# Patient Record
Sex: MALE | Race: OTHER | Employment: OTHER | URBAN - METROPOLITAN AREA
[De-identification: names, ages, dates, MRNs, and addresses within clinical notes are randomized per-mention and may not be internally consistent; named-entity substitution may affect disease eponyms.]

---

## 2017-02-17 PROBLEM — K59.09 CHRONIC CONSTIPATION: Status: ACTIVE | Noted: 2017-02-17

## 2017-02-17 PROBLEM — I25.9 ISCHEMIC HEART DISEASE DUE TO CORONARY ARTERY OBSTRUCTION (HCC): Status: ACTIVE | Noted: 2017-02-17

## 2017-02-17 PROBLEM — I24.0 ISCHEMIC HEART DISEASE DUE TO CORONARY ARTERY OBSTRUCTION (HCC): Status: ACTIVE | Noted: 2017-02-17

## 2017-07-19 DIAGNOSIS — I24.0 ISCHEMIC HEART DISEASE DUE TO CORONARY ARTERY OBSTRUCTION (HCC): ICD-10-CM

## 2017-07-19 DIAGNOSIS — I25.9 ISCHEMIC HEART DISEASE DUE TO CORONARY ARTERY OBSTRUCTION (HCC): ICD-10-CM

## 2017-07-19 LAB
HCT VFR BLD CALC: 28.2 % (ref 40.5–52.5)
HEMOGLOBIN: 9 G/DL (ref 13.5–17.5)
MCH RBC QN AUTO: 28.5 PG (ref 26–34)
MCHC RBC AUTO-ENTMCNC: 32.1 G/DL (ref 31–36)
MCV RBC AUTO: 88.7 FL (ref 80–100)
PDW BLD-RTO: 15.9 % (ref 12.4–15.4)
PLATELET # BLD: 198 K/UL (ref 135–450)
PMV BLD AUTO: 9.4 FL (ref 5–10.5)
RBC # BLD: 3.18 M/UL (ref 4.2–5.9)
WBC # BLD: 7.7 K/UL (ref 4–11)

## 2017-08-17 DIAGNOSIS — N18.31 CHRONIC KIDNEY DISEASE (CKD) STAGE G3A/A1, MODERATELY DECREASED GLOMERULAR FILTRATION RATE (GFR) BETWEEN 45-59 ML/MIN/1.73 SQUARE METER AND ALBUMINURIA CREATININE RATIO LESS THAN 30 MG/G (HCC): ICD-10-CM

## 2017-08-17 DIAGNOSIS — E87.5 HYPERKALEMIA: ICD-10-CM

## 2017-08-17 LAB
ALBUMIN SERPL-MCNC: 4.3 G/DL (ref 3.4–5)
ANION GAP SERPL CALCULATED.3IONS-SCNC: 14 MMOL/L (ref 3–16)
BUN BLDV-MCNC: 41 MG/DL (ref 7–20)
CALCIUM SERPL-MCNC: 9.1 MG/DL (ref 8.3–10.6)
CHLORIDE BLD-SCNC: 108 MMOL/L (ref 99–110)
CO2: 20 MMOL/L (ref 21–32)
CREAT SERPL-MCNC: 3 MG/DL (ref 0.8–1.3)
GFR AFRICAN AMERICAN: 24
GFR NON-AFRICAN AMERICAN: 20
GLUCOSE BLD-MCNC: 106 MG/DL (ref 70–99)
PHOSPHORUS: 3.7 MG/DL (ref 2.5–4.9)
POTASSIUM SERPL-SCNC: 5.1 MMOL/L (ref 3.5–5.1)
SODIUM BLD-SCNC: 142 MMOL/L (ref 136–145)

## 2017-09-13 PROBLEM — Z87.891 FORMER CIGARETTE SMOKER: Status: ACTIVE | Noted: 2017-09-13

## 2017-12-04 PROBLEM — R10.13 EPIGASTRIC PAIN: Status: ACTIVE | Noted: 2017-12-04

## 2017-12-04 PROBLEM — K43.9 VENTRAL HERNIA: Status: ACTIVE | Noted: 2017-12-04

## 2018-09-07 PROBLEM — D63.8 ANEMIA IN OTHER CHRONIC DISEASES CLASSIFIED ELSEWHERE: Status: ACTIVE | Noted: 2018-09-07

## 2018-09-25 DIAGNOSIS — E78.5 HYPERLIPIDEMIA LDL GOAL <70: ICD-10-CM

## 2018-09-25 LAB
ALT SERPL-CCNC: 16 U/L (ref 10–40)
AST SERPL-CCNC: 18 U/L (ref 15–37)
CHOLESTEROL, FASTING: 125 MG/DL (ref 0–199)
HDLC SERPL-MCNC: 55 MG/DL (ref 40–60)
LDL CHOLESTEROL CALCULATED: 46 MG/DL
TRIGLYCERIDE, FASTING: 119 MG/DL (ref 0–150)
VLDLC SERPL CALC-MCNC: 24 MG/DL

## 2018-12-14 PROBLEM — N18.6 ESRD (END STAGE RENAL DISEASE) (HCC): Status: ACTIVE | Noted: 2018-12-14

## 2019-03-21 PROBLEM — N18.6 ESRD (END STAGE RENAL DISEASE) (HCC): Status: RESOLVED | Noted: 2018-12-14 | Resolved: 2019-03-21

## 2019-09-19 DIAGNOSIS — R53.83 FATIGUE, UNSPECIFIED TYPE: ICD-10-CM

## 2019-09-19 LAB — TSH REFLEX FT4: 1.74 UIU/ML (ref 0.27–4.2)

## 2019-09-25 PROBLEM — R53.83 FATIGUE: Status: ACTIVE | Noted: 2019-09-25

## 2019-09-25 PROBLEM — F32.1 CURRENT MODERATE EPISODE OF MAJOR DEPRESSIVE DISORDER WITHOUT PRIOR EPISODE (HCC): Status: ACTIVE | Noted: 2019-09-25

## 2019-09-25 PROBLEM — G47.9 SLEEP DISORDER: Status: ACTIVE | Noted: 2019-09-25

## 2019-10-31 PROBLEM — R35.0 URINARY FREQUENCY: Status: ACTIVE | Noted: 2019-10-31

## 2019-10-31 PROBLEM — R35.1 NOCTURIA: Status: ACTIVE | Noted: 2019-10-31

## 2020-09-03 PROBLEM — R29.6 FREQUENT FALLS: Status: ACTIVE | Noted: 2020-09-03

## 2020-09-03 PROBLEM — R27.0 ATAXIA: Status: ACTIVE | Noted: 2020-09-03

## 2020-09-03 PROBLEM — N18.4 CHRONIC KIDNEY DISEASE, STAGE IV (SEVERE) (HCC): Status: ACTIVE | Noted: 2020-09-03

## 2020-10-05 DIAGNOSIS — N18.31 CHRONIC KIDNEY DISEASE (CKD) STAGE G3A/A1, MODERATELY DECREASED GLOMERULAR FILTRATION RATE (GFR) BETWEEN 45-59 ML/MIN/1.73 SQUARE METER AND ALBUMINURIA CREATININE RATIO LESS THAN 30 MG/G (HCC): ICD-10-CM

## 2020-10-05 DIAGNOSIS — E55.9 VITAMIN D DEFICIENCY: ICD-10-CM

## 2020-10-05 DIAGNOSIS — E78.2 MIXED HYPERLIPIDEMIA: ICD-10-CM

## 2020-10-05 DIAGNOSIS — I10 ESSENTIAL HYPERTENSION: ICD-10-CM

## 2020-10-05 DIAGNOSIS — Z95.5 STENTED CORONARY ARTERY: ICD-10-CM

## 2020-10-05 LAB
A/G RATIO: 1.6 (ref 1.1–2.2)
ALBUMIN SERPL-MCNC: 4.1 G/DL (ref 3.4–5)
ALP BLD-CCNC: 81 U/L (ref 40–129)
ALT SERPL-CCNC: 18 U/L (ref 10–40)
ANION GAP SERPL CALCULATED.3IONS-SCNC: 14 MMOL/L (ref 3–16)
AST SERPL-CCNC: 17 U/L (ref 15–37)
BILIRUB SERPL-MCNC: 0.3 MG/DL (ref 0–1)
BUN BLDV-MCNC: 39 MG/DL (ref 7–20)
CALCIUM SERPL-MCNC: 9.6 MG/DL (ref 8.3–10.6)
CHLORIDE BLD-SCNC: 111 MMOL/L (ref 99–110)
CHOLESTEROL, TOTAL: 120 MG/DL (ref 0–199)
CO2: 20 MMOL/L (ref 21–32)
CREAT SERPL-MCNC: 2.8 MG/DL (ref 0.8–1.3)
GFR AFRICAN AMERICAN: 26
GFR NON-AFRICAN AMERICAN: 21
GLOBULIN: 2.6 G/DL
GLUCOSE BLD-MCNC: 105 MG/DL (ref 70–99)
HDLC SERPL-MCNC: 55 MG/DL (ref 40–60)
LDL CHOLESTEROL CALCULATED: 39 MG/DL
POTASSIUM SERPL-SCNC: 5.8 MMOL/L (ref 3.5–5.1)
SODIUM BLD-SCNC: 145 MMOL/L (ref 136–145)
TOTAL PROTEIN: 6.7 G/DL (ref 6.4–8.2)
TRIGL SERPL-MCNC: 130 MG/DL (ref 0–150)
VITAMIN D 25-HYDROXY: 44 NG/ML
VLDLC SERPL CALC-MCNC: 26 MG/DL

## 2021-03-10 PROBLEM — G89.4 CHRONIC PAIN SYNDROME: Status: ACTIVE | Noted: 2021-03-10

## 2021-06-10 PROBLEM — R00.1 BRADYCARDIA: Status: ACTIVE | Noted: 2021-06-10

## 2021-08-05 PROBLEM — N18.4 ANEMIA IN STAGE 4 CHRONIC KIDNEY DISEASE (HCC): Status: ACTIVE | Noted: 2021-08-05

## 2021-11-05 DIAGNOSIS — I25.9 ISCHEMIC HEART DISEASE DUE TO CORONARY ARTERY OBSTRUCTION (HCC): ICD-10-CM

## 2021-11-05 DIAGNOSIS — I10 ESSENTIAL HYPERTENSION: ICD-10-CM

## 2021-11-05 DIAGNOSIS — E55.9 VITAMIN D DEFICIENCY: ICD-10-CM

## 2021-11-05 DIAGNOSIS — E78.2 MIXED HYPERLIPIDEMIA: ICD-10-CM

## 2021-11-05 DIAGNOSIS — I24.0 ISCHEMIC HEART DISEASE DUE TO CORONARY ARTERY OBSTRUCTION (HCC): ICD-10-CM

## 2021-11-05 LAB
A/G RATIO: 1.5 (ref 1.1–2.2)
ALBUMIN SERPL-MCNC: 4 G/DL (ref 3.4–5)
ALP BLD-CCNC: 78 U/L (ref 40–129)
ALT SERPL-CCNC: 30 U/L (ref 10–40)
ANION GAP SERPL CALCULATED.3IONS-SCNC: 9 MMOL/L (ref 3–16)
AST SERPL-CCNC: 29 U/L (ref 15–37)
BILIRUB SERPL-MCNC: 0.3 MG/DL (ref 0–1)
BUN BLDV-MCNC: 34 MG/DL (ref 7–20)
CALCIUM SERPL-MCNC: 9.2 MG/DL (ref 8.3–10.6)
CHLORIDE BLD-SCNC: 106 MMOL/L (ref 99–110)
CHOLESTEROL, TOTAL: 117 MG/DL (ref 0–199)
CO2: 22 MMOL/L (ref 21–32)
CREAT SERPL-MCNC: 3 MG/DL (ref 0.8–1.3)
GFR AFRICAN AMERICAN: 24
GFR NON-AFRICAN AMERICAN: 20
GLUCOSE BLD-MCNC: 98 MG/DL (ref 70–99)
HDLC SERPL-MCNC: 58 MG/DL (ref 40–60)
LDL CHOLESTEROL CALCULATED: 44 MG/DL
POTASSIUM SERPL-SCNC: 5.6 MMOL/L (ref 3.5–5.1)
SODIUM BLD-SCNC: 137 MMOL/L (ref 136–145)
TOTAL PROTEIN: 6.7 G/DL (ref 6.4–8.2)
TRIGL SERPL-MCNC: 75 MG/DL (ref 0–150)
VITAMIN D 25-HYDROXY: 45.5 NG/ML
VLDLC SERPL CALC-MCNC: 15 MG/DL

## 2022-01-03 ENCOUNTER — TELEPHONE (OUTPATIENT)
Dept: INTERNAL MEDICINE CLINIC | Age: 87
End: 2022-01-03

## 2022-01-03 PROBLEM — E87.5 HYPERKALEMIA: Status: ACTIVE | Noted: 2022-01-03

## 2022-04-04 PROBLEM — G89.29 CHRONIC MIDLINE LOW BACK PAIN: Status: ACTIVE | Noted: 2022-04-04

## 2022-04-04 PROBLEM — M54.50 CHRONIC MIDLINE LOW BACK PAIN: Status: ACTIVE | Noted: 2022-04-04

## 2023-01-05 ENCOUNTER — APPOINTMENT (OUTPATIENT)
Dept: GENERAL RADIOLOGY | Age: 88
DRG: 871 | End: 2023-01-05
Payer: MEDICARE

## 2023-01-05 ENCOUNTER — HOSPITAL ENCOUNTER (INPATIENT)
Age: 88
LOS: 18 days | Discharge: NURSING, REHAB OR LONG-TERM CARE | DRG: 871 | End: 2023-01-23
Attending: EMERGENCY MEDICINE | Admitting: INTERNAL MEDICINE
Payer: MEDICARE

## 2023-01-05 ENCOUNTER — HOSPITAL ENCOUNTER (EMERGENCY)
Age: 88
Discharge: STILL ADMITTED OR ADMINISTRATIVE | DRG: 871 | End: 2023-01-06
Payer: MEDICARE

## 2023-01-05 VITALS
RESPIRATION RATE: 25 BRPM | DIASTOLIC BLOOD PRESSURE: 64 MMHG | BODY MASS INDEX: 24.84 KG/M2 | HEART RATE: 76 BPM | WEIGHT: 154.54 LBS | OXYGEN SATURATION: 93 % | TEMPERATURE: 98 F | SYSTOLIC BLOOD PRESSURE: 131 MMHG | HEIGHT: 66 IN

## 2023-01-05 DIAGNOSIS — Z78.9 IMPAIRED MOBILITY AND ADLS: ICD-10-CM

## 2023-01-05 DIAGNOSIS — N18.9 ACUTE KIDNEY INJURY SUPERIMPOSED ON CKD (HCC): ICD-10-CM

## 2023-01-05 DIAGNOSIS — J15.9 BACTERIAL PNEUMONIA: ICD-10-CM

## 2023-01-05 DIAGNOSIS — J18.9 PNEUMONIA OF RIGHT LUNG DUE TO INFECTIOUS ORGANISM, UNSPECIFIED PART OF LUNG: Primary | ICD-10-CM

## 2023-01-05 DIAGNOSIS — R53.1 GENERAL WEAKNESS: ICD-10-CM

## 2023-01-05 DIAGNOSIS — Z74.09 IMPAIRED MOBILITY AND ADLS: ICD-10-CM

## 2023-01-05 DIAGNOSIS — N17.9 ACUTE KIDNEY INJURY SUPERIMPOSED ON CKD (HCC): ICD-10-CM

## 2023-01-05 DIAGNOSIS — U07.1 COVID-19: ICD-10-CM

## 2023-01-05 PROBLEM — R06.02 SOB (SHORTNESS OF BREATH): Status: ACTIVE | Noted: 2023-01-05

## 2023-01-05 LAB
A/G RATIO: 0.8 (ref 1.1–2.2)
ALBUMIN SERPL-MCNC: 3.2 G/DL (ref 3.4–5)
ALP BLD-CCNC: 74 U/L (ref 40–129)
ALT SERPL-CCNC: 22 U/L (ref 10–40)
ANION GAP SERPL CALCULATED.3IONS-SCNC: 13 MMOL/L (ref 3–16)
AST SERPL-CCNC: 31 U/L (ref 15–37)
BACTERIA: ABNORMAL /HPF
BASOPHILS ABSOLUTE: 0 K/UL (ref 0–0.2)
BASOPHILS RELATIVE PERCENT: 0.1 %
BILIRUB SERPL-MCNC: 0.5 MG/DL (ref 0–1)
BILIRUBIN URINE: NEGATIVE
BLOOD, URINE: ABNORMAL
BUN BLDV-MCNC: 78 MG/DL (ref 7–20)
CALCIUM SERPL-MCNC: 9.2 MG/DL (ref 8.3–10.6)
CHLORIDE BLD-SCNC: 111 MMOL/L (ref 99–110)
CLARITY: CLEAR
CO2: 17 MMOL/L (ref 21–32)
COLOR: YELLOW
CREAT SERPL-MCNC: 4.3 MG/DL (ref 0.8–1.3)
EOSINOPHILS ABSOLUTE: 0 K/UL (ref 0–0.6)
EOSINOPHILS RELATIVE PERCENT: 0 %
EPITHELIAL CELLS, UA: 2 /HPF (ref 0–5)
GFR SERPL CREATININE-BSD FRML MDRD: 12 ML/MIN/{1.73_M2}
GLUCOSE BLD-MCNC: 116 MG/DL (ref 70–99)
GLUCOSE URINE: NEGATIVE MG/DL
HCT VFR BLD CALC: 34.6 % (ref 40.5–52.5)
HEMOGLOBIN: 10.5 G/DL (ref 13.5–17.5)
HYALINE CASTS: 1 /LPF (ref 0–8)
KETONES, URINE: NEGATIVE MG/DL
LACTIC ACID, SEPSIS: 1.2 MMOL/L (ref 0.4–1.9)
LACTIC ACID, SEPSIS: 2.3 MMOL/L (ref 0.4–1.9)
LEUKOCYTE ESTERASE, URINE: ABNORMAL
LYMPHOCYTES ABSOLUTE: 1.2 K/UL (ref 1–5.1)
LYMPHOCYTES RELATIVE PERCENT: 8.3 %
MCH RBC QN AUTO: 28.5 PG (ref 26–34)
MCHC RBC AUTO-ENTMCNC: 30.4 G/DL (ref 31–36)
MCV RBC AUTO: 93.8 FL (ref 80–100)
MICROSCOPIC EXAMINATION: YES
MONOCYTES ABSOLUTE: 0.8 K/UL (ref 0–1.3)
MONOCYTES RELATIVE PERCENT: 5.9 %
NEUTROPHILS ABSOLUTE: 12.1 K/UL (ref 1.7–7.7)
NEUTROPHILS RELATIVE PERCENT: 85.7 %
NITRITE, URINE: NEGATIVE
PDW BLD-RTO: 15.1 % (ref 12.4–15.4)
PH UA: 5.5 (ref 5–8)
PLATELET # BLD: 150 K/UL (ref 135–450)
PMV BLD AUTO: 10 FL (ref 5–10.5)
POTASSIUM REFLEX MAGNESIUM: 5.3 MMOL/L (ref 3.5–5.1)
PROTEIN UA: 100 MG/DL
RAPID INFLUENZA  B AGN: NEGATIVE
RAPID INFLUENZA A AGN: NEGATIVE
RBC # BLD: 3.68 M/UL (ref 4.2–5.9)
RBC UA: 1106 /HPF (ref 0–4)
REASON FOR REJECTION: NORMAL
REJECTED TEST: NORMAL
SARS-COV-2, NAAT: DETECTED
SODIUM BLD-SCNC: 141 MMOL/L (ref 136–145)
SPECIFIC GRAVITY UA: 1.01 (ref 1–1.03)
TOTAL PROTEIN: 7 G/DL (ref 6.4–8.2)
TROPONIN: 0.02 NG/ML
URINE REFLEX TO CULTURE: ABNORMAL
URINE TYPE: ABNORMAL
UROBILINOGEN, URINE: 0.2 E.U./DL
WBC # BLD: 14.2 K/UL (ref 4–11)
WBC UA: 6 /HPF (ref 0–5)

## 2023-01-05 PROCEDURE — 80053 COMPREHEN METABOLIC PANEL: CPT

## 2023-01-05 PROCEDURE — 36415 COLL VENOUS BLD VENIPUNCTURE: CPT

## 2023-01-05 PROCEDURE — 1200000000 HC SEMI PRIVATE

## 2023-01-05 PROCEDURE — 6370000000 HC RX 637 (ALT 250 FOR IP): Performed by: INTERNAL MEDICINE

## 2023-01-05 PROCEDURE — 51798 US URINE CAPACITY MEASURE: CPT

## 2023-01-05 PROCEDURE — 6360000002 HC RX W HCPCS: Performed by: PHYSICIAN ASSISTANT

## 2023-01-05 PROCEDURE — 6360000002 HC RX W HCPCS

## 2023-01-05 PROCEDURE — 84484 ASSAY OF TROPONIN QUANT: CPT

## 2023-01-05 PROCEDURE — 93005 ELECTROCARDIOGRAM TRACING: CPT | Performed by: PHYSICIAN ASSISTANT

## 2023-01-05 PROCEDURE — 87635 SARS-COV-2 COVID-19 AMP PRB: CPT

## 2023-01-05 PROCEDURE — 2580000003 HC RX 258: Performed by: PHYSICIAN ASSISTANT

## 2023-01-05 PROCEDURE — 87804 INFLUENZA ASSAY W/OPTIC: CPT

## 2023-01-05 PROCEDURE — 99285 EMERGENCY DEPT VISIT HI MDM: CPT

## 2023-01-05 PROCEDURE — 96365 THER/PROPH/DIAG IV INF INIT: CPT

## 2023-01-05 PROCEDURE — 83605 ASSAY OF LACTIC ACID: CPT

## 2023-01-05 PROCEDURE — 71045 X-RAY EXAM CHEST 1 VIEW: CPT

## 2023-01-05 PROCEDURE — 2580000003 HC RX 258: Performed by: INTERNAL MEDICINE

## 2023-01-05 PROCEDURE — 96367 TX/PROPH/DG ADDL SEQ IV INF: CPT

## 2023-01-05 PROCEDURE — 85025 COMPLETE CBC W/AUTO DIFF WBC: CPT

## 2023-01-05 PROCEDURE — 87040 BLOOD CULTURE FOR BACTERIA: CPT

## 2023-01-05 PROCEDURE — 81001 URINALYSIS AUTO W/SCOPE: CPT

## 2023-01-05 RX ORDER — SODIUM CHLORIDE 0.9 % (FLUSH) 0.9 %
10 SYRINGE (ML) INJECTION EVERY 12 HOURS SCHEDULED
Status: DISCONTINUED | OUTPATIENT
Start: 2023-01-05 | End: 2023-01-23 | Stop reason: HOSPADM

## 2023-01-05 RX ORDER — ASPIRIN 81 MG/1
81 TABLET ORAL DAILY
Status: DISCONTINUED | OUTPATIENT
Start: 2023-01-06 | End: 2023-01-15

## 2023-01-05 RX ORDER — POLYETHYLENE GLYCOL 3350 17 G/17G
17 POWDER, FOR SOLUTION ORAL DAILY
Status: DISCONTINUED | OUTPATIENT
Start: 2023-01-06 | End: 2023-01-23 | Stop reason: HOSPADM

## 2023-01-05 RX ORDER — PANTOPRAZOLE SODIUM 40 MG/1
40 TABLET, DELAYED RELEASE ORAL
Status: DISCONTINUED | OUTPATIENT
Start: 2023-01-06 | End: 2023-01-09

## 2023-01-05 RX ORDER — ENOXAPARIN SODIUM 100 MG/ML
40 INJECTION SUBCUTANEOUS DAILY
Status: CANCELLED | OUTPATIENT
Start: 2023-01-05

## 2023-01-05 RX ORDER — TRAMADOL HYDROCHLORIDE 50 MG/1
50 TABLET ORAL EVERY 8 HOURS PRN
Status: DISCONTINUED | OUTPATIENT
Start: 2023-01-05 | End: 2023-01-23 | Stop reason: HOSPADM

## 2023-01-05 RX ORDER — 0.9 % SODIUM CHLORIDE 0.9 %
500 INTRAVENOUS SOLUTION INTRAVENOUS ONCE
Status: COMPLETED | OUTPATIENT
Start: 2023-01-05 | End: 2023-01-05

## 2023-01-05 RX ORDER — TRAMADOL HYDROCHLORIDE 50 MG/1
50 TABLET ORAL EVERY 8 HOURS PRN
Status: ON HOLD | COMMUNITY
End: 2023-01-23 | Stop reason: SDUPTHER

## 2023-01-05 RX ORDER — NITROGLYCERIN 0.4 MG/1
0.4 TABLET SUBLINGUAL EVERY 5 MIN PRN
Status: DISCONTINUED | OUTPATIENT
Start: 2023-01-05 | End: 2023-01-23 | Stop reason: HOSPADM

## 2023-01-05 RX ORDER — SODIUM CHLORIDE 9 MG/ML
INJECTION, SOLUTION INTRAVENOUS CONTINUOUS
Status: DISCONTINUED | OUTPATIENT
Start: 2023-01-05 | End: 2023-01-06

## 2023-01-05 RX ORDER — PROMETHAZINE HYDROCHLORIDE 25 MG/1
12.5 TABLET ORAL EVERY 6 HOURS PRN
Status: DISCONTINUED | OUTPATIENT
Start: 2023-01-05 | End: 2023-01-23 | Stop reason: HOSPADM

## 2023-01-05 RX ORDER — ATORVASTATIN CALCIUM 40 MG/1
40 TABLET, FILM COATED ORAL NIGHTLY
Status: DISCONTINUED | OUTPATIENT
Start: 2023-01-05 | End: 2023-01-23 | Stop reason: HOSPADM

## 2023-01-05 RX ORDER — SODIUM CHLORIDE 9 MG/ML
INJECTION, SOLUTION INTRAVENOUS PRN
Status: DISCONTINUED | OUTPATIENT
Start: 2023-01-05 | End: 2023-01-23 | Stop reason: HOSPADM

## 2023-01-05 RX ORDER — ACETAMINOPHEN 650 MG/1
650 SUPPOSITORY RECTAL EVERY 6 HOURS PRN
Status: DISCONTINUED | OUTPATIENT
Start: 2023-01-05 | End: 2023-01-23 | Stop reason: HOSPADM

## 2023-01-05 RX ORDER — ONDANSETRON 2 MG/ML
4 INJECTION INTRAMUSCULAR; INTRAVENOUS EVERY 6 HOURS PRN
Status: DISCONTINUED | OUTPATIENT
Start: 2023-01-05 | End: 2023-01-23 | Stop reason: HOSPADM

## 2023-01-05 RX ORDER — SENNA PLUS 8.6 MG/1
1 TABLET ORAL 2 TIMES DAILY
Status: DISCONTINUED | OUTPATIENT
Start: 2023-01-05 | End: 2023-01-23 | Stop reason: HOSPADM

## 2023-01-05 RX ORDER — MAGNESIUM SULFATE IN WATER 40 MG/ML
2000 INJECTION, SOLUTION INTRAVENOUS PRN
Status: CANCELLED | OUTPATIENT
Start: 2023-01-05

## 2023-01-05 RX ORDER — POTASSIUM CHLORIDE 20 MEQ/1
40 TABLET, EXTENDED RELEASE ORAL PRN
Status: CANCELLED | OUTPATIENT
Start: 2023-01-05

## 2023-01-05 RX ORDER — ESCITALOPRAM OXALATE 10 MG/1
10 TABLET ORAL NIGHTLY
Status: DISCONTINUED | OUTPATIENT
Start: 2023-01-05 | End: 2023-01-23 | Stop reason: HOSPADM

## 2023-01-05 RX ORDER — POTASSIUM CHLORIDE 7.45 MG/ML
10 INJECTION INTRAVENOUS PRN
Status: CANCELLED | OUTPATIENT
Start: 2023-01-05

## 2023-01-05 RX ORDER — CARVEDILOL 3.12 MG/1
3.12 TABLET ORAL 2 TIMES DAILY WITH MEALS
Status: DISCONTINUED | OUTPATIENT
Start: 2023-01-05 | End: 2023-01-15

## 2023-01-05 RX ORDER — ACETAMINOPHEN 325 MG/1
650 TABLET ORAL EVERY 6 HOURS PRN
Status: DISCONTINUED | OUTPATIENT
Start: 2023-01-05 | End: 2023-01-23 | Stop reason: HOSPADM

## 2023-01-05 RX ORDER — TAMSULOSIN HYDROCHLORIDE 0.4 MG/1
0.4 CAPSULE ORAL NIGHTLY
Status: DISCONTINUED | OUTPATIENT
Start: 2023-01-05 | End: 2023-01-23 | Stop reason: HOSPADM

## 2023-01-05 RX ORDER — SODIUM CHLORIDE 0.9 % (FLUSH) 0.9 %
10 SYRINGE (ML) INJECTION PRN
Status: DISCONTINUED | OUTPATIENT
Start: 2023-01-05 | End: 2023-01-23 | Stop reason: HOSPADM

## 2023-01-05 RX ADMIN — ATORVASTATIN CALCIUM 40 MG: 40 TABLET, FILM COATED ORAL at 23:26

## 2023-01-05 RX ADMIN — AZITHROMYCIN MONOHYDRATE 500 MG: 500 INJECTION, POWDER, LYOPHILIZED, FOR SOLUTION INTRAVENOUS at 17:11

## 2023-01-05 RX ADMIN — SODIUM CHLORIDE: 9 INJECTION, SOLUTION INTRAVENOUS at 23:20

## 2023-01-05 RX ADMIN — TAMSULOSIN HYDROCHLORIDE 0.4 MG: 0.4 CAPSULE ORAL at 23:26

## 2023-01-05 RX ADMIN — CARVEDILOL 3.12 MG: 3.12 TABLET, FILM COATED ORAL at 23:26

## 2023-01-05 RX ADMIN — SENNOSIDES 8.6 MG: 8.6 TABLET, FILM COATED ORAL at 23:26

## 2023-01-05 RX ADMIN — CEFTRIAXONE 1000 MG: 1 INJECTION, POWDER, FOR SOLUTION INTRAMUSCULAR; INTRAVENOUS at 16:32

## 2023-01-05 RX ADMIN — SODIUM CHLORIDE 500 ML: 9 INJECTION, SOLUTION INTRAVENOUS at 16:30

## 2023-01-05 RX ADMIN — ESCITALOPRAM OXALATE 10 MG: 10 TABLET ORAL at 23:26

## 2023-01-05 RX ADMIN — SODIUM CHLORIDE, PRESERVATIVE FREE 10 ML: 5 INJECTION INTRAVENOUS at 23:21

## 2023-01-05 ASSESSMENT — PAIN - FUNCTIONAL ASSESSMENT
PAIN_FUNCTIONAL_ASSESSMENT: NONE - DENIES PAIN
PAIN_FUNCTIONAL_ASSESSMENT: NONE - DENIES PAIN

## 2023-01-05 NOTE — ED NOTES
Will be a slight delay in care d/t pt. Having 2 's listed and has had trouble with this for the past 50yrs. Charts may have to be merged.       Tiffany Chung RN  [phone] [facility] Clearance Poll  [phone]

## 2023-01-05 NOTE — PROGRESS NOTES
Medication Reconciliation    List of medications patient is currently taking is complete. Source of information: 1. Conversation with patient's family at bedside                                      2. EPIC records      Allergies  Patient has no known allergies. Notes regarding home medications:   1.  Patient hasn't had medications since yesterday morning      [facility], [facility]   1/5/2023  4:11 PM

## 2023-01-05 NOTE — ED PROVIDER NOTES
[address] [facility]        Pt Name: Zeinab Holland  MRN: [id number]  [place] 7/31/1931  Date of evaluation: 1/5/2023  Provider: Yesenia Gallegos PA-C  PCP: No primary care provider on file. Note Started: 2:35 PM EST 1/5/23       I have seen and evaluated this patient with my supervising physician Mariaa Miranda MD.      [address] [place] [address]       Chief Complaint   Patient presents with    Fatigue     Pt. normally able to ambulate but not at this time and is extremely weak. HISTORY OF PRESENT ILLNESS: 1 or more Elements     History From: patient and son at bedside  Limitations to history : None    Zeinab Holland is a 80 y.o. male who presents with weakness, with new onset unable to ambulate for the last 2 days, cough and congestion, low energy and concern for possible COVID or influenza or pneumonia when consulting with visiting nursing and patient's physician according to the son. No recent fevers or chills. No known infectious disease exposure. No complaint of abdominal pain or vomiting. Positive for low appetite. No extremity one-sided acute loss of sensation or movement or coordination. No confusion or disorientation. However positive for malaise and fatigue. Nursing Notes were all reviewed and agreed with or any disagreements were addressed in the HPI. REVIEW OF SYSTEMS :      Review of Systems  Positive for cough, sometimes productive with mucus, no fevers or chills, no headache or vision change neck pain or stiffness shortness of breath or chest pain. No dizziness or confusion syncope or near syncope. Positive malaise and fatigue, newly too weak to stand and ambulate, no acute one-sided extremity loss of sensation or movement or coordination. Positives and Pertinent negatives as per HPI. SURGICAL HISTORY   No past surgical history on file.     CURRENTMEDICATIONS       Previous Medications    No medications on file ALLERGIES     Patient has no known allergies. FAMILYHISTORY     No family history on file. SOCIAL HISTORY          SCREENINGS        [place] Coma Scale  Eye Opening: To speech  Best Verbal Response: Oriented (With some confusion)  Best Motor Response: Obeys commands  [place] Coma Scale Score: 14                CIWA Assessment  BP: 126/62  Heart Rate: 67           PHYSICAL EXAM  1 or more Elements     ED Triage Vitals [01/05/23 1138]   BP Temp Temp Source Heart Rate Resp SpO2 Height Weight   126/62 98 °F (36.7 °C) Oral 67 24 92 % 5' 6\" (1.676 m) 154 lb 8.7 oz (70.1 kg)       Physical Exam  Vitals and nursing note reviewed. Constitutional:       Appearance: Normal appearance. He is ill-appearing. He is not toxic-appearing or diaphoretic. HENT:      Head: Normocephalic and atraumatic. Right Ear: External ear normal.      Left Ear: External ear normal.      Nose: Nose normal.      Mouth/Throat:      Mouth: Mucous membranes are moist.      Pharynx: No posterior oropharyngeal erythema. Eyes:      General:         Right eye: No discharge. Left eye: No discharge. Conjunctiva/sclera: Conjunctivae normal.   Cardiovascular:      Rate and Rhythm: Normal rate and regular rhythm. Pulses: Normal pulses. Heart sounds: Normal heart sounds. Pulmonary:      Effort: Tachypnea present. No respiratory distress. Breath sounds: Decreased air movement present. Examination of the left-middle field reveals rales. Examination of the left-lower field reveals rales. Rales present. Chest:      Chest wall: No tenderness. Abdominal:      General: There is no distension. Palpations: Abdomen is soft. Tenderness: There is no abdominal tenderness. There is no right CVA tenderness or left CVA tenderness. Musculoskeletal:         General: No swelling or tenderness. Normal range of motion. Cervical back: Normal range of motion and neck supple. No rigidity.       Right lower leg: No edema. Left lower leg: No edema. Comments: Patient moves all extremities equally though with little strength. Distal pulses 2+ bilaterally in radialis and dorsalis pedis. No edema or discoloration. Skin:     General: Skin is warm and dry. Capillary Refill: Capillary refill takes less than 2 seconds. Findings: No rash. Neurological:      Mental Status: He is alert and oriented to person, place, and time. Mental status is at baseline. Motor: Weakness present. Gait: Gait abnormal.   Psychiatric:         Mood and Affect: Mood normal.         Behavior: Behavior normal.       1 view chest x-ray    DIAGNOSTIC RESULTS   LABS:    Labs Reviewed - No data to display    When ordered only abnormal lab results are displayed. All other labs were within normal range or not returned as of this dictation. EKG: When ordered, EKG's are interpreted by the Emergency Department Physician in the absence of a cardiologist.  Please see their note for interpretation of EKG. RADIOLOGY:   Non-plain film images such as CT, Ultrasound and MRI are read by the radiologist. Plain radiographic images are visualized and preliminarily interpreted by the ED Provider with the below findings:    1 view chest x-ray reflects significant right middle and lower airspace increased markings consistent with likely pneumonia with also some increased left-sided streaks and patches per myself in absence of radiologist.    Interpretation per the Radiologist below, if available at the time of this note:    XR CHEST PORTABLE   Final Result   1. Mild bibasilar airspace disease right greater than left. This could   represent atelectasis or pneumonia in the appropriate clinical setting. 2.  There appear to be multiple bilateral pleural based calcified plaques. However, no prior exams are available for comparison. Calcified pleural   plaques can be seen with prior asbestos exposure. Correlate with patient's   history. XR CHEST PORTABLE    Result Date: 1/5/2023  EXAMINATION: ONE XRAY VIEW OF THE CHEST 1/5/2023 12:30 pm COMPARISON: None. HISTORY: ORDERING SYSTEM PROVIDED HISTORY: weakness TECHNOLOGIST PROVIDED HISTORY: Reason for exam:->weakness Reason for Exam: weakness FINDINGS: There appear to be multiple pleural based plaques bilaterally. Mild bibasilar airspace disease. No definite consolidation. No pneumothorax or pleural effusion. Normal cardiomediastinal silhouette     1. Mild bibasilar airspace disease right greater than left. This could represent atelectasis or pneumonia in the appropriate clinical setting. 2.  There appear to be multiple bilateral pleural based calcified plaques. However, no prior exams are available for comparison. Calcified pleural plaques can be seen with prior asbestos exposure. Correlate with patient's history. No results found. PROCEDURES   Unless otherwise noted below, none     Procedures    CRITICAL CARE TIME (.cctime)   none    PAST MEDICAL HISTORY      has a past medical history of CHF (congestive heart failure) ([address] Chronic kidney disease, and Depression. EMERGENCY DEPARTMENT COURSE and DIFFERENTIAL DIAGNOSIS/MDM:   Vitals:    Vitals:    01/05/23 1138   BP: 126/62   Pulse: 67   Resp: 24   Temp: 98 °F (36.7 °C)   TempSrc: Oral   SpO2: 92%   Weight: 154 lb 8.7 oz (70.1 kg)   Height: 5' 6\" (1.676 m)       Patient was given the following medications:  Medications - No data to display      Chronic Conditions affecting care: none additional other than   has a past medical history of CHF (congestive heart failure) ([address] Chronic kidney disease, and Depression. CONSULTS: (Who and What was discussed)  None          Records Reviewed (Source): none    CC/HPI Summary, DDx, ED Course, and Reassessment: This patient presents as above and evaluation and treatment is begun here. Chest x-ray ordered as well as labs right away.   There is significant delay in nursing obtaining and running the labs. However 1 view chest x-ray reflects significant right middle and lower airspace increased markings consistent with likely pneumonia with also some increased left-sided streaks and patches per myself in absence of radiologist.  Patient oxygen saturation at room air at rest is 92%. Unable to ambulate at this time. While the lactic acid levels are ordered as well as blood cultures, IV antibiotic orders now placed for azithromycin and Rocephin secondary to new right-sided pneumonia with weakness and productive cough at home. Disposition Considerations (tests considered but not done, Admit vs D/C, Shared Decision Making, Pt Expectation of Test or Tx.):   3:21 PM At this time patient has had lab orders pending and not yet obtained by nursing for about 2-1/2 hours and they indicate they are still working on it. Dr. Philip Ocasio is also seeing this patient and agrees to manage further ED care and disposition with disposition expected to most likely include inpatient admission. Patient in fair condition at this time. I am the Primary Clinician of Record. FINAL IMPRESSION    Pneumonia of right lung from infectious organism unspecified part of lung  General weakness  Impaired mobility and ADLs    DISPOSITION/PLAN     DISPOSITION        PATIENT REFERRED TO:  No follow-up provider specified.     DISCHARGE MEDICATIONS:  New Prescriptions    No medications on file       DISCONTINUED MEDICATIONS:  Discontinued Medications    No medications on file              (Please note that portions of this note were completed with a voice recognition program.  Efforts were made to edit the dictations but occasionally words are mis-transcribed.)    Marck Mayorga PA-C (electronically signed)        Marck Mayorga PA-C  01/05/23 1044

## 2023-01-05 NOTE — ED PROVIDER NOTES
ED Attending Attestation Note    This patient was seen by the advanced practice provider. I personally saw the patient and performed a substantive portion of the visit including all aspects of the medical decision making. Briefly, 80 y.o. 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 presents with complaints of general fatigue and difficulty ambulating secondary to general weakness. Patient also having shortness of breath as well as a cough. Patient's general fatigue is new onset over the past 2 days. Patient is able to normally ambulate. No documented fevers at home. No nausea or vomiting. No abdominal pain. Has had decreased appetite. No focal numbness or weakness. Focused exam:   Gen: 80 y.o. male, ill-appearing,  HEENT: NCAT. PERRL. EOMI. CV: RRR w/o MRG  Lungs: Decreased breath sounds in the bases bilaterally, Rales in the bases bilaterally, no wheezing, mild tachypnea  Abdomen: Soft, nontender, nondistended. No rebound/guarding. Neuro: GCS 15, cranial nerves II through XII intact, 5/5 strength throughout, light touch sensation grossly intact, no dysarthria, no aphasia, no facial droop, negative test of skew. The Ekg interpreted by me shows  Normal sinus rhythm with a rate of 81, left axis deviation, , QRS 96 and QTc 429, no ST elevations, nonspecific ST changes, Q waves in the inferior leads, no ST depressions, or R wave progression no longer present when compared EKG from 6/14/2022 otherwise no significant changes    MDM:   Patient seen and evaluated. History physical as above. Patient arrives for general fatigue.   No focal deficits on exam.  Patient is alert and does answer some questions. Patient tolerating room air. Found to have COVID-19. Patient also has elevated lactic acid at 2.3 and elevated white count of 14 consistent with sepsis. Patient's initial potassium was hemolyzed and at 7. Repeat potassium still hemolyzed at 5.3. Patient also has RONI with creatinine of 4.3 on top of CKD. Baseline creatinine is around 3. Chest x-ray does show signs of pneumonia worse on the left than the right. Discussed starting patient on antibiotics as well as admission to the hospital.  Patient's son agreeable. Is this patient to be included in the SEP-1 Core Measure due to severe sepsis or septic shock? Yes   SEP-1 CORE MEASURE DATA      Sepsis Criteria   Severe Sepsis Criteria   Septic Shock Criteria     Must be confirmed or suspected to move forward with diagnosis of sepsis. Must meet 2:    [] Temperature > 100.9 F (38.3 C)        or < 96.8 F (36 C)  [] HR > 90  [x] RR > 20  [x] WBC > 12 or < 4 or 10% bands      AND:      [x] Infection Confirmed or        Suspected. Must meet 1:    [x] Lactate > 2       or   [] Signs of Organ Dysfunction:    - SBP < 90 or MAP < 65  - Altered mental status  - Creatinine > 2 or increased from      baseline  - Urine Output < 0.5 ml/kg/hr  - Bilirubin > 2  - INR > 1.5 (not anticoagulated)  - Platelets < 017,390  - Acute Respiratory Failure as     evidenced by new need for NIPPV     or mechanical ventilation      [] No criteria met for Severe Sepsis. Must meet 1:    [] Lactate > 4        or   [] SBP < 90 or MAP < 65 for at        least two readings in the first        hour after fluid bolus        administration      [] Vasopressors initiated (if hypotension persists after fluid resuscitation)        [x] No criteria met for Septic Shock.    Patient Vitals for the past 6 hrs:   BP Temp Pulse Resp SpO2   01/05/23 1634 (!) 143/66 -- 69 -- --   01/05/23 1730 132/68 98 °F (36.7 °C) 81 22 --   01/05/23 1815 131/64 97.6 °F (36.4 °C) 76 25 93 %      Recent Labs     01/05/23  1443 01/05/23  1624   WBC 14.2*  --    CREATININE  --  4.3*   BILITOT  --  0.5     --          Time Severe Sepsis Identified: 4:26 PM EST      Fluid Resuscitation Rational: less than 30mL/kg because of concern for fluid overload and patient/patient advocate made an informed decision to decline more aggressive fluid resuscitation    Repeat lactate level: ordered and pending at this time    Reassessment Exam:   Not applicable. Patient does not have septic shock. CRITICAL CARE  I personally saw the patient and independently provided 32 minutes of non-concurrent critical care out of the total shared critical care time provided. This excludes seperately billable procedures. Critical care time was provided for COVID 19 and severe sepsis that required close evaluation and/or intervention with concern for potential patient decompensation. Final Impression    1. Pneumonia of right lung due to infectious organism, unspecified part of lung    2. General weakness    3. Impaired mobility and ADLs    4. COVID-19    5. Acute kidney injury superimposed on CKD (HCC)        Blood pressure 131/64, pulse 76, temperature 97.6 °F (36.4 °C), temperature source Oral, resp. rate 25, SpO2 93 %. DISPOSITION Decision To Admit 01/05/2023 06:40:22 PM      For further details of the patient's emergency department visit, please see the advanced practice provider's documentation. Fracisco Rome MD     This report has been produced using speech recognition software and may contain errors related to that system including errors in grammar, punctuation, and spelling, as well as words and phrases that may be inappropriate. If there are any questions or concerns please feel free to contact the dictating provider for clarification.         Fracisco Rome MD  01/05/23 9389

## 2023-01-06 LAB
ANION GAP SERPL CALCULATED.3IONS-SCNC: 12 MMOL/L (ref 3–16)
BASOPHILS ABSOLUTE: 0 K/UL (ref 0–0.2)
BASOPHILS RELATIVE PERCENT: 0.1 %
BUN BLDV-MCNC: 71 MG/DL (ref 7–20)
CALCIUM SERPL-MCNC: 8.6 MG/DL (ref 8.3–10.6)
CHLORIDE BLD-SCNC: 113 MMOL/L (ref 99–110)
CO2: 16 MMOL/L (ref 21–32)
CREAT SERPL-MCNC: 3.8 MG/DL (ref 0.8–1.3)
EKG ATRIAL RATE: 81 BPM
EKG DIAGNOSIS: NORMAL
EKG P-R INTERVAL: 168 MS
EKG Q-T INTERVAL: 370 MS
EKG QRS DURATION: 96 MS
EKG QTC CALCULATION (BAZETT): 429 MS
EKG R AXIS: -62 DEGREES
EKG T AXIS: 27 DEGREES
EKG VENTRICULAR RATE: 81 BPM
EOSINOPHILS ABSOLUTE: 0 K/UL (ref 0–0.6)
EOSINOPHILS RELATIVE PERCENT: 0 %
GFR SERPL CREATININE-BSD FRML MDRD: 14 ML/MIN/{1.73_M2}
GLUCOSE BLD-MCNC: 124 MG/DL (ref 70–99)
HCT VFR BLD CALC: 28.9 % (ref 40.5–52.5)
HEMOGLOBIN: 8.8 G/DL (ref 13.5–17.5)
LYMPHOCYTES ABSOLUTE: 0.7 K/UL (ref 1–5.1)
LYMPHOCYTES RELATIVE PERCENT: 5.9 %
MCH RBC QN AUTO: 28.5 PG (ref 26–34)
MCHC RBC AUTO-ENTMCNC: 30.6 G/DL (ref 31–36)
MCV RBC AUTO: 93 FL (ref 80–100)
MONOCYTES ABSOLUTE: 0.6 K/UL (ref 0–1.3)
MONOCYTES RELATIVE PERCENT: 5 %
NEUTROPHILS ABSOLUTE: 10.6 K/UL (ref 1.7–7.7)
NEUTROPHILS RELATIVE PERCENT: 89 %
PDW BLD-RTO: 14.9 % (ref 12.4–15.4)
PLATELET # BLD: 145 K/UL (ref 135–450)
PMV BLD AUTO: 10.1 FL (ref 5–10.5)
POTASSIUM REFLEX MAGNESIUM: 4.8 MMOL/L (ref 3.5–5.1)
RBC # BLD: 3.1 M/UL (ref 4.2–5.9)
SODIUM BLD-SCNC: 141 MMOL/L (ref 136–145)
WBC # BLD: 11.9 K/UL (ref 4–11)

## 2023-01-06 PROCEDURE — 85025 COMPLETE CBC W/AUTO DIFF WBC: CPT

## 2023-01-06 PROCEDURE — 97162 PT EVAL MOD COMPLEX 30 MIN: CPT

## 2023-01-06 PROCEDURE — 97530 THERAPEUTIC ACTIVITIES: CPT

## 2023-01-06 PROCEDURE — 97166 OT EVAL MOD COMPLEX 45 MIN: CPT

## 2023-01-06 PROCEDURE — 97535 SELF CARE MNGMENT TRAINING: CPT

## 2023-01-06 PROCEDURE — 92610 EVALUATE SWALLOWING FUNCTION: CPT

## 2023-01-06 PROCEDURE — 36415 COLL VENOUS BLD VENIPUNCTURE: CPT

## 2023-01-06 PROCEDURE — 2500000003 HC RX 250 WO HCPCS: Performed by: HOSPITALIST

## 2023-01-06 PROCEDURE — 6360000002 HC RX W HCPCS: Performed by: NURSE PRACTITIONER

## 2023-01-06 PROCEDURE — 2580000003 HC RX 258: Performed by: HOSPITALIST

## 2023-01-06 PROCEDURE — 94760 N-INVAS EAR/PLS OXIMETRY 1: CPT

## 2023-01-06 PROCEDURE — 6370000000 HC RX 637 (ALT 250 FOR IP): Performed by: INTERNAL MEDICINE

## 2023-01-06 PROCEDURE — 93010 ELECTROCARDIOGRAM REPORT: CPT | Performed by: INTERNAL MEDICINE

## 2023-01-06 PROCEDURE — 1200000000 HC SEMI PRIVATE

## 2023-01-06 PROCEDURE — 6360000002 HC RX W HCPCS: Performed by: INTERNAL MEDICINE

## 2023-01-06 PROCEDURE — 80048 BASIC METABOLIC PNL TOTAL CA: CPT

## 2023-01-06 PROCEDURE — 2580000003 HC RX 258: Performed by: INTERNAL MEDICINE

## 2023-01-06 RX ORDER — HEPARIN SODIUM 5000 [USP'U]/ML
5000 INJECTION, SOLUTION INTRAVENOUS; SUBCUTANEOUS EVERY 8 HOURS SCHEDULED
Status: DISCONTINUED | OUTPATIENT
Start: 2023-01-06 | End: 2023-01-09

## 2023-01-06 RX ADMIN — ESCITALOPRAM OXALATE 10 MG: 10 TABLET ORAL at 22:15

## 2023-01-06 RX ADMIN — SODIUM CHLORIDE, PRESERVATIVE FREE 10 ML: 5 INJECTION INTRAVENOUS at 22:16

## 2023-01-06 RX ADMIN — SENNOSIDES 8.6 MG: 8.6 TABLET, FILM COATED ORAL at 22:15

## 2023-01-06 RX ADMIN — TAMSULOSIN HYDROCHLORIDE 0.4 MG: 0.4 CAPSULE ORAL at 22:15

## 2023-01-06 RX ADMIN — ATORVASTATIN CALCIUM 40 MG: 40 TABLET, FILM COATED ORAL at 22:15

## 2023-01-06 RX ADMIN — HEPARIN SODIUM 5000 UNITS: 5000 INJECTION INTRAVENOUS; SUBCUTANEOUS at 15:33

## 2023-01-06 RX ADMIN — CEFTRIAXONE 1000 MG: 1 INJECTION, POWDER, FOR SOLUTION INTRAMUSCULAR; INTRAVENOUS at 15:31

## 2023-01-06 RX ADMIN — HEPARIN SODIUM 5000 UNITS: 5000 INJECTION INTRAVENOUS; SUBCUTANEOUS at 22:15

## 2023-01-06 RX ADMIN — Medication: at 12:29

## 2023-01-06 RX ADMIN — CARVEDILOL 3.12 MG: 3.12 TABLET, FILM COATED ORAL at 15:33

## 2023-01-06 RX ADMIN — AZITHROMYCIN MONOHYDRATE 500 MG: 500 INJECTION, POWDER, LYOPHILIZED, FOR SOLUTION INTRAVENOUS at 17:08

## 2023-01-06 ASSESSMENT — PAIN DESCRIPTION - DESCRIPTORS: DESCRIPTORS: ACHING;DISCOMFORT

## 2023-01-06 ASSESSMENT — PAIN DESCRIPTION - ONSET: ONSET: ON-GOING

## 2023-01-06 ASSESSMENT — PAIN DESCRIPTION - FREQUENCY: FREQUENCY: CONTINUOUS

## 2023-01-06 ASSESSMENT — PAIN DESCRIPTION - PAIN TYPE: TYPE: ACUTE PAIN

## 2023-01-06 ASSESSMENT — PAIN DESCRIPTION - LOCATION: LOCATION: ABDOMEN

## 2023-01-06 ASSESSMENT — PAIN - FUNCTIONAL ASSESSMENT: PAIN_FUNCTIONAL_ASSESSMENT: PREVENTS OR INTERFERES SOME ACTIVE ACTIVITIES AND ADLS

## 2023-01-06 ASSESSMENT — PAIN SCALES - GENERAL: PAINLEVEL_OUTOF10: 3

## 2023-01-06 NOTE — ED TRIAGE NOTES
Pt. presents via EMT d/t newly developed weakness and trouble ambulating. Pt.'s son was stating he wasn't acting right.

## 2023-01-06 NOTE — PROGRESS NOTES
Physical Therapy  Facility/Department: [facility]  Physical Therapy Initial Assessment    Name: Larisa Hernández  : 1931  MRN: [id number]  Date of Service: 2023    Discharge Recommendations:  Therapy recommended at discharge, Continue to assess pending progress    Larisa Hernández scored a 15/24 on the AM-PAC short mobility form. Current research shows that an AM-PAC score of 17 or less is typically not associated with a discharge to the patient's home setting. Based on the patient's AM-PAC score and their current functional mobility deficits, it is recommended that the patient have 3-5 sessions per week of Physical Therapy at d/c to increase the patient's independence. Please see assessment section for further patient specific details. If patient discharges prior to next session this note will serve as a discharge summary. Please see below for the latest assessment towards goals. Patient Diagnosis(es): The primary encounter diagnosis was Pneumonia of right lung due to infectious organism, unspecified part of lung. Diagnoses of General weakness, Impaired mobility and ADLs, COVID-19, and Acute kidney injury superimposed on CKD [facility]) were also pertinent to this visit. 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 Past Surgical History:  has a past surgical history that includes Coronary angioplasty with stent (10/18/15); Endoscopy, colon, diagnostic; and Upper gastrointestinal endoscopy (10-). Assessment   Body Structures, Functions, Activity Limitations Requiring Skilled Therapeutic Intervention: Decreased functional mobility ; Decreased ADL status; Decreased strength;Decreased balance;Decreased endurance  Assessment: Patient is a 63-year-old male with past medical history of CAD, CKD stage IV, hyperlipidemia who presents to the hospital on 1/5/23 due to patient having generalized weakness. Pt found to be COVID+. Prior to admission, pt living in apt setting with son, independent with ADLs and ambulation with SPC vs RW. Pt currently functioning below baseline. Recommend continued skilled therapy 3-5x/week. Treatment Diagnosis: impaired mobility  Therapy Prognosis: Fair  Decision Making: Medium Complexity  History: see above  Exam: see below  Clinical Presentation: evolving  Requires PT Follow-Up: Yes  Activity Tolerance  Activity Tolerance: Patient limited by fatigue;Patient limited by endurance     Plan   Physcial Therapy Plan  General Plan: 3-5 times per week  Current Treatment Recommendations: Strengthening, Balance training, Functional mobility training, Transfer training, Gait training, Endurance training, Neuromuscular re-education, Safety education & training, Equipment evaluation, education, & procurement, Patient/Caregiver education & training, Therapeutic activities  Safety Devices  Type of Devices: All fall risk precautions in place, Call light within reach, Gait belt, Patient at risk for falls, Left in chair, Chair alarm in place, Nurse notified     Restrictions  Restrictions/Precautions  Restrictions/Precautions: Fall Risk, Isolation  Position Activity Restriction  Other position/activity restrictions: Covid-19     Subjective   General  Chart Reviewed: Yes  Patient assessed for rehabilitation services?: Yes  Additional Pertinent Hx: Patient is a 63-year-old male with past medical history of CAD, CKD stage IV, hyperlipidemia who presents to the hospital on 1/5/23 due to patient having generalized weakness. Pt found to be COVID+.   Response To Previous Treatment: Not applicable  Family / Caregiver Present: No  Referring Practitioner: Opal Gonzales MD  Referral Date : 01/05/23  Diagnosis: COVID  Follows Commands: Within Functional Limits  Subjective  Subjective: Pt is agreeable to PT - slightly difficult to understand at times due to accent. Social/Functional History  Social/Functional History  Lives With: Son  Type of Home: Apartment  Home Layout: One level  Home Access: Stairs to enter with rails  Entrance Stairs - Number of Steps: 13  Bathroom Shower/Tub: Tub/Shower unit  Bathroom Toilet: Standard  Bathroom Equipment: Grab bars in shower  Home Equipment: Lugenia Constance, Walker, rolling  Has the patient had two or more falls in the past year or any fall with injury in the past year?: Yes  ADL Assistance: Independent  Ambulation Assistance: Independent (RW in apt, SPC in community)  Transfer Assistance: Independent  Active : No  Occupation: Retired  Type of Occupation:     Vision/Hearing  Vision  Vision: Impaired (Reporting poor vision but does not wear glasses)  Hearing  Hearing: Exceptions to [facility]  Hearing Exceptions: Hard of hearing/hearing concerns      Cognition   Orientation  Overall Orientation Status: Impaired  Orientation Level: Oriented to person;Oriented to place  Cognition  Overall Cognitive Status: Exceptions  Arousal/Alertness: Appropriate responses to stimuli  Following Commands:  Follows one step commands with repetition  Attention Span: Difficulty dividing attention  Memory: Decreased recall of recent events;Decreased short term memory  Safety Judgement: Decreased awareness of need for safety;Decreased awareness of need for assistance  Problem Solving: Decreased awareness of errors  Insights: Decreased awareness of deficits  Initiation: Requires cues for some  Sequencing: Requires cues for some     Objective   Bed mobility  Supine to Sit: Contact guard assistance  Sit to Supine: Unable to assess (in recliner at end of session)  Transfers  Sit to Stand: Contact guard assistance;Minimal Assistance  Stand to Sit: Contact guard assistance  Comment: several trials from EOB and toilet. Ambulation  Surface: Level tile  Device: Rolling Walker  Assistance: Contact guard assistance  Gait Deviations: Slow Donna;Decreased step length;Decreased step height  Distance: 10' + 25'  Comments: Pt ambulated to bathroom for BM. Pt also noted to what appeared to be aspirating on liquids - explained to nsg - she reports pt is NPO util speech, however NPO order not in system but speech order is.      Balance  Posture: Fair  Sitting - Static: Good  Sitting - Dynamic: Good  Standing - Static: Fair  Standing - Dynamic: Fair           AM-PAC Score  AM-PAC Inpatient Mobility Raw Score : 15 (01/06/23 0927)  AM-PAC Inpatient T-Scale Score : 39.45 (01/06/23 0927)  Mobility Inpatient CMS 0-100% Score: 57.7 (01/06/23 0927)  Mobility Inpatient CMS G-Code Modifier : CK (01/06/23 0927)          Goals  Short Term Goals  Time Frame for Short Term Goals: by acute discharge  Short Term Goal 1: bed mobility SBA  Short Term Goal 2: sit<>stand with SBA  Short Term Goal 3: ambulate > 30' with RW and SBA  Patient Goals   Patient Goals : none stated       Education  Patient Education  Education Given To: Patient  Education Provided: Role of Therapy;Plan of Care  Education Method: Verbal  Barriers to Learning: None  Education Outcome: Verbalized understanding;Continued education needed      Therapy Time   Individual Concurrent Group Co-treatment   Time In 0830         Time Out 0910         Minutes 40         Timed Code Treatment Minutes: 25 Minutes       Theodora Pardo, PT

## 2023-01-06 NOTE — CONSULTS
Nephrology ([facility]) Consult Note    Jairo Mueller is a 80 y.o. male whom we were asked to see for RONI on CKD. I see him in the office, last 08/22 with eGFR 18 ml/min. He presents with weakness and dyspnea. He was diagnosed with Covid-19. Patient is confused and unable to provide a history. This is clearly different from baseline. Nurse informs me that he has fallen recent and lost some teeth. Neither he nor his son called the office to inform me of his clinical deterioration. Past Medical History:  CKD4   - sees me in office  hyperkalemia  CAD  HLP  BPH  failure to thrive      Review of System:  Otherwise unremarkable    Allergies:  Patient has no known allergies. Medications:  Current medications reviewed. Social History:  former tobacco  Family Medical History:  Negative for kidney disase    Physical Exam:  Blood pressure 119/60, pulse (!) 120, temperature 97.5 °F (36.4 °C), temperature source Oral, resp. rate 19, height 5' 6\" (1.676 m), weight 143 lb 1.3 oz (64.9 kg), SpO2 94 %.     General:  NAD, confused, ill-appearing, normal body habitus  HEENT:  PERRL, EOMI  Neck:  Supple, normal range of movement,   Chest:  CTAB, good respiratory effort, good air movement  CV:  RRR, no murmurs or rubs, no carotid bruit, no abdominal bruits  Abdomen:  NTND, soft, +BS, no hepatosplenomegaly  Extremities:  1+ edema  Neurological:  N/A  Lymphatics:  No palpable lymph nodes  Skin:  No rash, no jaundice  Psychiatric:  N/A    Laboratory Studies:  Lab Results   Component Value Date/Time     01/06/2023 07:16 AM    K 4.8 01/06/2023 07:16 AM     (H) 01/06/2023 07:16 AM    CO2 16 (L) 01/06/2023 07:16 AM    BUN 71 (H) 01/06/2023 07:16 AM    CREATININE 3.8 (H) 01/06/2023 07:16 AM    CALCIUM 8.6 01/06/2023 07:16 AM    PHOS 3.4 04/30/2021 02:32 PM    MG 2.20 06/18/2019 10:11 PM     Lab Results   Component Value Date/Time    WBC 11.9 (H) 01/06/2023 07:16 AM    HGB 8.8 (L) 01/06/2023 07:16 AM    HCT 28.9 (L) 01/06/2023 07:16 AM     01/06/2023 07:16 AM       Assessment/Plan:  Reviewed old records and labs.      1) 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 CKD progression vs. pre-renal  - check urine studies  - I would be concerned that the weakness and failure to thrive may indicate lack of sufficient renal function, and although we have previously discussed dialysis as an option if his CKD progressed, I have reservations about the appropriateness of dialysis is his current situation with clinical decline    2) Covid-19  - not requiring O2    3) PNA  - on empiric ceftriaxone and azithromycin    4) FEN  - metabolic acidosis   - on NaHCO3 gtt  - failure to thrive   - encouraged PO intake    5) anemia  - check iron studies    6) AMS  - clinical decline and will need to see if there is improvement over the weekend  - may need palliative care consult  - he has previously discussed that he has \"lived a good life\" and lived [facility] enough\"

## 2023-01-06 NOTE — PROGRESS NOTES
Hospital Medicine Progress Note      Admit Date: 1/5/2023       CC: F/U for generalized weakness    HPI: Patient is a 68-year-old male with past medical history of CAD, CKD stage IV, hyperlipidemia who presents to the hospital due to patient having generalized weakness. Patient also has poor appetite, fatigue as well as generalized weakness. Patient does not have any recent sick contacts, no reported fevers chills. No reported nausea vomiting diarrhea. Interval History/Subjective: pt sleeping on my exam. Dark tea-colored urine in bag. Getting IVF  Lungs sound clear. Review of Systems:       The patient denied headaches, visual changes, LOC, SOB, CP, ABD pain, N/V/D, skin changes, new or worsening weakness or neuromuscular deficits. Comprehensive ROS negative except as mentioned above. Past Medical History:        Diagnosis Date    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 chronic kidney disease ([facility] [address] 8/5/2021    Back pain 12/17/2013    BPH (benign prostatic hyperplasia) 12/17/2013    CAD (coronary artery disease)     CAD (coronary artery disease) 12/17/2013    Constipated 12/17/2013    Hyperlipidemia     MI (myocardial infarction) ([facility] [address] 10/18/15    Spinal stenosis 12/17/2013    Stented coronary artery 12/17/2013    Urinary hesitancy     Vitamin D deficiency 12/23/2013       Past Surgical History:        Procedure Laterality Date    CORONARY ANGIOPLASTY WITH STENT PLACEMENT  10/18/15    ENDOSCOPY, COLON, DIAGNOSTIC      UPPER GASTROINTESTINAL ENDOSCOPY  10-       Allergies:  Patient has no known allergies. Past medical and surgical history reviewed. Any changes have been noted.      PHYSICAL EXAM:  /63   Pulse 77   Temp 99.5 °F (37.5 °C) (Oral)   Resp 22   Ht 5' 6\" (1.676 m)   Wt 143 lb 1.3 oz (64.9 kg)   SpO2 90%   BMI 23.09 kg/m²       Intake/Output Summary (Last 24 hours) at 1/6/2023 1032  Last data filed at 1/6/2023 0634  Gross per 24 hour   Intake 700 ml   Output 950 ml   Net -250 ml General appearance:   No apparent distress, appears stated age. Cooperative. HEENT:  Normocephalic, atraumatic. PERRLA. EOMi. Conjunctivae/corneas clear, no icterus, non-injected. Neck: Supple, with full range of motion. No jugular venous distention. Trachea midline. Respiratory:  Normal respiratory effort. Clear to auscultation, bilaterally without Rales/Wheezes/Rhonchi. Cardiovascular:  Regular rate and rhythm without murmurs, rubs or gallops. Abdomen: Soft, non-tender, non-distended, without rebound or guarding. Normal bowel sounds. : aldrich in place with tea-colored urine   Musculoskeletal:  No clubbing, cyanosis or edema bilaterally. Full range of motion without deformity. Skin: Skin color, texture, turgor normal.  No rashes or lesions. Neurologic:  Neurovascularly intact without any focal sensory/motor deficits. Cranial nerves: II-XII intact, grossly intact. No facial asymmetry, tongue midline. Psychiatric:  Alert and oriented, thought content appropriate  Capillary Refill: Brisk,< 3 seconds   Peripheral Pulses: +2 palpable, equal bilaterally       LABS:    Lab Results   Component Value Date    WBC 11.9 (H) 01/06/2023    HGB 8.8 (L) 01/06/2023    HCT 28.9 (L) 01/06/2023    MCV 93.0 01/06/2023     01/06/2023    LYMPHOPCT 5.9 01/06/2023    RBC 3.10 (L) 01/06/2023    MCH 28.5 01/06/2023    MCHC 30.6 (L) 01/06/2023    RDW 14.9 01/06/2023       Lab Results   Component Value Date    CREATININE 3.8 (H) 01/06/2023    BUN 71 (H) 01/06/2023     01/06/2023    K 4.8 01/06/2023     (H) 01/06/2023    CO2 16 (L) 01/06/2023       Lab Results   Component Value Date/Time    MG 2.20 06/18/2019 10:11 PM       Lab Results   Component Value Date    ALT 22 01/05/2023    AST 31 01/05/2023    ALKPHOS 74 01/05/2023    BILITOT 0.5 01/05/2023        No flowsheet data found. No results found for: LABA1C    Imaging:  XR CHEST 1 VIEW   Final Result   Ill-defined opacities bilaterally. Scheduled and prn Medications:    Scheduled Meds:   sodium chloride flush  10 mL IntraVENous 2 times per day    azithromycin  500 mg IntraVENous Q24H    cefTRIAXone (ROCEPHIN) IV  1,000 mg IntraVENous Q24H    aspirin EC  81 mg Oral Daily    atorvastatin  40 mg Oral Nightly    carvedilol  3.125 mg Oral BID WC    escitalopram  10 mg Oral Nightly    pantoprazole  40 mg Oral QAM AC    polyethylene glycol  17 g Oral Daily    senna  1 tablet Oral BID    tamsulosin  0.4 mg Oral Nightly     Continuous Infusions:   sodium chloride      sodium chloride 75 mL/hr at 01/05/23 2320     PRN Meds:.sodium chloride flush, sodium chloride, promethazine **OR** ondansetron, acetaminophen **OR** acetaminophen, nitroGLYCERIN, traMADol    Assessment & Plan:        Right lung pneumonia suspected gram-positive pneumonia  - CXR: ill-defined opacities bilaterally   - Gentle IV fluid therapy  - Check blood cultures  - Started on azithromycin, Rocephin  - Check procalcitonin, urine Legionella antigen, urine strep antigen test    Generalized weakness, likely due to pneumonia and UTI   - cont to monitor and treat  - PT/OT evals  - placement if needed   - Difficulty ambulation  - 80 yr old with full code  - palliative care consult for code status      Hyperkalemia  - replace per protocol  - daily BMP     Lactic acidosis- normalized  - IVF  - trend - can stop    Sepsis present on admission  - likely due to pneumonia and UTI  - follow blood cultures  - treat PNA and UTI with antibx  - VSS, Tmax 99.5,+ leukocytosis       COVID-19 infection  - not requiring O2       CKD stage IV  - monitor daily BMP  - baseline Cr 3  - IVF NS @ 75/hr  - urine with + U/A with trace leuks and only 6 WBC on micro   - consult Nephrology - Dr. Dalila Justin already consulted    CAD  - reordered home meds   - trop elev in setting of CKD   - denies chest pain   - EKG NSR, rate 81, , QRS 96, QTc 429, no ST elevation/ectopy    Anemia, chronic  - monitor H/H   - stable Bacteruria  - urine with + U/A with trace leuks and only 6 WBC on micro   - rocephin  - follow cx   Continue current regimen/therapies. Monitor. Adjust medical regimen as appropriate. Body mass index is 23.09 kg/m². The patient and / or the family were informed of the results of any tests, a time was given to answer questions, a plan was proposed and they agreed with plan. DVT ppx: heparin      Diet: ADULT DIET;  Regular    Consults:  IP CONSULT TO NEPHROLOGY    DISPO/placement plan: pending - SNF on d/c when ready    Code Status: Full Code      LISSET Meier - CNP  01/06/23

## 2023-01-06 NOTE — H&P
Hospital Medicine History & Physical      PCP: Marylin Galeano MD    Date of Admission: 1/5/2023    Chief Complaint:  No chief complaint on file. History Of Present Illness  Patient is a 19-year-old male with past medical history of CAD, CKD stage IV, hyperlipidemia who presents to the hospital due to patient having generalized weakness. Patient also has poor appetite, fatigue as well as generalized weakness. Patient does not have any recent sick contacts, no reported fevers chills. No reported nausea vomiting diarrhea. Past Medical History:          Diagnosis Date    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 chronic kidney disease ([facility] [address] 8/5/2021    Back pain 12/17/2013    BPH (benign prostatic hyperplasia) 12/17/2013    CAD (coronary artery disease)     CAD (coronary artery disease) 12/17/2013    Constipated 12/17/2013    Hyperlipidemia     MI (myocardial infarction) ([facility] [address] 10/18/15    Spinal stenosis 12/17/2013    Stented coronary artery 12/17/2013    Urinary hesitancy     Vitamin D deficiency 12/23/2013       Past Surgical History:          Procedure Laterality Date    CORONARY ANGIOPLASTY WITH STENT PLACEMENT  10/18/15    ENDOSCOPY, COLON, DIAGNOSTIC      UPPER GASTROINTESTINAL ENDOSCOPY  10-       Medications Prior to Admission:      Prior to Admission medications    Medication Sig Start Date End Date Taking? Authorizing Provider   traMADol (ULTRAM) 50 MG tablet Take 50 mg by mouth every 8 hours as needed for Pain.    Yes Historical Provider, MD   solifenacin (VESICARE) 5 MG tablet TAKE 1 TABLET BY MOUTH DAILY 12/5/22   Christopher Ledezma MD   atorvastatin (LIPITOR) 40 MG tablet TAKE 1 TABLET BY MOUTH DAILY 11/17/22   Christopher Ledezma MD   tamsulosin (FLOMAX) 0.4 MG capsule TAKE 1 CAPSULE BY MOUTH EVERY DAY  Patient taking differently: Take 0.4 mg by mouth at bedtime 8/19/22   Christopher Ledezma MD   carvedilol (COREG) 3.125 MG tablet TAKE 1 TABLET BY MOUTH TWICE DAILY 8/19/22   Christopher Ledezma MD omeprazole (PRILOSEC) 40 MG delayed release capsule TAKE 1 CAPSULE BY MOUTH DAILY 8/19/22   Becky Logan MD   escitalopram (LEXAPRO) 10 MG tablet TAKE 1 TABLET BY MOUTH DAILY  Patient taking differently: Take 10 mg by mouth at bedtime 5/23/22   Becky Logan MD   senna (SENOKOT) 8.6 MG tablet Take 1 tablet by mouth 2 times daily 4/4/22 4/4/23  Becky Logan MD   dorzolamide-timolol (COSOPT) 22.3-6.8 MG/ML ophthalmic solution INSTILL 1 DROP IN BOTH EYES TWICE DAILY 12/20/21   Historical Provider, MD   polyethylene glycol (GLYCOLAX) 17 GM/SCOOP powder Take 17 g by mouth 2 times daily MIX 17 GRAMS IN WATER AND DRINK DAILY  Patient taking differently: Take 17 g by mouth daily MIX 17 GRAMS IN WATER AND DRINK DAILY 10/5/21   Becky Logan MD   nitroGLYCERIN (NITROSTAT) 0.4 MG SL tablet Place 1 tablet under the tongue every 5 minutes as needed for Chest pain 10/23/20   Helen Rubio MD   aspirin EC 81 MG EC tablet Take 1 tablet by mouth daily 9/4/20   Becky Logan MD   Multiple Vitamins-Minerals (ICAPS AREDS 2 PO) Take 1 tablet by mouth daily    Historical Provider, MD   patiromer sorbitex calcium (VELTASSA) 8.4 g PACK packet Take 8.4 g by mouth daily     Historical Provider, MD       Allergies:  Patient has no known allergies. Social History:      TOBACCO:   reports that he quit smoking about 9 years ago. His smoking use included cigarettes. He smoked an average of .5 packs per day. He has never used smokeless tobacco.  ETOH:   reports no history of alcohol use. Family History:       Reviewed in detail and non contributory          Problem Relation Age of Onset    Heart Disease Mother        REVIEW OF SYSTEMS:   Pertinent positives as noted in the HPI. All other systems reviewed and negative. PHYSICAL EXAM PERFORMED:    /62   Pulse 70   Temp 97.6 °F (36.4 °C) (Oral)   Resp 23   SpO2 95%     General appearance:  No apparent distress, cooperative.   HEENT:  Normal cephalic, atraumatic without obvious deformity. Conjunctivae/corneas clear. Neck: Supple, with full range of motion. No cervical lymphadenopathy  Respiratory:  Normal respiratory effort. Clear to auscultation, bilaterally without Rales/Wheezes/Rhonchi. Cardiovascular:  Regular rate and rhythm with normal S1/S2 without murmurs, rubs or gallops. Abdomen: Soft, non-tender, non-distended, normal bowel sounds. Musculoskeletal:  No edema noted bilaterally. No tenderness on palpation   Skin: no rash visible  Neurologic:  moving all his extremities spontaneously  Psychiatric:  Alert and oriented x0, he is somnolent, normal mood  Peripheral Pulses: +2 palpable, equal bilaterally       Labs:     Recent Labs     01/05/23  1443   WBC 14.2*   HGB 10.5*   HCT 34.6*        Recent Labs     01/05/23  1624      K 5.3*   *   CO2 17*   BUN 78*   CREATININE 4.3*   CALCIUM 9.2     Recent Labs     01/05/23  1624   AST 31   ALT 22   BILITOT 0.5   ALKPHOS 74     No results for input(s): INR in the last 72 hours. Recent Labs     01/05/23  1624   TROPONINI 0.02*       Urinalysis:      Lab Results   Component Value Date/Time    NITRU Negative 01/05/2023 06:49 PM    WBCUA 6 01/05/2023 06:49 PM    BACTERIA None Seen 01/05/2023 06:49 PM    RBCUA 1106 01/05/2023 06:49 PM    BLOODU LARGE 01/05/2023 06:49 PM    SPECGRAV 1.015 01/05/2023 06:49 PM    GLUCOSEU Negative 01/05/2023 06:49 PM       Radiology:       No orders to display           Active Hospital Problems    Diagnosis Date Noted    SOB (shortness of breath) [R06.02] 01/05/2023     Priority: Medium       Patient is a 59-year-old female with past medical history of CAD, CKD stage IV, hyperlipidemia who presents to the hospital due to patient having generalized weakness. Patient also has poor appetite, fatigue as well as generalized weakness. Patient does not have any recent sick contacts, no reported fevers chills.   No reported nausea vomiting diarrhea. Assessment  Right lung pneumonia suspected gram-positive pneumonia  Generalized weakness  Difficulty ambulation  Hyperkalemia  Lactic acidosis  Sepsis present on admission  COVID-19 infection  CKD stage IV  CAD    Plan  Started on azithromycin, Rocephin  Gentle IV fluid therapy  Check blood cultures  Check urine culture  Check procalcitonin, urine Legionella antigen, urine strep antigen test  Patient is not requiring oxygen at this time, holding off of his steroids  Monitor lactic acid  Consult palliative care  Monitor and replace electrolytes  DVT prophylaxis-Heparin  Diet: No diet orders on file  Code Status: Prior    PT/OT Eval Status: ordered    Dispo - pending clinical improvement       Abbey Aldana MD    The note was completed using EMR and Dragon dictation system. Every effort was made to ensure accuracy; however, inadvertent computerized transcription errors may be present. Thank you Emy Mccoy MD for the opportunity to be involved in this patient's care. If you have any questions or concerns please feel free to contact me at [phone].     Abbey Aldana MD

## 2023-01-06 NOTE — CARE COORDINATION
INITIAL CASE MANAGEMENT ASSESSMENT    Met with patient to assess possible discharge needs. Explained Case Management role/services. Living Situation: Lives with his son in an apartment with [address]. ADLs: Independent     DME: Cane, rolling walker, grab bars in shower. PT/OT Recs: scored a 15/24 on the AM-PAC short mobility form. Current research shows that an AM-PAC score of 17 or less is typically not associated with a discharge to the patient's home setting. Based on the patient's AM-PAC score and their current functional mobility deficits, it is recommended that the patient have 3-5 sessions per week of Physical Therapy at d/c to increase the patient's independence. OT scored a 16/24 on the AM-PAC ADL Inpatient form. Current research shows that an AM-PAC score of 17 or less is typically not associated with a discharge to the patient's home setting. Based on the patient's AM-PAC score and their current ADL deficits, it is recommended that the patient have 3-5 sessions per week of Occupational Therapy at d/c to increase the patient's independence. Active Services: N/A     Transportation: Not an active . Family provides transportation     Medications: [facility]    PCP: August Cisneros MD      HD/PD: N/A    PLAN/COMMENTS: Discharge plan to be determined. Patient is covid +. Recommendations at this time by therapy is to a skilled nursing facility. Insurance is [employer]. Will require precert. Provided contact information for patient or family to call with any questions. Will follow and assist as needed.     Pamela LANE RN  Case Management  [phone]    Electronically signed by Pamela Harp RN on 1/6/2023 at 6:31 PM

## 2023-01-06 NOTE — PROGRESS NOTES
Occupational Therapy  Facility/Department: [facility]  Occupational Therapy Initial Assessment    Name: Vonnie Wagner  : 1931  MRN: [id number]  Date of Service: 2023    Discharge Recommendations:  3-5 sessions per week, Patient would benefit from continued therapy after discharge     Vonnie Wagner scored a 16/24 on the AM-PAC ADL Inpatient form. Current research shows that an AM-PAC score of 17 or less is typically not associated with a discharge to the patient's home setting. Based on the patient's AM-PAC score and their current ADL deficits, it is recommended that the patient have 3-5 sessions per week of Occupational Therapy at d/c to increase the patient's independence. Please see assessment section for further patient specific details. If patient discharges prior to next session this note will serve as a discharge summary. Please see below for the latest assessment towards goals. Patient Diagnosis(es): The primary encounter diagnosis was Pneumonia of right lung due to infectious organism, unspecified part of lung. Diagnoses of General weakness, Impaired mobility and ADLs, COVID-19, and Acute kidney injury superimposed on CKD [facility]) were also pertinent to this visit. 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 Past Surgical History:  has a past surgical history that includes Coronary angioplasty with stent (10/18/15); Endoscopy, colon, diagnostic; and Upper gastrointestinal endoscopy (10-). Treatment Diagnosis: Decreased: ADLs, functional transfers/mobility      Assessment   Performance deficits / Impairments: Decreased functional mobility ; Decreased ADL status; Decreased endurance;Decreased balance;Decreased cognition;Decreased safe awareness;Decreased strength  Assessment: Pt is a 81 yo M who was admitted with weakness, SOB, found to have covid-19. PTA, pt lives at home w/ his son where he is typically independent in self-care and functional mobility using RW vs SPC. He is below baseline today 2/2 the above deficits, and required CGA for functional transfers and mobility using RW and mod/max A for toileting. Will continue to see on acute in order to address the above deficits and maximize pt's functional performance. Based on performance today, would recommend ongoing skilled OT 3-5x/week at d/c to increase his safety, independence, and promote return to PLOF. Will continue to assess progress for possible d/c home. Treatment Diagnosis: Decreased: ADLs, functional transfers/mobility  Prognosis: Fair  Decision Making: Medium Complexity  REQUIRES OT FOLLOW-UP: Yes  Activity Tolerance  Activity Tolerance: Patient Tolerated treatment well        Plan   Occupational Therapy Plan  Times Per Week: 3-5  Times Per Day: Once a day  Current Treatment Recommendations: Strengthening, ROM, Balance training, Functional mobility training, Endurance training, Self-Care / ADL, Safety education & training     Restrictions  Restrictions/Precautions  Restrictions/Precautions: Fall Risk, Isolation  Position Activity Restriction  Other position/activity restrictions: Covid-19    Subjective   General  Chart Reviewed: Yes  Patient assessed for rehabilitation services?: Yes  Additional Pertinent Hx: per H&P: \"Patient is a 77-year-old male with past medical history of CAD, CKD stage IV, hyperlipidemia who presents to the hospital due to patient having generalized weakness. Patient also has poor appetite, fatigue as well as generalized weakness.   Patient does not have any recent sick contacts, no reported fevers chills\"  Family / Caregiver Present: No  Referring Practitioner: Shay  Diagnosis: SOB  Subjective  Subjective: Pt met b/s for OT eval/tx w/ PT. Pt in bed, agreeable to therapy. Denied pain     Social/Functional History  Social/Functional History  Lives With: Son  Type of Home: Apartment  Home Layout: One level  Home Access: Stairs to enter with rails  Entrance Stairs - Number of Steps: 13  Bathroom Shower/Tub: Tub/Shower unit  Bathroom Toilet: Standard  Bathroom Equipment: Grab bars in shower  Home Equipment: Angelina Aniyah, rolling  Has the patient had two or more falls in the past year or any fall with injury in the past year?: Yes  ADL Assistance: Independent  Ambulation Assistance: Independent (RW in apt, SPC in community)  Transfer Assistance: Independent  Active : No  Occupation: Retired  Type of Occupation:        Objective   Safety Devices  Type of Devices: All fall risk precautions in place;Call light within reach;Gait belt;Patient at risk for falls; Left in chair;Chair alarm in place;Nurse notified     Toilet Transfers  Toilet - Technique: Ambulating (RW)  Equipment Used: Grab bars  Toilet Transfer: Minimal assistance  Toilet Transfers Comments: min A to guide hips to toilet  AROM: Generally decreased, functional  Strength: Generally decreased, functional  ADL  Feeding Skilled Clinical Factors: Breakfast tray outside room - brought in and set up for pt. Pt began drinking water/juice, reports he has been having difficulty swalling x1 week. Pt noted to be coughing on liquids - could not follow commands to perform chin tuck. Discussed w/ RN who reports pt is supposed to be NPO pending speech consult - this is not in the chart anywhere but did remove pt's breakfast tray from him  Toileting: Moderate assistance;Maximum assistance  Toileting Skilled Clinical Factors: Tien.  Pt had loose BM from the commode, he completed hygiene w/ min A to ensure good quality and required max A for briefs up/down  Additional Comments: Anticipate pt would require setup for grooming, min A UB bathing/dressing, max A LB bathing/dressing based on ROM, strength, endurance, cognition this session     Activity Tolerance  Activity Tolerance: Patient limited by fatigue;Patient limited by endurance  Bed mobility  Supine to Sit: Stand by assistance  Sit to Supine: Unable to assess (in recliner at end of session)  Transfers  Sit to stand: Contact guard assistance  Stand to sit: Contact guard assistance  Transfer Comments: RW. Pt completed functional mobility from bed > BR > recliner CGA using RW, unsteady at times and required min/mod cues for RW safety  Vision  Vision: Impaired (Reporting poor vision but does not wear glasses)  Hearing  Hearing: Exceptions to [facility]  Hearing Exceptions: Hard of hearing/hearing concerns  Cognition  Overall Cognitive Status: Exceptions  Arousal/Alertness: Appropriate responses to stimuli  Following Commands: Follows one step commands with repetition  Attention Span: Difficulty dividing attention  Memory: Decreased recall of recent events;Decreased short term memory  Safety Judgement: Decreased awareness of need for safety;Decreased awareness of need for assistance  Problem Solving: Decreased awareness of errors  Insights: Decreased awareness of deficits  Initiation: Requires cues for some  Sequencing: Requires cues for some  Orientation  Overall Orientation Status: Impaired  Orientation Level: Oriented to person;Oriented to place                  Education Given To: Patient  Education Provided: Role of Therapy; ADL Adaptive Strategies;Transfer Training;Equipment  Education Method: Verbal;Demonstration  Barriers to Learning: Cognition  Education Outcome: Continued education needed        AM-PAC Score  AM-[facility] Inpatient Daily Activity Raw Score: 16 (01/06/23 0921)  AM-PAC Inpatient ADL T-Scale Score : 35.96 (01/06/23 0921)  ADL Inpatient CMS 0-100% Score: 53.32 (01/06/23 0921)  ADL Inpatient CMS G-Code Modifier : CK (01/06/23 0753)    Goals  Short Term Goals  Time Frame for Short Term Goals: Prior to d/c  Short Term Goal 1: Pt will complete ADL transfers w/ supervision  Short Term Goal 2: Pt will toilet w/ supervision  Short Term Goal 3: Pt will bathe w/ min A  Short Term Goal 4: Pt will groom in stance at sink w/ supervision  Short Term Goal 5: Pt will complete 10 reps UE therex in all planes to increase strength/endurance for ADLs  Long Term Goals  Time Frame for Long Term Goals : LTG=STG  Patient Goals   Patient goals : to go home       Therapy Time   Individual Concurrent Group Co-treatment   Time In 0830         Time Out 0910         Minutes 40         Timed Code Treatment Minutes: [address], OTR/L [address]

## 2023-01-06 NOTE — PROGRESS NOTES
Facility/Department: [address] MED SURG  Initial Assessment  DYSPHAGIA BEDSIDE SWALLOW EVALUATION     Patient: Zulma Cruz   : 1931   MRN: [id number]      Evaluation Date: 2023   Admitting Diagnosis:   SOB (shortness of breath) [R06.02]  General weakness [R53.1]  Impaired mobility and ADLs [Z74.09, Z78.9]  Pneumonia of right lung due to infectious organism, unspecified part of lung [J18.9]  Acute kidney injury superimposed on CKD ([facility] [address] [N17.9, N18.9]  COVID-19 [U07.1]    Pain: Denies                                                       CHART REVIEW:  2023 admitted with c/o generalized weakness, fatigue, poor appetite  MD ADMISSION H&P HPI:  Patient is a 57-year-old male with past medical history of CAD, CKD stage IV, hyperlipidemia who presents to the hospital due to patient having generalized weakness. Patient also has poor appetite, fatigue as well as generalized weakness. Patient does not have any recent sick contacts, no reported fevers chills. No reported nausea vomiting diarrhea. 2023 COVID +    IMAGING:  CXR: 2023  Impression   Ill-defined opacities bilaterally. Current Diet Level (prior to evaluation): Regular texture diet  Thin liquids    Respiratory Status:   [x]Room Air   []O2 via nasal cannula   []Other:    Reason for referral: SLP evaluation orders received due to pt/family reports of trouble swallowing     History/Prior Level of Function:   Living Status: admitted from home  Baseline diet: patient reports soft foods after losing teeth after reported fall? Prior Dysphagia History: no prior notes; patient denies prior dysphagia    Dysphagia Impressions/Diagnosis: Oropharyngeal Dysphagia   Accepted and tolerated evaluation at bedside. Patient alert, cooperative, pleasant; concern for potential for unreliable historian as responses tend to be vague at times; follows simple dx; verbally responsive.   Moderate orals stage dysphagia characterized by reduced mastication 2/2 edentulism and weakness and decreased lingual manipulation 2/2 weakness. Bolus formation and movement with soft solids is prolonged and labored. Bolus prep ap[pears adequate with minced solids, puree, and liquids. Suspect premature bolus loss to the pharynx with all. Moderate pharyngeal stage dysphagia characterized by delayed swallow and decreased laryngeal elevation. Cough ~25% of trials with thin liquids noted. Recommend downgrade to minced/moist with mildly/nectar thick liquids. ST to follow for diet tolerance, upgrade readiness, and tx appropriateness monitor. MEDICAL OR COGNITIVE/BEHAVIORAL FACTORS WHICH CAN EXACERBATE: current comorbidities place patient at risk for potential for variable dysphagia    Recommended Diet and Intervention 1/6/2023:  Diet Solids Recommendation:  Dysphagia II Minced and Moist  Liquid Consistency Recommendation:  Mildly (nectar) thick liquids  Recommended form of Meds: Meds crushed as able in puree      Compensatory Swallowing Strategies:  Upright as possible with all PO intake , Small bites/sips , Eat/feed slowly, Remain upright 30-45 min     SHORT TERM DYSPHAGIA GOALS/PLAN OF CARE: Speech therapy for dysphagia tx 3-5 times per week during acute care stay.     Goals  Pt will functionally tolerate recommended diet with no overt clinical s/s of aspiration   Pt will demonstrate understanding of aspiration risk and precautions via education/demonstration with occasional prompting  Pt will advance to least restrictive diet as indicated        Dysphagia Therapeutic Intervention:  Bolus Control Exercise, Laryngeal Exercises , Pharyngeal Exercise, Diet Tolerance Monitoring , Oral Motor Exercises , Patient/Family Education , Therapeutic Trials with SLP     Dysphagia Prognosis: [] good []fair  [x]guarded []poor  Barriers to progress: Confusion, Medical comorbidties    Discharge Recommendations: Recommend ongoing SLP for dysphagia therapy upon discharge from hospital       TEST DATA  Vision: [x]WFL []Impaired:  Hearing: []WFL [x]Impaired: [place]    Cognitive/behavioral/communication:   Oriented to [x] self [x] place [] date [] situation  [x]alert []lethargic  [x]cooperative []self-limiting   [x]confused at times  []distractible []agitated []impulsive  [x]verbally responsive []nonverbal []limited verbal responses  [x]follows one step commands []does not follow dx []follows complex commands  []aphasic []dysarthric  [] other:     Dentition: []Adequate []Dentures [x]Missing Many Teeth []Edentulous []Other:  Vocal Quality: [x]Normal []Dysphonic  []Aphonic  []Hoarse []Wet []Weak []Other:  Volitional Cough: [x]Strong []Weak []Wet []Absent []Congested []Other:  Volitional Swallow:   []Absent  [x]Delayed []Adequate []Required use of drink     Patient Positioning: Upright in chair    Consistencies Presented: Thin liquid;   Mildly / nectar thick liquid ;    Moderately / honey thick liquid  Puree food  Minced and moist food   Soft/bite size food    Oral Mechanism Exam:  []WFL [x]Mild   [x] Moderate  []Severe  []To be assessed  Impaired:   []Left side      []Right side    [x]Labial ROM/Coordination    []Labial Symmetry   [x]Lingual ROM/Coordination   []Lingual Symmetry  []Gag  []Other:     Oral Phase: []WFL []Mild   [x] Moderate  []Severe  []To be assessed   [x]Impaired/Prolonged Mastication: soft   []Spillage Left:   []Spillage Right:  []Pocketing Left:   []Pocketing Right:   [x]Decreased Anterior to Posterior Transit:   [x]Suspected Premature Bolus Loss:   []Lingual/Palatal Residue:   []Other:     Pharyngeal Phase: []WFL []Mild   [x] Moderate  []Severe  []To be assessed   [x]Delayed Swallow:   [x]Suspected Pharyngeal Pooling:   [x]Decreased Laryngeal Elevation:   []Absent Swallow:  []Wet Vocal Quality:   []Throat Clearing-Immediate:   []Throat Clearing-Delayed:   [x]Cough-Immediate: ~25% thin trials   []Cough-Delayed:  []Change in Vital Signs:  []Suspected Delayed Pharyngeal Clearing:  []Other: Eating Assistance:  []Independent  [x]Setup or clean-up assistance   [] Supervision or touching assistance   [] Partial or moderate assistance   [] Substantial or maximal assistance  [] Dependent       EDUCATION:   Provided education regarding role of SLP, results of assessment, recommendations and general speech pathology plan of care. [] Pt verbalized understanding and agreement   [x] Pt requires ongoing learning   [] No evidence of comprehension     If patient discharges prior to next visit, this note will serve as discharge. Timed Code Minutes: 0  Total Treatment Minutes: 40    Electronically signed by:  Juan Bell, #[id number]  Speech-Language Pathologist  Portable phone: [phone]  01/06/23 11:05 AM

## 2023-01-06 NOTE — ED NOTES
Pt. to be admitted to [place] [address] room 4277. Report called to Ginny Armendariz RN. Transport to be initiated when Triage from 1st chart is re-entered into second chart by this writer.       Dorothea Mota RN  01/05/23 2030

## 2023-01-06 NOTE — CONSULTS
Nephrology Consult Note                                                                                                                                                                                                                                                                                                                                                               Office : [phone]     Fax :[phone]              Patient's Name: Flash Bradford  1:54 PM  1/6/2023    Reason for Consult:  RONI  Requesting Physician:  KENDRA URBINA MD      Chief Complaint:  SOB    History of Present Ilness:    Flash Bradford is a 89 y.o. 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 chronic kidney disease (HCC) 8/5/2021    Back pain 12/17/2013    BPH (benign prostatic hyperplasia) 12/17/2013    CAD (coronary artery disease)     CAD (coronary artery disease) 12/17/2013    Constipated 12/17/2013    Hyperlipidemia     MI (myocardial infarction) ([facility]) 10/18/15    Spinal stenosis 12/17/2013    Stented coronary artery 12/17/2013    Urinary hesitancy     Vitamin D deficiency 12/23/2013       Past Surgical History:   Procedure Laterality Date    CORONARY ANGIOPLASTY WITH STENT PLACEMENT  10/18/15    ENDOSCOPY, COLON, DIAGNOSTIC      UPPER GASTROINTESTINAL ENDOSCOPY  10-       Family History   Problem Relation Age of Onset    Heart Disease Mother         reports that he quit smoking about 9 years ago. His smoking use included cigarettes. He smoked an average of .5 packs per day. He has never used smokeless tobacco. He reports that he does not drink alcohol and does not use drugs.    Allergies:  Patient has no known allergies.    Current Medications:    sodium bicarbonate 150 mEq in dextrose 5 % 1,150 mL infusion,  Continuous  sodium chloride flush 0.9 % injection 10 mL, 2 times per day  sodium chloride flush 0.9 % injection 10 mL, PRN  0.9 % sodium chloride infusion, PRN  promethazine (PHENERGAN) tablet 12.5 mg, Q6H PRN   Or  ondansetron (ZOFRAN) injection 4 mg, Q6H PRN  acetaminophen (TYLENOL) tablet 650 mg, Q6H PRN   Or  acetaminophen (TYLENOL) suppository 650 mg, Q6H PRN  azithromycin (ZITHROMAX) 500 mg in D5W 250ml addavial, Q24H  cefTRIAXone (ROCEPHIN) 1,000 mg in dextrose 5 % 50 mL IVPB mini-bag, Q24H  aspirin EC tablet 81 mg, Daily  atorvastatin (LIPITOR) tablet 40 mg, Nightly  carvedilol (COREG) tablet 3.125 mg, BID WC  escitalopram (LEXAPRO) tablet 10 mg, Nightly  nitroGLYCERIN (NITROSTAT) SL tablet 0.4 mg, Q5 Min PRN  pantoprazole (PROTONIX) tablet 40 mg, QAM AC  polyethylene glycol (GLYCOLAX) packet 17 g, Daily  senna (SENOKOT) tablet 8.6 mg, BID  tamsulosin (FLOMAX) capsule 0.4 mg, Nightly  traMADol (ULTRAM) tablet 50 mg, Q8H PRN        Review of Systems:   14 point ROS obtained but were negative except mentioned in HPI      Physical exam:     Vitals:  /65   Pulse 70   Temp 97.5 °F (36.4 °C) (Oral)   Resp 19   Ht 5' 6\" (1.676 m)   Wt 143 lb 1.3 oz (64.9 kg)   SpO2 94%   BMI 23.09 kg/m²   Constitutional:  OAA X3 NAD  Skin: no rash, turgor wnl  Heent:  eomi, mmm  Neck: no bruits or jvd noted  Cardiovascular:  S1, S2 without m/r/g  Respiratory: CTA B without w/r/r  Abdomen:  +bs, soft, nt, nd  Ext:  no lower extremity edema  Psychiatric: mood and affect appropriate  Musculoskeletal:  Rom, muscular strength intact    Data:   Labs:  CBC:   Recent Labs     01/05/23  1443 01/06/23  0716   WBC 14.2* 11.9*   HGB 10.5* 8.8*    145     BMP:    Recent Labs     01/05/23  1624 01/06/23  0716    141   K 5.3* 4.8   * 113*   CO2 17* 16*   BUN 78* 71*   CREATININE 4.3* 3.8*   GLUCOSE 116* 124*     Ca/Mg/Phos:   Recent Labs     01/05/23  1624 01/06/23  0716   CALCIUM 9.2 8.6     Hepatic:   Recent Labs 01/05/23  1624   AST 31   ALT 22   BILITOT 0.5   ALKPHOS 74     Troponin:   Recent Labs     01/05/23  1624   TROPONINI 0.02*     BNP: No results for input(s): BNP in the last 72 hours. Lipids: No results for input(s): CHOL, TRIG, HDL, LDLCALC, LABVLDL in the last 72 hours. ABGs: No results for input(s): PHART, PO2ART, REF0NSR in the last 72 hours. INR: No results for input(s): INR in the last 72 hours. UA:  Recent Labs     01/05/23  1849   COLORU Yellow   CLARITYU Clear   GLUCOSEU Negative   BILIRUBINUR Negative   KETUA Negative   SPECGRAV 1.015   BLOODU LARGE*   PHUR 5.5   PROTEINU 100*   UROBILINOGEN 0.2   NITRU Negative   LEUKOCYTESUR TRACE*   LABMICR YES   URINETYPE NotGiven      Urine Microscopic:   Recent Labs     01/05/23  1849   BACTERIA None Seen   HYALCAST 1   WBCUA 6*   RBCUA 1106*   EPIU 2     Urine Culture: No results for input(s): LABURIN in the last 72 hours. Urine Chemistry: No results for input(s): Valene Lara, PROTEINUR, NAUR in the last 72 hours. IMAGING:  XR CHEST 1 VIEW   Final Result   Ill-defined opacities bilaterally. Assessment/Plan       Acute renal failure on CKD 4    Baseline serum cr ~ 3    RONI etiology : combination of pre renal   ATN from sepsis       Continue IVF  Check Urine Na, Cr  Avoid ACEI or ARB  Avoid NSAIDs  Avoid contrast  Accurate documentation of UOP      AGMA in setting of RONI    Diarrhea ?     Give IVF with sodium bicarb    Hyperkalemia    Better  Low K diet  Continue bicarb    Sepsis , UTI, PNA    Antibiotics per primary team  Avoid Nephrotoxic meds      CAD    DLP    Covid 19 infection : management per primary team                Thank you for allowing us to participate in care of Oliver Redman MD  Feel free to contact me   [facility] [address]  Office : [phone]  Fax :[phone]

## 2023-01-06 NOTE — ACP (ADVANCE CARE PLANNING)
Advance Care Planning     Advance Care Planning Activator (Inpatient)  Conversation Note      Date of ACP Conversation: 1/6/2023     Conversation Conducted with: Patient with Decision Making Capacity    ACP Activator: Kacey Palm [place] 45 Decision Maker:     Current Designated Health Care Decision Maker:     Primary Decision Maker: Hugh Bowman - [phone]    Secondary Decision Maker: Bobbi Bradford - [phone]    Care Preferences    Ventilation: \"If you were in your present state of health and suddenly became very ill and were unable to breathe on your own, what would your preference be about the use of a ventilator (breathing machine) if it were available to you? \"      Would the patient desire the use of ventilator (breathing machine)?: yes    \"If your health worsens and it becomes clear that your chance of recovery is unlikely, what would your preference be about the use of a ventilator (breathing machine) if it were available to you? \"     Would the patient desire the use of ventilator (breathing machine)?: Unsure      Resuscitation  \"CPR works best to restart the heart when there is a sudden event, like a heart attack, in someone who is otherwise healthy. Unfortunately, CPR does not typically restart the heart for people who have serious health conditions or who are very sick. \"    \"In the event your heart stopped as a result of an underlying serious health condition, would you want attempts to be made to restart your heart (answer \"yes\" for attempt to resuscitate) or would you prefer a natural death (answer \"no\" for do not attempt to resuscitate)? \" yes       [] Yes   [x] No   Educated Patient / Mary Bey regarding differences between Advance Directives and portable DNR orders.     Length of ACP Conversation in minutes:  5    Conversation Outcomes:  [] ACP discussion completed  [] Existing advance directive reviewed with patient; no changes to patient's previously recorded wishes  [] New Advance Directive completed  [] Portable Do Not Rescitate prepared for Provider review and signature  [] POLST/POST/MOLST/MOST prepared for Provider review and signature      Follow-up plan:    [] Schedule follow-up conversation to continue planning  [] Referred individual to Provider for additional questions/concerns   [] Advised patient/agent/surrogate to review completed ACP document and update if needed with changes in condition, patient preferences or care setting    [] This note routed to one or more involved healthcare providers    Ganesh LANE RN  Case Management  [phone]    Electronically signed by Ganesh Barton RN on 1/6/2023 at 6:36 PM

## 2023-01-07 ENCOUNTER — APPOINTMENT (OUTPATIENT)
Dept: CT IMAGING | Age: 88
DRG: 871 | End: 2023-01-07
Payer: MEDICARE

## 2023-01-07 LAB
ANION GAP SERPL CALCULATED.3IONS-SCNC: 11 MMOL/L (ref 3–16)
BASOPHILS ABSOLUTE: 0 K/UL (ref 0–0.2)
BASOPHILS RELATIVE PERCENT: 0.1 %
BUN BLDV-MCNC: 66 MG/DL (ref 7–20)
CALCIUM SERPL-MCNC: 8.4 MG/DL (ref 8.3–10.6)
CHLORIDE BLD-SCNC: 109 MMOL/L (ref 99–110)
CO2: 21 MMOL/L (ref 21–32)
CREAT SERPL-MCNC: 3.2 MG/DL (ref 0.8–1.3)
EOSINOPHILS ABSOLUTE: 0.1 K/UL (ref 0–0.6)
EOSINOPHILS RELATIVE PERCENT: 0.7 %
FERRITIN: 736.6 NG/ML (ref 30–400)
GFR SERPL CREATININE-BSD FRML MDRD: 18 ML/MIN/{1.73_M2}
GLUCOSE BLD-MCNC: 126 MG/DL (ref 70–99)
HCT VFR BLD CALC: 27 % (ref 40.5–52.5)
HEMOGLOBIN: 8.5 G/DL (ref 13.5–17.5)
IRON SATURATION: 20 % (ref 20–50)
IRON: 30 UG/DL (ref 59–158)
LYMPHOCYTES ABSOLUTE: 0.8 K/UL (ref 1–5.1)
LYMPHOCYTES RELATIVE PERCENT: 7 %
MCH RBC QN AUTO: 28.5 PG (ref 26–34)
MCHC RBC AUTO-ENTMCNC: 31.4 G/DL (ref 31–36)
MCV RBC AUTO: 90.6 FL (ref 80–100)
MONOCYTES ABSOLUTE: 0.5 K/UL (ref 0–1.3)
MONOCYTES RELATIVE PERCENT: 4.9 %
NEUTROPHILS ABSOLUTE: 9.4 K/UL (ref 1.7–7.7)
NEUTROPHILS RELATIVE PERCENT: 87.3 %
PDW BLD-RTO: 14.6 % (ref 12.4–15.4)
PLATELET # BLD: 161 K/UL (ref 135–450)
PMV BLD AUTO: 10 FL (ref 5–10.5)
POTASSIUM REFLEX MAGNESIUM: 4.2 MMOL/L (ref 3.5–5.1)
PROCALCITONIN: 2.05 NG/ML (ref 0–0.15)
RBC # BLD: 2.98 M/UL (ref 4.2–5.9)
SODIUM BLD-SCNC: 141 MMOL/L (ref 136–145)
TOTAL IRON BINDING CAPACITY: 153 UG/DL (ref 260–445)
WBC # BLD: 10.7 K/UL (ref 4–11)

## 2023-01-07 PROCEDURE — 80048 BASIC METABOLIC PNL TOTAL CA: CPT

## 2023-01-07 PROCEDURE — 70490 CT SOFT TISSUE NECK W/O DYE: CPT

## 2023-01-07 PROCEDURE — 85025 COMPLETE CBC W/AUTO DIFF WBC: CPT

## 2023-01-07 PROCEDURE — 6360000002 HC RX W HCPCS: Performed by: INTERNAL MEDICINE

## 2023-01-07 PROCEDURE — 6360000002 HC RX W HCPCS: Performed by: NURSE PRACTITIONER

## 2023-01-07 PROCEDURE — 6370000000 HC RX 637 (ALT 250 FOR IP): Performed by: INTERNAL MEDICINE

## 2023-01-07 PROCEDURE — 2500000003 HC RX 250 WO HCPCS: Performed by: HOSPITALIST

## 2023-01-07 PROCEDURE — 36415 COLL VENOUS BLD VENIPUNCTURE: CPT

## 2023-01-07 PROCEDURE — 1200000000 HC SEMI PRIVATE

## 2023-01-07 PROCEDURE — 83550 IRON BINDING TEST: CPT

## 2023-01-07 PROCEDURE — 84145 PROCALCITONIN (PCT): CPT

## 2023-01-07 PROCEDURE — 94760 N-INVAS EAR/PLS OXIMETRY 1: CPT

## 2023-01-07 PROCEDURE — 2580000003 HC RX 258: Performed by: HOSPITALIST

## 2023-01-07 PROCEDURE — 2580000003 HC RX 258: Performed by: INTERNAL MEDICINE

## 2023-01-07 PROCEDURE — 82728 ASSAY OF FERRITIN: CPT

## 2023-01-07 PROCEDURE — 83540 ASSAY OF IRON: CPT

## 2023-01-07 RX ORDER — DOCUSATE SODIUM 100 MG/1
100 CAPSULE, LIQUID FILLED ORAL 2 TIMES DAILY
Status: DISCONTINUED | OUTPATIENT
Start: 2023-01-07 | End: 2023-01-23 | Stop reason: HOSPADM

## 2023-01-07 RX ORDER — BISACODYL 10 MG
10 SUPPOSITORY, RECTAL RECTAL DAILY PRN
Status: DISCONTINUED | OUTPATIENT
Start: 2023-01-07 | End: 2023-01-23 | Stop reason: HOSPADM

## 2023-01-07 RX ORDER — BISACODYL 10 MG
10 SUPPOSITORY, RECTAL RECTAL ONCE
Status: DISCONTINUED | OUTPATIENT
Start: 2023-01-07 | End: 2023-01-14

## 2023-01-07 RX ADMIN — HEPARIN SODIUM 5000 UNITS: 5000 INJECTION INTRAVENOUS; SUBCUTANEOUS at 22:03

## 2023-01-07 RX ADMIN — ASPIRIN 81 MG: 81 TABLET, COATED ORAL at 08:33

## 2023-01-07 RX ADMIN — SODIUM CHLORIDE, PRESERVATIVE FREE 10 ML: 5 INJECTION INTRAVENOUS at 08:34

## 2023-01-07 RX ADMIN — SENNOSIDES 8.6 MG: 8.6 TABLET, FILM COATED ORAL at 08:33

## 2023-01-07 RX ADMIN — ATORVASTATIN CALCIUM 40 MG: 40 TABLET, FILM COATED ORAL at 22:02

## 2023-01-07 RX ADMIN — POLYETHYLENE GLYCOL 3350 17 G: 17 POWDER, FOR SOLUTION ORAL at 08:33

## 2023-01-07 RX ADMIN — CARVEDILOL 3.12 MG: 3.12 TABLET, FILM COATED ORAL at 08:33

## 2023-01-07 RX ADMIN — DOCUSATE SODIUM 100 MG: 100 CAPSULE, LIQUID FILLED ORAL at 22:03

## 2023-01-07 RX ADMIN — CARVEDILOL 3.12 MG: 3.12 TABLET, FILM COATED ORAL at 16:25

## 2023-01-07 RX ADMIN — TRAMADOL HYDROCHLORIDE 50 MG: 50 TABLET ORAL at 04:36

## 2023-01-07 RX ADMIN — PANTOPRAZOLE SODIUM 40 MG: 40 TABLET, DELAYED RELEASE ORAL at 05:02

## 2023-01-07 RX ADMIN — TAMSULOSIN HYDROCHLORIDE 0.4 MG: 0.4 CAPSULE ORAL at 22:03

## 2023-01-07 RX ADMIN — Medication: at 11:02

## 2023-01-07 RX ADMIN — CEFTRIAXONE 1000 MG: 1 INJECTION, POWDER, FOR SOLUTION INTRAMUSCULAR; INTRAVENOUS at 16:24

## 2023-01-07 RX ADMIN — TRAMADOL HYDROCHLORIDE 50 MG: 50 TABLET ORAL at 22:02

## 2023-01-07 RX ADMIN — AZITHROMYCIN MONOHYDRATE 500 MG: 500 INJECTION, POWDER, LYOPHILIZED, FOR SOLUTION INTRAVENOUS at 17:03

## 2023-01-07 RX ADMIN — SENNOSIDES 8.6 MG: 8.6 TABLET, FILM COATED ORAL at 22:03

## 2023-01-07 RX ADMIN — HEPARIN SODIUM 5000 UNITS: 5000 INJECTION INTRAVENOUS; SUBCUTANEOUS at 16:25

## 2023-01-07 RX ADMIN — ESCITALOPRAM OXALATE 10 MG: 10 TABLET ORAL at 22:02

## 2023-01-07 RX ADMIN — HEPARIN SODIUM 5000 UNITS: 5000 INJECTION INTRAVENOUS; SUBCUTANEOUS at 05:02

## 2023-01-07 ASSESSMENT — PAIN DESCRIPTION - LOCATION
LOCATION: ABDOMEN
LOCATION: ABDOMEN;NECK

## 2023-01-07 ASSESSMENT — PAIN DESCRIPTION - DESCRIPTORS
DESCRIPTORS: ACHING;TENDER;DISCOMFORT
DESCRIPTORS: ACHING;DISCOMFORT
DESCRIPTORS: ACHING;DISCOMFORT;TENDER

## 2023-01-07 ASSESSMENT — PAIN SCALES - GENERAL
PAINLEVEL_OUTOF10: 7
PAINLEVEL_OUTOF10: 0
PAINLEVEL_OUTOF10: 0
PAINLEVEL_OUTOF10: 10
PAINLEVEL_OUTOF10: 8
PAINLEVEL_OUTOF10: 10
PAINLEVEL_OUTOF10: 10
PAINLEVEL_OUTOF10: 0

## 2023-01-07 ASSESSMENT — PAIN DESCRIPTION - ORIENTATION: ORIENTATION: ANTERIOR

## 2023-01-07 ASSESSMENT — PAIN - FUNCTIONAL ASSESSMENT
PAIN_FUNCTIONAL_ASSESSMENT: PREVENTS OR INTERFERES SOME ACTIVE ACTIVITIES AND ADLS
PAIN_FUNCTIONAL_ASSESSMENT: PREVENTS OR INTERFERES SOME ACTIVE ACTIVITIES AND ADLS

## 2023-01-07 ASSESSMENT — PAIN DESCRIPTION - FREQUENCY
FREQUENCY: CONTINUOUS
FREQUENCY: CONTINUOUS

## 2023-01-07 ASSESSMENT — PAIN DESCRIPTION - PAIN TYPE
TYPE: ACUTE PAIN
TYPE: ACUTE PAIN

## 2023-01-07 ASSESSMENT — PAIN DESCRIPTION - ONSET
ONSET: ON-GOING
ONSET: ON-GOING

## 2023-01-07 NOTE — PROGRESS NOTES
Department of Internal Medicine  Nephrology Progress Note    HPI.    80 y.o. male whom we were asked to see for RONI on CKD. I see him in the office, last 08/22 with eGFR 18 ml/min. He presents with weakness and dyspnea. He was diagnosed with Covid-19. Renal cosn called for RONI . Events noted , chart reviewed   REVIEW OF SYSTEMS:  Confused / disoriented  . Physical Exam:    VITALS:  /69   Pulse 69   Temp 98 °F (36.7 °C) (Oral)   Resp 16   Ht 5' 6\" (1.676 m)   Wt 148 lb 2.4 oz (67.2 kg)   SpO2 95%   BMI 23.91 kg/m²   24HR INTAKE/OUTPUT:    Intake/Output Summary (Last 24 hours) at 1/7/2023 1208  Last data filed at 1/7/2023 0853  Gross per 24 hour   Intake 1470.27 ml   Output 1350 ml   Net 120.27 ml       Constitutional:  Looks comfortable   Respiratory:  scattered rhonchi   Gastrointestinal No tenderness.   Normal Bowel Sounds  Cardiovascular:  S1, S2 RRR   Edema:   + edema    DATA:    CBC:  Lab Results   Component Value Date/Time    WBC 10.7 01/07/2023 05:17 AM    RBC 2.98 01/07/2023 05:17 AM    HGB 8.5 01/07/2023 05:17 AM    HCT 27.0 01/07/2023 05:17 AM    MCV 90.6 01/07/2023 05:17 AM    MCH 28.5 01/07/2023 05:17 AM    MCHC 31.4 01/07/2023 05:17 AM    RDW 14.6 01/07/2023 05:17 AM     01/07/2023 05:17 AM    MPV 10.0 01/07/2023 05:17 AM     CMP:  Lab Results   Component Value Date/Time     01/07/2023 05:17 AM    K 4.2 01/07/2023 05:17 AM     01/07/2023 05:17 AM    CO2 21 01/07/2023 05:17 AM    BUN 66 01/07/2023 05:17 AM    CREATININE 3.2 01/07/2023 05:17 AM    GFRAA 24 11/05/2021 01:23 PM    AGRATIO 0.8 01/05/2023 04:24 PM    LABGLOM 18 01/07/2023 05:17 AM    GLUCOSE 126 01/07/2023 05:17 AM    PROT 7.0 01/05/2023 04:24 PM    CALCIUM 8.4 01/07/2023 05:17 AM    BILITOT 0.5 01/05/2023 04:24 PM    ALKPHOS 74 01/05/2023 04:24 PM    AST 31 01/05/2023 04:24 PM    ALT 22 01/05/2023 04:24 PM      Hepatic Function Panel:   Lab Results   Component Value Date/Time    ALKPHOS 74 01/05/2023 04:24 PM    ALT 22 01/05/2023 04:24 PM    AST 31 01/05/2023 04:24 PM    PROT 7.0 01/05/2023 04:24 PM    BILITOT 0.5 01/05/2023 04:24 PM      Phosphorus:   Lab Results   Component Value Date/Time    PHOS 3.4 04/30/2021 02:32 PM       ASSESSMENT:  Principal Problem:    SOB (shortness of breath)  Resolved Problems:    * No resolved hospital problems. *      PLAN:  1) 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 CKD progression vs. pre-renal  - renal function better with IVF . Rate adjusted      2) Covid-19  - plan per admitting team.     3) PNA  - on empiric ceftriaxone and azithromycin     4) FEN  - metabolic acidosis              - on NaHCO3 gtt  - failure to thrive              - encouraged PO intake     5) anemia  - check iron studies     6) AMS   No marked change . Over  all prognosis guarded .     Israel Hernandez MD, FACP

## 2023-01-07 NOTE — PLAN OF CARE
Problem: Discharge Planning  Goal: Discharge to home or other facility with appropriate resources  Outcome: Progressing     Problem: Skin/Tissue Integrity  Goal: Absence of new skin breakdown  Description: 1. Monitor for areas of redness and/or skin breakdown  2. Assess vascular access sites hourly  3. Every 4-6 hours minimum:  Change oxygen saturation probe site  4. Every 4-6 hours:  If on nasal continuous positive airway pressure, respiratory therapy assess nares and determine need for appliance change or resting period.   Outcome: Progressing     Problem: Neurosensory - Adult  Goal: Achieves stable or improved neurological status  Outcome: Progressing  Goal: Achieves maximal functionality and self care  Outcome: Progressing     Problem: Respiratory - Adult  Goal: Achieves optimal ventilation and oxygenation  Outcome: Progressing     Problem: Cardiovascular - Adult  Goal: Maintains optimal cardiac output and hemodynamic stability  Outcome: Progressing  Goal: Absence of cardiac dysrhythmias or at baseline  Outcome: Progressing     Problem: Skin/Tissue Integrity - Adult  Goal: Skin integrity remains intact  Outcome: Progressing  Goal: Incisions, wounds, or drain sites healing without S/S of infection  Outcome: Progressing  Goal: Oral mucous membranes remain intact  Outcome: Progressing     Problem: Musculoskeletal - Adult  Goal: Return mobility to safest level of function  Outcome: Progressing  Goal: Maintain proper alignment of affected body part  Outcome: Progressing  Goal: Return ADL status to a safe level of function  Outcome: Progressing     Problem: Gastrointestinal - Adult  Goal: Minimal or absence of nausea and vomiting  Outcome: Progressing  Goal: Maintains or returns to baseline bowel function  Outcome: Progressing     Problem: Genitourinary - Adult  Goal: Urinary catheter remains patent  Outcome: Progressing     Problem: Infection - Adult  Goal: Absence of infection at discharge  Outcome: Progressing  Goal: Absence of infection during hospitalization  Outcome: Progressing  Goal: Absence of fever/infection during anticipated neutropenic period  Outcome: Progressing     Problem: Metabolic/Fluid and Electrolytes - Adult  Goal: Electrolytes maintained within normal limits  Outcome: Progressing  Goal: Hemodynamic stability and optimal renal function maintained  Outcome: Progressing  Goal: Glucose maintained within prescribed range  Outcome: Progressing     Problem: Hematologic - Adult  Goal: Maintains hematologic stability  Outcome: Progressing     Problem: ABCDS Injury Assessment  Goal: Absence of physical injury  Outcome: Progressing     Problem: Safety - Adult  Goal: Free from fall injury  Outcome: Progressing     Problem: Pain  Goal: Verbalizes/displays adequate comfort level or baseline comfort level  Outcome: Progressing

## 2023-01-07 NOTE — PROGRESS NOTES
[facility] Medicine Progress Note     Date:  1/7/2023    PCP: Marylin Galeano MD (Tel: [phone])    Date of Admission: 1/5/2023    Chief complaint:   Chief Complaint   Patient presents with    Fatigue     Pt. normally able to ambulate but not at this time and is extremely weak. Brief admission history: 70-year-old male with history of CKD stage IV, anemia of chronic disease, BPH, CAD, hyperlipidemia, who was admitted with generalized weakness, decreased oral intake, diagnosed with COVID-19 and bacterial pneumonia. Assessment/plan:  Bacterial pneumonia, likely superimposed in the setting of COVID-19 pneumonia. Procalcitonin level is elevated. Continue Zithromax, Rocephin. Continue breathing treatments. Inframandibular pain, per patient's report. Exam was benign. Will obtain CT-soft tissue neck for further evaluation. Acute kidney injury on CKD stage IV. Continue intravenous fluid. Monitor renal function closely. Generalized weakness. Likely secondary to acute medical conditions. Treat underlying conditions as noted above. Therapy evaluation in place. Going to SNF at discharge. Asymptomatic bacteriuria. Patient already on antibiotics for the indication. There is no indication for treatment of asymptomatic bacteriuria. Abdominal pain. Likely secondary to constipation. Abdominal exam is benign. Adjusted bowel regimen. Other comorbidities: history of CKD stage IV, anemia of chronic disease, BPH, CAD, hyperlipidemia. Diet: ADULT DIET; Dysphagia - Minced and Moist; Mildly Thick (Nectar)    Code status: Full Code   ----------  Subjective  Reports abdominal pain. He also reports mandibular pain. Objective  Physical exam:  Vitals: /69   Pulse 69   Temp 98 °F (36.7 °C) (Oral)   Resp 16   Ht 5' 6\" (1.676 m)   Wt 148 lb 2.4 oz (67.2 kg)   SpO2 95%   BMI 23.91 kg/m²   Gen/overall appearance: Not in acute distress. Alert. Oriented x3.   Head: Normocephalic, atraumatic  Eyes: EOMI, good acuity  ENT: Oral mucosa moist  Neck: No JVD, thyromegaly  CVS: Nml S1S2, no MRG, RRR  Pulm: Clear bilaterally. No crackles/wheezes  Gastrointestinal: Soft, NT/ND, +BS  Musculoskeletal: No edema. Warm  Neuro: No focal deficit. Moves extremity spontaneously. Psychiatry: Appropriate affect. Not agitated. Skin: Warm, dry with normal turgor. No rash  Capillary refill: Brisk,< 3 seconds   Peripheral Pulses: +2 palpable, equal bilaterally     24HR INTAKE/OUTPUT:    Intake/Output Summary (Last 24 hours) at 1/7/2023 1340  Last data filed at 1/7/2023 0853  Gross per 24 hour   Intake 1470.27 ml   Output 1350 ml   Net 120.27 ml     I/O last 3 completed shifts: In: 1970.3 [P.O.:700; I.V.:1045.8;  IV Piggyback:224.5]  Out: 2100 [Urine:2100]  I/O this shift:  In: 200 [P.O.:200]  Out: 200 [Urine:200]  Meds:    heparin (porcine)  5,000 Units SubCUTAneous 3 times per day    sodium chloride flush  10 mL IntraVENous 2 times per day    azithromycin  500 mg IntraVENous Q24H    cefTRIAXone (ROCEPHIN) IV  1,000 mg IntraVENous Q24H    aspirin EC  81 mg Oral Daily    atorvastatin  40 mg Oral Nightly    carvedilol  3.125 mg Oral BID WC    escitalopram  10 mg Oral Nightly    pantoprazole  40 mg Oral QAM AC    polyethylene glycol  17 g Oral Daily    senna  1 tablet Oral BID    tamsulosin  0.4 mg Oral Nightly     Infusions:    sodium bicarbonate infusion 50 mL/hr at 01/07/23 1224    sodium chloride       PRN Meds: sodium chloride flush, sodium chloride, promethazine **OR** ondansetron, acetaminophen **OR** acetaminophen, nitroGLYCERIN, traMADol    Labs/imaging:  CBC:   Recent Labs     01/05/23  1443 01/06/23  0716 01/07/23  0517   WBC 14.2* 11.9* 10.7   HGB 10.5* 8.8* 8.5*    145 161     BMP:    Recent Labs     01/05/23  1624 01/06/23  0716 01/07/23  0517    141 141   K 5.3* 4.8 4.2   * 113* 109   CO2 17* 16* 21   BUN 78* 71* 66*   CREATININE 4.3* 3.8* 3.2*   GLUCOSE 116* 124* 126*     Hepatic: Recent Labs     01/05/23  1624   AST 31   ALT 22   BILITOT 0.5   ALKPHOS 74       Juani Benavides MD  -------------------------------  [facility] Center[facility]

## 2023-01-07 NOTE — PLAN OF CARE
Problem: Discharge Planning  Goal: Discharge to home or other facility with appropriate resources  Outcome: Progressing     Problem: Skin/Tissue Integrity  Goal: Absence of new skin breakdown  Description: 1. Monitor for areas of redness and/or skin breakdown  2. Assess vascular access sites hourly  3. Every 4-6 hours minimum:  Change oxygen saturation probe site  4. Every 4-6 hours:  If on nasal continuous positive airway pressure, respiratory therapy assess nares and determine need for appliance change or resting period.   Outcome: Progressing     Problem: Neurosensory - Adult  Goal: Achieves stable or improved neurological status  Outcome: Progressing  Goal: Achieves maximal functionality and self care  Outcome: Progressing     Problem: Respiratory - Adult  Goal: Achieves optimal ventilation and oxygenation  Outcome: Progressing     Problem: Cardiovascular - Adult  Goal: Maintains optimal cardiac output and hemodynamic stability  Outcome: Progressing  Goal: Absence of cardiac dysrhythmias or at baseline  Outcome: Progressing     Problem: Skin/Tissue Integrity - Adult  Goal: Skin integrity remains intact  Outcome: Progressing  Goal: Incisions, wounds, or drain sites healing without S/S of infection  Outcome: Progressing  Goal: Oral mucous membranes remain intact  Outcome: Progressing     Problem: Musculoskeletal - Adult  Goal: Return mobility to safest level of function  Outcome: Progressing  Goal: Maintain proper alignment of affected body part  Outcome: Progressing  Goal: Return ADL status to a safe level of function  Outcome: Progressing     Problem: Gastrointestinal - Adult  Goal: Minimal or absence of nausea and vomiting  Outcome: Progressing  Goal: Maintains or returns to baseline bowel function  Outcome: Progressing     Problem: Genitourinary - Adult  Goal: Urinary catheter remains patent  Outcome: Progressing     Problem: Infection - Adult  Goal: Absence of infection at discharge  Outcome: Progressing  Goal: Absence of infection during hospitalization  Outcome: Progressing  Goal: Absence of fever/infection during anticipated neutropenic period  Outcome: Progressing     Problem: Metabolic/Fluid and Electrolytes - Adult  Goal: Electrolytes maintained within normal limits  Outcome: Progressing  Goal: Hemodynamic stability and optimal renal function maintained  Outcome: Progressing  Goal: Glucose maintained within prescribed range  Outcome: Progressing     Problem: Hematologic - Adult  Goal: Maintains hematologic stability  Outcome: Progressing     Problem: ABCDS Injury Assessment  Goal: Absence of physical injury  Outcome: Progressing     Problem: Safety - Adult  Goal: Free from fall injury  Outcome: Progressing

## 2023-01-07 NOTE — PROGRESS NOTES
Pt has been complaining of abdominal pain off and on through the shift. Medicated with Tramadol for pain Also has a persistent cough. Needs cough syrup. Remains in Covid isolation.  VSS

## 2023-01-08 LAB
ABO/RH: NORMAL
ALBUMIN SERPL-MCNC: 2.5 G/DL (ref 3.4–5)
ANION GAP SERPL CALCULATED.3IONS-SCNC: 10 MMOL/L (ref 3–16)
ANTIBODY SCREEN: NORMAL
BLOOD BANK DISPENSE STATUS: NORMAL
BLOOD BANK PRODUCT CODE: NORMAL
BPU ID: NORMAL
BUN BLDV-MCNC: 56 MG/DL (ref 7–20)
CALCIUM SERPL-MCNC: 7.9 MG/DL (ref 8.3–10.6)
CHLORIDE BLD-SCNC: 105 MMOL/L (ref 99–110)
CO2: 24 MMOL/L (ref 21–32)
CREAT SERPL-MCNC: 2.6 MG/DL (ref 0.8–1.3)
DESCRIPTION BLOOD BANK: NORMAL
GFR SERPL CREATININE-BSD FRML MDRD: 22 ML/MIN/{1.73_M2}
GLUCOSE BLD-MCNC: 108 MG/DL (ref 70–99)
HCT VFR BLD CALC: 22 % (ref 40.5–52.5)
HCT VFR BLD CALC: 23.3 % (ref 40.5–52.5)
HEMOGLOBIN: 6.9 G/DL (ref 13.5–17.5)
HEMOGLOBIN: 7.5 G/DL (ref 13.5–17.5)
MCH RBC QN AUTO: 29.1 PG (ref 26–34)
MCHC RBC AUTO-ENTMCNC: 32.4 G/DL (ref 31–36)
MCV RBC AUTO: 89.7 FL (ref 80–100)
OCCULT BLOOD SCREENING: NORMAL
PDW BLD-RTO: 14.4 % (ref 12.4–15.4)
PHOSPHORUS: 2.6 MG/DL (ref 2.5–4.9)
PLATELET # BLD: 166 K/UL (ref 135–450)
PMV BLD AUTO: 10 FL (ref 5–10.5)
POTASSIUM SERPL-SCNC: 4.2 MMOL/L (ref 3.5–5.1)
PRO-BNP: 4078 PG/ML (ref 0–449)
RBC # BLD: 2.59 M/UL (ref 4.2–5.9)
SODIUM BLD-SCNC: 139 MMOL/L (ref 136–145)
WBC # BLD: 7.1 K/UL (ref 4–11)

## 2023-01-08 PROCEDURE — 36415 COLL VENOUS BLD VENIPUNCTURE: CPT

## 2023-01-08 PROCEDURE — 2580000003 HC RX 258: Performed by: INTERNAL MEDICINE

## 2023-01-08 PROCEDURE — 86900 BLOOD TYPING SEROLOGIC ABO: CPT

## 2023-01-08 PROCEDURE — 86901 BLOOD TYPING SEROLOGIC RH(D): CPT

## 2023-01-08 PROCEDURE — 85014 HEMATOCRIT: CPT

## 2023-01-08 PROCEDURE — 85027 COMPLETE CBC AUTOMATED: CPT

## 2023-01-08 PROCEDURE — 85018 HEMOGLOBIN: CPT

## 2023-01-08 PROCEDURE — 6360000002 HC RX W HCPCS: Performed by: INTERNAL MEDICINE

## 2023-01-08 PROCEDURE — 6370000000 HC RX 637 (ALT 250 FOR IP): Performed by: INTERNAL MEDICINE

## 2023-01-08 PROCEDURE — 6360000002 HC RX W HCPCS: Performed by: NURSE PRACTITIONER

## 2023-01-08 PROCEDURE — 1200000000 HC SEMI PRIVATE

## 2023-01-08 PROCEDURE — 36430 TRANSFUSION BLD/BLD COMPNT: CPT

## 2023-01-08 PROCEDURE — 2500000003 HC RX 250 WO HCPCS: Performed by: INTERNAL MEDICINE

## 2023-01-08 PROCEDURE — P9016 RBC LEUKOCYTES REDUCED: HCPCS

## 2023-01-08 PROCEDURE — 86850 RBC ANTIBODY SCREEN: CPT

## 2023-01-08 PROCEDURE — 83880 ASSAY OF NATRIURETIC PEPTIDE: CPT

## 2023-01-08 PROCEDURE — 80069 RENAL FUNCTION PANEL: CPT

## 2023-01-08 PROCEDURE — 86923 COMPATIBILITY TEST ELECTRIC: CPT

## 2023-01-08 PROCEDURE — 82270 OCCULT BLOOD FECES: CPT

## 2023-01-08 RX ORDER — FERROUS SULFATE TAB EC 324 MG (65 MG FE EQUIVALENT) 324 (65 FE) MG
324 TABLET DELAYED RESPONSE ORAL
Status: DISCONTINUED | OUTPATIENT
Start: 2023-01-08 | End: 2023-01-14

## 2023-01-08 RX ORDER — SODIUM CHLORIDE 9 MG/ML
INJECTION, SOLUTION INTRAVENOUS PRN
Status: DISCONTINUED | OUTPATIENT
Start: 2023-01-08 | End: 2023-01-11

## 2023-01-08 RX ADMIN — AZITHROMYCIN MONOHYDRATE 500 MG: 500 INJECTION, POWDER, LYOPHILIZED, FOR SOLUTION INTRAVENOUS at 15:39

## 2023-01-08 RX ADMIN — Medication: at 18:36

## 2023-01-08 RX ADMIN — HEPARIN SODIUM 5000 UNITS: 5000 INJECTION INTRAVENOUS; SUBCUTANEOUS at 21:26

## 2023-01-08 RX ADMIN — SENNOSIDES 8.6 MG: 8.6 TABLET, FILM COATED ORAL at 21:26

## 2023-01-08 RX ADMIN — SENNOSIDES 8.6 MG: 8.6 TABLET, FILM COATED ORAL at 09:41

## 2023-01-08 RX ADMIN — TAMSULOSIN HYDROCHLORIDE 0.4 MG: 0.4 CAPSULE ORAL at 21:26

## 2023-01-08 RX ADMIN — HEPARIN SODIUM 5000 UNITS: 5000 INJECTION INTRAVENOUS; SUBCUTANEOUS at 05:41

## 2023-01-08 RX ADMIN — PANTOPRAZOLE SODIUM 40 MG: 40 TABLET, DELAYED RELEASE ORAL at 05:41

## 2023-01-08 RX ADMIN — FERROUS SULFATE TAB EC 324 MG (65 MG FE EQUIVALENT) 324 MG: 324 (65 FE) TABLET DELAYED RESPONSE at 13:39

## 2023-01-08 RX ADMIN — ASPIRIN 81 MG: 81 TABLET, COATED ORAL at 09:41

## 2023-01-08 RX ADMIN — ATORVASTATIN CALCIUM 40 MG: 40 TABLET, FILM COATED ORAL at 21:25

## 2023-01-08 RX ADMIN — CEFTRIAXONE 1000 MG: 1 INJECTION, POWDER, FOR SOLUTION INTRAMUSCULAR; INTRAVENOUS at 15:02

## 2023-01-08 RX ADMIN — HEPARIN SODIUM 5000 UNITS: 5000 INJECTION INTRAVENOUS; SUBCUTANEOUS at 13:39

## 2023-01-08 RX ADMIN — ESCITALOPRAM OXALATE 10 MG: 10 TABLET ORAL at 21:25

## 2023-01-08 RX ADMIN — CARVEDILOL 3.12 MG: 3.12 TABLET, FILM COATED ORAL at 09:41

## 2023-01-08 RX ADMIN — POLYETHYLENE GLYCOL 3350 17 G: 17 POWDER, FOR SOLUTION ORAL at 09:40

## 2023-01-08 ASSESSMENT — PAIN SCALES - GENERAL: PAINLEVEL_OUTOF10: 3

## 2023-01-08 NOTE — PROGRESS NOTES
Department of Internal Medicine  Nephrology Progress Note    HPI.    80 y.o. male whom we were asked to see for RONI on CKD. I see him in the office, last 08/22 with eGFR 18 ml/min. He presents with weakness and dyspnea. He was diagnosed with Covid-19. Renal cosn called for RONI . Events noted , chart reviewed   MS better   REVIEW OF SYSTEMS:  Improved Confusion , no CP/SOB . Eating better     No family at bed  side   Physical Exam:    VITALS:  BP (!) 90/52   Pulse 69   Temp 97.3 °F (36.3 °C) (Oral)   Resp 16   Ht 5' 6\" (1.676 m)   Wt 143 lb 15.4 oz (65.3 kg)   SpO2 92%   BMI 23.24 kg/m²   24HR INTAKE/OUTPUT:    Intake/Output Summary (Last 24 hours) at 1/8/2023 1457  Last data filed at 1/8/2023 1244  Gross per 24 hour   Intake 400 ml   Output 600 ml   Net -200 ml         Constitutional:  Looks comfortable   Respiratory:  scattered rhonchi   Gastrointestinal No tenderness.   Normal Bowel Sounds  Cardiovascular:  S1, S2 RRR   Edema:   + edema    DATA:    CBC:  Lab Results   Component Value Date/Time    WBC 7.1 01/08/2023 06:10 AM    RBC 2.59 01/08/2023 06:10 AM    HGB 7.5 01/08/2023 06:10 AM    HCT 23.3 01/08/2023 06:10 AM    MCV 89.7 01/08/2023 06:10 AM    MCH 29.1 01/08/2023 06:10 AM    MCHC 32.4 01/08/2023 06:10 AM    RDW 14.4 01/08/2023 06:10 AM     01/08/2023 06:10 AM    MPV 10.0 01/08/2023 06:10 AM     CMP:  Lab Results   Component Value Date/Time     01/08/2023 06:10 AM    K 4.2 01/08/2023 06:10 AM    K 4.2 01/07/2023 05:17 AM     01/08/2023 06:10 AM    CO2 24 01/08/2023 06:10 AM    BUN 56 01/08/2023 06:10 AM    CREATININE 2.6 01/08/2023 06:10 AM    GFRAA 24 11/05/2021 01:23 PM    AGRATIO 0.8 01/05/2023 04:24 PM    LABGLOM 23 01/08/2023 06:10 AM    GLUCOSE 108 01/08/2023 06:10 AM    PROT 7.0 01/05/2023 04:24 PM    CALCIUM 7.9 01/08/2023 06:10 AM    BILITOT 0.5 01/05/2023 04:24 PM    ALKPHOS 74 01/05/2023 04:24 PM    AST 31 01/05/2023 04:24 PM    ALT 22 01/05/2023 04:24 PM Hepatic Function Panel:   Lab Results   Component Value Date/Time    ALKPHOS 74 01/05/2023 04:24 PM    ALT 22 01/05/2023 04:24 PM    AST 31 01/05/2023 04:24 PM    PROT 7.0 01/05/2023 04:24 PM    BILITOT 0.5 01/05/2023 04:24 PM      Phosphorus:   Lab Results   Component Value Date/Time    PHOS 2.6 01/08/2023 06:10 AM       ASSESSMENT:  Principal Problem:    SOB (shortness of breath)  Resolved Problems:    * No resolved hospital problems. *      PLAN:  1) 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 CKD progression vs. pre-renal  - renal function better with IVF . Rate adjusted      2) Covid-19  - plan per admitting team.     3) PNA  - on empiric ceftriaxone and azithromycin     4) FEN  - metabolic acidosis              - on NaHCO3 gtt  - failure to thrive              - encouraged PO intake     5) anemia  - labs noted . 6) AMS   No marked change . Over  all prognosis guarded .     Nakia Anaya MD, FACP

## 2023-01-08 NOTE — PROGRESS NOTES
Critical hemoglobin to 6.9 per lab at this time, perfect serve sent to MD to notify, awaiting response. Occult stool ordered earlier this shift, pt hasn't had BM yet this shift to obtain sample. UPDATE 1817: Give 1 UNIT PRBC, type and screen order placed as well per Hailey Zuniga MD.       Guillermina Newell 1667: RN attempted to get consent from Daughter at bedside, unsure about pt receiving blood,  daughter wants to speak to her  before signing consent. UPDATE 1856: Daughter gave consent for blood at this time, placed in chart. Stool sample sent as well.

## 2023-01-08 NOTE — PROGRESS NOTES
RN attempted to get pt out of bed, pt declined despite education. Pt currently denies any pain, pt provided new glass of water and encouraged to drink, pt resting in bed with call light within reach. Bed alarm on, bed in lowest position.

## 2023-01-08 NOTE — PROGRESS NOTES
RN spoke to daughter Karen Sanderson regarding visiting policy for covid positive pts, RN offered for family to come up and see pts through window outside of room, daughter upset with the offer and hung up on RN. Charge RN notified, pts daughter requesting a call from the supervisor prior to hanging up on RN, Charge RN also already spoke to family regarding the policy.

## 2023-01-08 NOTE — PROGRESS NOTES
Charge nurse notified that the patients daughter requested a call from a nursing supervisor. Call placed to patients daughter [phone] as requested. She expressed multiple concerns and very emotional    She requested to speak with the doctor, notified dr Franklin Perez via perfect serve    She is requesting  call, order placed for social work consult     She is requesting to visit with her father due to she is here from out of town.

## 2023-01-08 NOTE — PROGRESS NOTES
[facility] Medicine Progress Note     Date:  1/8/2023    PCP: Baltazar Oglesby MD (Tel: [phone])    Date of Admission: 1/5/2023    Chief complaint:   Chief Complaint   Patient presents with    Fatigue     Pt. normally able to ambulate but not at this time and is extremely weak. Brief admission history: 51-year-old male with history of CKD stage IV, anemia of chronic disease, BPH, CAD, hyperlipidemia, who was admitted with generalized weakness, decreased oral intake, diagnosed with COVID-19 and bacterial pneumonia. Assessment/plan:  Bacterial pneumonia, likely superimposed in the setting of COVID-19 pneumonia. Procalcitonin level is elevated. Continue Zithromax, Rocephin. Continue breathing treatments. Inframandibular pain, per patient's report. Exam was benign. CT-soft tissue neck with no acute finding. Suspect patient has sore throat from underlying COVID infection - started on cepacol. Acute kidney injury on CKD stage IV, resolved with intravenous fluid. Non-anion gap metabolic acidosis, resolved with sodium bicarbonate fluid. Generalized weakness. Likely secondary to acute medical conditions. Treat underlying conditions as noted above. Therapy evaluation in place. Going to SNF at discharge. Asymptomatic bacteriuria. Patient already on antibiotics for other indication as noted above. There is no indication for treatment of asymptomatic bacteriuria. Abdominal pain. Likely secondary to constipation. Abdominal exam is benign. Adjusted bowel regimen on 1/7/2023, had bowel movement on 1/7/2023. Acute on chronic anemia with superimposed anemia of chronic disease. He does have some degree of iron deficiency - on ferrous sulfate. Currently low Hb is partly due to hemodilutional effect. Will check occult stool, repeat H&H today. If occult stool is positive, will transfuse. Other comorbidities: history of CKD stage IV, anemia of chronic disease, BPH, CAD, hyperlipidemia.   Discussed with daughter over the phone, phone #[phone]. Diet: ADULT DIET; Dysphagia - Minced and Moist; Mildly Thick (Nectar)    Code status: Full Code   ----------  Subjective  No complaints today. Objective  Physical exam:  Vitals: BP (!) 128/58   Pulse 65   Temp 98.2 °F (36.8 °C) (Oral)   Resp 16   Ht 5' 6\" (1.676 m)   Wt 143 lb 15.4 oz (65.3 kg)   SpO2 94%   BMI 23.24 kg/m²   Gen/overall appearance: Not in acute distress. Alert. Oriented x3. Head: Normocephalic, atraumatic  Eyes: EOMI, good acuity  ENT: Oral mucosa moist  Neck: No JVD, thyromegaly  CVS: Nml S1S2, no MRG, RRR  Pulm: Clear bilaterally. No crackles/wheezes  Gastrointestinal: Soft, NT/ND, +BS  Musculoskeletal: No edema. Warm  Neuro: No focal deficit. Moves extremity spontaneously. Psychiatry: Appropriate affect. Not agitated. Skin: Warm, dry with normal turgor. No rash  Capillary refill: Brisk,< 3 seconds   Peripheral Pulses: +2 palpable, equal bilaterally     24HR INTAKE/OUTPUT:    Intake/Output Summary (Last 24 hours) at 1/8/2023 1000  Last data filed at 1/8/2023 0408  Gross per 24 hour   Intake 300 ml   Output 925 ml   Net -625 ml       I/O last 3 completed shifts: In: 1770.3 [P.O.:500; I.V.:1045.8; IV Piggyback:224.5]  Out: 1875 [Urine:1875]  No intake/output data recorded.   Meds:    docusate sodium  100 mg Oral BID    bisacodyl  10 mg Rectal Once    heparin (porcine)  5,000 Units SubCUTAneous 3 times per day    sodium chloride flush  10 mL IntraVENous 2 times per day    azithromycin  500 mg IntraVENous Q24H    cefTRIAXone (ROCEPHIN) IV  1,000 mg IntraVENous Q24H    aspirin EC  81 mg Oral Daily    atorvastatin  40 mg Oral Nightly    carvedilol  3.125 mg Oral BID WC    escitalopram  10 mg Oral Nightly    pantoprazole  40 mg Oral QAM AC    polyethylene glycol  17 g Oral Daily    senna  1 tablet Oral BID    tamsulosin  0.4 mg Oral Nightly     Infusions:    sodium bicarbonate infusion 50 mL/hr at 01/07/23 1224    sodium chloride PRN Meds: bisacodyl, sodium chloride flush, sodium chloride, promethazine **OR** ondansetron, acetaminophen **OR** acetaminophen, nitroGLYCERIN, traMADol    Labs/imaging:  CBC:   Recent Labs     01/06/23  0716 01/07/23  0517 01/08/23  0610   WBC 11.9* 10.7 7.1   HGB 8.8* 8.5* 7.5*    161 166       BMP:    Recent Labs     01/06/23  0716 01/07/23  0517 01/08/23  0610    141 139   K 4.8 4.2 4.2   * 109 105   CO2 16* 21 24   BUN 71* 66* 56*   CREATININE 3.8* 3.2* 2.6*   GLUCOSE 124* 126* 108*       Hepatic:   Recent Labs     01/05/23  1624   AST 31   ALT 22   BILITOT 0.5   ALKPHOS 74         Trena Victor MD  -------------------------------  Rounding hospitalist

## 2023-01-09 ENCOUNTER — ANESTHESIA EVENT (OUTPATIENT)
Dept: ENDOSCOPY | Age: 88
End: 2023-01-09
Payer: MEDICARE

## 2023-01-09 ENCOUNTER — APPOINTMENT (OUTPATIENT)
Dept: GENERAL RADIOLOGY | Age: 88
DRG: 871 | End: 2023-01-09
Payer: MEDICARE

## 2023-01-09 ENCOUNTER — ANESTHESIA (OUTPATIENT)
Dept: ENDOSCOPY | Age: 88
End: 2023-01-09
Payer: MEDICARE

## 2023-01-09 LAB
A/G RATIO: 1 (ref 1.1–2.2)
ALBUMIN SERPL-MCNC: 2.5 G/DL (ref 3.4–5)
ALP BLD-CCNC: 61 U/L (ref 40–129)
ALT SERPL-CCNC: 45 U/L (ref 10–40)
ANION GAP SERPL CALCULATED.3IONS-SCNC: 13 MMOL/L (ref 3–16)
AST SERPL-CCNC: 49 U/L (ref 15–37)
BASOPHILS ABSOLUTE: 0 K/UL (ref 0–0.2)
BASOPHILS RELATIVE PERCENT: 0.3 %
BILIRUB SERPL-MCNC: 0.3 MG/DL (ref 0–1)
BLOOD BANK DISPENSE STATUS: NORMAL
BLOOD BANK PRODUCT CODE: NORMAL
BLOOD CULTURE, ROUTINE: NORMAL
BPU ID: NORMAL
BUN BLDV-MCNC: 83 MG/DL (ref 7–20)
CALCIUM SERPL-MCNC: 8.2 MG/DL (ref 8.3–10.6)
CHLORIDE BLD-SCNC: 101 MMOL/L (ref 99–110)
CO2: 24 MMOL/L (ref 21–32)
CREAT SERPL-MCNC: 4 MG/DL (ref 0.8–1.3)
CREATININE URINE: 104.5 MG/DL (ref 39–259)
CULTURE, BLOOD 2: NORMAL
DESCRIPTION BLOOD BANK: NORMAL
EOSINOPHILS ABSOLUTE: 0.1 K/UL (ref 0–0.6)
EOSINOPHILS RELATIVE PERCENT: 1.8 %
GFR SERPL CREATININE-BSD FRML MDRD: 13 ML/MIN/{1.73_M2}
GLUCOSE BLD-MCNC: 118 MG/DL (ref 70–99)
HCT VFR BLD CALC: 20.6 % (ref 40.5–52.5)
HCT VFR BLD CALC: 20.9 % (ref 40.5–52.5)
HCT VFR BLD CALC: 21.3 % (ref 40.5–52.5)
HCT VFR BLD CALC: 23.1 % (ref 40.5–52.5)
HCT VFR BLD CALC: 24.3 % (ref 40.5–52.5)
HEMOGLOBIN: 6.8 G/DL (ref 13.5–17.5)
HEMOGLOBIN: 6.9 G/DL (ref 13.5–17.5)
HEMOGLOBIN: 7.1 G/DL (ref 13.5–17.5)
HEMOGLOBIN: 7.6 G/DL (ref 13.5–17.5)
HEMOGLOBIN: 8 G/DL (ref 13.5–17.5)
INR BLD: 1.23 (ref 0.87–1.14)
LYMPHOCYTES ABSOLUTE: 0.8 K/UL (ref 1–5.1)
LYMPHOCYTES RELATIVE PERCENT: 10.7 %
MAGNESIUM: 1.9 MG/DL (ref 1.8–2.4)
MONOCYTES ABSOLUTE: 0.4 K/UL (ref 0–1.3)
MONOCYTES RELATIVE PERCENT: 5.4 %
NEUTROPHILS ABSOLUTE: 5.9 K/UL (ref 1.7–7.7)
NEUTROPHILS RELATIVE PERCENT: 81.8 %
OCCULT BLOOD SCREENING: ABNORMAL
PHOSPHORUS: 3.6 MG/DL (ref 2.5–4.9)
POTASSIUM SERPL-SCNC: 4 MMOL/L (ref 3.5–5.1)
PROTEIN PROTEIN: 80 MG/DL
PROTEIN/CREAT RATIO: 0.8 MG/DL
PROTHROMBIN TIME: 15.4 SEC (ref 11.7–14.5)
SODIUM BLD-SCNC: 138 MMOL/L (ref 136–145)
TOTAL PROTEIN: 5.1 G/DL (ref 6.4–8.2)

## 2023-01-09 PROCEDURE — 3700000001 HC ADD 15 MINUTES (ANESTHESIA): Performed by: INTERNAL MEDICINE

## 2023-01-09 PROCEDURE — 3E0G8GC INTRODUCTION OF OTHER THERAPEUTIC SUBSTANCE INTO UPPER GI, VIA NATURAL OR ARTIFICIAL OPENING ENDOSCOPIC: ICD-10-PCS | Performed by: INTERNAL MEDICINE

## 2023-01-09 PROCEDURE — 3700000000 HC ANESTHESIA ATTENDED CARE: Performed by: INTERNAL MEDICINE

## 2023-01-09 PROCEDURE — 2060000000 HC ICU INTERMEDIATE R&B

## 2023-01-09 PROCEDURE — 6370000000 HC RX 637 (ALT 250 FOR IP): Performed by: INTERNAL MEDICINE

## 2023-01-09 PROCEDURE — 3609013800 HC EGD SUBMUCOSAL/BOTOX INJECTION: Performed by: INTERNAL MEDICINE

## 2023-01-09 PROCEDURE — 85014 HEMATOCRIT: CPT

## 2023-01-09 PROCEDURE — 2580000003 HC RX 258: Performed by: INTERNAL MEDICINE

## 2023-01-09 PROCEDURE — 6360000002 HC RX W HCPCS: Performed by: INTERNAL MEDICINE

## 2023-01-09 PROCEDURE — 6360000002 HC RX W HCPCS

## 2023-01-09 PROCEDURE — 94760 N-INVAS EAR/PLS OXIMETRY 1: CPT

## 2023-01-09 PROCEDURE — 85018 HEMOGLOBIN: CPT

## 2023-01-09 PROCEDURE — 84100 ASSAY OF PHOSPHORUS: CPT

## 2023-01-09 PROCEDURE — 71045 X-RAY EXAM CHEST 1 VIEW: CPT

## 2023-01-09 PROCEDURE — 84156 ASSAY OF PROTEIN URINE: CPT

## 2023-01-09 PROCEDURE — 36430 TRANSFUSION BLD/BLD COMPNT: CPT

## 2023-01-09 PROCEDURE — 2500000003 HC RX 250 WO HCPCS

## 2023-01-09 PROCEDURE — 36415 COLL VENOUS BLD VENIPUNCTURE: CPT

## 2023-01-09 PROCEDURE — 85610 PROTHROMBIN TIME: CPT

## 2023-01-09 PROCEDURE — 6360000002 HC RX W HCPCS: Performed by: NURSE PRACTITIONER

## 2023-01-09 PROCEDURE — 2500000003 HC RX 250 WO HCPCS: Performed by: INTERNAL MEDICINE

## 2023-01-09 PROCEDURE — C9113 INJ PANTOPRAZOLE SODIUM, VIA: HCPCS | Performed by: INTERNAL MEDICINE

## 2023-01-09 PROCEDURE — 97530 THERAPEUTIC ACTIVITIES: CPT

## 2023-01-09 PROCEDURE — 83735 ASSAY OF MAGNESIUM: CPT

## 2023-01-09 PROCEDURE — 80053 COMPREHEN METABOLIC PANEL: CPT

## 2023-01-09 PROCEDURE — 82270 OCCULT BLOOD FECES: CPT

## 2023-01-09 PROCEDURE — 0W3P8ZZ CONTROL BLEEDING IN GASTROINTESTINAL TRACT, VIA NATURAL OR ARTIFICIAL OPENING ENDOSCOPIC: ICD-10-PCS | Performed by: INTERNAL MEDICINE

## 2023-01-09 PROCEDURE — 2720000010 HC SURG SUPPLY STERILE: Performed by: INTERNAL MEDICINE

## 2023-01-09 PROCEDURE — 2709999900 HC NON-CHARGEABLE SUPPLY: Performed by: INTERNAL MEDICINE

## 2023-01-09 PROCEDURE — 82570 ASSAY OF URINE CREATININE: CPT

## 2023-01-09 PROCEDURE — 3609013200 HC EGD W/ ABLATION: Performed by: INTERNAL MEDICINE

## 2023-01-09 RX ORDER — KETAMINE HCL IN NACL, ISO-OSM 100MG/10ML
SYRINGE (ML) INJECTION PRN
Status: DISCONTINUED | OUTPATIENT
Start: 2023-01-09 | End: 2023-01-09 | Stop reason: SDUPTHER

## 2023-01-09 RX ORDER — SODIUM CHLORIDE 0.9 % (FLUSH) 0.9 %
5-40 SYRINGE (ML) INJECTION EVERY 12 HOURS SCHEDULED
Status: DISCONTINUED | OUTPATIENT
Start: 2023-01-09 | End: 2023-01-15

## 2023-01-09 RX ORDER — LIDOCAINE HYDROCHLORIDE 20 MG/ML
INJECTION, SOLUTION EPIDURAL; INFILTRATION; INTRACAUDAL; PERINEURAL PRN
Status: DISCONTINUED | OUTPATIENT
Start: 2023-01-09 | End: 2023-01-09 | Stop reason: SDUPTHER

## 2023-01-09 RX ORDER — SODIUM CHLORIDE 9 MG/ML
INJECTION, SOLUTION INTRAVENOUS CONTINUOUS
Status: ACTIVE | OUTPATIENT
Start: 2023-01-09 | End: 2023-01-10

## 2023-01-09 RX ORDER — SODIUM CHLORIDE 9 MG/ML
INJECTION, SOLUTION INTRAVENOUS PRN
Status: DISCONTINUED | OUTPATIENT
Start: 2023-01-09 | End: 2023-01-23 | Stop reason: HOSPADM

## 2023-01-09 RX ORDER — SODIUM CHLORIDE 9 MG/ML
INJECTION, SOLUTION INTRAVENOUS PRN
Status: DISCONTINUED | OUTPATIENT
Start: 2023-01-09 | End: 2023-01-16

## 2023-01-09 RX ORDER — EPINEPHRINE 1 MG/ML(1)
AMPUL (ML) INJECTION PRN
Status: DISCONTINUED | OUTPATIENT
Start: 2023-01-09 | End: 2023-01-09 | Stop reason: ALTCHOICE

## 2023-01-09 RX ORDER — 0.9 % SODIUM CHLORIDE 0.9 %
500 INTRAVENOUS SOLUTION INTRAVENOUS ONCE
Status: COMPLETED | OUTPATIENT
Start: 2023-01-09 | End: 2023-01-09

## 2023-01-09 RX ORDER — SODIUM CHLORIDE 0.9 % (FLUSH) 0.9 %
5-40 SYRINGE (ML) INJECTION PRN
Status: DISCONTINUED | OUTPATIENT
Start: 2023-01-09 | End: 2023-01-16

## 2023-01-09 RX ORDER — PROPOFOL 10 MG/ML
INJECTION, EMULSION INTRAVENOUS PRN
Status: DISCONTINUED | OUTPATIENT
Start: 2023-01-09 | End: 2023-01-09 | Stop reason: SDUPTHER

## 2023-01-09 RX ORDER — PROPOFOL 10 MG/ML
INJECTION, EMULSION INTRAVENOUS CONTINUOUS PRN
Status: DISCONTINUED | OUTPATIENT
Start: 2023-01-09 | End: 2023-01-09 | Stop reason: SDUPTHER

## 2023-01-09 RX ADMIN — DOCUSATE SODIUM 100 MG: 100 CAPSULE, LIQUID FILLED ORAL at 22:27

## 2023-01-09 RX ADMIN — TAMSULOSIN HYDROCHLORIDE 0.4 MG: 0.4 CAPSULE ORAL at 22:27

## 2023-01-09 RX ADMIN — ASPIRIN 81 MG: 81 TABLET, COATED ORAL at 08:21

## 2023-01-09 RX ADMIN — FERROUS SULFATE TAB EC 324 MG (65 MG FE EQUIVALENT) 324 MG: 324 (65 FE) TABLET DELAYED RESPONSE at 08:22

## 2023-01-09 RX ADMIN — ESCITALOPRAM OXALATE 10 MG: 10 TABLET ORAL at 22:27

## 2023-01-09 RX ADMIN — PROPOFOL 50 MG: 10 INJECTION, EMULSION INTRAVENOUS at 14:25

## 2023-01-09 RX ADMIN — LIDOCAINE HYDROCHLORIDE 100 MG: 20 INJECTION, SOLUTION EPIDURAL; INFILTRATION; INTRACAUDAL; PERINEURAL at 14:25

## 2023-01-09 RX ADMIN — SODIUM CHLORIDE, PRESERVATIVE FREE 10 ML: 5 INJECTION INTRAVENOUS at 08:22

## 2023-01-09 RX ADMIN — SODIUM CHLORIDE 80 MG: 9 INJECTION, SOLUTION INTRAVENOUS at 10:42

## 2023-01-09 RX ADMIN — SENNOSIDES 8.6 MG: 8.6 TABLET, FILM COATED ORAL at 22:27

## 2023-01-09 RX ADMIN — Medication 20 MG: at 14:25

## 2023-01-09 RX ADMIN — CEFTRIAXONE 1000 MG: 1 INJECTION, POWDER, FOR SOLUTION INTRAMUSCULAR; INTRAVENOUS at 17:02

## 2023-01-09 RX ADMIN — AZITHROMYCIN MONOHYDRATE 500 MG: 500 INJECTION, POWDER, LYOPHILIZED, FOR SOLUTION INTRAVENOUS at 18:07

## 2023-01-09 RX ADMIN — CARVEDILOL 3.12 MG: 3.12 TABLET, FILM COATED ORAL at 08:22

## 2023-01-09 RX ADMIN — Medication 1 LOZENGE: at 08:21

## 2023-01-09 RX ADMIN — SODIUM CHLORIDE: 9 INJECTION, SOLUTION INTRAVENOUS at 13:40

## 2023-01-09 RX ADMIN — Medication 10 MG: at 14:28

## 2023-01-09 RX ADMIN — SODIUM CHLORIDE 500 ML: 9 INJECTION, SOLUTION INTRAVENOUS at 11:36

## 2023-01-09 RX ADMIN — Medication: at 00:27

## 2023-01-09 RX ADMIN — PROPOFOL 50 MCG/KG/MIN: 10 INJECTION, EMULSION INTRAVENOUS at 14:25

## 2023-01-09 RX ADMIN — SODIUM CHLORIDE 8 MG/HR: 9 INJECTION, SOLUTION INTRAVENOUS at 11:38

## 2023-01-09 RX ADMIN — SODIUM CHLORIDE: 9 INJECTION, SOLUTION INTRAVENOUS at 22:40

## 2023-01-09 RX ADMIN — HEPARIN SODIUM 5000 UNITS: 5000 INJECTION INTRAVENOUS; SUBCUTANEOUS at 05:03

## 2023-01-09 RX ADMIN — ATORVASTATIN CALCIUM 40 MG: 40 TABLET, FILM COATED ORAL at 22:27

## 2023-01-09 RX ADMIN — PANTOPRAZOLE SODIUM 40 MG: 40 TABLET, DELAYED RELEASE ORAL at 05:03

## 2023-01-09 RX ADMIN — SODIUM CHLORIDE, PRESERVATIVE FREE 10 ML: 5 INJECTION INTRAVENOUS at 22:28

## 2023-01-09 ASSESSMENT — PAIN SCALES - GENERAL
PAINLEVEL_OUTOF10: 0
PAINLEVEL_OUTOF10: 7
PAINLEVEL_OUTOF10: 0
PAINLEVEL_OUTOF10: 0

## 2023-01-09 ASSESSMENT — PAIN DESCRIPTION - ORIENTATION: ORIENTATION: MID

## 2023-01-09 ASSESSMENT — PAIN DESCRIPTION - LOCATION: LOCATION: ABDOMEN

## 2023-01-09 ASSESSMENT — PAIN DESCRIPTION - PAIN TYPE: TYPE: ACUTE PAIN

## 2023-01-09 ASSESSMENT — PAIN DESCRIPTION - DESCRIPTORS: DESCRIPTORS: SQUEEZING

## 2023-01-09 ASSESSMENT — ENCOUNTER SYMPTOMS: SHORTNESS OF BREATH: 1

## 2023-01-09 ASSESSMENT — LIFESTYLE VARIABLES: SMOKING_STATUS: 0

## 2023-01-09 NOTE — CONSULTS
GASTROENTEROLOGY INPATIENT CONSULTATION        IDENTIFYING DATA/REASON FOR CONSULTATION   PATIENT:  Kirsty Bronson  MRN:  [id number]  ADMIT DATE: 1/5/2023  TIME OF EVALUATION: 1/9/2023 1:08 PM  HOSPITAL STAY:   LOS: 4 days     REASON FOR CONSULTATION:  GI bleed    HISTORY OF PRESENT ILLNESS   Kirsty Bronson is a 80 y.o. male with a PMH of CAD with coronary stents, HLD, CKD stage IV, chronic anemia BPH who presented on 1/5/2023 with generalized weakness, decreased oral intake diagnosed with COVID-19, bacterial pneumonia, acute on CKD. We have been consulted regarding GI bleed. Pt's hgb has been trending down since admission from 10.5 (on 1/5/23) to 6.8 yesterday. He was transfused 2u PRBCs. This morning pt had a large bloody dark stool. RN described it as magenta in color with blood clots. Pt states he has diffuse abdominal pain occurring for a long time. He suffers from frequent constipation. He denies NSAID use. He is on daily aspirin, no other anticoaguluation or antiplatelet therapy      Prior Endoscopic Evaluations:  EGD 10/5/15 with Dr. Miki Osgood  1. Hiatal hernia   2. Multiple duodenal ulcers in the duodenal bulb   3. Duodenal Nodule in second portion of the duodenum    EGD/EUS 10/28/15 with Dr. Roscoe Tabor  1. Probable gastric metaplasia of the 2nd portion of the duodenum. Did not take further biopsies today as he is on plavix. By EUS there was no deep invasion. Surface does not look adenomatous. 2. Normal papilla, pancreas, and bile duct. 3.  Resolution of the duodenal ulcers.     Patient reports last colonoscopy ~45 years ago    PAST MEDICAL, SURGICAL, FAMILY, and SOCIAL HISTORY     Past Medical History:   Diagnosis Date    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 chronic kidney disease ([facility] [address] 8/5/2021    Back pain 12/17/2013    BPH (benign prostatic hyperplasia) 12/17/2013    CAD (coronary artery disease)     CAD (coronary artery disease) 12/17/2013    Constipated 12/17/2013    Hyperlipidemia     MI (myocardial infarction) ([facility] [address] 10/18/15    Spinal stenosis 2013    Stented coronary artery 2013    Urinary hesitancy     Vitamin D deficiency 2013     Past Surgical History:   Procedure Laterality Date    CORONARY ANGIOPLASTY WITH STENT PLACEMENT  10/18/15    ENDOSCOPY, COLON, DIAGNOSTIC      UPPER GASTROINTESTINAL ENDOSCOPY  10-     Family History   Problem Relation Age of Onset    Heart Disease Mother      Social History     Socioeconomic History    Marital status: Single   Tobacco Use    Smoking status: Former     Packs/day: 0.50     Types: Cigarettes     Quit date: 2013     Years since quittin.0    Smokeless tobacco: Never   Substance and Sexual Activity    Alcohol use: No    Drug use: No     Social Determinants of Health     Physical Activity: Inactive    Days of Exercise per Week: 0 days    Minutes of Exercise per Session: 0 min       MEDICATIONS   SCHEDULED:  sodium chloride, 500 mL, Once  [Held by provider] ferrous sulfate, 324 mg, Daily with breakfast  docusate sodium, 100 mg, BID  bisacodyl, 10 mg, Once  sodium chloride flush, 10 mL, 2 times per day  azithromycin, 500 mg, Q24H  cefTRIAXone (ROCEPHIN) IV, 1,000 mg, Q24H  [Held by provider] aspirin EC, 81 mg, Daily  atorvastatin, 40 mg, Nightly  [Held by provider] carvedilol, 3.125 mg, BID WC  escitalopram, 10 mg, Nightly  polyethylene glycol, 17 g, Daily  senna, 1 tablet, BID  tamsulosin, 0.4 mg, Nightly      FLUIDS/DRIPS:     sodium chloride      pantoprazole 8 mg/hr (23 1138)    sodium chloride      sodium chloride      sodium chloride       PRNs: sodium chloride, , PRN  Benzocaine-Menthol, 1 lozenge, Q2H PRN  sodium chloride, , PRN  bisacodyl, 10 mg, Daily PRN  sodium chloride flush, 10 mL, PRN  sodium chloride, , PRN  promethazine, 12.5 mg, Q6H PRN   Or  ondansetron, 4 mg, Q6H PRN  acetaminophen, 650 mg, Q6H PRN   Or  acetaminophen, 650 mg, Q6H PRN  nitroGLYCERIN, 0.4 mg, Q5 Min PRN  traMADol, 50 mg, Q8H PRN      ALLERGIES:  He No Known Allergies    REVIEW OF SYSTEMS   Pertinent ROS noted in HPI    PHYSICAL EXAM     Vitals:    01/09/23 0500 01/09/23 0515 01/09/23 0734 01/09/23 1118   BP:  (!) 110/57 (!) 105/44 (!) 94/35   Pulse:  60 61 54   Resp:  17 18 15   Temp:  97.8 °F (36.6 °C) 98.7 °F (37.1 °C) 98.1 °F (36.7 °C)   TempSrc:  Axillary Oral Oral   SpO2:  91% 92% 93%   Weight: 145 lb 15.1 oz (66.2 kg)      Height: 5' 6\" (1.676 m)          I/O last 3 completed shifts: In: 697 [P.O.:400; Blood:297]  Out: 1000 [Urine:1000]      Physical Exam:  General appearance: alert, cooperative, no distress, appears stated age  Eyes: Anicteric  Head: Normocephalic, without obvious abnormality  Lungs: clear to auscultation bilaterally, Normal Effort  Heart: regular rate and rhythm, normal S1 and S2, no murmurs or rubs  Abdomen: soft, non-distended, non-tender. Bowel sounds normal. No masses,  no organomegaly. Extremities: atraumatic, no cyanosis or edema  Skin: warm and dry, no jaundice  Neuro: Grossly intact, A&OX3      LABS AND IMAGING   Laboratory   Recent Labs     01/07/23  0517 01/08/23  0610 01/08/23  1740 01/09/23  0214 01/09/23  0249 01/09/23  0943   WBC 10.7 7.1  --   --   --   --    HGB 8.5* 7.5*   < > 6.8* 6.9* 8.0*   HCT 27.0* 23.3*   < > 20.6* 20.9* 24.3*   MCV 90.6 89.7  --   --   --   --     166  --   --   --   --     < > = values in this interval not displayed. Recent Labs     01/07/23  0517 01/08/23  0610 01/09/23  0943    139 138   K 4.2 4.2 4.0    105 101   CO2 21 24 24   PHOS  --  2.6 3.6   BUN 66* 56* 83*   CREATININE 3.2* 2.6* 4.0*     Recent Labs     01/09/23  0943   AST 49*   ALT 45*   BILITOT 0.3   ALKPHOS 61     No results for input(s): LIPASE, AMYLASE in the last 72 hours. Recent Labs     01/09/23  0943   PROTIME 15.4*   INR 1.23*       Imaging  XR CHEST PORTABLE   Final Result   1. Patchy right basilar airspace disease could represent atelectasis versus   pneumonia.       2.  Multiple bilateral calcified pleural plaques         CT SOFT TISSUE NECK WO CONTRAST   Final Result   No acute findings, soft tissue mass or lymphadenopathy evident. XR CHEST 1 VIEW   Final Result   Ill-defined opacities bilaterally. ASSESSMENT AND RECOMMENDATIONS   80 y.o. male with a CAD with coronary stents, HLD, CKD stage IV, chronic anemia BPH who presented on 1/5/2023 with generalized weakness, decreased oral intake diagnosed with COVID-19, bacterial pneumonia, acute on CKD. We have been consulted regarding bloody stool, acute on  chronic anemia    IMPRESSION:  Acute on chronic anemia with bloody stools. Unclear if lower GI bleed (hemorrhoids, diverticular bleed, AVM, neoplasm) vs brisk upper GI bleed. BUN 56->83 raising concern for upper source. Pt with hx of duodenal ulcers and questionable gastric metaplasia of the 2nd portion of the duodenum (biopsies not taken due to plavix). No NSAIDs. No on anticoagulation or antiplatelet therapy. Covid-19 infection  Acute on CKD  Hx of CAD with coronary stents. On baby ASA          RECOMMENDATIONS:    EGD and flex sig today  2 tap water enemas  Keep NPO. If you have any questions or need any further information, please feel free to contact our consult team.  Thank you for allowing us to participate in the care of Georges Mcfarland. The note was completed using Dragon voice recognition transcription. Every effort was made to ensure accuracy; however, inadvertent transcription errors may be present despite my best efforts to edit errors.       Steven Portillo PA-C

## 2023-01-09 NOTE — PROGRESS NOTES
Advised by nursing staff that patient had diarrhea with blood clots in it. Starting PPI, GI consult. Canceled previously ordered heme consult.

## 2023-01-09 NOTE — PLAN OF CARE
Law from [facility] called to schedule a TCM for pt who was admitted on 10/1/21 and is discharging today. Pt is currently positive for covid, please call Law.    Problem: Discharge Planning  Goal: Discharge to home or other facility with appropriate resources  1/9/2023 0810 by Heather Chapa RN  Outcome: Progressing  1/9/2023 0127 by Mckenna Ramirez RN  Outcome: Progressing     Problem: Skin/Tissue Integrity  Goal: Absence of new skin breakdown  Description: 1. Monitor for areas of redness and/or skin breakdown  2. Assess vascular access sites hourly  3. Every 4-6 hours minimum:  Change oxygen saturation probe site  4. Every 4-6 hours:  If on nasal continuous positive airway pressure, respiratory therapy assess nares and determine need for appliance change or resting period.   1/9/2023 0810 by Heather Chapa RN  Outcome: Progressing  1/9/2023 0127 by Mckenna Ramirez RN  Outcome: Progressing     Problem: Neurosensory - Adult  Goal: Achieves stable or improved neurological status  1/9/2023 0810 by Heather Chapa RN  Outcome: Progressing  1/9/2023 0127 by Mckenna Ramirez RN  Outcome: Progressing  Flowsheets (Taken 1/8/2023 2140)  Achieves stable or improved neurological status: Assess for and report changes in neurological status  Goal: Achieves maximal functionality and self care  1/9/2023 0810 by Heather Chapa RN  Outcome: Progressing  1/9/2023 0127 by Mckenna Ramirez RN  Outcome: Progressing  Flowsheets (Taken 1/8/2023 2140)  Achieves maximal functionality and self care: Monitor swallowing and airway patency with patient fatigue and changes in neurological status     Problem: Respiratory - Adult  Goal: Achieves optimal ventilation and oxygenation  1/9/2023 0810 by Heather Chapa RN  Outcome: Progressing  1/9/2023 0127 by Mckenna Ramirez RN  Outcome: Progressing  Flowsheets (Taken 1/8/2023 2140)  Achieves optimal ventilation and oxygenation: Assess for changes in respiratory status     Problem: Skin/Tissue Integrity - Adult  Goal: Skin integrity remains intact  1/9/2023 0810 by Heather Chapa RN  Outcome: Progressing  1/9/2023 0127 by Mckenna Ramirez RN  Outcome: Progressing  Flowsheets (Taken 1/8/2023 2140)  Skin Integrity Remains Intact: Monitor for areas of redness and/or skin breakdown  Goal: Incisions, wounds, or drain sites healing without S/S of infection  1/9/2023 0810 by Brooke Degroot RN  Outcome: Progressing  1/9/2023 0127 by Berna Becerra RN  Outcome: Progressing  Flowsheets (Taken 1/8/2023 2140)  Incisions, Wounds, or Drain Sites Healing Without Sign and Symptoms of Infection: TWICE DAILY: Assess and document skin integrity  Goal: Oral mucous membranes remain intact  1/9/2023 0810 by Brooke Degroot RN  Outcome: Progressing  1/9/2023 0127 by Berna Becerra RN  Outcome: Progressing     Problem: Musculoskeletal - Adult  Goal: Return mobility to safest level of function  1/9/2023 0810 by Brooke Degroot RN  Outcome: Progressing  1/9/2023 0127 by Berna Becerra RN  Outcome: Progressing  Flowsheets (Taken 1/8/2023 2140)  Return Mobility to Safest Level of Function: Assess patient stability and activity tolerance for standing, transferring and ambulating with or without assistive devices  Goal: Maintain proper alignment of affected body part  1/9/2023 0810 by Brooke Degroot RN  Outcome: Progressing  1/9/2023 0127 by Berna Becerra RN  Outcome: Progressing  Goal: Return ADL status to a safe level of function  1/9/2023 0810 by Brooke Degroot RN  Outcome: Progressing  1/9/2023 0127 by Berna Becerra RN  Outcome: Progressing     Problem: Genitourinary - Adult  Goal: Urinary catheter remains patent  1/9/2023 0810 by Brooke Degroot RN  Outcome: Progressing  1/9/2023 0127 by Berna Becerra RN  Outcome: Progressing  Flowsheets (Taken 1/8/2023 2140)  Urinary catheter remains patent: Assess patency of urinary catheter     Problem: Infection - Adult  Goal: Absence of infection at discharge  1/9/2023 0810 by Brooke Degroot RN  Outcome: Progressing  1/9/2023 0127 by Berna Becerra RN  Outcome: Progressing  Flowsheets (Taken 1/8/2023 2140)  Absence of infection at discharge: Assess and monitor for signs and symptoms of infection  Goal: Absence of infection during hospitalization  1/9/2023 0810 by Candace Moctezuma RN  Outcome: Progressing  1/9/2023 0127 by Phyllis Thomason RN  Outcome: Progressing  Goal: Absence of fever/infection during anticipated neutropenic period  1/9/2023 0810 by Candace Moctezuma RN  Outcome: Progressing  1/9/2023 0127 by Phyllis Thomason RN  Outcome: Progressing     Problem: Metabolic/Fluid and Electrolytes - Adult  Goal: Electrolytes maintained within normal limits  1/9/2023 0810 by Candace Moctezuma RN  Outcome: Progressing  1/9/2023 0127 by Phyllis Thomason RN  Outcome: Progressing  Goal: Hemodynamic stability and optimal renal function maintained  1/9/2023 0810 by Candace Moctezuma RN  Outcome: Progressing  1/9/2023 0127 by Phyllis Thomason RN  Outcome: Progressing  Goal: Glucose maintained within prescribed range  1/9/2023 0810 by Candace Moctezuma RN  Outcome: Progressing  1/9/2023 0127 by Phyllis Thomason RN  Outcome: Progressing     Problem: Safety - Adult  Goal: Free from fall injury  1/9/2023 0810 by Candace Moctezuma RN  Outcome: Progressing  1/9/2023 0127 by Phyllis Thomason RN  Outcome: Progressing  Flowsheets (Taken 1/9/2023 0126)  Free From Fall Injury: Instruct family/caregiver on patient safety     Problem: Pain  Goal: Verbalizes/displays adequate comfort level or baseline comfort level  1/9/2023 0810 by Candace Moctezuma RN  Outcome: Progressing  1/9/2023 0127 by Phyllis Thomason RN  Outcome: Progressing

## 2023-01-09 NOTE — PROGRESS NOTES
Report given to [facility]. Notified daughter Teodora Rea that pt will be going to room [phone] after procedure.

## 2023-01-09 NOTE — PROGRESS NOTES
Daughter Lee Friend signed consent for EGD with flexible sigmoidoscopy with possible colonoscopy after GI NP Evelin Norwood spoke with her.

## 2023-01-09 NOTE — PROGRESS NOTES
Speech Language Pathology    Chart reviewed. Patient now NPO d/t med team concern for GI bleed. ST to hold dysphagia tx this date with plan to re-attempt later date pending med status unless otherwise notified. Thank you. Juan Culver, #[id number]  Speech-Language Pathologist  Portable phone: [phone]

## 2023-01-09 NOTE — PROGRESS NOTES
Physical Therapy  Facility/Department: [address] MED SURG  Physical Therapy Treatment Note    Name: Trevor Newman  : 1931  MRN: [id number]  Date of Service: 2023    Discharge Recommendations:  Therapy recommended at discharge, Continue to assess pending progress        Trevor Newman scored a 13/ on the AM-PAC short mobility form. Current research shows that an AM-PAC score of 17 or less is typically not associated with a discharge to the patient's home setting. Based on the patient's AM-PAC score and their current functional mobility deficits, it is recommended that the patient have 3-5 sessions per week of Physical Therapy at d/c to increase the patient's independence. Please see assessment section for further patient specific details. If patient discharges prior to next session this note will serve as a discharge summary. Please see below for the latest assessment towards goals. Patient Diagnosis(es): The primary encounter diagnosis was Pneumonia of right lung due to infectious organism, unspecified part of lung. Diagnoses of General weakness, Impaired mobility and ADLs, COVID-19, and Acute kidney injury superimposed on CKD [facility]) were also pertinent to this visit. Past Medical History:  has a past medical history of 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 chronic kidney disease ([facility] [address] Back pain, BPH (benign prostatic hyperplasia), CAD (coronary artery disease), CAD (coronary artery disease), Constipated, Hyperlipidemia, MI (myocardial infarction) ([facility] [address] Spinal stenosis, Stented coronary artery, Urinary hesitancy, and Vitamin D deficiency. Past Surgical History:  has a past surgical history that includes Coronary angioplasty with stent (10/18/15); Endoscopy, colon, diagnostic; and Upper gastrointestinal endoscopy (10-). Assessment   Body Structures, Functions, Activity Limitations Requiring Skilled Therapeutic Intervention: Decreased functional mobility ; Decreased ADL status; Decreased strength;Decreased balance;Decreased endurance  Assessment: Patient is a 80-year-old male with past medical history of CAD, CKD stage IV, hyperlipidemia who presents to the hospital on 1/5/23 due to patient having generalized weakness. Pt found to be COVID+. Prior to admission, pt living in apt setting with son, independent with ADLs and ambulation with SPC vs RW. Pt continues to function well below baseline. Recommend continued skilled therapy 3-5x/week. Treatment Diagnosis: impaired mobility  Therapy Prognosis: Fair  Decision Making: Medium Complexity  History: see above  Exam: see below  Clinical Presentation: evolving  Activity Tolerance  Activity Tolerance: Patient limited by fatigue;Patient limited by endurance     Plan   Physcial Therapy Plan  General Plan: 3-5 times per week  Current Treatment Recommendations: Strengthening, Balance training, Functional mobility training, Transfer training, Gait training, Endurance training, Neuromuscular re-education, Safety education & training, Equipment evaluation, education, & procurement, Patient/Caregiver education & training, Therapeutic activities  Safety Devices  Type of Devices: All fall risk precautions in place, Call light within reach, Gait belt, Patient at risk for falls, Left in chair, Chair alarm in place, Nurse notified     Restrictions  Restrictions/Precautions  Restrictions/Precautions: Fall Risk, Isolation  Position Activity Restriction  Other position/activity restrictions: Covid-19     Subjective   General  Chart Reviewed: Yes  Patient assessed for rehabilitation services?: Yes  Additional Pertinent Hx: Patient is a 80-year-old male with past medical history of CAD, CKD stage IV, hyperlipidemia who presents to the hospital on 1/5/23 due to patient having generalized weakness. Pt found to be COVID+.   Response To Previous Treatment: Not applicable  Family / Caregiver Present: No  Referring Practitioner: Lu Kehr, MD  Referral Date : 01/05/23  Diagnosis: COVID  Follows Commands: Within Functional Limits  General Comment  Comments: Noted to receive one unit PRBC this morning - hgb 8.0. Subjective  Subjective: Pt is agreeable to PT - states he did not sit in his recliner over the weekend. Social/Functional History  Social/Functional History  Lives With: Son  Type of Home: Apartment  Home Layout: One level  Home Access: Stairs to enter with rails  Entrance Stairs - Number of Steps: 13  Bathroom Shower/Tub: Tub/Shower unit  Bathroom Toilet: Standard  Bathroom Equipment: Grab bars in shower  Home Equipment: Steph Lennox, Walker, rolling  Has the patient had two or more falls in the past year or any fall with injury in the past year?: Yes  ADL Assistance: Independent  Ambulation Assistance: Independent (RW in apt, SPC in community)  Transfer Assistance: Independent  Active : No  Occupation: Retired  Type of Occupation:     Vision/Hearing  Vision  Vision: Impaired (Reporting poor vision but does not wear glasses)  Hearing  Hearing: Exceptions to [facility]  Hearing Exceptions: Hard of hearing/hearing concerns      Cognition   Orientation  Overall Orientation Status: Impaired  Orientation Level: Oriented to person;Oriented to place  Cognition  Overall Cognitive Status: Exceptions  Arousal/Alertness: Appropriate responses to stimuli  Following Commands: Follows one step commands with repetition  Attention Span: Difficulty dividing attention  Memory: Decreased recall of recent events;Decreased short term memory  Safety Judgement: Decreased awareness of need for safety;Decreased awareness of need for assistance  Problem Solving: Decreased awareness of errors  Insights: Decreased awareness of deficits  Initiation: Requires cues for some  Sequencing: Requires cues for some     Objective   Bed mobility  Supine to Sit: Contact guard assistance  Sit to Supine: Unable to assess (in recliner at end of session)  Transfers  Sit to Stand:  Moderate Assistance  Stand to Sit: Minimal Assistance  Comment: heavy posterior lean this date - cues to transition weight to midfoot. Ambulation  Surface: Level tile  Device: Rolling Walker  Assistance: Minimal assistance  Gait Deviations: Slow Donna;Decreased step length;Decreased step height  Distance: 5'  Comments: Limited due to dizziness. Attempted to call in lunch order but listed as NPO for possible endoscopic procedure. Balance  Posture: Fair  Sitting - Static: Good  Sitting - Dynamic: Good  Standing - Static: Poor;+  Standing - Dynamic: Poor;+         AM-PAC Score  AM-PAC Inpatient Mobility Raw Score : 13 (01/09/23 1049)  AM-PAC Inpatient T-Scale Score : 36.74 (01/09/23 1049)  Mobility Inpatient CMS 0-100% Score: 64.91 (01/09/23 1049)  Mobility Inpatient CMS G-Code Modifier : CL (01/09/23 1049)    Goals  Short Term Goals  Time Frame for Short Term Goals: by acute discharge - all goals ongoing as of 1/9/23.   Short Term Goal 1: bed mobility SBA  Short Term Goal 2: sit<>stand with SBA  Short Term Goal 3: ambulate > 30' with RW and SBA  Patient Goals   Patient Goals : none stated       Education  Patient Education  Education Given To: Patient  Education Provided: Role of Therapy;Plan of Care  Education Method: Verbal  Barriers to Learning: None  Education Outcome: Verbalized understanding;Continued education needed      Therapy Time   Individual Concurrent Group Co-treatment   Time In 1005         Time Out 1030         Minutes 25         Timed Code Treatment Minutes: 25 Minutes       Roscoe Bertrand, PT

## 2023-01-09 NOTE — PROGRESS NOTES
Patient completed one unit of blood transfusion. Vital signs recorded. Patient calmly resting in bed.  Electronically signed by Kishore Zafar RN on 1/9/2023 at 12:23 AM

## 2023-01-09 NOTE — PROGRESS NOTES
Gastroenterology Progress Note    Maryana Mayers is a 80 y.o. male patient. Principal Problem:    SOB (shortness of breath)  Resolved Problems:    * No resolved hospital problems. *      SUBJECTIVE:  Tolerated endoscopy.      Current Facility-Administered Medications: 0.9 % sodium chloride infusion, , IntraVENous, PRN  pantoprazole (PROTONIX) 80 mg in sodium chloride 0.9 % 100 mL infusion, 8 mg/hr, IntraVENous, Continuous  0.9 % sodium chloride infusion, , IntraVENous, Continuous  0.9 % sodium chloride bolus, 500 mL, IntraVENous, Once  Benzocaine-Menthol (CEPACOL SORE THROAT) lozenge 1 lozenge, 1 lozenge, Oral, Q2H PRN  [Held by provider] ferrous sulfate EC tablet 324 mg, 324 mg, Oral, Daily with breakfast  0.9 % sodium chloride infusion, , IntraVENous, PRN  docusate sodium (COLACE) capsule 100 mg, 100 mg, Oral, BID  bisacodyl (DULCOLAX) suppository 10 mg, 10 mg, Rectal, Daily PRN  bisacodyl (DULCOLAX) suppository 10 mg, 10 mg, Rectal, Once  sodium chloride flush 0.9 % injection 10 mL, 10 mL, IntraVENous, 2 times per day  sodium chloride flush 0.9 % injection 10 mL, 10 mL, IntraVENous, PRN  0.9 % sodium chloride infusion, , IntraVENous, PRN  promethazine (PHENERGAN) tablet 12.5 mg, 12.5 mg, Oral, Q6H PRN **OR** ondansetron (ZOFRAN) injection 4 mg, 4 mg, IntraVENous, Q6H PRN  acetaminophen (TYLENOL) tablet 650 mg, 650 mg, Oral, Q6H PRN **OR** acetaminophen (TYLENOL) suppository 650 mg, 650 mg, Rectal, Q6H PRN  azithromycin (ZITHROMAX) 500 mg in D5W 250ml addavial, 500 mg, IntraVENous, Q24H  cefTRIAXone (ROCEPHIN) 1,000 mg in dextrose 5 % 50 mL IVPB mini-bag, 1,000 mg, IntraVENous, Q24H  [Held by provider] aspirin EC tablet 81 mg, 81 mg, Oral, Daily  atorvastatin (LIPITOR) tablet 40 mg, 40 mg, Oral, Nightly  [Held by provider] carvedilol (COREG) tablet 3.125 mg, 3.125 mg, Oral, BID WC  escitalopram (LEXAPRO) tablet 10 mg, 10 mg, Oral, Nightly  nitroGLYCERIN (NITROSTAT) SL tablet 0.4 mg, 0.4 mg, SubLINGual, Q5 Min PRN  polyethylene glycol (GLYCOLAX) packet 17 g, 17 g, Oral, Daily  senna (SENOKOT) tablet 8.6 mg, 1 tablet, Oral, BID  tamsulosin (FLOMAX) capsule 0.4 mg, 0.4 mg, Oral, Nightly  traMADol (ULTRAM) tablet 50 mg, 50 mg, Oral, Q8H PRN    Physical    VITALS:  BP (!) 94/35   Pulse 54   Temp 98.1 °F (36.7 °C) (Oral)   Resp 15   Ht 5' 6\" (1.676 m)   Wt 145 lb 15.1 oz (66.2 kg)   SpO2 93%   BMI 23.56 kg/m²   TEMPERATURE:  Current - Temp: 98.1 °F (36.7 °C); Max - Temp  Av.3 °F (36.8 °C)  Min: 97.8 °F (36.6 °C)  Max: 98.7 °F (37.1 °C)    NAD  Eyes: No icterus  RRR  Lungs CTA Bilaterally, normal effort  Abdomen soft, ND, NT, Bowel sounds normal.  Ext: no edema  Neuro: No tremor  Psych: Alert but confused  Data    Data Review:    Recent Labs     23  0523  0610 23  1740 23  0214 23  0249 23  0943   WBC 10.7 7.1  --   --   --   --    HGB 8.5* 7.5*   < > 6.8* 6.9* 8.0*   HCT 27.0* 23.3*   < > 20.6* 20.9* 24.3*   MCV 90.6 89.7  --   --   --   --     166  --   --   --   --     < > = values in this interval not displayed. Recent Labs     23  0523  0610 23  0943    139 138   K 4.2 4.2 4.0    105 101   CO2 21 24 24   PHOS  --  2.6 3.6   BUN 66* 56* 83*   CREATININE 3.2* 2.6* 4.0*     Recent Labs     23  0943   AST 49*   ALT 45*   BILITOT 0.3   ALKPHOS 61     No results for input(s): LIPASE, AMYLASE in the last 72 hours. Recent Labs     23  0943   PROTIME 15.4*   INR 1.23*     No results for input(s): PTT in the last 72 hours. ASSESSMENT:  80 y.o. male with a PMH of CAD with coronary stents, HLD, CKD stage IV, BPH who presented on 2023 with generalized weakness, decreased oral intake diagnosed with COVID-19 and bacterial pneumonia. Has developed worsening anemia during hospital course and came down to 6.9 23 and received 1 unit of blood. Repeat hgb 23 the same so recieved a 2nd unit.   Then morning on 23 had explosive diarrhea with clots. Labs this am showed BUN 83 and Cr 4.0 (56/2.6 yesterday). LFT's AST 49, ALT 49, bili 0.3. Hgb 6.9 after 1 unit of blood and then 8.0 after a 2nd unit of blood (10.5 on admission). EGD 1/9/23 showed a normal esophagus without Mcdonald's or reflux changes, 3cm sliding hiatal hernia, normal stomach, 6mm clean-based duodenal ulcer, 15mm clean-based duodenal ulcer in the bulb. In the sweep, there was a 2cm ulcer with a visible vessel in the distal aspect of the ulcer. I was not able to see the very proximal aspect of the ulcer. Epinephrine 1:10,000 was injected in each of 4 quadrants around the ulcer with excellent blanching of tissue. A total of 3.5mL was injected. Next, the vessel was obliterated using gold probe cautery with excellent hemostasis. No bleeding at the end of the procedure. 2nd portion of the duodenum was normal.     Duodenal ulcer with hemorrhage and acute blood loss anemia  Acute renal failure  COVID pneumonia    PLAN :  PPI drip  H. Pylori stool antigen  Avoid NSAIDs  Follow hemoglobin and transfuse to keep >7 (higher if needed given his CAD and CKD, will defer to medicine). Thank you for allowing me to participate in the care of your patient. Please feel free to contact me with any concerns.   [address], MD

## 2023-01-09 NOTE — PROGRESS NOTES
X2 water enema given to pt per MD order. pt tolerated well. Blood clots noted coming out of rectum. Tried to get consents signed by daughter Mahogany Coulter for EGD with flexible sigmoidoscopy; with possible colonoscopy. And daughter was not sure if she wants to complete procedure. Called GI NP Asa to come up and speak with pt daughter again.

## 2023-01-09 NOTE — ANESTHESIA POSTPROCEDURE EVALUATION
Department of Anesthesiology  Postprocedure Note    Patient: Kirsty Bronson  MRN: [id number]  YOB: 1931  Date of evaluation: 1/9/2023      Procedure Summary     Date: 01/09/23 Room / Location: [address] [place] [address]    Anesthesia Start: 7171 Anesthesia Stop: 6217    Procedures:       EGD SUBMUCOSAL EPINEPHRINE INJECTION      EGD  ABLATION WITH GOLD PROBE Diagnosis:       Anemia, unspecified type      (Anemia, unspecified type [D64.9])    Surgeons: Yosvany Ramsay MD Responsible Provider: Denilson Choudhury MD    Anesthesia Type: MAC ASA Status: 4 - Emergent          Anesthesia Type: MAC    Pat Phase I: Pat Score: 8    Pat Phase II:        Anesthesia Post Evaluation    Patient location during evaluation: PACU  Patient participation: complete - patient cannot participate (At preoperative baseline, following commands, speech difficult to understand)  Level of consciousness: awake  Airway patency: patent  Nausea & Vomiting: no nausea and no vomiting  Complications: no  Cardiovascular status: hemodynamically stable and blood pressure returned to baseline  Respiratory status: spontaneous ventilation, nonlabored ventilation and room air  Hydration status: stable  Comments: Uneventful MAC anesthetic. Mr. Marisol Jolley was seen resting comfortably following procedure. Recovered in room per institutional COVID policies. Appropriate for return to floor at this time.

## 2023-01-09 NOTE — PROGRESS NOTES
Collected stool for occult stool. Stool noted watery with magenta color. No distress or discomfort noted.

## 2023-01-09 NOTE — OP NOTE
Endoscopy Note    Patient: Zeinab Holland  : 1931  Acct#:     Procedure: Esophagogastroduodenoscopy with epinephrine injection and gold probe cautery for control of hemorrhage    Date:  2023     Surgeon:  Shelli Starks MD,     Indications: This is a 80y.o. year old male who presents today with melena and acute blood loss anemia. Planning EGD and if negative then limited colonoscopy. Anesthesia:  TIVA    Description of Procedure:  Informed consent was obtained from the patient after explanation of indications, benefits and possible risks and complications of the procedure. The patient was then taken to the endoscopy suite, placed in the left lateral decubitus position and the above IV sedation was administrered. The Olympus videoendoscope was placed in the patient's mouth and under direct visualization passed into the esophagus and advanced without difficulty to the 2nd portion of the duodenum. Views were good, patient toleration was good. Retroflexion was performed in the stomach. Findings:  Normal esophagus without Mcdonald's or reflux changes, 3cm sliding hiatal hernia, normal stomach, 6mm clean-based duodenal ulcer, 15mm clean-based duodenal ulcer in the bulb. In the sweep, there was a 2cm ulcer with a visible vessel in the distal aspect of the ulcer. I was not able to see the very proximal aspect of the ulcer. Epinephrine 1:10,000 was injected in each of 4 quadrants around the ulcer with excellent blanching of tissue. A total of 3.5mL was injected. Next, the vessel was obliterated using gold probe cautery with excellent hemostasis. No bleeding at the end of the procedure. 2nd portion of the duodenum was normal.    The scope was then withdrawn back into the stomach, it was decompressed, and the scope was completely withdrawn. The patient tolerated the procedure well and was taken to the post anesthesia care unit in good condition.     Estimated blood loss: minimal  Specimens taken: none    Impression:    Normal esophagus without Mcdonald's or reflux changes, 3cm sliding hiatal hernia, normal stomach, 6mm clean-based duodenal ulcer, 15mm clean-based duodenal ulcer in the bulb. In the sweep, there was a 2cm ulcer with a visible vessel in the distal aspect of the ulcer. I was not able to see the very proximal aspect of the ulcer. Epinephrine 1:10,000 was injected in each of 4 quadrants around the ulcer with excellent blanching of tissue. A total of 3.5mL was injected. Next, the vessel was obliterated using gold probe cautery with excellent hemostasis. No bleeding at the end of the procedure. 2nd portion of the duodenum was normal.      Recommendations:   1.   See my progress note    Deep Batista MD,   Jackson Gregory

## 2023-01-09 NOTE — PROGRESS NOTES
Medications administered and monitored by CRNA, see anesthesia record.     Electronically signed by Aimee Mckeon RN on 1/9/2023 at 2:08 PM

## 2023-01-09 NOTE — PROGRESS NOTES
Department of Internal Medicine  Nephrology Progress Note    HPI.    80 y.o. male whom we were asked to see for RONI on CKD. I see him in the office, last 08/22 with eGFR 18 ml/min. He presents with weakness and dyspnea. He was diagnosed with Covid-19. Renal cosn called for RONI . Events noted , chart reviewed     HPI:  Breathing stable. No CP. Renal function better. Less confused. ROS:  In bed. No fever. [address]:  medications reviewed. Physical Exam:    VITALS:  BP (!) 105/44   Pulse 61   Temp 98.7 °F (37.1 °C) (Oral)   Resp 18   Ht 5' 6\" (1.676 m)   Wt 145 lb 15.1 oz (66.2 kg)   SpO2 92%   BMI 23.56 kg/m²   24HR INTAKE/OUTPUT:    Intake/Output Summary (Last 24 hours) at 1/9/2023 1046  Last data filed at 1/9/2023 0021  Gross per 24 hour   Intake 537 ml   Output 400 ml   Net 137 ml         Constitutional:  Looks comfortable   Respiratory:  scattered rhonchi   Gastrointestinal No tenderness.   Normal Bowel Sounds  Cardiovascular:  S1, S2 RRR   Edema:   + edema  Skin:  no rash    DATA:    CBC:  Lab Results   Component Value Date/Time    WBC 7.1 01/08/2023 06:10 AM    RBC 2.59 01/08/2023 06:10 AM    HGB 8.0 01/09/2023 09:43 AM    HCT 24.3 01/09/2023 09:43 AM    MCV 89.7 01/08/2023 06:10 AM    MCH 29.1 01/08/2023 06:10 AM    MCHC 32.4 01/08/2023 06:10 AM    RDW 14.4 01/08/2023 06:10 AM     01/08/2023 06:10 AM    MPV 10.0 01/08/2023 06:10 AM     CMP:  Lab Results   Component Value Date/Time     01/08/2023 06:10 AM    K 4.2 01/08/2023 06:10 AM    K 4.2 01/07/2023 05:17 AM     01/08/2023 06:10 AM    CO2 24 01/08/2023 06:10 AM    BUN 56 01/08/2023 06:10 AM    CREATININE 2.6 01/08/2023 06:10 AM    GFRAA 24 11/05/2021 01:23 PM    AGRATIO 0.8 01/05/2023 04:24 PM    LABGLOM 23 01/08/2023 06:10 AM    GLUCOSE 108 01/08/2023 06:10 AM    PROT 7.0 01/05/2023 04:24 PM    CALCIUM 7.9 01/08/2023 06:10 AM    BILITOT 0.5 01/05/2023 04:24 PM    ALKPHOS 74 01/05/2023 04:24 PM    AST 31 01/05/2023 04:24 PM    ALT 22 01/05/2023 04:24 PM      Hepatic Function Panel:   Lab Results   Component Value Date/Time    ALKPHOS 74 01/05/2023 04:24 PM    ALT 22 01/05/2023 04:24 PM    AST 31 01/05/2023 04:24 PM    PROT 7.0 01/05/2023 04:24 PM    BILITOT 0.5 01/05/2023 04:24 PM      Phosphorus:   Lab Results   Component Value Date/Time    PHOS 2.6 01/08/2023 06:10 AM       ASSESSMENT/PLAN:    1) 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 CKD progression vs. pre-renal  - renal function better   - off IVF     2) Covid-19  - plan per admitting team.     3) PNA  - on empiric ceftriaxone and azithromycin     4) FEN  - metabolic acidosis              - off NaHCO3 gtt  - failure to thrive              - encouraged PO intake     5) anemia  - labs noted . 6) UGIB  - GI consulted  - on PPI gtt     7) AMS   No marked change . Will consult palliative care as I suspect his overall prognosis is quite poor.

## 2023-01-09 NOTE — PROGRESS NOTES
Hospital Medicine Progress Note     Date:  1/9/2023    PCP: Marcos Purvis MD (Tel: [phone])    Date of Admission: 1/5/2023    Chief complaint:   Chief Complaint   Patient presents with    Fatigue     Pt. normally able to ambulate but not at this time and is extremely weak. Brief admission history: 80-year-old male with history of CKD stage IV, anemia of chronic disease, BPH, CAD, hyperlipidemia, who was admitted with generalized weakness, decreased oral intake, diagnosed with COVID-19 and bacterial pneumonia. Assessment/plan:  Bacterial pneumonia, likely superimposed in the setting of COVID-19 pneumonia. Procalcitonin level is elevated. Continue Zithromax (last dose 1/9/2023), Rocephin. Continue breathing treatments. Not on steroid for COVID as patient is not hypoxic. Follow up CXR ordered 1/9/2023, especially with report of SOB. Inframandibular pain, per patient's report. Exam was benign. CT-soft tissue neck with no acute finding. Suspect patient has sore throat from underlying COVID infection - started on cepacol. Acute kidney injury on CKD stage IV, resolved with intravenous fluid. Non-anion gap metabolic acidosis, resolved with sodium bicarbonate fluid. Discontinued intravenous fluid on 1/9/2023 as patient is currently receiving 2nd unit of PRBC in less than 12 hours, with increased risk of volume overload. Renal function has normalized to baseline. Generalized weakness. Likely secondary to acute medical conditions. Treat underlying conditions as noted above. Therapy evaluation in place. Going to SNF at discharge. Asymptomatic bacteriuria. Patient already on antibiotics for other indication as noted above. There is no indication for treatment of asymptomatic bacteriuria. Abdominal pain. Likely secondary to constipation. Abdominal exam is benign. Adjusted bowel regimen on 1/7/2023, had bowel movement on 1/7/2023.   Acute on chronic anemia with superimposed anemia of chronic disease. He does have some degree of iron deficiency, complicated by acute GI bleed during this hospitalization. .   Occult stool negative on 1/8/2023. However, patient had gross melanotic stool on 1/9/2023. Status post transfusion of 1 unit of PRBC on 1/8/2023 with no significant improvement. Getting second unit of PRBC as of morning of 1/9/2023. Holding aspirin. Discontinued SC heparin (SCDs ordered) and started patient on PPI infusion. GI has been consulted to assist with evaluation. Other comorbidities: history of CKD stage IV, anemia of chronic disease, BPH, CAD, hyperlipidemia. Discussed with daughter Mrs Chely Cazares at 9 AM this morning, phone #[phone]. Updated on findings of GI bleed, current hemoglobin level, GI consult and possible endoscopic evaluation. She would like a call from GI regarding procedure (if any), and to discuss expectations, risks of complications, from procedure. Disposition. Palliative care consulted on 1/9/2023 to determine goals of care. Prognosis is guarded, given advanced age, underlying pneumonic process. Diet: ADULT DIET; Dysphagia - Minced and Moist; Mildly Thick (Nectar)    Code status: Full Code   ----------  Subjective  Patient reports feeling fine, no abdominal pain. He reports sore throat this morning. Per discussion with nursing staff, he has complained of shortness of breath this morning. Objective  Physical exam:  Vitals: BP (!) 105/44   Pulse 61   Temp 98.7 °F (37.1 °C) (Oral)   Resp 18   Ht 5' 6\" (1.676 m)   Wt 145 lb 15.1 oz (66.2 kg)   SpO2 92%   BMI 23.56 kg/m²   Gen/overall appearance: Not in acute distress. Alert. Oriented x3. Head: Normocephalic, atraumatic  Eyes: EOMI, good acuity  ENT: Oral mucosa moist  Neck: No JVD, thyromegaly  CVS: Nml S1S2, no MRG, RRR  Pulm: Clear bilaterally. No crackles/wheezes  Gastrointestinal: Soft, NT/ND, +BS  Musculoskeletal: No edema. Warm  Neuro: No focal deficit.  Moves extremity spontaneously. Psychiatry: Appropriate affect. Not agitated. Skin: Warm, dry with normal turgor. No rash  Capillary refill: Brisk,< 3 seconds   Peripheral Pulses: +2 palpable, equal bilaterally     24HR INTAKE/OUTPUT:    Intake/Output Summary (Last 24 hours) at 1/9/2023 0648  Last data filed at 1/9/2023 0021  Gross per 24 hour   Intake 697 ml   Output 400 ml   Net 297 ml       I/O last 3 completed shifts: In: 900 [P.O.:900]  Out: 1125 [Urine:1125]  I/O this shift:  In: 297 [Blood:297]  Out: 400 [Urine:400]  Meds:    pantoprazole (PROTONIX) bolus  80 mg IntraVENous Once    [Held by provider] ferrous sulfate  324 mg Oral Daily with breakfast    docusate sodium  100 mg Oral BID    bisacodyl  10 mg Rectal Once    sodium chloride flush  10 mL IntraVENous 2 times per day    azithromycin  500 mg IntraVENous Q24H    cefTRIAXone (ROCEPHIN) IV  1,000 mg IntraVENous Q24H    [Held by provider] aspirin EC  81 mg Oral Daily    atorvastatin  40 mg Oral Nightly    carvedilol  3.125 mg Oral BID WC    escitalopram  10 mg Oral Nightly    polyethylene glycol  17 g Oral Daily    senna  1 tablet Oral BID    tamsulosin  0.4 mg Oral Nightly     Infusions:    sodium chloride      pantoprazole      sodium chloride      sodium chloride       PRN Meds: sodium chloride, Benzocaine-Menthol, sodium chloride, bisacodyl, sodium chloride flush, sodium chloride, promethazine **OR** ondansetron, acetaminophen **OR** acetaminophen, nitroGLYCERIN, traMADol    Labs/imaging:  CBC:   Recent Labs     01/07/23  0517 01/08/23  0610 01/08/23  1740 01/09/23  0214 01/09/23  0249   WBC 10.7 7.1  --   --   --    HGB 8.5* 7.5* 6.9* 6.8* 6.9*    166  --   --   --      BMP:    Recent Labs     01/07/23  0517 01/08/23  0610    139   K 4.2 4.2    105   CO2 21 24   BUN 66* 56*   CREATININE 3.2* 2.6*   GLUCOSE 126* 108*     Hepatic:   No results for input(s): AST, ALT, ALB, BILITOT, ALKPHOS in the last 72 hours.       Luzmaria Magallanes, MD  -------------------------------  Rounding hospitalist

## 2023-01-09 NOTE — PROGRESS NOTES
Secure message sent to [facility] about patients hemoglobin. New orders placed. See MAR.  Electronically signed by Cameron Sun RN on 1/9/2023 at 4:29 AM

## 2023-01-09 NOTE — PLAN OF CARE
Problem: Discharge Planning  Goal: Discharge to home or other facility with appropriate resources  Outcome: Progressing     Problem: Skin/Tissue Integrity  Goal: Absence of new skin breakdown  Description: 1. Monitor for areas of redness and/or skin breakdown  2. Assess vascular access sites hourly  3. Every 4-6 hours minimum:  Change oxygen saturation probe site  4. Every 4-6 hours:  If on nasal continuous positive airway pressure, respiratory therapy assess nares and determine need for appliance change or resting period.   Outcome: Progressing     Problem: Neurosensory - Adult  Goal: Achieves stable or improved neurological status  Outcome: Progressing  Flowsheets (Taken 1/8/2023 2140)  Achieves stable or improved neurological status: Assess for and report changes in neurological status  Goal: Achieves maximal functionality and self care  Outcome: Progressing  Flowsheets (Taken 1/8/2023 2140)  Achieves maximal functionality and self care: Monitor swallowing and airway patency with patient fatigue and changes in neurological status     Problem: Respiratory - Adult  Goal: Achieves optimal ventilation and oxygenation  Outcome: Progressing  Flowsheets (Taken 1/8/2023 2140)  Achieves optimal ventilation and oxygenation: Assess for changes in respiratory status     Problem: Cardiovascular - Adult  Goal: Maintains optimal cardiac output and hemodynamic stability  Outcome: Progressing  Flowsheets (Taken 1/8/2023 2140)  Maintains optimal cardiac output and hemodynamic stability: Monitor blood pressure and heart rate  Goal: Absence of cardiac dysrhythmias or at baseline  Outcome: Progressing     Problem: Skin/Tissue Integrity - Adult  Goal: Skin integrity remains intact  Outcome: Progressing  Flowsheets (Taken 1/8/2023 2140)  Skin Integrity Remains Intact: Monitor for areas of redness and/or skin breakdown  Goal: Incisions, wounds, or drain sites healing without S/S of infection  Outcome: Progressing  Flowsheets (Taken 1/8/2023 2140)  Incisions, Wounds, or Drain Sites Healing Without Sign and Symptoms of Infection: TWICE DAILY: Assess and document skin integrity  Goal: Oral mucous membranes remain intact  Outcome: Progressing     Problem: Musculoskeletal - Adult  Goal: Return mobility to safest level of function  Outcome: Progressing  Flowsheets (Taken 1/8/2023 2140)  Return Mobility to Safest Level of Function: Assess patient stability and activity tolerance for standing, transferring and ambulating with or without assistive devices  Goal: Maintain proper alignment of affected body part  Outcome: Progressing  Goal: Return ADL status to a safe level of function  Outcome: Progressing     Problem: Gastrointestinal - Adult  Goal: Minimal or absence of nausea and vomiting  Outcome: Progressing  Flowsheets (Taken 1/8/2023 2140)  Minimal or absence of nausea and vomiting: Administer IV fluids as ordered to ensure adequate hydration  Goal: Maintains or returns to baseline bowel function  Outcome: Progressing  Flowsheets (Taken 1/8/2023 2140)  Maintains or returns to baseline bowel function: Assess bowel function     Problem: Genitourinary - Adult  Goal: Urinary catheter remains patent  Outcome: Progressing  Flowsheets (Taken 1/8/2023 2140)  Urinary catheter remains patent: Assess patency of urinary catheter     Problem: Infection - Adult  Goal: Absence of infection at discharge  Outcome: Progressing  Flowsheets (Taken 1/8/2023 2140)  Absence of infection at discharge: Assess and monitor for signs and symptoms of infection  Goal: Absence of infection during hospitalization  Outcome: Progressing  Goal: Absence of fever/infection during anticipated neutropenic period  Outcome: Progressing     Problem: Metabolic/Fluid and Electrolytes - Adult  Goal: Electrolytes maintained within normal limits  Outcome: Progressing  Goal: Hemodynamic stability and optimal renal function maintained  Outcome: Progressing  Goal: Glucose maintained within prescribed range  Outcome: Progressing     Problem: Hematologic - Adult  Goal: Maintains hematologic stability  Outcome: Progressing     Problem: ABCDS Injury Assessment  Goal: Absence of physical injury  Outcome: Progressing  Flowsheets (Taken 1/9/2023 0126)  Absence of Physical Injury: Implement safety measures based on patient assessment     Problem: Safety - Adult  Goal: Free from fall injury  Outcome: Progressing  Flowsheets (Taken 1/9/2023 0126)  Free From Fall Injury: Instruct family/caregiver on patient safety     Problem: Pain  Goal: Verbalizes/displays adequate comfort level or baseline comfort level  Outcome: Progressing

## 2023-01-09 NOTE — ANESTHESIA PRE PROCEDURE
Department of Anesthesiology  Preprocedure Note       Name:  Juliana Najera   Age:  80 y.o.  :  1931                                          MRN:  [id number]         Date:  2023      Surgeon: Fern Tabor):  Naresh Correia MD    Procedure: Procedure(s):  EGD ESOPHAGOGASTRODUODENOSCOPY  SIGMOIDOSCOPY DIAGNOSTIC FLEXIBLE    Medications prior to admission:   Prior to Admission medications    Medication Sig Start Date End Date Taking? Authorizing Provider   traMADol (ULTRAM) 50 MG tablet Take 50 mg by mouth every 8 hours as needed for Pain.    Yes Historical Provider, MD   solifenacin (VESICARE) 5 MG tablet TAKE 1 TABLET BY MOUTH DAILY 22   Luis Fabian MD   atorvastatin (LIPITOR) 40 MG tablet TAKE 1 TABLET BY MOUTH DAILY 22   Luis Fabian MD   tamsulosin (FLOMAX) 0.4 MG capsule TAKE 1 CAPSULE BY MOUTH EVERY DAY  Patient taking differently: Take 0.4 mg by mouth at bedtime 22   Luis Fabian MD   carvedilol (COREG) 3.125 MG tablet TAKE 1 TABLET BY MOUTH TWICE DAILY 22   Luis Fabian MD   omeprazole (PRILOSEC) 40 MG delayed release capsule TAKE 1 CAPSULE BY MOUTH DAILY 22   Luis Fabian MD   escitalopram (LEXAPRO) 10 MG tablet TAKE 1 TABLET BY MOUTH DAILY  Patient taking differently: Take 10 mg by mouth at bedtime 22   Luis Fabian MD   senna (SENOKOT) 8.6 MG tablet Take 1 tablet by mouth 2 times daily 22  Luis Fabian MD   dorzolamide-timolol (COSOPT) 22.3-6.8 MG/ML ophthalmic solution INSTILL 1 DROP IN BOTH EYES TWICE DAILY 21   Historical Provider, MD   polyethylene glycol (GLYCOLAX) 17 GM/SCOOP powder Take 17 g by mouth 2 times daily MIX 17 GRAMS IN WATER AND DRINK DAILY  Patient taking differently: Take 17 g by mouth daily MIX 17 GRAMS IN WATER AND DRINK DAILY 10/5/21   Luis Fabian MD   nitroGLYCERIN (NITROSTAT) 0.4 MG SL tablet Place 1 tablet under the tongue every 5 minutes as needed for Chest pain 10/23/20   Melanie Villasenor Estrada Torres MD   aspirin EC 81 MG EC tablet Take 1 tablet by mouth daily 9/4/20   Aida Madsen MD   Multiple Vitamins-Minerals (ICAPS AREDS 2 PO) Take 1 tablet by mouth daily    Historical Provider, MD   patiromer sorbitex calcium (VELTASSA) 8.4 g PACK packet Take 8.4 g by mouth daily     Historical Provider, MD       Current medications:    Current Facility-Administered Medications   Medication Dose Route Frequency Provider Last Rate Last Admin    0.9 % sodium chloride infusion   IntraVENous PRN Brenna Lopez MD        pantoprazole (PROTONIX) 80 mg in sodium chloride 0.9 % 100 mL infusion  8 mg/hr IntraVENous Continuous Taryn Gordon MD 10 mL/hr at 01/09/23 1138 8 mg/hr at 01/09/23 1138    0.9 % sodium chloride infusion   IntraVENous Continuous Taryn Gordon MD 75 mL/hr at 01/09/23 1340 New Bag at 01/09/23 1340    Benzocaine-Menthol (CEPACOL SORE THROAT) lozenge 1 lozenge  1 lozenge Oral Q2H PRN Taryn Gordon MD   1 lozenge at 01/09/23 6830    [Held by provider] ferrous sulfate EC tablet 324 mg  324 mg Oral Daily with breakfast Taryn Gordon MD   324 mg at 01/09/23 9954    0.9 % sodium chloride infusion   IntraVENous PRN Taryn Gordon MD        docusate sodium (COLACE) capsule 100 mg  100 mg Oral BID Taryn Gordon MD   100 mg at 01/07/23 2203    bisacodyl (DULCOLAX) suppository 10 mg  10 mg Rectal Daily PRN Taryn Gordon MD        bisacodyl (DULCOLAX) suppository 10 mg  10 mg Rectal Once Taryn Gordon MD        sodium chloride flush 0.9 % injection 10 mL  10 mL IntraVENous 2 times per day Juan Carlos Carlin MD   10 mL at 01/09/23 0822    sodium chloride flush 0.9 % injection 10 mL  10 mL IntraVENous PRN Juan Carlos Carlin MD        0.9 % sodium chloride infusion   IntraVENous PRN Juan Carlos Carlin MD        promethazine (PHENERGAN) tablet 12.5 mg  12.5 mg Oral Q6H PRN Juan Carlos Carlin MD        Or    ondansetron (ZOFRAN) injection 4 mg  4 mg IntraVENous Q6H PRN Irfan MD Shay        acetaminophen (TYLENOL) tablet 650 mg  650 mg Oral Q6H PRN Shoaib Tipton MD        Or    acetaminophen (TYLENOL) suppository 650 mg  650 mg Rectal Q6H PRN Shoaib Tipton MD        azithromycin (ZITHROMAX) 500 mg in D5W 250ml addavial  500 mg IntraVENous Q24H Shoaib Tipton MD   Stopped at 01/08/23 1657    cefTRIAXone (ROCEPHIN) 1,000 mg in dextrose 5 % 50 mL IVPB mini-bag  1,000 mg IntraVENous Q24H Laquita Oconnor MD   Stopped at 01/08/23 1538    [Held by provider] aspirin EC tablet 81 mg  81 mg Oral Daily Shoaib Tipotn MD   81 mg at 01/09/23 0821    atorvastatin (LIPITOR) tablet 40 mg  40 mg Oral Nightly Shoaib Tipton MD   40 mg at 01/08/23 2125    [Held by provider] carvedilol (COREG) tablet 3.125 mg  3.125 mg Oral BID WC Shoaib Tipton MD   3.125 mg at 01/09/23 7890    escitalopram (LEXAPRO) tablet 10 mg  10 mg Oral Nightly Shoaib Tipton MD   10 mg at 01/08/23 2125    nitroGLYCERIN (NITROSTAT) SL tablet 0.4 mg  0.4 mg SubLINGual Q5 Min PRN Shoaib Tipton MD        polyethylene glycol (GLYCOLAX) packet 17 g  17 g Oral Daily Laquita Oconnor MD   17 g at 01/08/23 0940    senna (SENOKOT) tablet 8.6 mg  1 tablet Oral BID Shoaib Tipton MD   8.6 mg at 01/08/23 2126    tamsulosin (FLOMAX) capsule 0.4 mg  0.4 mg Oral Nightly Shoaib Tipton MD   0.4 mg at 01/08/23 2126    traMADol (ULTRAM) tablet 50 mg  50 mg Oral Q8H PRN Shoaib Tipton MD   50 mg at 01/07/23 2202       Allergies:  No Known Allergies    Problem List:    Patient Active Problem List   Diagnosis Code    Back pain M54.9    Benign nodular prostatic hyperplasia with lower urinary tract symptoms N40.1    Spinal stenosis M48.00    Coronary artery disease involving native coronary artery of native heart without angina pectoris I25.10    Stented coronary artery Z95.5    Vitamin D deficiency E55.9    Essential hypertension I10    Hyperlipidemia E78.5    Slow transit constipation K59.01    Psoriasis L40.9    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, I25.9    Chronic constipation K59.09    Former cigarette smoker Z87.891    Epigastric pain R10.13    Ventral hernia K43.9    Anemia in other chronic diseases classified elsewhere D63.8    Current moderate episode of major depressive disorder without prior episode ([facility]) F32.1    Fatigue R53.83    Sleep disorder G47.9    Non-English speaking patient Z78.9    Nocturia R35.1    Urinary frequency R35.0    Chronic kidney disease, stage IV (severe) ([facility]) N18.4    Ataxia R27.0    Frequent falls R29.6    Frailty syndrome in geriatric patient R48    Chronic pain syndrome G89.4    Bradycardia R00.1    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 chronic kidney disease (HCC) N18.4, D63.1    Hyperkalemia E87.5    Chronic midline low back pain M54.50, G89.29    SOB (shortness of breath) R06.02       Past Medical History:        Diagnosis Date    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 chronic kidney disease ([facility] 2021    Back pain 2013    BPH (benign prostatic hyperplasia) 2013    CAD (coronary artery disease)     CAD (coronary artery disease) 2013    Constipated 2013    Hyperlipidemia     MI (myocardial infarction) ([facility] 10/18/15    Spinal stenosis 2013    Stented coronary artery 2013    Urinary hesitancy     Vitamin D deficiency 2013       Past Surgical History:        Procedure Laterality Date    CORONARY ANGIOPLASTY WITH STENT PLACEMENT  10/18/15    ENDOSCOPY, COLON, DIAGNOSTIC      UPPER GASTROINTESTINAL ENDOSCOPY  10-       Social History:    Social History     Tobacco Use    Smoking status: Former     Packs/day: 0.50     Types: Cigarettes     Quit date: 2013     Years since quittin.0    Smokeless tobacco: Never   Substance Use Topics    Alcohol use:  No                                Counseling given: Not Answered      Vital Signs (Current):   Vitals:    23 0500 23 0515 23 0734 23 1118   BP: (!) 110/57 (!) 105/44 (!) 94/35   Pulse:  60 61 54   Resp:  17 18 15   Temp:  97.8 °F (36.6 °C) 98.7 °F (37.1 °C) 98.1 °F (36.7 °C)   TempSrc:  Axillary Oral Oral   SpO2:  91% 92% 93%   Weight: 145 lb 15.1 oz (66.2 kg)      Height: 5' 6\" (1.676 m)                                                 BP Readings from Last 3 Encounters:   01/09/23 (!) 94/35   10/06/22 128/60   07/05/22 (!) 155/70       NPO Status: Time of last liquid consumption: 0800                        Time of last solid consumption: 0800                        Date of last liquid consumption: 01/09/23                        Date of last solid food consumption: 01/09/23    BMI:   Wt Readings from Last 3 Encounters:   01/09/23 145 lb 15.1 oz (66.2 kg)   10/06/22 150 lb (68 kg)   07/05/22 150 lb (68 kg)     Body mass index is 23.56 kg/m². CBC:   Lab Results   Component Value Date/Time    WBC 7.1 01/08/2023 06:10 AM    RBC 2.59 01/08/2023 06:10 AM    HGB 8.0 01/09/2023 09:43 AM    HCT 24.3 01/09/2023 09:43 AM    MCV 89.7 01/08/2023 06:10 AM    RDW 14.4 01/08/2023 06:10 AM     01/08/2023 06:10 AM       CMP:   Lab Results   Component Value Date/Time     01/09/2023 09:43 AM    K 4.0 01/09/2023 09:43 AM    K 4.2 01/07/2023 05:17 AM     01/09/2023 09:43 AM    CO2 24 01/09/2023 09:43 AM    BUN 83 01/09/2023 09:43 AM    CREATININE 4.0 01/09/2023 09:43 AM    GFRAA 24 11/05/2021 01:23 PM    AGRATIO 1.0 01/09/2023 09:43 AM    LABGLOM 13 01/09/2023 09:43 AM    GLUCOSE 118 01/09/2023 09:43 AM    PROT 5.1 01/09/2023 09:43 AM    CALCIUM 8.2 01/09/2023 09:43 AM    BILITOT 0.3 01/09/2023 09:43 AM    ALKPHOS 61 01/09/2023 09:43 AM    AST 49 01/09/2023 09:43 AM    ALT 45 01/09/2023 09:43 AM       POC Tests: No results for input(s): POCGLU, POCNA, POCK, POCCL, POCBUN, POCHEMO, POCHCT in the last 72 hours.     Coags:   Lab Results   Component Value Date/Time    PROTIME 15.4 01/09/2023 09:43 AM    INR 1.23 01/09/2023 09:43 AM    APTT 29.1 10/18/2015 09:39 AM       HCG (If Applicable): No results found for: PREGTESTUR, PREGSERUM, HCG, HCGQUANT     ABGs: No results found for: PHART, PO2ART, HHP6BMS, GQR5HZN, BEART, F2WIYMKB     Type & Screen (If Applicable):  No results found for: LABABO, LABRH    Drug/Infectious Status (If Applicable):  No results found for: HIV, HEPCAB    COVID-19 Screening (If Applicable):   Lab Results   Component Value Date/Time    COVID19 DETECTED 01/05/2023 02:43 PM           Anesthesia Evaluation   no history of anesthetic complications:   Airway: Mallampati: II       Mouth opening: > = 3 FB   Dental:      Comment: Majority of teeth missing. Dark residue noted on tongue, suspect recent coffee ground emesis. Pulmonary:   (+) pneumonia:  shortness of breath:  recent URI (Covid +):  rhonchi decreased breath sounds     (-) not a current smoker                           Cardiovascular:  Exercise tolerance: good (>4 METS),   (+) hypertension:, past MI:, CAD:, CABG/stent (s/p PCI):, COLVIN:,     (-) weak pulses and peripheral edema (trace)        Rate: normal           Beta Blocker:  Dose within 24 Hrs (carvedilol)      ROS comment: Echocardiogram (2015):  Summary  LVH with normal systolic function. EF 60%. Grade I Diastolic dysfunction. No wall motion abnormalities seen. Trivial mitral, aortic, tricuspid and pulmonic regurgitation present. RVSP estimated at 30 mmHg. Neuro/Psych:   (+) neuromuscular disease (ataxia):, depression/anxiety    (-) seizures, TIA and CVA           GI/Hepatic/Renal:   (+) renal disease (CKD-4): CRI,      (-) liver disease and no morbid obesity       Endo/Other:    (+) blood dyscrasia: anemia:., electrolyte abnormalities (K+: 4.0), .    (-) diabetes mellitus               Abdominal:         (-) obese Abdomen: soft. Vascular: Other Findings: Appears frail. Anesthesia Plan      MAC     ASA 4 - emergent     (Presenting for upper and lower endoscopy following acute drop in hemoglobin.  Meds with applesauce 6 hours ago per floor. Consent obtained by telephone from patient's daughter. Discussed Mr. Braxton Barker is a high risk patient given his age, past medical history and active COVID/pneumonia. Unable to optimize prior to procedure. Ms. Braxton Barker verbalized understanding and wishes to proceed with proposed procedure. DNR suspended for perioperative period. )      MIPS: Postoperative opioids intended and Prophylactic antiemetics administered. Anesthetic plan and risks discussed with child/children. Use of blood products discussed with child/children whom consented to blood products. Plan discussed with CRNA. This pre-anesthesia assessment may be used as a history and physical.    DOS STAFF ADDENDUM:    Pt seen and examined, chart reviewed (including anesthesia, drug and allergy history). No interval changes to history and physical examination. Anesthetic plan, risks, benefits, alternatives, and personnel involved discussed with patient. Patient verbalized an understanding and agrees to proceed.       Nicolette Mason MD  January 9, 2023  2:27 PM

## 2023-01-09 NOTE — PROGRESS NOTES
Blood started at 2124, verified with YONY Khalil. VS stable (see flowsheets). Pt tolerating well. 2144 update, VS obtained (see flowsheets).   Rate increased with YNOY Khalil

## 2023-01-09 NOTE — PROGRESS NOTES
Blood transfusion started. Primary RN at bedside. Confirmed orders and blood with another RN on floor.  Electronically signed by Aguila Becerra RN on 1/9/2023 at 5:02 AM

## 2023-01-09 NOTE — PROGRESS NOTES
Called daughter again around 11:35 AM to update about ongoing GI bleed, soft BP. Unfortunately, she is unable to hear me very well - from my cell phone. And each time I tried to reach her from hospital phone, it went straight to voicemail, 3 separate times. She states she will be coming to hospital now, should be here in another 30 mins, to discuss ongoing issues.     SIGNED: Jagdish Hoffman MD, MPH. 1/9/2023

## 2023-01-09 NOTE — PROGRESS NOTES
Pt noted with explosive diarrhea with blood clots. BM noted watery and color noted magenta. Held all laxatives and notified MD Nolan. See new orders placed. 2nd unit of blood stopped. Pt given bed bath and full linen change and tolerated well. No distress or discomfort noted. Call light in reach. All safety measures in place.

## 2023-01-09 NOTE — PROGRESS NOTES
Met with and discussed with daughter in the room. She also connected me to discuss with her  over the phone. Updated both of them regarding GI bleed, worsening renal failure, plan for endoscopy. Answered all their questions. Daughter wants everything done for now. Her  was recommending \"conservative management. \"  However, daughter, Mrs Viann Gitelman, advised that it is her decision and that she will make decisions one at a time. For now, patient to remain full code.     SIGNED: Sheldon Dutta MD, MPH. 1/9/2023

## 2023-01-10 LAB
ALBUMIN SERPL-MCNC: 2.2 G/DL (ref 3.4–5)
ALP BLD-CCNC: 60 U/L (ref 40–129)
ALT SERPL-CCNC: 40 U/L (ref 10–40)
ANION GAP SERPL CALCULATED.3IONS-SCNC: 10 MMOL/L (ref 3–16)
ANION GAP SERPL CALCULATED.3IONS-SCNC: 10 MMOL/L (ref 3–16)
AST SERPL-CCNC: 44 U/L (ref 15–37)
BASOPHILS ABSOLUTE: 0 K/UL (ref 0–0.2)
BASOPHILS ABSOLUTE: 0 K/UL (ref 0–0.2)
BASOPHILS RELATIVE PERCENT: 0.2 %
BASOPHILS RELATIVE PERCENT: 0.3 %
BILIRUB SERPL-MCNC: <0.2 MG/DL (ref 0–1)
BILIRUBIN DIRECT: <0.2 MG/DL (ref 0–0.3)
BILIRUBIN, INDIRECT: ABNORMAL MG/DL (ref 0–1)
BLOOD BANK DISPENSE STATUS: NORMAL
BLOOD BANK PRODUCT CODE: NORMAL
BPU ID: NORMAL
BUN BLDV-MCNC: 76 MG/DL (ref 7–20)
BUN BLDV-MCNC: 81 MG/DL (ref 7–20)
CALCIUM SERPL-MCNC: 7.6 MG/DL (ref 8.3–10.6)
CALCIUM SERPL-MCNC: 7.6 MG/DL (ref 8.3–10.6)
CHLORIDE BLD-SCNC: 107 MMOL/L (ref 99–110)
CHLORIDE BLD-SCNC: 109 MMOL/L (ref 99–110)
CO2: 21 MMOL/L (ref 21–32)
CO2: 23 MMOL/L (ref 21–32)
CREAT SERPL-MCNC: 3.5 MG/DL (ref 0.8–1.3)
CREAT SERPL-MCNC: 3.6 MG/DL (ref 0.8–1.3)
DESCRIPTION BLOOD BANK: NORMAL
EOSINOPHILS ABSOLUTE: 0.2 K/UL (ref 0–0.6)
EOSINOPHILS ABSOLUTE: 0.2 K/UL (ref 0–0.6)
EOSINOPHILS RELATIVE PERCENT: 3.2 %
EOSINOPHILS RELATIVE PERCENT: 3.2 %
GFR SERPL CREATININE-BSD FRML MDRD: 15 ML/MIN/{1.73_M2}
GFR SERPL CREATININE-BSD FRML MDRD: 16 ML/MIN/{1.73_M2}
GLUCOSE BLD-MCNC: 100 MG/DL (ref 70–99)
GLUCOSE BLD-MCNC: 114 MG/DL (ref 70–99)
HCT VFR BLD CALC: 20.8 % (ref 40.5–52.5)
HCT VFR BLD CALC: 21.1 % (ref 40.5–52.5)
HCT VFR BLD CALC: 24.6 % (ref 40.5–52.5)
HCT VFR BLD CALC: 26 % (ref 40.5–52.5)
HEMOGLOBIN: 6.9 G/DL (ref 13.5–17.5)
HEMOGLOBIN: 7 G/DL (ref 13.5–17.5)
HEMOGLOBIN: 8.2 G/DL (ref 13.5–17.5)
HEMOGLOBIN: 8.5 G/DL (ref 13.5–17.5)
LYMPHOCYTES ABSOLUTE: 0.7 K/UL (ref 1–5.1)
LYMPHOCYTES ABSOLUTE: 0.7 K/UL (ref 1–5.1)
LYMPHOCYTES RELATIVE PERCENT: 10.6 %
LYMPHOCYTES RELATIVE PERCENT: 9.7 %
MCH RBC QN AUTO: 29.6 PG (ref 26–34)
MCH RBC QN AUTO: 29.9 PG (ref 26–34)
MCHC RBC AUTO-ENTMCNC: 33.1 G/DL (ref 31–36)
MCHC RBC AUTO-ENTMCNC: 33.2 G/DL (ref 31–36)
MCV RBC AUTO: 89 FL (ref 80–100)
MCV RBC AUTO: 90.4 FL (ref 80–100)
MONOCYTES ABSOLUTE: 0.6 K/UL (ref 0–1.3)
MONOCYTES ABSOLUTE: 0.6 K/UL (ref 0–1.3)
MONOCYTES RELATIVE PERCENT: 8.1 %
MONOCYTES RELATIVE PERCENT: 8.3 %
NEUTROPHILS ABSOLUTE: 5.2 K/UL (ref 1.7–7.7)
NEUTROPHILS ABSOLUTE: 5.6 K/UL (ref 1.7–7.7)
NEUTROPHILS RELATIVE PERCENT: 77.6 %
NEUTROPHILS RELATIVE PERCENT: 78.8 %
PDW BLD-RTO: 13.8 % (ref 12.4–15.4)
PDW BLD-RTO: 13.9 % (ref 12.4–15.4)
PLATELET # BLD: 157 K/UL (ref 135–450)
PLATELET # BLD: 158 K/UL (ref 135–450)
PMV BLD AUTO: 9.4 FL (ref 5–10.5)
PMV BLD AUTO: 9.7 FL (ref 5–10.5)
POTASSIUM REFLEX MAGNESIUM: 4 MMOL/L (ref 3.5–5.1)
POTASSIUM REFLEX MAGNESIUM: 4.1 MMOL/L (ref 3.5–5.1)
RBC # BLD: 2.3 M/UL (ref 4.2–5.9)
RBC # BLD: 2.37 M/UL (ref 4.2–5.9)
SODIUM BLD-SCNC: 140 MMOL/L (ref 136–145)
SODIUM BLD-SCNC: 140 MMOL/L (ref 136–145)
TOTAL PROTEIN: 5 G/DL (ref 6.4–8.2)
WBC # BLD: 6.7 K/UL (ref 4–11)
WBC # BLD: 7.1 K/UL (ref 4–11)

## 2023-01-10 PROCEDURE — 80048 BASIC METABOLIC PNL TOTAL CA: CPT

## 2023-01-10 PROCEDURE — 36415 COLL VENOUS BLD VENIPUNCTURE: CPT

## 2023-01-10 PROCEDURE — 85014 HEMATOCRIT: CPT

## 2023-01-10 PROCEDURE — 6360000002 HC RX W HCPCS: Performed by: INTERNAL MEDICINE

## 2023-01-10 PROCEDURE — 80076 HEPATIC FUNCTION PANEL: CPT

## 2023-01-10 PROCEDURE — 6370000000 HC RX 637 (ALT 250 FOR IP): Performed by: INTERNAL MEDICINE

## 2023-01-10 PROCEDURE — 92526 ORAL FUNCTION THERAPY: CPT

## 2023-01-10 PROCEDURE — 2060000000 HC ICU INTERMEDIATE R&B

## 2023-01-10 PROCEDURE — 94760 N-INVAS EAR/PLS OXIMETRY 1: CPT

## 2023-01-10 PROCEDURE — 85025 COMPLETE CBC W/AUTO DIFF WBC: CPT

## 2023-01-10 PROCEDURE — C9113 INJ PANTOPRAZOLE SODIUM, VIA: HCPCS | Performed by: INTERNAL MEDICINE

## 2023-01-10 PROCEDURE — 2580000003 HC RX 258: Performed by: INTERNAL MEDICINE

## 2023-01-10 PROCEDURE — 2580000003 HC RX 258: Performed by: STUDENT IN AN ORGANIZED HEALTH CARE EDUCATION/TRAINING PROGRAM

## 2023-01-10 PROCEDURE — 85018 HEMOGLOBIN: CPT

## 2023-01-10 PROCEDURE — 36430 TRANSFUSION BLD/BLD COMPNT: CPT

## 2023-01-10 RX ORDER — DORZOLAMIDE HYDROCHLORIDE AND TIMOLOL MALEATE 20; 5 MG/ML; MG/ML
1 SOLUTION/ DROPS OPHTHALMIC 2 TIMES DAILY
Status: DISCONTINUED | OUTPATIENT
Start: 2023-01-10 | End: 2023-01-23 | Stop reason: HOSPADM

## 2023-01-10 RX ORDER — SODIUM CHLORIDE 9 MG/ML
INJECTION, SOLUTION INTRAVENOUS PRN
Status: DISCONTINUED | OUTPATIENT
Start: 2023-01-10 | End: 2023-01-11

## 2023-01-10 RX ADMIN — SENNOSIDES 8.6 MG: 8.6 TABLET, FILM COATED ORAL at 22:00

## 2023-01-10 RX ADMIN — CEFTRIAXONE 1000 MG: 1 INJECTION, POWDER, FOR SOLUTION INTRAMUSCULAR; INTRAVENOUS at 17:57

## 2023-01-10 RX ADMIN — SODIUM CHLORIDE 8 MG/HR: 9 INJECTION, SOLUTION INTRAVENOUS at 02:23

## 2023-01-10 RX ADMIN — ATORVASTATIN CALCIUM 40 MG: 40 TABLET, FILM COATED ORAL at 21:59

## 2023-01-10 RX ADMIN — TAMSULOSIN HYDROCHLORIDE 0.4 MG: 0.4 CAPSULE ORAL at 21:59

## 2023-01-10 RX ADMIN — POLYETHYLENE GLYCOL 3350 17 G: 17 POWDER, FOR SOLUTION ORAL at 10:55

## 2023-01-10 RX ADMIN — ESCITALOPRAM OXALATE 10 MG: 10 TABLET ORAL at 22:01

## 2023-01-10 RX ADMIN — Medication 10 ML: at 22:01

## 2023-01-10 RX ADMIN — DORZOLAMIDE HYDROCHLORIDE AND TIMOLOL MALEATE 1 DROP: 20; 5 SOLUTION/ DROPS OPHTHALMIC at 20:04

## 2023-01-10 RX ADMIN — SODIUM CHLORIDE, PRESERVATIVE FREE 10 ML: 5 INJECTION INTRAVENOUS at 21:59

## 2023-01-10 RX ADMIN — DOCUSATE SODIUM 100 MG: 100 CAPSULE, LIQUID FILLED ORAL at 11:00

## 2023-01-10 RX ADMIN — SENNOSIDES 8.6 MG: 8.6 TABLET, FILM COATED ORAL at 11:00

## 2023-01-10 ASSESSMENT — ENCOUNTER SYMPTOMS
SHORTNESS OF BREATH: 0
CHEST TIGHTNESS: 0

## 2023-01-10 ASSESSMENT — PAIN SCALES - GENERAL: PAINLEVEL_OUTOF10: 8

## 2023-01-10 ASSESSMENT — PAIN DESCRIPTION - PAIN TYPE: TYPE: ACUTE PAIN

## 2023-01-10 ASSESSMENT — PAIN DESCRIPTION - FREQUENCY: FREQUENCY: CONTINUOUS

## 2023-01-10 ASSESSMENT — PAIN DESCRIPTION - DESCRIPTORS: DESCRIPTORS: SQUEEZING

## 2023-01-10 ASSESSMENT — PAIN DESCRIPTION - ORIENTATION: ORIENTATION: MID

## 2023-01-10 ASSESSMENT — PAIN DESCRIPTION - LOCATION: LOCATION: ABDOMEN

## 2023-01-10 ASSESSMENT — PAIN DESCRIPTION - ONSET: ONSET: ON-GOING

## 2023-01-10 NOTE — PROGRESS NOTES
Gastroenterology Progress Note    Shira Pemberton is a 80 y.o. male patient. Principal Problem:    SOB (shortness of breath)  Resolved Problems:    * No resolved hospital problems. *      SUBJECTIVE:  Stool is now black with a hint of maroon compared to pretty reddish yesterday. Has had some abdominal pain. Tolerating small clears. Not eating much. No fevers.      Current Facility-Administered Medications: 0.9 % sodium chloride infusion, , IntraVENous, PRN  0.9 % sodium chloride infusion, , IntraVENous, PRN  pantoprazole (PROTONIX) 80 mg in sodium chloride 0.9 % 100 mL infusion, 8 mg/hr, IntraVENous, Continuous  0.9 % sodium chloride infusion, , IntraVENous, Continuous  sodium chloride flush 0.9 % injection 5-40 mL, 5-40 mL, IntraVENous, 2 times per day  sodium chloride flush 0.9 % injection 5-40 mL, 5-40 mL, IntraVENous, PRN  0.9 % sodium chloride infusion, , IntraVENous, PRN  Benzocaine-Menthol (CEPACOL SORE THROAT) lozenge 1 lozenge, 1 lozenge, Oral, Q2H PRN  [Held by provider] ferrous sulfate EC tablet 324 mg, 324 mg, Oral, Daily with breakfast  0.9 % sodium chloride infusion, , IntraVENous, PRN  docusate sodium (COLACE) capsule 100 mg, 100 mg, Oral, BID  bisacodyl (DULCOLAX) suppository 10 mg, 10 mg, Rectal, Daily PRN  bisacodyl (DULCOLAX) suppository 10 mg, 10 mg, Rectal, Once  sodium chloride flush 0.9 % injection 10 mL, 10 mL, IntraVENous, 2 times per day  sodium chloride flush 0.9 % injection 10 mL, 10 mL, IntraVENous, PRN  0.9 % sodium chloride infusion, , IntraVENous, PRN  promethazine (PHENERGAN) tablet 12.5 mg, 12.5 mg, Oral, Q6H PRN **OR** ondansetron (ZOFRAN) injection 4 mg, 4 mg, IntraVENous, Q6H PRN  acetaminophen (TYLENOL) tablet 650 mg, 650 mg, Oral, Q6H PRN **OR** acetaminophen (TYLENOL) suppository 650 mg, 650 mg, Rectal, Q6H PRN  cefTRIAXone (ROCEPHIN) 1,000 mg in dextrose 5 % 50 mL IVPB mini-bag, 1,000 mg, IntraVENous, Q24H  [Held by provider] aspirin EC tablet 81 mg, 81 mg, Oral, Daily  atorvastatin (LIPITOR) tablet 40 mg, 40 mg, Oral, Nightly  [Held by provider] carvedilol (COREG) tablet 3.125 mg, 3.125 mg, Oral, BID WC  escitalopram (LEXAPRO) tablet 10 mg, 10 mg, Oral, Nightly  nitroGLYCERIN (NITROSTAT) SL tablet 0.4 mg, 0.4 mg, SubLINGual, Q5 Min PRN  polyethylene glycol (GLYCOLAX) packet 17 g, 17 g, Oral, Daily  senna (SENOKOT) tablet 8.6 mg, 1 tablet, Oral, BID  tamsulosin (FLOMAX) capsule 0.4 mg, 0.4 mg, Oral, Nightly  traMADol (ULTRAM) tablet 50 mg, 50 mg, Oral, Q8H PRN    Physical    VITALS:  BP (!) 136/59   Pulse 62   Temp 98.2 °F (36.8 °C) (Oral)   Resp 18   Ht 5' 6\" (1.676 m)   Wt 149 lb 11.1 oz (67.9 kg)   SpO2 96%   BMI 24.16 kg/m²   TEMPERATURE:  Current - Temp: 98.2 °F (36.8 °C); Max - Temp  Av.9 °F (36.6 °C)  Min: 97.5 °F (36.4 °C)  Max: 98.3 °F (36.8 °C)    NAD  Eyes: No icterus  RRR  Lungs CTA Bilaterally, normal effort  Abdomen soft, ND, NT, Bowel sounds normal.  Ext: no edema  Neuro: No tremor  Psych: Alert but confused  Data    Data Review:    Recent Labs     23  0610 23  1740 23  1750 01/10/23  0418 01/10/23  0559   WBC 7.1  --   --  7.1 6.7   HGB 7.5*   < > 7.1* 7.0* 6.9*   HCT 23.3*   < > 21.3* 21.1* 20.8*   MCV 89.7  --   --  89.0 90.4     --   --  158 157    < > = values in this interval not displayed. Recent Labs     23  0610 23  0943 01/10/23  0418 01/10/23  0600    138 140 140   K 4.2 4.0 4.1 4.0    101 107 109   CO2 24 24 23 21   PHOS 2.6 3.6  --   --    BUN 56* 83* 81* 76*   CREATININE 2.6* 4.0* 3.5* 3.6*     Recent Labs     23  0943 01/10/23  0418   AST 49* 44*   ALT 45* 40   BILIDIR  --  <0.2   BILITOT 0.3 <0.2   ALKPHOS 61 60     No results for input(s): LIPASE, AMYLASE in the last 72 hours. Recent Labs     23  09   PROTIME 15.4*   INR 1.23*     No results for input(s): PTT in the last 72 hours.     ASSESSMENT:  80 y.o. male with a PMH of CAD with coronary stents, HLD, CKD stage IV, BPH who presented on 1/5/2023 with generalized weakness, decreased oral intake diagnosed with COVID-19 and bacterial pneumonia. Has developed worsening anemia during hospital course and came down to 6.9 1/8/23 and received 1 unit of blood. Repeat hgb 1/9/23 the same so recieved a 2nd unit. Then morning on 1/9/23 had explosive diarrhea with clots. Labs this am showed BUN 83 and Cr 4.0 (56/2.6 yesterday). LFT's AST 49, ALT 49, bili 0.3. Hgb 6.9 after 1 unit of blood and then 8.0 after a 2nd unit of blood (10.5 on admission). EGD 1/9/23 showed a normal esophagus without [employer] or reflux changes, 3cm sliding hiatal hernia, normal stomach, 6mm clean-based duodenal ulcer, 15mm clean-based duodenal ulcer in the bulb. In the sweep, there was a 2cm ulcer with a visible vessel in the distal aspect of the ulcer. I was not able to see the very proximal aspect of the ulcer. Epinephrine 1:10,000 was injected in each of 4 quadrants around the ulcer with excellent blanching of tissue. A total of 3.5mL was injected. Next, the vessel was obliterated using gold probe cautery with excellent hemostasis. No bleeding at the end of the procedure. 2nd portion of the duodenum was normal.     Duodenal ulcer with hemorrhage and acute blood loss anemia  Acute renal failure  COVID pneumonia    PLAN :  PPI drip  H. Pylori stool antigen  Avoid NSAIDs  Agree with transfusion. Would watch for now. BUN improving. Hgb down 0.5 points. No other treatable lesion on endoscopy yesterday. Will transfuse and monitor. Continue PPI drip. I suspect this is old blood passing through rather than active bleeding but will need to watch. Thank you for allowing me to participate in the care of your patient. Please feel free to contact me with any concerns.   [address], [place]

## 2023-01-10 NOTE — PROGRESS NOTES
[facility]   Speech Therapy  Daily Dysphagia Treatment Note    Aleksandar Mulligan  AGE: 80 y.o. 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)    Chronic constipation    Former cigarette smoker    Epigastric pain    Ventral hernia    Anemia in other chronic diseases classified elsewhere    Current moderate episode of major depressive disorder without prior episode (HCC)    Fatigue    Sleep disorder    Non-English speaking patient    Nocturia    Urinary frequency    Chronic kidney disease, stage IV (severe) (HCC)    Ataxia    Frequent falls    Frailty syndrome in geriatric patient    Chronic pain syndrome    Bradycardia    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 chronic kidney disease (HCC)    Hyperkalemia    Chronic midline low back pain    SOB (shortness of breath)     No Known Allergies    Treatment Diagnosis: Dysphagia     Chart review: 2023 admitted with c/o generalized weakness, fatigue, poor appetite  MD ADMISSION H&P HPI:  Patient is a 59-year-old male with past medical history of CAD, CKD stage IV, hyperlipidemia who presents to the hospital due to patient having generalized weakness. Patient also has poor appetite, fatigue as well as generalized weakness. Patient does not have any recent sick contacts, no reported fevers chills. No reported nausea vomiting diarrhea. 2023 COVID +     IMAGING:  CXR: 2023  Impression   Ill-defined opacities bilaterally.       GI note 1/10/23  80 y.o. male with a PMH of CAD with coronary stents, HLD, CKD stage IV, BPH who presented on 1/5/2023 with generalized weakness, decreased oral intake diagnosed with COVID-19 and bacterial pneumonia. Has developed worsening anemia during hospital course and came down to 6.9 1/8/23 and received 1 unit of blood. Repeat hgb 1/9/23 the same so recieved a 2nd unit. Then morning on 1/9/23 had explosive diarrhea with clots. Labs this am showed BUN 83 and Cr 4.0 (56/2.6 yesterday). LFT's AST 49, ALT 49, bili 0.3. Hgb 6.9 after 1 unit of blood and then 8.0 after a 2nd unit of blood (10.5 on admission). EGD 1/9/23 showed a normal esophagus without [employer] or reflux changes, 3cm sliding hiatal hernia, normal stomach, 6mm clean-based duodenal ulcer, 15mm clean-based duodenal ulcer in the bulb. In the sweep, there was a 2cm ulcer with a visible vessel in the distal aspect of the ulcer. I was not able to see the very proximal aspect of the ulcer. Epinephrine 1:10,000 was injected in each of 4 quadrants around the ulcer with excellent blanching of tissue. A total of 3.5mL was injected. Next, the vessel was obliterated using gold probe cautery with excellent hemostasis. No bleeding at the end of the procedure. 2nd portion of the duodenum was normal.      Duodenal ulcer with hemorrhage and acute blood loss anemia  Acute renal failure  COVID pneumonia     PLAN :  PPI drip  H. Pylori stool antigen  Avoid NSAIDs  Agree with transfusion. Would watch for now. BUN improving. Hgb down 0.5 points. No other treatable lesion on endoscopy yesterday. Will transfuse and monitor. Continue PPI drip. I suspect this is old blood passing through rather than active bleeding but will need to watch. Subjective:     Current Diet Level: Clear liquids ;  Thin liquids  (despite recommendation for mildly thick liquids prior to being made NPO)    Comments regarding tolerating Current Diet: RN reports staff has been giving him mildly thick liquids and thickening his clear liquids as this was the recommended diet prior to being made NPO and advanced to clear liquids by GI. Reports no difficulty with mildly thick liquids    Objective:     Pain: Did not state               Vision: [x]WFL []Impaired:  Hearing: []WFL [x]Impaired: [place]    Cognitive/behavioral/communication:   Oriented to [x] self [x] place [x] Date (month and year) [x] situation  [x]alert []lethargic  [x]cooperative []self-limiting   [x]confused at times  []distractible []agitated []impulsive  [x]verbally responsive []nonverbal []limited verbal responses  [x]follows one step commands []does not follow dx []follows complex commands  []aphasic []dysarthric  [] other:     Respiratory Status: [x]Room Air []O2 via nasal cannula []Other:  Dentition: []Adequate []Dentures []Missing Many Teeth []Edentulous []Other:  Vocal Quality: [x]Normal []Dysphonic  []Aphonic  []Hoarse []Wet []Weak []Other:  Volitional Cough: [x]Strong []Weak []Wet []Absent []Congested []Other:  Volitional Swallow:   []Absent  [x]Delayed []Adequate []Required use of drink     Oral Mechanism Exam:  []WFL [x]Mild   [x] Moderate  []Severe  []To be assessed  Impaired:   []Left side      []Right side    [x]Labial ROM/Coordination    []Labial Symmetry   [x]Lingual ROM/Coordination   []Lingual Symmetry  []Gag  []Other:     Patient Positioning: Upright in bed     PO Trials: liquids only per recommendation by GI  Thin Liquids: suspect at risk for premature loss of bolus, delayed swallow initiation, decreased laryngeal elevation, immediate severe coughing episode via tsp  Nectar thick liquids via straw and cup's edge: suspect at risk for premature loss of bolus, delayed swallow initiation, decreased laryngeal elevation  Honey Thick liquids suspect at risk for premature loss of bolus, delayed swallow initiation, decreased laryngeal elevation      Dysphagia Tx:   Direct Dysphagia tx: PO tolerance as indicated above. Immediate coughing episodes with thin liquids.  No overt s/s of aspiration or penetration with mildly thick liquids. Dysphagia ex: N/A, main focus on PO tolerance  Training in compensatory strategies: small single sips, reviewed purpose of diet recommendations, sit upright  Pt response to ex/training: needs ongoing reinforcement    Goals:    Pt will functionally tolerate recommended diet with no overt clinical s/s of aspiration ongoing  Pt will demonstrate understanding of aspiration risk and precautions via education/demonstration with occasional prompting ongoing  Pt will advance to least restrictive diet as indicated  ongoing     Assessment:   Impressions:   Accepted and tolerated treatment at bedside-patient seen with lunch tray. Patient alert, cooperative, pleasant; concern for potential for unreliable historian as responses tend to be vague at times; follows simple dx; verbally responsive. Limited oral phase assessment as trials limited to liquids only. Patient previously seen 1/6/22 with recommendation for minced and moist recommendation due to reduced mastication secondary to edentulism and weakness. Suspect premature bolus loss to the pharynx with all. Moderate pharyngeal stage dysphagia characterized by delayed swallow and decreased laryngeal elevation. Immediate cough of thin liquids via tsp. No overt s/s of aspiration or penetration   Recommend downgrade to mildly thick liquids cont clear liquids per GI. Please refer to ST note 1/6/22 before advancing diet to solids. ST recommended minced and moist due to missing teeth and weakness. ST to follow for diet tolerance, upgrade readiness, and tx appropriateness monitor.     Recommended Diet and Intervention 1/10/2023:  Diet Solids Recommendation:  Clear liquids  Liquid Consistency Recommendation:  Mildly (nectar) thick liquids  Recommended form of Meds: Meds crushed as able in puree      Compensatory Swallowing Strategies:  Upright as possible with all PO intake , Small bites/sips , Remain upright 30-45 min     EDUCATION: Provided education regarding role of SLP, results of assessment, recommendations and general speech pathology plan of care. [] Pt verbalized understanding and agreement   [x] Pt requires ongoing learning   [] No evidence of comprehension     Dysphagia Prognosis: [] good [x]fair []poor []guarded     Current co-morbidites    Plan:   Continue Dysphagia Therapy: YES    Interventions: Diet Tolerance Monitoring , Patient/Family Education   Duration/Frequency of therapy while on unit: Speech therapy for dysphagia tx 3-5 times per week during acute care stay. Discharge Instructions:   Recommend ongoing SLP for dysphagia therapy upon discharge from hospital     This note serves as a D/C Summary in the event that this patient is discharged prior to the next therapy session.     Coded treatment time: 0  Total treatment time: [address] [place] [address], [facility]  Speech-Language Pathologist  On 01/10/23 at 12:33 PM

## 2023-01-10 NOTE — CARE COORDINATION
01/10/23 1147   IMM Letter   IMM Letter given to Patient/Family/Significant other/Guardian/POA/by: Provided to patient at bedside by Nick Wilkerson RN. Education provided to patient, patient reported no questions and verbalized understanding. Patient aware of 4 hours allotted time to determine if they choose to pursue Medicare appeal process. Copy left at bedside.    IMM Letter date given: 01/10/23   IMM Letter time given: 1115     #[phone]  Electronically signed by Nick Wilkerson RN on 1/10/2023 at 11:47 AM

## 2023-01-10 NOTE — PROGRESS NOTES
Department of Internal Medicine  Nephrology Progress Note    HPI.    80 y.o. male whom we were asked to see for RONI on CKD. I see him in the office, last 08/22 with eGFR 18 ml/min. He presents with weakness and dyspnea. He was diagnosed with Covid-19. Renal cosn called for RONI . Events noted , chart reviewed     HPI:  Breathing stable. No CP. Renal function worse. Hgb dropped  ROS:  In bed. No fever. [address]:  medications reviewed. Physical Exam:    VITALS:  BP (!) 136/59   Pulse 62   Temp 98.2 °F (36.8 °C) (Oral)   Resp 18   Ht 5' 6\" (1.676 m)   Wt 149 lb 11.1 oz (67.9 kg)   SpO2 96%   BMI 24.16 kg/m²   24HR INTAKE/OUTPUT:    Intake/Output Summary (Last 24 hours) at 1/10/2023 1253  Last data filed at 1/10/2023 0306  Gross per 24 hour   Intake 400 ml   Output 900 ml   Net -500 ml         Constitutional:  Looks comfortable   Respiratory:  scattered rhonchi   Gastrointestinal No tenderness.   Normal Bowel Sounds  Cardiovascular:  S1, S2 RRR   Edema:   + edema  Skin:  no rash    DATA:    CBC:  Lab Results   Component Value Date/Time    WBC 6.7 01/10/2023 05:59 AM    RBC 2.30 01/10/2023 05:59 AM    HGB 6.9 01/10/2023 05:59 AM    HCT 20.8 01/10/2023 05:59 AM    MCV 90.4 01/10/2023 05:59 AM    MCH 29.9 01/10/2023 05:59 AM    MCHC 33.1 01/10/2023 05:59 AM    RDW 13.9 01/10/2023 05:59 AM     01/10/2023 05:59 AM    MPV 9.7 01/10/2023 05:59 AM     CMP:  Lab Results   Component Value Date/Time     01/10/2023 06:00 AM    K 4.0 01/10/2023 06:00 AM     01/10/2023 06:00 AM    CO2 21 01/10/2023 06:00 AM    BUN 76 01/10/2023 06:00 AM    CREATININE 3.6 01/10/2023 06:00 AM    GFRAA 24 11/05/2021 01:23 PM    AGRATIO 1.0 01/09/2023 09:43 AM    LABGLOM 15 01/10/2023 06:00 AM    GLUCOSE 100 01/10/2023 06:00 AM    PROT 5.0 01/10/2023 04:18 AM    CALCIUM 7.6 01/10/2023 06:00 AM    BILITOT <0.2 01/10/2023 04:18 AM    ALKPHOS 60 01/10/2023 04:18 AM    AST 44 01/10/2023 04:18 AM    ALT 40 01/10/2023 04:18 AM      Hepatic Function Panel:   Lab Results   Component Value Date/Time    ALKPHOS 60 01/10/2023 04:18 AM    ALT 40 01/10/2023 04:18 AM    AST 44 01/10/2023 04:18 AM    PROT 5.0 01/10/2023 04:18 AM    BILITOT <0.2 01/10/2023 04:18 AM    BILIDIR <0.2 01/10/2023 04:18 AM    IBILI see below 01/10/2023 04:18 AM      Phosphorus:   Lab Results   Component Value Date/Time    PHOS 3.6 01/09/2023 09:43 AM       ASSESSMENT/PLAN:    1) 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 CKD progression vs. pre-renal  - renal function worse     2) Covid-19  - plan per admitting team.     3) PNA  - on empiric ceftriaxone and azithromycin     4) FEN  - metabolic acidosis              - off NaHCO3 gtt  - failure to thrive              - encouraged PO intake     5) anemia  - labs noted     6) UGIB  - GI consulted  - on PPI gtt     7) AMS   No marked change . Will consult palliative care as I suspect his overall prognosis is quite poor.

## 2023-01-10 NOTE — CARE COORDINATION
Discharge Planning:   Lyssa Hamilton with Palliative Care requested I call daughter Carrillo Fernandez #[phone]. Called to daughter Carrillo Fernandez. Discussed discharge planning. Daughter expressed concerns regarding patient returning home with son and refusing services in the home. Per chart review, PT/OT recommending SNF placement at discharge. Daughter requested information regarding resources in the community if patient would return home. Private duty, home health care, and skilled nursing facility sent to daughter via e-mail. E-mail to [email or web address]  Referral to [facility] placed online.      NEED: SNF choice    YAO Valentin, LACHO, Social Work/Case Management   [phone]  Electronically signed by YAO Valentin, LACHO on 1/10/2023 at 4:23 PM

## 2023-01-10 NOTE — PROGRESS NOTES
Hospitalist Progress Note    Patient:  Moise Cardoza  Unit/Bed:M1L-2619/5127-01   YOB: 1931       MRN: [id number] Acct: [de-identified]  PCP: Sarah Melo MD    Date of Admission: 1/5/2023  --------------------------    Chief Complaint:          Hospital Course:     Moise Cardoza is a 80 y.o. male hospitalized on 1/5/2023   with multiple medical condition including anemia, CKD stage IV, dyslipidemia, coronary artery disease, BPH, presented with generalized weakness. Decreased oral intake. Diagnosed with COVID-19 infection with probable superimposed bacterial infection    Assessment/plan:     Bacterial pneumonia superimposing, COVID 19 infection  -Date of diagnosis 12/5/2022  -No hypoxia.  -No COVID-specific medication.  -Completing antibiotics today.  -Respiratory care protocol bronchodilator as needed. Acute upper GI bleed    -Status post EGD 1/9 which revealed multiple duodenal ulcer, treated with ( 6mm clean-based duodenal ulcer, 15mm clean-based duodenal ulcer in the bulb. In the sweep, there was a 2cm ulcer with a visible vessel in the distal aspect of the ulcer. I was not able to see the very proximal aspect of the ulcer). Treated with epinephrine  -Patient hemodynamically stable. Ongoing melena could indicate persistent bleeding versus residual melena from previous bleed. Continue monitoring stool output  -Continue infusion of Protonix per GI recommended  -Holding aspirin         Acute blood loss anemia  -Repeated hemoglobin today 6.9. Will give additional 1 unit of packed RBCs continue monitoring. Acute kidney injury superimposing CKD versus progression of CKD. -Plan per nephrology. Acute metabolic encephalopathy,   -Alert oriented to self carry carry conversation. ,  Neuro exam nonfocal.  Continue monitoring. Incidental pleural plaques    Code Status: Full Code         DVT prophylaxis: SCD secondary to GI bleed.      Disposition: Continue inpatient care, awaiting stability of the renal function hemoglobin. Diet advancement. Discussed with RN      Attempt to call the patient daughter Jeff [place] approximately 4:27 PM.  Message left.    ----------------      Subjective:     Patient seen and examined  Overnight events noted  RN and ancillary staff note reviewed    Alert, report that he is thirsty, no new complaint. Hemoglobin low today. Discussed with nursing staff, still have black stool. No hematemesis. Diet: ADULT DIET; Clear Liquid; Mildly Thick (Nectar)    OBJECTIVE     Exam:  BP (!) 141/57   Pulse 62   Temp 98 °F (36.7 °C) (Oral)   Resp 18   Ht 5' 6\" (1.676 m)   Wt 149 lb 11.1 oz (67.9 kg)   SpO2 96%   BMI 24.16 kg/m²            Gen: Somnolent, arousable.,    Head: Normocephalic. Atraumatic. Eyes: Conjunctivae/corneas clear. ENT: Oral mucosa dry mucous membrane, no throat lesion  Neck: No JVD. No obvious thyromegaly. CVS: Nml S1S2, no murmur  , RRR  Pulmomary: Diminished air entry in both lungs. .  Gastrointestinal: Soft, minimal tenderness with deep palpation, no rebound tenderness or sign of acute abdomen. Tender, non distend, . Musculoskeletal: Trace bilateral edema. Warm  Neuro: Somnolent but easily arousable no focal deficit. Moves extremity spontaneously.   Psychiatry: Appears depressed        Medications:  Reviewed    Infusion Medications    sodium chloride      sodium chloride      pantoprazole 8 mg/hr (01/10/23 0223)    sodium chloride      sodium chloride      sodium chloride       Scheduled Medications    sodium chloride flush  5-40 mL IntraVENous 2 times per day    [Held by provider] ferrous sulfate  324 mg Oral Daily with breakfast    docusate sodium  100 mg Oral BID    bisacodyl  10 mg Rectal Once    sodium chloride flush  10 mL IntraVENous 2 times per day    cefTRIAXone (ROCEPHIN) IV  1,000 mg IntraVENous Q24H    [Held by provider] aspirin EC  81 mg Oral Daily    atorvastatin  40 mg Oral Nightly    [Held by provider] carvedilol  3.125 mg Oral BID     escitalopram  10 mg Oral Nightly    polyethylene glycol  17 g Oral Daily    senna  1 tablet Oral BID    tamsulosin  0.4 mg Oral Nightly     PRN Meds: sodium chloride, phenol, sodium chloride, sodium chloride flush, sodium chloride, Benzocaine-Menthol, sodium chloride, bisacodyl, sodium chloride flush, sodium chloride, promethazine **OR** ondansetron, acetaminophen **OR** acetaminophen, nitroGLYCERIN, traMADol      Intake/Output Summary (Last 24 hours) at 1/10/2023 1547  Last data filed at 1/10/2023 1446  Gross per 24 hour   Intake 515 ml   Output 1400 ml   Net -885 ml             Labs:   Recent Labs     01/08/23  0610 01/08/23  1740 01/10/23  0418 01/10/23  0559 01/10/23  1433   WBC 7.1  --  7.1 6.7  --    HGB 7.5*   < > 7.0* 6.9* 8.5*   HCT 23.3*   < > 21.1* 20.8* 26.0*     --  158 157  --     < > = values in this interval not displayed. Recent Labs     01/08/23  0610 01/09/23  0943 01/10/23  0418 01/10/23  0600    138 140 140   K 4.2 4.0 4.1 4.0    101 107 109   CO2 24 24 23 21   BUN 56* 83* 81* 76*   CREATININE 2.6* 4.0* 3.5* 3.6*   CALCIUM 7.9* 8.2* 7.6* 7.6*   PHOS 2.6 3.6  --   --      Recent Labs     01/09/23  0943 01/10/23  0418   AST 49* 44*   ALT 45* 40   BILIDIR  --  <0.2   BILITOT 0.3 <0.2   ALKPHOS 61 60     Recent Labs     01/09/23 0943   INR 1.23*     No results for input(s): [facility] in the last 72 hours. Urinalysis:      Lab Results   Component Value Date/Time    NITRU Negative 01/05/2023 06:49 PM    WBCUA 6 01/05/2023 06:49 PM    BACTERIA None Seen 01/05/2023 06:49 PM    RBCUA 1106 01/05/2023 06:49 PM    BLOODU LARGE 01/05/2023 06:49 PM    SPECGRAV 1.015 01/05/2023 06:49 PM    GLUCOSEU Negative 01/05/2023 06:49 PM       Radiology:  XR CHEST PORTABLE   Final Result   1. Patchy right basilar airspace disease could represent atelectasis versus   pneumonia.       2.  Multiple bilateral calcified pleural plaques         CT SOFT TISSUE NECK WO CONTRAST   Final Result   No acute findings, soft tissue mass or lymphadenopathy evident. XR CHEST 1 VIEW   Final Result   Ill-defined opacities bilaterally.                      Electronically signed by Christina Horne MD on 1/10/2023 at 3:47 PM

## 2023-01-10 NOTE — CONSULTS
PALLIATIVE MEDICINE CONSULTATION     Patient name:Flash Goodman   GUN:[id number]    TJO:[birth year]  Room/Bed:Y1U-5379/5127-01   LOS: 5 days         Date of consult:1/10/2023    Consult Information  Palliative Medicine Consult performed by: LISSET Rodriguez CNP     Inpatient consult to Palliative Care  Consult performed by: LISSET Rodriguez CNP  Consult ordered by: Sheldon Dutta MD  Reason for consult: Goals of care and code status discussion. ASSESSMENT/RECOMMENDATIONS     80 y.o. male with debility, dysphagia, anemia, malnutrition, and CKD. Symptom Management:  Weakness - Working with PT/OT, will likely need SNF at discharge. Lives with son, however he also has significant medical concerns. Daughter would like to speak with case management to discuss possibilities of the patient going to a SNF in [facility]. Dysphagia - ST following. Anemia - Transfusion per attending, GI following. Malnutrition - Long standing concern after reviewing medical records. Discussed the role this plays in healing and overall prognosis. CKD - Nephrology following, not a candidate for dialysis. Goals of Care- See below. Patient/Family Goals of Care :    I explained the highly personal nature of deciding how to approach serious illness, and outlined two extremes--continuing disease-focused, morbidity-inducing treatment with the possibility of prolonging life vs. focusing on comfort/quality of life while allowing disease progression and natural death. Emphasis was placed on the absence of a \"right\" answer, as opposed to making good decisions based on personal preferences and circumstances, with ongoing reevaluation and adjustment in treatment goals as the clinical course unfolds. Patient was alert and oriented throughout the conversation in his room.   We reviewed his living well at length, he did confirm his decisions regarding CPR and intubation in the aspect of cardiac or respiratory arrest.  He does state that he would like to live and continue medical management up until that point. Reached out to patient's [facility]. POA, daughter Cathryn Wallace. [place] through the 38-minute conversation, Cathryn Rodrigo did call who is a critical care physician in [facility]. He and Cathryn Rodrigo are not 100% on the same page regarding the patient's current medical care. We discussed goals of care, patient's main goal is to return home where he lives with his son who also has several medical conditions. The 2 of them essentially financially support and help maintain each other's care. Given the patient's overall prognosis, at this point, he will likely not return immediately to his apartment, however will end up in a skilled nursing facility. Cathryn Wallace is having a difficult time with the patient's \"waffling\" regarding his desire to live. Per his chart review, the patient has had conversations with his providers about \"why am I still living\". Per his daughters report he is also had those conversations with her and is questioning why he is the last of his siblings to be alive. He has had concerns with malnutrition and failure to thrive for quite some time per chart review, she confirmed. Her frustration now lies that he is essentially changing his mind stating that he has a lot to live for. Per her report, her  was very in depth when having CODE STATUS conversations when the patient was completing his living well and estate planning. At that time he chose not to undergo resuscitative measures in the event of cardiac or respiratory arrest.  He feels as though now he wishes to do so. We discussed that the patient's in goal essentially will not be met by aggressive management of his disease processes. She is under the mindset that if the patient were to undergo cardiac or respiratory arrest, temporary measures to revive the patient would be appropriate so that her siblings would be able to say goodbye.   Her  does not agree with this decision, but states that she has the right to make that decision. Larry Perez states that she needs to [place] conversations with her siblings\" so that they understand the magnitude of what is going on with their father and can come to terms with his prognosis therefore taking resuscitative measures off the table. She also states that she is \"trying to prevent my siblings from hating me for the rest of my life\" if I do not allow resuscitative measures. We discussed the risk vs benefit of aggressive measures understanding his desires and co-morbidities. Crista Alegria and her  feel as though the patient would improve if he moved to [facility], however he has a co dependent relationship with his son and therefore will likely not move. Crista Alegria is very overwhelmed, asked that the conversation was ended in order eat and take a walk. She would like to have a discussion again tomorrow allowing some time to continue discussions with her siblings and . Disposition/Discharge Plan:   Pending  Follows with outpatient [place]. Advance Directives:  Surrogate Decision Maker: Matheus Odom. Code status:  Full Code    Thank you for allowing us to participate in the care of this patient. HISTORY     CC: Weakness  HPI: The patient is a 80 y.o. male with a past medical history significant for CAD, CKD stage IV, hyperlipidemia, depression, chronic low back pain, and failure to thrive. Patient arrived to the ED with complaints of poor appetite, fatigue, and generalized weakness. Further evaluation showed right lung pneumonia, positive for COVID-19. Developed worsening anemia requiring multiple units of PRBC's. EGD and flex sigmoid yesterday showing duodenal ulcer. Appetite has been poor for quite some time, ST following for aspiration. Palliative Medicine SymptomScreening/ROS:    Review of Systems   Constitutional:  Positive for appetite change and fatigue. Negative for activity change. Respiratory:  Negative for chest tightness and shortness of breath. Cardiovascular: Negative. Neurological:  Positive for weakness. Psychiatric/Behavioral: Negative. A complete 10 count ROS was obtained. Pertinent positives mentioned above in HPI/ROS. All others if not mentioned are negative. Palliative Performance Scale:     [] 60%  Amb reduced; Sig dz. Can't do hobbies/housework; Intake normal or reduced, Occasional assist; LOC full/confusion   [] 50%  Mainly sit/lie; Extensive disease. Mainly assist, Intake normal or reduced; Occasional assist; LOC full/confusion   [] 40%  Mainly in bed; Extensive disease; Mainly assist; Intake normal or reduced; Occasional assist; LOC full/confusion   [x] 30%  Bed bound, Extensive disease; Total care; Intake reduced; LOC full/confusion   [] 20%  Bed bound; Extensive disease; Total care; Intake minimal; Drowsy/coma   [] 10%  Bed bound; Extensive disease; Total care; Mouth care only; Drowsy/coma   []  0%   Death       Home med list and hospital medications reviewed in chart as of 1/10/2023     EXAM     Vitals:    01/10/23 1048   BP: (!) 136/59   Pulse:    Resp:    Temp: 98.2 °F (36.8 °C)   SpO2:        Physical Exam  Cardiovascular:      Rate and Rhythm: Normal rate. Pulses: Normal pulses. Pulmonary:      Effort: Pulmonary effort is normal.   Abdominal:      Palpations: Abdomen is soft. Musculoskeletal:      Right lower leg: No edema. Left lower leg: No edema. Neurological:      Mental Status: He is alert and oriented to person, place, and time. Motor: Weakness present. Psychiatric:         Mood and Affect: Mood normal.         Thought Content:  Thought content normal.         Judgment: Judgment normal.              Current labs in the epic chart reviewed as of 1/10/2023   Review of previous notes, admits, labs, radiology and testing relevant to this consult done in this chart today 1/10/2023      Total time: 110 minutes  >50% of time spent counseling patient at bedside or POA/family member if applicable , reviewing information and discussing care, coordinating with care team  Signed By: Electronically signed by LISSET Aguero CNP on 1/10/2023 at [address] [place] [address]   [phone]    January 10, 2023

## 2023-01-11 LAB
ANION GAP SERPL CALCULATED.3IONS-SCNC: 11 MMOL/L (ref 3–16)
BASOPHILS ABSOLUTE: 0 K/UL (ref 0–0.2)
BASOPHILS RELATIVE PERCENT: 0.3 %
BUN BLDV-MCNC: 65 MG/DL (ref 7–20)
CALCIUM SERPL-MCNC: 8 MG/DL (ref 8.3–10.6)
CHLORIDE BLD-SCNC: 110 MMOL/L (ref 99–110)
CO2: 20 MMOL/L (ref 21–32)
CREAT SERPL-MCNC: 3.3 MG/DL (ref 0.8–1.3)
EOSINOPHILS ABSOLUTE: 0.2 K/UL (ref 0–0.6)
EOSINOPHILS RELATIVE PERCENT: 2.8 %
GFR SERPL CREATININE-BSD FRML MDRD: 17 ML/MIN/{1.73_M2}
GLUCOSE BLD-MCNC: 107 MG/DL (ref 70–99)
HCT VFR BLD CALC: 25.7 % (ref 40.5–52.5)
HCT VFR BLD CALC: 25.8 % (ref 40.5–52.5)
HCT VFR BLD CALC: 25.9 % (ref 40.5–52.5)
HCT VFR BLD CALC: 26.3 % (ref 40.5–52.5)
HEMOGLOBIN: 8.4 G/DL (ref 13.5–17.5)
HEMOGLOBIN: 8.5 G/DL (ref 13.5–17.5)
HEMOGLOBIN: 8.5 G/DL (ref 13.5–17.5)
HEMOGLOBIN: 8.6 G/DL (ref 13.5–17.5)
LYMPHOCYTES ABSOLUTE: 0.7 K/UL (ref 1–5.1)
LYMPHOCYTES RELATIVE PERCENT: 8.6 %
MCH RBC QN AUTO: 29.6 PG (ref 26–34)
MCHC RBC AUTO-ENTMCNC: 33.1 G/DL (ref 31–36)
MCV RBC AUTO: 89.6 FL (ref 80–100)
MONOCYTES ABSOLUTE: 0.5 K/UL (ref 0–1.3)
MONOCYTES RELATIVE PERCENT: 6.9 %
NEUTROPHILS ABSOLUTE: 6.2 K/UL (ref 1.7–7.7)
NEUTROPHILS RELATIVE PERCENT: 81.4 %
PDW BLD-RTO: 14.3 % (ref 12.4–15.4)
PLATELET # BLD: 207 K/UL (ref 135–450)
PMV BLD AUTO: 9 FL (ref 5–10.5)
POTASSIUM REFLEX MAGNESIUM: 4 MMOL/L (ref 3.5–5.1)
RBC # BLD: 2.89 M/UL (ref 4.2–5.9)
SODIUM BLD-SCNC: 141 MMOL/L (ref 136–145)
WBC # BLD: 7.7 K/UL (ref 4–11)

## 2023-01-11 PROCEDURE — C9113 INJ PANTOPRAZOLE SODIUM, VIA: HCPCS | Performed by: INTERNAL MEDICINE

## 2023-01-11 PROCEDURE — 2060000000 HC ICU INTERMEDIATE R&B

## 2023-01-11 PROCEDURE — 97530 THERAPEUTIC ACTIVITIES: CPT

## 2023-01-11 PROCEDURE — 2580000003 HC RX 258: Performed by: INTERNAL MEDICINE

## 2023-01-11 PROCEDURE — 6370000000 HC RX 637 (ALT 250 FOR IP): Performed by: INTERNAL MEDICINE

## 2023-01-11 PROCEDURE — 2580000003 HC RX 258: Performed by: STUDENT IN AN ORGANIZED HEALTH CARE EDUCATION/TRAINING PROGRAM

## 2023-01-11 PROCEDURE — 94760 N-INVAS EAR/PLS OXIMETRY 1: CPT

## 2023-01-11 PROCEDURE — 92526 ORAL FUNCTION THERAPY: CPT

## 2023-01-11 PROCEDURE — 85025 COMPLETE CBC W/AUTO DIFF WBC: CPT

## 2023-01-11 PROCEDURE — 36415 COLL VENOUS BLD VENIPUNCTURE: CPT

## 2023-01-11 PROCEDURE — 85014 HEMATOCRIT: CPT

## 2023-01-11 PROCEDURE — 6360000002 HC RX W HCPCS: Performed by: INTERNAL MEDICINE

## 2023-01-11 PROCEDURE — 85018 HEMOGLOBIN: CPT

## 2023-01-11 PROCEDURE — 80048 BASIC METABOLIC PNL TOTAL CA: CPT

## 2023-01-11 RX ADMIN — SENNOSIDES 8.6 MG: 8.6 TABLET, FILM COATED ORAL at 08:18

## 2023-01-11 RX ADMIN — DORZOLAMIDE HYDROCHLORIDE AND TIMOLOL MALEATE 1 DROP: 20; 5 SOLUTION/ DROPS OPHTHALMIC at 11:43

## 2023-01-11 RX ADMIN — SODIUM CHLORIDE 8 MG/HR: 9 INJECTION, SOLUTION INTRAVENOUS at 00:55

## 2023-01-11 RX ADMIN — ATORVASTATIN CALCIUM 40 MG: 40 TABLET, FILM COATED ORAL at 21:06

## 2023-01-11 RX ADMIN — POLYETHYLENE GLYCOL 3350 17 G: 17 POWDER, FOR SOLUTION ORAL at 08:17

## 2023-01-11 RX ADMIN — SODIUM CHLORIDE, PRESERVATIVE FREE 10 ML: 5 INJECTION INTRAVENOUS at 21:06

## 2023-01-11 RX ADMIN — DOCUSATE SODIUM 100 MG: 100 CAPSULE, LIQUID FILLED ORAL at 08:17

## 2023-01-11 RX ADMIN — Medication 10 ML: at 21:07

## 2023-01-11 RX ADMIN — TAMSULOSIN HYDROCHLORIDE 0.4 MG: 0.4 CAPSULE ORAL at 21:06

## 2023-01-11 RX ADMIN — ESCITALOPRAM OXALATE 10 MG: 10 TABLET ORAL at 21:06

## 2023-01-11 RX ADMIN — DOCUSATE SODIUM 100 MG: 100 CAPSULE, LIQUID FILLED ORAL at 21:06

## 2023-01-11 RX ADMIN — DORZOLAMIDE HYDROCHLORIDE AND TIMOLOL MALEATE 1 DROP: 20; 5 SOLUTION/ DROPS OPHTHALMIC at 21:10

## 2023-01-11 RX ADMIN — ACETAMINOPHEN 650 MG: 325 TABLET ORAL at 21:06

## 2023-01-11 RX ADMIN — SENNOSIDES 8.6 MG: 8.6 TABLET, FILM COATED ORAL at 21:06

## 2023-01-11 ASSESSMENT — PAIN SCALES - GENERAL
PAINLEVEL_OUTOF10: 4
PAINLEVEL_OUTOF10: 0
PAINLEVEL_OUTOF10: 7
PAINLEVEL_OUTOF10: 4

## 2023-01-11 ASSESSMENT — PAIN DESCRIPTION - LOCATION
LOCATION: ABDOMEN
LOCATION: ABDOMEN

## 2023-01-11 ASSESSMENT — PAIN DESCRIPTION - ORIENTATION: ORIENTATION: MID

## 2023-01-11 NOTE — PROGRESS NOTES
Gastroenterology Progress Note    Aleksandar Mulligan is a 80 y.o. male patient. Principal Problem:    SOB (shortness of breath)  Resolved Problems:    * No resolved hospital problems. *      SUBJECTIVE:  Small melena only overnight per nursing. Has had some abdominal pain. Tolerating small clears. Not eating much. No fevers.      Current Facility-Administered Medications: phenol 1.4 % mouth spray 1 spray, 1 spray, Mouth/Throat, Q2H PRN  dorzolamide-timolol (COSOPT) 22.3-6.8 MG/ML ophthalmic solution 1 drop (Patient Supplied), 1 drop, Both Eyes, BID  0.9 % sodium chloride infusion, , IntraVENous, PRN  pantoprazole (PROTONIX) 80 mg in sodium chloride 0.9 % 100 mL infusion, 8 mg/hr, IntraVENous, Continuous  sodium chloride flush 0.9 % injection 5-40 mL, 5-40 mL, IntraVENous, 2 times per day  sodium chloride flush 0.9 % injection 5-40 mL, 5-40 mL, IntraVENous, PRN  0.9 % sodium chloride infusion, , IntraVENous, PRN  Benzocaine-Menthol (CEPACOL SORE THROAT) lozenge 1 lozenge, 1 lozenge, Oral, Q2H PRN  [Held by provider] ferrous sulfate EC tablet 324 mg, 324 mg, Oral, Daily with breakfast  docusate sodium (COLACE) capsule 100 mg, 100 mg, Oral, BID  bisacodyl (DULCOLAX) suppository 10 mg, 10 mg, Rectal, Daily PRN  bisacodyl (DULCOLAX) suppository 10 mg, 10 mg, Rectal, Once  sodium chloride flush 0.9 % injection 10 mL, 10 mL, IntraVENous, 2 times per day  sodium chloride flush 0.9 % injection 10 mL, 10 mL, IntraVENous, PRN  0.9 % sodium chloride infusion, , IntraVENous, PRN  promethazine (PHENERGAN) tablet 12.5 mg, 12.5 mg, Oral, Q6H PRN **OR** ondansetron (ZOFRAN) injection 4 mg, 4 mg, IntraVENous, Q6H PRN  acetaminophen (TYLENOL) tablet 650 mg, 650 mg, Oral, Q6H PRN **OR** acetaminophen (TYLENOL) suppository 650 mg, 650 mg, Rectal, Q6H PRN  cefTRIAXone (ROCEPHIN) 1,000 mg in dextrose 5 % 50 mL IVPB mini-bag, 1,000 mg, IntraVENous, Q24H  [Held by provider] aspirin EC tablet 81 mg, 81 mg, Oral, Daily  atorvastatin (LIPITOR) tablet 40 mg, 40 mg, Oral, Nightly  [Held by provider] carvedilol (COREG) tablet 3.125 mg, 3.125 mg, Oral, BID WC  escitalopram (LEXAPRO) tablet 10 mg, 10 mg, Oral, Nightly  nitroGLYCERIN (NITROSTAT) SL tablet 0.4 mg, 0.4 mg, SubLINGual, Q5 Min PRN  polyethylene glycol (GLYCOLAX) packet 17 g, 17 g, Oral, Daily  senna (SENOKOT) tablet 8.6 mg, 1 tablet, Oral, BID  tamsulosin (FLOMAX) capsule 0.4 mg, 0.4 mg, Oral, Nightly  traMADol (ULTRAM) tablet 50 mg, 50 mg, Oral, Q8H PRN    Physical    VITALS:  /72   Pulse 58   Temp 97.8 °F (36.6 °C) (Oral)   Resp 18   Ht 5' 6\" (1.676 m)   Wt 154 lb 8.7 oz (70.1 kg)   SpO2 92%   BMI 24.94 kg/m²   TEMPERATURE:  Current - Temp: 97.8 °F (36.6 °C); Max - Temp  Av.1 °F (36.7 °C)  Min: 97.7 °F (36.5 °C)  Max: 98.9 °F (37.2 °C)    NAD  Eyes: No icterus  RRR  Lungs CTA Bilaterally, normal effort  Abdomen soft, ND, NT, Bowel sounds normal.  Ext: no edema  Neuro: No tremor  Psych: Alert but confused  Data    Data Review:    Recent Labs     01/10/23  0418 01/10/23  0559 01/10/23  1433 01/10/23  1733 23  0155 23  0612   WBC 7.1 6.7  --   --   --  7.7   HGB 7.0* 6.9*   < > 8.2* 8.4* 8.6*   HCT 21.1* 20.8*   < > 24.6* 25.7* 25.9*   MCV 89.0 90.4  --   --   --  89.6    157  --   --   --  207    < > = values in this interval not displayed. Recent Labs     23  0943 01/10/23  0418 01/10/23  0600 23  0612    140 140 141   K 4.0 4.1 4.0 4.0    107 109 110   CO2 24 23 21 20*   PHOS 3.6  --   --   --    BUN 83* 81* 76* 65*   CREATININE 4.0* 3.5* 3.6* 3.3*     Recent Labs     23  0943 01/10/23  0418   AST 49* 44*   ALT 45* 40   BILIDIR  --  <0.2   BILITOT 0.3 <0.2   ALKPHOS 61 60     No results for input(s): LIPASE, AMYLASE in the last 72 hours. Recent Labs     23  0943   PROTIME 15.4*   INR 1.23*     No results for input(s): PTT in the last 72 hours.     ASSESSMENT:  80 y.o. male with a PMH of CAD with coronary stents, HLD, CKD stage IV, BPH who presented on 1/5/2023 with generalized weakness, decreased oral intake diagnosed with COVID-19 and bacterial pneumonia. Has developed worsening anemia during hospital course and came down to 6.9 1/8/23 and received 1 unit of blood. Repeat hgb 1/9/23 the same so recieved a 2nd unit. Then morning on 1/9/23 had explosive diarrhea with clots. Labs this am showed BUN 83 and Cr 4.0 (56/2.6 yesterday). LFT's AST 49, ALT 49, bili 0.3. Hgb 6.9 after 1 unit of blood and then 8.0 after a 2nd unit of blood (10.5 on admission). EGD 1/9/23 showed a normal esophagus without Mcdonald's or reflux changes, 3cm sliding hiatal hernia, normal stomach, 6mm clean-based duodenal ulcer, 15mm clean-based duodenal ulcer in the bulb. In the sweep, there was a 2cm ulcer with a visible vessel in the distal aspect of the ulcer. I was not able to see the very proximal aspect of the ulcer. Epinephrine 1:10,000 was injected in each of 4 quadrants around the ulcer with excellent blanching of tissue. A total of 3.5mL was injected. Next, the vessel was obliterated using gold probe cautery with excellent hemostasis. No bleeding at the end of the procedure. 2nd portion of the duodenum was normal.     Duodenal ulcer with hemorrhage and acute blood loss anemia  Acute renal failure  COVID pneumonia    PLAN :  PPI drip  H. Pylori stool antigen  Avoid NSAIDs  Agree with transfusion. Would continue to monitor. BUN improving. Hgb stable. OK for regular dysphagia diet. Thank you for allowing me to participate in the care of your patient. Please feel free to contact me with any concerns.   [address], [place]

## 2023-01-11 NOTE — PROGRESS NOTES
Attempted to see patient today for continuation of goals of care conversations with both he and his daughter. No family was in the room, patient was somewhat lethargic and sleeping. Will attempt to see later today if time permits.

## 2023-01-11 NOTE — PROGRESS NOTES
Hospitalist Progress Note    Patient:  Tal Lacey  Unit/Bed:U3V-6253/5127-01   YOB: 1931       MRN: [id number] Acct: [de-identified]  PCP: Wesly Shannon MD    Date of Admission: 1/5/2023  --------------------------    Chief Complaint:      Weakness     Hospital Course/interval history:     Tal Lacey is a 80 y.o. male hospitalized on 1/5/2023   with multiple medical condition including anemia, CKD stage IV, dyslipidemia, coronary artery disease, BPH, presented with generalized weakness. Decreased oral intake. Diagnosed with COVID-19 infection with probable superimposed bacterial infection    Broad-spectrum antibiotic initiated, respiratory care protocol. He did not require COVID-specific medications in the light of the lack of hypoxia. He is noted to have acute GI bleeding, underwent EGD which revealed multiple peptic ulcers. Assessment/plan:     Bacterial pneumonia superimposing, COVID 19 infection  -Date of diagnosis 12/5/2022  -No hypoxia.  -No COVID-specific medication.  -Completing 6-day course of ceftriaxone, 5 days of azithromycin.  -Respiratory care protocol, monitor respiratory status. Acute upper GI bleed    -Status post EGD 1/9 which revealed multiple duodenal ulcer, treated with ( 6mm clean-based duodenal ulcer, 15mm clean-based duodenal ulcer in the bulb. In the sweep, there was a 2cm ulcer with a visible vessel in the distal aspect of the ulcer. I was not able to see the very proximal aspect of the ulcer). Treated with epinephrine  -Continue holding aspirin  -Continue Protonix infusion  -GI team advance diet to regular (modified for dysphagia)  -Hemoglobin appears stable, dark stool likely secondary related to old blood. Continue close monitoring of stool output. Acute blood loss anemia  -Hemoglobin stable at 8.5. Continue monitoring. Acute kidney injury superimposing CKD versus progression of CKD. -Creatinine 3.3 today. Unclear baseline.   Peaked at 4.    Acute metabolic encephalopathy,/delirium  -Alert oriented to self carry carry conversation. ,  Neuro exam nonfocal.  Continue monitoring. Incidental pleural plaques    Code Status: Full Code         DVT prophylaxis: SCD secondary to GI bleed. Disposition: Continue monitor for signs of rebleeding. Awaiting improvement of mental status. Continue PT and OT. Likely need ECF on discharge. Discussed with RN      Message left for his daughter. ----------------      Subjective:     Patient seen and examined    Somnolent but easily arousable, oriented. Has \"little \" abdominal pain\" tolerating clear. Still have dark stool. Diet: ADULT DIET; Dysphagia - Minced and Moist; Mildly Thick (Nectar)    OBJECTIVE     Exam:  /72   Pulse 58   Temp 97.8 °F (36.6 °C) (Oral)   Resp 18   Ht 5' 6\" (1.676 m)   Wt 154 lb 8.7 oz (70.1 kg)   SpO2 92%   BMI 24.94 kg/m²          Overall unchanged physical exam finding as documented below  Gen: Mental status waxing and waning. Head: Normocephalic. Atraumatic. Eyes: Conjunctivae/corneas clear. ENT: Oral mucosa dry mucous membrane, no throat lesion  Neck: No JVD. No obvious thyromegaly. CVS: Nml S1S2, no murmur  , RRR  Pulmomary: Diminished air entry in both lungs. .  Gastrointestinal: Soft, minimal tenderness with deep palpation, no rebound tenderness or sign of acute abdomen. Tender, non distend, . Musculoskeletal: Trace bilateral edema. Warm  Neuro: Somnolent but easily arousable no focal deficit. Moves extremity spontaneously.   Psychiatry: Appears depressed        Medications:  Reviewed    Infusion Medications    sodium chloride      pantoprazole 8 mg/hr (01/11/23 0055)    sodium chloride      sodium chloride       Scheduled Medications    dorzolamide-timolol  1 drop Both Eyes BID    sodium chloride flush  5-40 mL IntraVENous 2 times per day    [Held by provider] ferrous sulfate  324 mg Oral Daily with breakfast    docusate sodium  100 mg Oral BID    bisacodyl  10 mg Rectal Once    sodium chloride flush  10 mL IntraVENous 2 times per day    cefTRIAXone (ROCEPHIN) IV  1,000 mg IntraVENous Q24H    [Held by provider] aspirin EC  81 mg Oral Daily    atorvastatin  40 mg Oral Nightly    [Held by provider] carvedilol  3.125 mg Oral BID WC    escitalopram  10 mg Oral Nightly    polyethylene glycol  17 g Oral Daily    senna  1 tablet Oral BID    tamsulosin  0.4 mg Oral Nightly     PRN Meds: phenol, sodium chloride, sodium chloride flush, sodium chloride, Benzocaine-Menthol, bisacodyl, sodium chloride flush, sodium chloride, promethazine **OR** ondansetron, acetaminophen **OR** acetaminophen, nitroGLYCERIN, traMADol      Intake/Output Summary (Last 24 hours) at 1/11/2023 1427  Last data filed at 1/11/2023 1416  Gross per 24 hour   Intake 1402.56 ml   Output 2050 ml   Net -647.44 ml             Labs:   Recent Labs     01/10/23  0418 01/10/23  0559 01/10/23  1433 01/11/23  0155 01/11/23  0612 01/11/23  1243   WBC 7.1 6.7  --   --  7.7  --    HGB 7.0* 6.9*   < > 8.4* 8.6* 8.5*   HCT 21.1* 20.8*   < > 25.7* 25.9* 25.8*    157  --   --  207  --     < > = values in this interval not displayed. Recent Labs     01/09/23  0943 01/10/23  0418 01/10/23  0600 01/11/23  0612    140 140 141   K 4.0 4.1 4.0 4.0    107 109 110   CO2 24 23 21 20*   BUN 83* 81* 76* 65*   CREATININE 4.0* 3.5* 3.6* 3.3*   CALCIUM 8.2* 7.6* 7.6* 8.0*   PHOS 3.6  --   --   --      Recent Labs     01/09/23  0943 01/10/23  0418   AST 49* 44*   ALT 45* 40   BILIDIR  --  <0.2   BILITOT 0.3 <0.2   ALKPHOS 61 60     Recent Labs     01/09/23  0943   INR 1.23*     No results for input(s): Haseeb Valverde in the last 72 hours.     Urinalysis:      Lab Results   Component Value Date/Time    NITRU Negative 01/05/2023 06:49 PM    WBCUA 6 01/05/2023 06:49 PM    BACTERIA None Seen 01/05/2023 06:49 PM    RBCUA 1106 01/05/2023 06:49 PM    BLOODU LARGE 01/05/2023 06:49 PM    SPECGRAV 1.015 01/05/2023 06:49 PM    GLUCOSEU Negative 01/05/2023 06:49 PM       Radiology:  XR CHEST PORTABLE   Final Result   1. Patchy right basilar airspace disease could represent atelectasis versus   pneumonia. 2.  Multiple bilateral calcified pleural plaques         CT SOFT TISSUE NECK WO CONTRAST   Final Result   No acute findings, soft tissue mass or lymphadenopathy evident. XR CHEST 1 VIEW   Final Result   Ill-defined opacities bilaterally.                      Electronically signed by Kodak Brower MD on 1/11/2023 at 2:27 PM

## 2023-01-11 NOTE — PROGRESS NOTES
Physical Therapy  Facility/Department: [facility] PROGRESSIVE CARE  Physical Therapy Daily Treatment Note     Name: Joseluis Barry  : 1931  MRN: [id number]  Date of Service: 2023    Discharge Recommendations:  Therapy recommended at discharge, Continue to assess pending progress (skilled 3-5 x wk therapy)      Patient Diagnosis(es): The primary encounter diagnosis was Pneumonia of right lung due to infectious organism, unspecified part of lung. Diagnoses of General weakness, Impaired mobility and ADLs, COVID-19, and Acute kidney injury superimposed on CKD [facility]) were also pertinent to this visit. Past Medical History:  has a past medical history of 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 chronic kidney disease ([facility] [address] Back pain, BPH (benign prostatic hyperplasia), CAD (coronary artery disease), CAD (coronary artery disease), Constipated, Hyperlipidemia, MI (myocardial infarction) ([facility] [address] Spinal stenosis, Stented coronary artery, Urinary hesitancy, and Vitamin D deficiency. Past Surgical History:  has a past surgical history that includes Coronary angioplasty with stent (10/18/15); Endoscopy, colon, diagnostic; Upper gastrointestinal endoscopy (10-); Upper gastrointestinal endoscopy (N/A, 2023); and Upper gastrointestinal endoscopy (2023). Assessment   Body Structures, Functions, Activity Limitations Requiring Skilled Therapeutic Intervention: Decreased functional mobility ; Decreased ADL status; Decreased strength;Decreased balance;Decreased endurance  Assessment: Patient is a 80-year-old male with past medical history of CAD, CKD stage IV, hyperlipidemia who presents to the hospital on 23 due to patient having generalized weakness. Pt found to be COVID+. Prior to admission, pt living in apt setting with son, independent with ADLs and ambulation with SPC vs RW. Pt continues to function well below baseline--not able to keep awake and engage in activity or sitting up or out of bed at this time.   Juan C in need for cotreatment with OT for further sessions. At present time, pt will likley need ongoing skilled PT at 3-5x wk intensity for d/c needs. Will follow and make recs based on pt's progress. Mykel Pacheco scored a 10/24 on the AM-PAC short mobility form. Current research shows that an AM-PAC score of 17 or less is typically not associated with a discharge to the patient's home setting. Based on the patient's AM-PAC score and their current functional mobility deficits, it is recommended that the patient have 3-5 sessions per week of Physical Therapy at d/c to increase the patient's independence. Please see assessment section for further patient specific details. If patient discharges prior to next session this note will serve as a discharge summary. Please see below for the latest assessment towards goals. Treatment Diagnosis: impaired mobility  Specific Instructions for Next Treatment: cotx with OT  Therapy Prognosis: Fair;Guarded  Barriers to Learning: [place], ?cog  Requires PT Follow-Up: Yes  Activity Tolerance  Activity Tolerance: Patient limited by fatigue;Patient limited by endurance  Activity Tolerance Comments: eyes closed, little to no tolerance for activity at this time.      Plan   Physcial Therapy Plan  General Plan: 3-5 times per week  Specific Instructions for Next Treatment: cotx with OT  Current Treatment Recommendations: Strengthening, Balance training, Functional mobility training, Transfer training, Gait training, Endurance training, Neuromuscular re-education, Safety education & training, Equipment evaluation, education, & procurement, Patient/Caregiver education & training, Therapeutic activities  Safety Devices  Type of Devices: Patient at risk for falls, Bed alarm in place, Left in bed, Nurse notified, Call light within reach, Heels elevated for pressure relief     Restrictions  Restrictions/Precautions  Restrictions/Precautions: Fall Risk, Isolation  Position Activity Restriction  Other position/activity restrictions: Covid-19; IV; aldrich     Subjective   General  Chart Reviewed: Yes  Patient assessed for rehabilitation services?: Yes  Additional Pertinent Hx: Patient is a 60-year-old male with past medical history of CAD, CKD stage IV, hyperlipidemia who presents to the hospital on 1/5/23 due to patient having generalized weakness. Pt found to be COVID+. Family / Caregiver Present: No  Referring Practitioner: Dusty Bonner MD  Referral Date : 01/05/23  Diagnosis: COVID+  Subjective  Subjective: Pt was in supine, tended to keep eyes closed with little interaction with PT--difficult to keep awake. Pt nodded yes/no at times, garbled speech noted with wet heavy cough at times. Lunch tray present, but declining to eat--did agree to a drink, 2 bites of mousse.      Social/Functional History  Social/Functional History  Lives With: Son  Type of Home: Apartment  Home Layout: One level  Home Access: Stairs to enter with rails  Entrance Stairs - Number of Steps: 13  Bathroom Shower/Tub: Tub/Shower unit  Bathroom Toilet: Standard  Bathroom Equipment: Grab bars in shower  Home Equipment: franny Felipe  Has the patient had two or more falls in the past year or any fall with injury in the past year?: Yes  ADL Assistance: Independent  Ambulation Assistance: Independent (RW in apt, SPC in community)  Transfer Assistance: Independent  Active : No  Occupation: Retired  Type of Occupation:   Vision/Hearing  Vision  Vision: Impaired (Reporting poor vision but does not wear glasses)  Vision Exceptions:  (kept eyes closed most of PT sessioin this date.)  Hearing  Hearing: Exceptions to [facility]  Hearing Exceptions: Hard of hearing/hearing concerns    Cognition   Orientation  Overall Orientation Status: Impaired  Orientation Level: Oriented to person;Oriented to place (O x person, knew 'hospital')     Objective   Bed mobility  Supine to Sit: Unable to assess  Sit to Supine: Unable to assess  Bed Mobility Comments: Declining any/all attempts at sitting up eob or any mobility at this time. Only allowed minimal rom of LEs for repositioning in bed, as well as pillow placement under knees to allow heels up/off of bed. PT gave pt drink, 2 bites of mousse, then left in supine with call light and needs in reach. Nursing notified. Transfers  Sit to Stand: Unable to assess  Stand to Sit: Unable to assess  Bed to Chair: Unable to assess  Comment: KATRIN--pt declined any mobility attempts thie date, falls back asleep easily, with eyes closed most of session. Balance  Comments: KATRIN this date, declined offers, kept eyes closed most all of visit. AM-PAC Score  AM-PAC Inpatient Mobility Raw Score : 10 (01/11/23 1332)  AM-PAC Inpatient T-Scale Score : 32.29 (01/11/23 1332)  Mobility Inpatient CMS 0-100% Score: 76.75 (01/11/23 1332)  Mobility Inpatient CMS G-Code Modifier : CL (01/11/23 1332)     Goals  Short Term Goals  Time Frame for Short Term Goals: by acute discharge - all goals ongoing 1/11/23 with refugio/no progress this date. Short Term Goal 1: bed mobility SBA  Short Term Goal 2: sit<>stand with SBA  Short Term Goal 3: ambulate > 30' with RW and SBA  Patient Goals   Patient Goals : none stated     Education  Patient Education  Education Given To: Patient; Family  Education Provided: Role of Therapy;Plan of Care;Orientation  Education Method: Verbal  Barriers to Learning: Hearing  Education Outcome: Continued education needed  Therapy Time   Individual Concurrent Group Co-treatment   Time In 1300         Time Out 1325         Minutes 25          2 act charges   Maribel Rangel PT  Electronically signed by Maribel Rangel PT on 1/11/2023 at 1:34 PM

## 2023-01-11 NOTE — PROGRESS NOTES
Pt is alert and oriented to self and place, pt is confused on the time and situation at times. Pt has had 3 small loose dark red/brown BM throughout the night. Pt denies further needs at this time. Call light within reach.     Electronically signed by Purnima Roberts RN on 1/11/2023 at 6:54 AM

## 2023-01-11 NOTE — PROGRESS NOTES
[facility]   Speech Therapy  Daily Dysphagia Treatment Note    Jairo Mueller  AGE: 80 y.o. 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)    Chronic constipation    Former cigarette smoker    Epigastric pain    Ventral hernia    Anemia in other chronic diseases classified elsewhere    Current moderate episode of major depressive disorder without prior episode (HCC)    Fatigue    Sleep disorder    Non-English speaking patient    Nocturia    Urinary frequency    Chronic kidney disease, stage IV (severe) (HCC)    Ataxia    Frequent falls    Frailty syndrome in geriatric patient    Chronic pain syndrome    Bradycardia    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 Patient also has poor appetite, fatigue as well as generalized weakness. Patient does not have any recent sick contacts, no reported fevers chills. No reported nausea vomiting diarrhea. 2023 COVID +     IMAGING:  CXR: 2023  Impression   Ill-defined opacities bilaterally.       GI note 1/10/23  80 y.o. male with a PMH of CAD with coronary stents, HLD, CKD stage IV, BPH who presented on 1/5/2023 with generalized weakness, decreased oral intake diagnosed with COVID-19 and bacterial pneumonia. Has developed worsening anemia during hospital course and came down to 6.9 1/8/23 and received 1 unit of blood. Repeat hgb 1/9/23 the same so recieved a 2nd unit. Then morning on 1/9/23 had explosive diarrhea with clots. Labs this am showed BUN 83 and Cr 4.0 (56/2.6 yesterday). LFT's AST 49, ALT 49, bili 0.3. Hgb 6.9 after 1 unit of blood and then 8.0 after a 2nd unit of blood (10.5 on admission). EGD 1/9/23 showed a normal esophagus without Mcdonald's or reflux changes, 3cm sliding hiatal hernia, normal stomach, 6mm clean-based duodenal ulcer, 15mm clean-based duodenal ulcer in the bulb. In the sweep, there was a 2cm ulcer with a visible vessel in the distal aspect of the ulcer. I was not able to see the very proximal aspect of the ulcer. Epinephrine 1:10,000 was injected in each of 4 quadrants around the ulcer with excellent blanching of tissue. A total of 3.5mL was injected. Next, the vessel was obliterated using gold probe cautery with excellent hemostasis. No bleeding at the end of the procedure. 2nd portion of the duodenum was normal.      Duodenal ulcer with hemorrhage and acute blood loss anemia  Acute renal failure  COVID pneumonia     PLAN :  PPI drip  H. Pylori stool antigen  Avoid NSAIDs  Agree with transfusion. Would watch for now. BUN improving. Hgb down 0.5 points. No other treatable lesion on endoscopy yesterday. Will transfuse and monitor. Continue PPI drip. I suspect this is old blood passing through rather than active bleeding but will need to watch. Subjective:     Current Diet Level: Regular with nectar thick liquids    1/10/2023 Clear liquids ;  Thin liquids  (despite recommendation for mildly thick liquids prior to being made NPO)    Comments regarding tolerating Current Diet:   NSg reports pt tolerating nectar thick (mildly thick) liquids (clear liquid diet per GI)  GI wrote for regular diet this date; nsg concern that pt cannot tolerate solids  Pt with CSE on 1/6/2023 with recommendation for Dysphagia II diet (minced and moist) and nectar thick (mildly thick) liquids      Objective:     Pain: Did not state               Vision: [x]WFL []Impaired:  Hearing: []WFL [x]Impaired: Teller    Cognitive/behavioral/communication:   Oriented to [x] self [x] place [] Date (month and year) [] situation  []alert [x]lethargic  [x]cooperative []self-limiting   [x]confused at times  []distractible []agitated []impulsive  []verbally responsive []nonverbal [x]limited verbal responses  [x]follows one step commands []does not follow dx []follows complex commands  []aphasic []dysarthric  [] other:     Respiratory Status: [x]Room Air []O2 via nasal cannula []Other:  Dentition: []Adequate []Dentures [x]Missing Many Teeth []Edentulous []Other:  Vocal Quality: [x]Normal []Dysphonic  []Aphonic  []Hoarse []Wet []Weak []Other:  Volitional Cough: []Strong []Weak []Wet []Absent []Congested [x]Other: NT this date  Volitional Swallow:   []Absent  [x]Delayed []Adequate []Required use of drink   Reflexive cough:   [x]Strong []Weak []Wet []Absent []Congested []Other:    Oral Mechanism Exam:  []WFL []Mild   [x] Moderate  []Severe  []To be assessed  Impaired:   []Left side      []Right side    [x]Labial ROM/Coordination    []Labial Symmetry   [x]Lingual ROM/Coordination   []Lingual Symmetry  []Gag  []Other:     Patient Positioning: Upright in bed ; bed does not got to 90 degrees    PO Trials: pt reports he is not hungry; agreeable to trials. Thin Liquids: NT  Nectar thick liquids via straw: suspect at risk for premature loss of bolus, delayed swallow initiation, decreased laryngeal elevation. Pt initially with delayed cough with laryngeal congestion. After pause in trials and clearing of congestion, re attempted with sips by straw with no cough noted.     Honey Thick liquids : NT  Puree:  x 1:  slow oral phase, no noted cough, choke or vocal change. Dysphagia II diet (minced and moist): slow oral phase, trace oral residue clears with puree and effort. No cough, choke or vocal change noted. Soft solid: trial with small bolus lynne cracker:  lengthy oral phase, assist to clear with puree and liquid. Pt closing eyes while chewing; responds to verbal cues to continue. Dysphagia Tx:   Direct Dysphagia tx: PO tolerance as indicated above. O2 sats remain consistent at 96% t/o trials. Dysphagia ex: N/A, main focus on PO tolerance  Training in compensatory strategies: small single sips, reviewed purpose of diet recommendations, sit upright  Pt response to ex/training: no response to education    Goals:    Pt will functionally tolerate recommended diet with no overt clinical s/s of aspiration ongoing  Pt will demonstrate understanding of aspiration risk and precautions via education/demonstration with occasional prompting ongoing  Pt will advance to least restrictive diet as indicated  ongoing     Assessment:   Impressions:   Pt fatigued but agreeable to tx  Pt upgraded to regular solids this date per GI with nsg concern for intake due to pt's poor dentition  Pt appears to tolerate nectar thick liquids per current diet  Trials with solids indicate most optimal diet to be Minced and moist solids due to pt's poor dentition, slow oral phase, and fatigue. ST to continue to follow for diet tolerance and training in strategies.      Recommended Diet and Intervention 1/11/2023:  Diet Solids Recommendation:  Dysphagia II diet (minced and moist)   Liquid Consistency Recommendation:  Mildly (nectar) thick liquids  Recommended form of Meds: Meds crushed as able in puree      Compensatory Swallowing Strategies:  Upright as possible with all PO intake , Small bites/sips , Remain upright 30-45 min     EDUCATION:   Provided education regarding role of SLP, results of assessment, recommendations and general speech pathology plan of care. [] Pt verbalized understanding and agreement   [x] Pt requires ongoing learning   [] No evidence of comprehension     Dysphagia Prognosis: [] good [x]fair []poor []guarded     Current co-morbidites    Plan:   Continue Dysphagia Therapy: YES    Interventions: Diet Tolerance Monitoring , Patient/Family Education   Duration/Frequency of therapy while on unit: Speech therapy for dysphagia tx 3-5 times per week during acute care stay. Discharge Instructions:   Recommend ongoing SLP for dysphagia therapy upon discharge from hospital     This note serves as a D/C Summary in the event that this patient is discharged prior to the next therapy session.     Coded treatment time: 0  Total treatment time: 1111 [facility], [place] CCC/SLP 5823  Speech Language Pathologist  01/11/23  2:36 PM

## 2023-01-11 NOTE — PROGRESS NOTES
Occupational Therapy  Facility/Department: [facility] PROGRESSIVE CARE  Occupational Therapy Daily Note  Name: Riaz Hay  : 1931  MRN: [id number]  Date of Service: 2023    Discharge Recommendations:  3-5 sessions per week, Patient would benefit from continued therapy after discharge  Riaz Hay scored a 15/24 on the AM-PAC ADL Inpatient form. Current research shows that an AM-PAC score of 17 or less is typically not associated with a discharge to the patient's home setting. Based on the patient's AM-PAC score and their current ADL deficits, it is recommended that the patient have 3-5 sessions per week of Occupational Therapy at d/c to increase the patient's independence. Please see assessment section for further patient specific details. If patient discharges prior to next session this note will serve as a discharge summary. Please see below for the latest assessment towards goals. Patient Diagnosis(es): The primary encounter diagnosis was Pneumonia of right lung due to infectious organism, unspecified part of lung. Diagnoses of General weakness, Impaired mobility and ADLs, COVID-19, and Acute kidney injury superimposed on CKD [facility]) were also pertinent to this visit. 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 Past Surgical History:  has a past surgical history that includes Coronary angioplasty with stent (10/18/15); Endoscopy, colon, diagnostic; Upper gastrointestinal endoscopy (10-); Upper gastrointestinal endoscopy (N/A, 2023); and Upper gastrointestinal endoscopy (2023).     Treatment Diagnosis: Decreased: ADLs, functional transfers/mobility      Assessment Performance deficits / Impairments: Decreased functional mobility ; Decreased ADL status; Decreased endurance;Decreased balance;Decreased cognition;Decreased safe awareness;Decreased strength  Assessment: Discussed with OTR: Pt presenting weaker, limited by the above deficits, and required increased assistance for bed mob (mod A x1), Sit><stands (Mod A x1 with light CGA of another), with use of ivelisse stedy lift. Pt sat at EOB for 5 min with up to Max A x1 d/t posterior lean and c/o dizziness. Pt on room air and 02 sats WNL(92%), BP WNL. Unsafe to attempt further standing, OOB to chair d/t medical status. Anticipate pt would require overall Max A for ADL's. Will continue to see on acute in order to address the above deficits and maximize pt's functional performance. Based on performance today, would recommend ongoing skilled OT 3-5x/week at d/c to increase his safety, independence, and promote return to PLOF. Treatment Diagnosis: Decreased: ADLs, functional transfers/mobility  Prognosis: Fair  History: Pt is a 81 yo M who was admitted with weakness, SOB, found to have covid-19. PTA, pt lives at home w/ his son where he is typically independent in self-care and functional mobility using RW vs SPC. REQUIRES OT FOLLOW-UP: Yes  Activity Tolerance  Activity Tolerance: Treatment limited secondary to medical complications (free text); Patient limited by fatigue  Activity Tolerance Comments: C/o dizziness while seated at EOB. 02 sats WNL (92%) on room air, and BP WNL. Plan   Occupational Therapy Plan  Times Per Week: 3-5  Times Per Day:  Once a day  Current Treatment Recommendations: Strengthening, ROM, Balance training, Functional mobility training, Endurance training, Self-Care / ADL, Safety education & training     Restrictions  Restrictions/Precautions  Restrictions/Precautions: Fall Risk, Isolation  Position Activity Restriction  Other position/activity restrictions: Covid-19; IV; aldrich    Subjective General  Chart Reviewed: Yes, Orders, Progress Notes, Labs  Patient assessed for rehabilitation services?: Yes  Additional Pertinent Hx: per H&P: \"Patient is a 80-year-old male with past medical history of CAD, CKD stage IV, hyperlipidemia who presents to the hospital due to patient having generalized weakness. Patient also has poor appetite, fatigue as well as generalized weakness. Patient does not have any recent sick contacts, no reported fevers chills\"  Family / Caregiver Present: No  Referring Practitioner: Shay  Diagnosis: SOB  Subjective  Subjective: Pt met BS, in bed. Pt agreeable to OT. Pt complaining of dizziness when seated at EOB. Social/Functional History  Social/Functional History  Lives With: Son  Type of Home: Apartment  Home Layout: One level  Home Access: Stairs to enter with rails  Entrance Stairs - Number of Steps: 13  Bathroom Shower/Tub: Tub/Shower unit  Bathroom Toilet: Standard  Bathroom Equipment: Grab bars in shower  Home Equipment: [email or web address] N[employer], rolling  Has the patient had two or more falls in the past year or any fall with injury in the past year?: Yes  ADL Assistance: Independent  Ambulation Assistance: Independent (RW in apt, SPC in community)  Transfer Assistance: Independent  Active : No  Occupation: Retired  Type of Occupation:        Objective         Safety Devices  Type of Devices: All fall risk precautions in place;Call light within reach;Gait belt;Patient at risk for falls;Nurse notified; Left in bed;Bed alarm in place           ADL  Feeding Skilled Clinical Factors: Assisted to take drink from cup. Toileting Skilled Clinical Factors: [place]. Additional Comments: Anticipate pt would require setup for grooming, min A UB bathing/dressing, max A LB bathing/dressing based on ROM, strength, endurance, cognition this session        Bed mobility  Supine to Sit: Moderate assistance (x1; cues, assist for hand placement, use of rail.  Increased time to complete)  Sit to Supine: Moderate assistance (x1; bed flat)  Bed Mobility Comments: Max A for sitting at EOB. Pt leaning/pushing posteriorly and c/o dizziness. 02 sats and BP WNL. Transfers  Sit to stand: Moderate assistance  Stand to sit: Moderate assistance  Transfer Comments: x1 to/from bed using ivelisse stedy lift (RN present and provided light CGA). Pt stood briefly on stedy, with posterior lean/push, needing Max A for balance. Cognition  Overall Cognitive Status: Exceptions  Arousal/Alertness: Delayed responses to stimuli  Following Commands: Follows one step commands with repetition  Attention Span: Difficulty dividing attention  Memory: Decreased recall of recent events;Decreased short term memory  Safety Judgement: Decreased awareness of need for safety  Problem Solving: Decreased awareness of errors  Insights: Fully aware of deficits  Initiation: Requires cues for some  Sequencing: Requires cues for some  Orientation  Overall Orientation Status: Impaired  Orientation Level: Oriented to person;Oriented to place         Education Given To: Patient  Education Provided: Role of Therapy;Transfer Training  Education Method: Verbal;Demonstration  Barriers to Learning: Cognition  Education Outcome: Continued education needed    AM-PAC Score        AM-[facility] Inpatient Daily Activity Raw Score: 15 (01/11/23 0842)  AM-PAC Inpatient ADL T-Scale Score : 34.69 (01/11/23 0842)  ADL Inpatient CMS 0-100% Score: 56.46 (01/11/23 0842)  ADL Inpatient CMS G-Code Modifier : CK (01/11/23 9090)       Goals  Short Term Goals  Time Frame for Short Term Goals: Prior to d/c.  Status: goals ongoing  Short Term Goal 1: Pt will complete ADL transfers w/ supervision  Short Term Goal 2: Pt will toilet w/ supervision  Short Term Goal 3: Pt will bathe w/ min A  Short Term Goal 4: Pt will groom in stance at sink w/ supervision  Short Term Goal 5: Pt will complete 10 reps UE therex in all planes to increase strength/endurance for ADLs  Long Term Goals  Time Frame for Long Term Goals : LTG=STG  Patient Goals   Patient goals : to go home       Therapy Time   Individual Concurrent Group Co-treatment   Time In 0800         Time Out 0845         Minutes [address], [address] [place]/L,[facility]

## 2023-01-11 NOTE — PROGRESS NOTES
Department of Internal Medicine  Nephrology Progress Note    HPI.    80 y.o. male whom we were asked to see for RONI on CKD. I see him in the office, last 08/22 with eGFR 18 ml/min. He presents with weakness and dyspnea. He was diagnosed with Covid-19. Renal cosn called for RONI . Events noted , chart reviewed     HPI:  Breathing stable. No CP. Renal function better after PRBC yesterday. ROS:  In bed. No fever. [address]:  medications reviewed. Physical Exam:    VITALS:  /72   Pulse 58   Temp 97.8 °F (36.6 °C) (Oral)   Resp 18   Ht 5' 6\" (1.676 m)   Wt 154 lb 8.7 oz (70.1 kg)   SpO2 92%   BMI 24.94 kg/m²   24HR INTAKE/OUTPUT:    Intake/Output Summary (Last 24 hours) at 1/11/2023 1431  Last data filed at 1/11/2023 1416  Gross per 24 hour   Intake 1402.56 ml   Output 2050 ml   Net -647.44 ml         Constitutional:  Looks comfortable   Respiratory:  scattered rhonchi   Gastrointestinal No tenderness.   Normal Bowel Sounds  Cardiovascular:  S1, S2 RRR   Edema:   + edema  Skin:  no rash    DATA:    CBC:  Lab Results   Component Value Date/Time    WBC 7.7 01/11/2023 06:12 AM    RBC 2.89 01/11/2023 06:12 AM    HGB 8.5 01/11/2023 12:43 PM    HCT 25.8 01/11/2023 12:43 PM    MCV 89.6 01/11/2023 06:12 AM    MCH 29.6 01/11/2023 06:12 AM    MCHC 33.1 01/11/2023 06:12 AM    RDW 14.3 01/11/2023 06:12 AM     01/11/2023 06:12 AM    MPV 9.0 01/11/2023 06:12 AM     CMP:  Lab Results   Component Value Date/Time     01/11/2023 06:12 AM    K 4.0 01/11/2023 06:12 AM     01/11/2023 06:12 AM    CO2 20 01/11/2023 06:12 AM    BUN 65 01/11/2023 06:12 AM    CREATININE 3.3 01/11/2023 06:12 AM    GFRAA 24 11/05/2021 01:23 PM    AGRATIO 1.0 01/09/2023 09:43 AM    LABGLOM 17 01/11/2023 06:12 AM    GLUCOSE 107 01/11/2023 06:12 AM    PROT 5.0 01/10/2023 04:18 AM    CALCIUM 8.0 01/11/2023 06:12 AM    BILITOT <0.2 01/10/2023 04:18 AM    ALKPHOS 60 01/10/2023 04:18 AM    AST 44 01/10/2023 04:18 AM    ALT 40 01/10/2023 04:18 AM      Hepatic Function Panel:   Lab Results   Component Value Date/Time    ALKPHOS 60 01/10/2023 04:18 AM    ALT 40 01/10/2023 04:18 AM    AST 44 01/10/2023 04:18 AM    PROT 5.0 01/10/2023 04:18 AM    BILITOT <0.2 01/10/2023 04:18 AM    BILIDIR <0.2 01/10/2023 04:18 AM    IBILI see below 01/10/2023 04:18 AM      Phosphorus:   Lab Results   Component Value Date/Time    PHOS 3.6 01/09/2023 09:43 AM       ASSESSMENT/PLAN:    1) 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 CKD progression vs. pre-renal  - renal function better     2) Covid-19  - plan per admitting team.     3) PNA  - on empiric ceftriaxone and azithromycin     4) FEN  - metabolic acidosis              - off NaHCO3 gtt  - failure to thrive              - encouraged PO intake     5) anemia  - labs noted   - s/p PRBC 01/10/23    6) UGIB  - GI consulted  - on PPI gtt     7) AMS   No marked change .

## 2023-01-12 LAB
ANION GAP SERPL CALCULATED.3IONS-SCNC: 12 MMOL/L (ref 3–16)
BASOPHILS ABSOLUTE: 0 K/UL (ref 0–0.2)
BASOPHILS RELATIVE PERCENT: 0.3 %
BUN BLDV-MCNC: 60 MG/DL (ref 7–20)
CALCIUM SERPL-MCNC: 7.8 MG/DL (ref 8.3–10.6)
CHLORIDE BLD-SCNC: 110 MMOL/L (ref 99–110)
CO2: 20 MMOL/L (ref 21–32)
CREAT SERPL-MCNC: 3.1 MG/DL (ref 0.8–1.3)
EOSINOPHILS ABSOLUTE: 0.2 K/UL (ref 0–0.6)
EOSINOPHILS RELATIVE PERCENT: 2.9 %
GFR SERPL CREATININE-BSD FRML MDRD: 18 ML/MIN/{1.73_M2}
GLUCOSE BLD-MCNC: 116 MG/DL (ref 70–99)
HCT VFR BLD CALC: 24.7 % (ref 40.5–52.5)
HCT VFR BLD CALC: 25.3 % (ref 40.5–52.5)
HEMOGLOBIN: 8.2 G/DL (ref 13.5–17.5)
HEMOGLOBIN: 8.3 G/DL (ref 13.5–17.5)
LYMPHOCYTES ABSOLUTE: 0.8 K/UL (ref 1–5.1)
LYMPHOCYTES RELATIVE PERCENT: 10.2 %
MCH RBC QN AUTO: 29.9 PG (ref 26–34)
MCHC RBC AUTO-ENTMCNC: 33.2 G/DL (ref 31–36)
MCV RBC AUTO: 90 FL (ref 80–100)
MONOCYTES ABSOLUTE: 0.6 K/UL (ref 0–1.3)
MONOCYTES RELATIVE PERCENT: 7.6 %
NEUTROPHILS ABSOLUTE: 6 K/UL (ref 1.7–7.7)
NEUTROPHILS RELATIVE PERCENT: 79 %
PDW BLD-RTO: 14.6 % (ref 12.4–15.4)
PLATELET # BLD: 237 K/UL (ref 135–450)
PMV BLD AUTO: 9.1 FL (ref 5–10.5)
POTASSIUM REFLEX MAGNESIUM: 4.2 MMOL/L (ref 3.5–5.1)
RBC # BLD: 2.75 M/UL (ref 4.2–5.9)
SODIUM BLD-SCNC: 142 MMOL/L (ref 136–145)
WBC # BLD: 7.6 K/UL (ref 4–11)

## 2023-01-12 PROCEDURE — 6370000000 HC RX 637 (ALT 250 FOR IP): Performed by: INTERNAL MEDICINE

## 2023-01-12 PROCEDURE — 6360000002 HC RX W HCPCS: Performed by: INTERNAL MEDICINE

## 2023-01-12 PROCEDURE — 85025 COMPLETE CBC W/AUTO DIFF WBC: CPT

## 2023-01-12 PROCEDURE — 85014 HEMATOCRIT: CPT

## 2023-01-12 PROCEDURE — 92526 ORAL FUNCTION THERAPY: CPT

## 2023-01-12 PROCEDURE — 2580000003 HC RX 258: Performed by: INTERNAL MEDICINE

## 2023-01-12 PROCEDURE — 36415 COLL VENOUS BLD VENIPUNCTURE: CPT

## 2023-01-12 PROCEDURE — 97530 THERAPEUTIC ACTIVITIES: CPT | Performed by: PHYSICAL THERAPIST

## 2023-01-12 PROCEDURE — 2060000000 HC ICU INTERMEDIATE R&B

## 2023-01-12 PROCEDURE — 80048 BASIC METABOLIC PNL TOTAL CA: CPT

## 2023-01-12 PROCEDURE — 85018 HEMOGLOBIN: CPT

## 2023-01-12 PROCEDURE — 97535 SELF CARE MNGMENT TRAINING: CPT

## 2023-01-12 PROCEDURE — C9113 INJ PANTOPRAZOLE SODIUM, VIA: HCPCS | Performed by: INTERNAL MEDICINE

## 2023-01-12 PROCEDURE — 2580000003 HC RX 258: Performed by: STUDENT IN AN ORGANIZED HEALTH CARE EDUCATION/TRAINING PROGRAM

## 2023-01-12 PROCEDURE — 97530 THERAPEUTIC ACTIVITIES: CPT

## 2023-01-12 RX ADMIN — SENNOSIDES 8.6 MG: 8.6 TABLET, FILM COATED ORAL at 21:00

## 2023-01-12 RX ADMIN — DOCUSATE SODIUM 100 MG: 100 CAPSULE, LIQUID FILLED ORAL at 21:00

## 2023-01-12 RX ADMIN — TAMSULOSIN HYDROCHLORIDE 0.4 MG: 0.4 CAPSULE ORAL at 21:00

## 2023-01-12 RX ADMIN — Medication 10 ML: at 21:07

## 2023-01-12 RX ADMIN — DORZOLAMIDE HYDROCHLORIDE AND TIMOLOL MALEATE 1 DROP: 20; 5 SOLUTION/ DROPS OPHTHALMIC at 21:01

## 2023-01-12 RX ADMIN — ACETAMINOPHEN 650 MG: 325 TABLET ORAL at 21:00

## 2023-01-12 RX ADMIN — POLYETHYLENE GLYCOL 3350 17 G: 17 POWDER, FOR SOLUTION ORAL at 08:16

## 2023-01-12 RX ADMIN — SENNOSIDES 8.6 MG: 8.6 TABLET, FILM COATED ORAL at 08:16

## 2023-01-12 RX ADMIN — SODIUM CHLORIDE, PRESERVATIVE FREE 10 ML: 5 INJECTION INTRAVENOUS at 21:00

## 2023-01-12 RX ADMIN — ESCITALOPRAM OXALATE 10 MG: 10 TABLET ORAL at 21:00

## 2023-01-12 RX ADMIN — DOCUSATE SODIUM 100 MG: 100 CAPSULE, LIQUID FILLED ORAL at 08:16

## 2023-01-12 RX ADMIN — SODIUM CHLORIDE, PRESERVATIVE FREE 10 ML: 5 INJECTION INTRAVENOUS at 08:17

## 2023-01-12 RX ADMIN — Medication 10 ML: at 08:17

## 2023-01-12 RX ADMIN — SODIUM CHLORIDE 8 MG/HR: 9 INJECTION, SOLUTION INTRAVENOUS at 03:13

## 2023-01-12 RX ADMIN — TRAMADOL HYDROCHLORIDE 50 MG: 50 TABLET ORAL at 08:22

## 2023-01-12 RX ADMIN — ATORVASTATIN CALCIUM 40 MG: 40 TABLET, FILM COATED ORAL at 21:00

## 2023-01-12 RX ADMIN — DORZOLAMIDE HYDROCHLORIDE AND TIMOLOL MALEATE 1 DROP: 20; 5 SOLUTION/ DROPS OPHTHALMIC at 08:51

## 2023-01-12 ASSESSMENT — PAIN DESCRIPTION - LOCATION
LOCATION: ABDOMEN
LOCATION: HEAD

## 2023-01-12 ASSESSMENT — PAIN SCALES - GENERAL
PAINLEVEL_OUTOF10: 7
PAINLEVEL_OUTOF10: 4

## 2023-01-12 ASSESSMENT — PAIN DESCRIPTION - FREQUENCY: FREQUENCY: CONTINUOUS

## 2023-01-12 ASSESSMENT — PAIN DESCRIPTION - PAIN TYPE: TYPE: ACUTE PAIN

## 2023-01-12 ASSESSMENT — PAIN - FUNCTIONAL ASSESSMENT: PAIN_FUNCTIONAL_ASSESSMENT: ACTIVITIES ARE NOT PREVENTED

## 2023-01-12 ASSESSMENT — PAIN DESCRIPTION - ONSET: ONSET: ON-GOING

## 2023-01-12 ASSESSMENT — PAIN DESCRIPTION - ORIENTATION: ORIENTATION: MID

## 2023-01-12 NOTE — PROGRESS NOTES
Department of Internal Medicine  Nephrology Progress Note    HPI.    89 y.o. male whom we were asked to see for RONI on CKD.  I see him in the office, last 08/22 with eGFR 18 ml/min.  He presents with weakness and dyspnea.  He was diagnosed with Covid-19.  Renal cosn called for RONI .      Events noted , chart reviewed     HPI:  Breathing stable.  No CP.  Renal function better.  ROS:  In bed.  No fever.  PMFSH:  medications reviewed.    Physical Exam:    VITALS:  /65   Pulse 59   Temp 98.2 °F (36.8 °C) (Oral)   Resp 25   Ht 5' 6\" (1.676 m)   Wt 154 lb 12.2 oz (70.2 kg)   SpO2 91%   BMI 24.98 kg/m²   24HR INTAKE/OUTPUT:    Intake/Output Summary (Last 24 hours) at 1/12/2023 1647  Last data filed at 1/12/2023 1500  Gross per 24 hour   Intake 320 ml   Output 1400 ml   Net -1080 ml         Constitutional:  Looks comfortable   Respiratory:  scattered rhonchi   Gastrointestinal No tenderness.  Normal Bowel Sounds  Cardiovascular:  S1, S2 RRR   Edema:   + edema  Skin:  no rash    DATA:    CBC:  Lab Results   Component Value Date/Time    WBC 7.6 01/12/2023 04:52 AM    RBC 2.75 01/12/2023 04:52 AM    HGB 8.2 01/12/2023 04:52 AM    HCT 24.7 01/12/2023 04:52 AM    MCV 90.0 01/12/2023 04:52 AM    MCH 29.9 01/12/2023 04:52 AM    MCHC 33.2 01/12/2023 04:52 AM    RDW 14.6 01/12/2023 04:52 AM     01/12/2023 04:52 AM    MPV 9.1 01/12/2023 04:52 AM     CMP:  Lab Results   Component Value Date/Time     01/12/2023 04:52 AM    K 4.2 01/12/2023 04:52 AM     01/12/2023 04:52 AM    CO2 20 01/12/2023 04:52 AM    BUN 60 01/12/2023 04:52 AM    CREATININE 3.1 01/12/2023 04:52 AM    GFRAA 24 11/05/2021 01:23 PM    AGRATIO 1.0 01/09/2023 09:43 AM    LABGLOM 18 01/12/2023 04:52 AM    GLUCOSE 116 01/12/2023 04:52 AM    PROT 5.0 01/10/2023 04:18 AM    CALCIUM 7.8 01/12/2023 04:52 AM    BILITOT <0.2 01/10/2023 04:18 AM    ALKPHOS 60 01/10/2023 04:18 AM    AST 44 01/10/2023 04:18 AM    ALT 40 01/10/2023 04:18 AM     Hepatic Function Panel:   Lab Results   Component Value Date/Time    ALKPHOS 60 01/10/2023 04:18 AM    ALT 40 01/10/2023 04:18 AM    AST 44 01/10/2023 04:18 AM    PROT 5.0 01/10/2023 04:18 AM    BILITOT <0.2 01/10/2023 04:18 AM    BILIDIR <0.2 01/10/2023 04:18 AM    IBILI see below 01/10/2023 04:18 AM      Phosphorus:   Lab Results   Component Value Date/Time    PHOS 3.6 01/09/2023 09:43 AM       ASSESSMENT/PLAN:    1) 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 CKD progression vs. pre-renal  - renal function slightly better and close to baseline  - I do not believe patient is an appropriate candidate for dialysis any more     2) Covid-19  - out of isolation     3) PNA  - off Abx     4) FEN  - metabolic acidosis              - monitor  - failure to thrive              - encouraged PO intake     5) anemia  - labs noted   - s/p PRBC 01/10/23    6) UGIB  - GI consulted  - on PPI gtt     7) AMS   No marked change .

## 2023-01-12 NOTE — PROGRESS NOTES
Physical Therapy  Facility/Department: [address] PROGRESSIVE CARE  Physical Therapy Treatment Note    Name: Joseluis Barry  : 1931  MRN: [id number]  Date of Service: 2023    Discharge Recommendations:  Patient would benefit from continued therapy after discharge (3-5x/wk)   PT Equipment Recommendations  Other: defer to next level of care    Joseluis Barry scored a 9/24 on the AM-PAC short mobility form. Current research shows that an AM-PAC score of 17 or less is typically not associated with a discharge to the patient's home setting. Based on the patient's AM-PAC score and their current functional mobility deficits, it is recommended that the patient have 3-5 sessions per week of Physical Therapy at d/c to increase the patient's independence. Please see assessment section for further patient specific details. If patient discharges prior to next session this note will serve as a discharge summary. Please see below for the latest assessment towards goals. Patient Diagnosis(es): The primary encounter diagnosis was Pneumonia of right lung due to infectious organism, unspecified part of lung. Diagnoses of General weakness, Impaired mobility and ADLs, COVID-19, and Acute kidney injury superimposed on CKD [facility]) were also pertinent to this visit. 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 Past Surgical History:  has a past surgical history that includes Coronary angioplasty with stent (10/18/15); Endoscopy, colon, diagnostic; Upper gastrointestinal endoscopy (10-); Upper gastrointestinal endoscopy (N/A, 2023); and Upper gastrointestinal endoscopy (2023).     Assessment   Body Structures, Functions, Activity Limitations Requiring Skilled Therapeutic Intervention: Decreased functional mobility ; Decreased ADL status; Decreased strength;Decreased balance;Decreased endurance  Assessment: Patient is a 59-year-old male with past medical history of CAD, CKD stage IV, hyperlipidemia who presents to the hospital on 1/5/23 due to patient having generalized weakness. Pt found to be COVID+. Prior to admission, pt living in apt setting with son, independent with ADLs and ambulation with SPC vs RW.   Pt is making slow improvements but continues to need significant assist with functional tasks and has early fatigue; pt will benefit from continued therapy 3-5x/wk to address deficits to allow pt to regain his Ind  Treatment Diagnosis: impaired mobility  Therapy Prognosis: Fair  Clinical Presentation: evolving  Requires PT Follow-Up: Yes  Activity Tolerance  Activity Tolerance: Patient limited by endurance  Activity Tolerance Comments: limited by overall strength deficits     Plan   Physcial Therapy Plan  General Plan: 3-5 times per week  Specific Instructions for Next Treatment: cotx with OT  Current Treatment Recommendations: Strengthening, Balance training, Functional mobility training, Transfer training, Gait training, Endurance training, Neuromuscular re-education, Safety education & training, Equipment evaluation, education, & procurement, Patient/Caregiver education & training, Therapeutic activities  Safety Devices  Type of Devices: Patient at risk for falls, Nurse notified, Call light within reach, Heels elevated for pressure relief, Chair alarm in place, Left in chair     Restrictions  Restrictions/Precautions  Restrictions/Precautions: Fall Risk, Isolation  Position Activity Restriction  Other position/activity restrictions: Covid-19; IV; adlrich     Subjective   General  Chart Reviewed: Yes  Patient assessed for rehabilitation services?: Yes  Additional Pertinent Hx: Patient is a 59-year-old male with past medical history of CAD, CKD stage IV, hyperlipidemia who presents to the hospital on 1/5/23 due to patient having generalized weakness. Pt found to be COVID+. Response To Previous Treatment: Patient with no complaints from previous session. Family / Caregiver Present: No  Referring Practitioner: Jennifer Prado MD  Referral Date : 01/05/23  Diagnosis: COVID+  Follows Commands: Within Functional Limits  Subjective  Subjective: pt pleasant and reports fatigue but wants to try and get up; reports he needs to have a BM         Social/Functional History  Social/Functional History  Lives With: Son  Type of Home: Apartment  Home Layout: One level  Home Access: Stairs to enter with rails  Entrance Stairs - Number of Steps: 13  Bathroom Shower/Tub: Tub/Shower unit  Bathroom Toilet: Standard  Bathroom Equipment: Grab bars in [address]: franny Lopez  Has the patient had two or more falls in the past year or any fall with injury in the past year?: Yes  ADL Assistance: Independent  Ambulation Assistance: Independent (RW in apt, SPC in community)  Transfer Assistance: Independent  Active : No  Occupation: Retired  Type of Occupation:   Vision/Hearing  Vision  Vision: Impaired (Reporting poor vision but does not wear glasses)  Vision Exceptions:  (kept eyes closed most of PT sessioin this date.)  Hearing  Hearing: Exceptions to [facility]  Hearing Exceptions: Hard of hearing/hearing concerns    Cognition   Orientation  Overall Orientation Status: Impaired  Orientation Level: Oriented to person;Oriented to place  Cognition  Overall Cognitive Status: Exceptions  Arousal/Alertness: Delayed responses to stimuli  Following Commands: Follows one step commands with repetition; Follows one step commands with increased time  Attention Span: Difficulty dividing attention  Memory: Decreased recall of recent events;Decreased short term memory  Safety Judgement: Decreased awareness of need for safety  Problem Solving: Decreased awareness of errors  Insights: Decreased awareness of deficits  Initiation: Requires cues for some  Sequencing: Requires cues for some     Objective   Heart Rate: 70  Heart Rate Source: Monitor  BP: (!) 106/58  BP Location: Left upper arm  BP Method: Automatic  Patient Position: Right side  MAP (Calculated): 74  Resp: 24  SpO2: 91 %  O2 Device: None (Room air)                             Bed mobility  Supine to Sit: Minimal assistance;2 Person assistance (HOB raised)  Scooting: Maximal assistance (x1 to EOB)  Transfers  Sit to Stand: Minimal Assistance;2 Person Assistance (with stedy)  Stand to Sit: Minimal Assistance;2 Person Assistance (with stedy)  Bed to Chair: Dependent/Total (used stedy to transfer to bathroom and then to recliner chair)        Balance  Comments: CGA for sitting EOB and for briefly standing on stedy while OT cleansed him after having a BM           OutComes Score                                                  AM-PAC Score  AM-PAC Inpatient Mobility Raw Score : 9 (01/12/23 1525)  AM-PAC Inpatient T-Scale Score : 30.55 (01/12/23 1525)  Mobility Inpatient CMS 0-100% Score: 81.38 (01/12/23 1525)  Mobility Inpatient CMS G-Code Modifier : CM (01/12/23 1525)          Tinneti Score       Goals  Short Term Goals  Time Frame for Short Term Goals: by acute discharge - all goals ongoing 1/11/23 with lilltle/no progress this date.   Short Term Goal 1: bed mobility SBA  Short Term Goal 2: sit<>stand with SBA  Short Term Goal 3: ambulate > 30' with RW and SBA  Patient Goals   Patient Goals : none stated       Education  Patient Education  Education Given To: Patient  Education Provided Comments: reviewed call light and not getting up without assist  Education Method: Verbal  Barriers to Learning: Hearing  Education Outcome: Verbalized understanding      Therapy Time   Individual Concurrent Group Co-treatment   Time In 5688         Time Out 1526         Minutes 41 SHANTEL AGUILAR, PT   Electronically signed by SHANTEL AGUILAR, PT on 1/12/2023 at 3:28 PM

## 2023-01-12 NOTE — PROGRESS NOTES
Occupational Therapy  Facility/Department: [facility] PROGRESSIVE CARE  Occupational Therapy Daily Note  Name: Juliana Najera  : 1931  MRN: [id number]  Date of Service: 2023    Discharge Recommendations:  3-5 sessions per week, Patient would benefit from continued therapy after discharge   Juliana Najera scored a 15/24 on the AM-PAC ADL Inpatient form. Current research shows that an AM-PAC score of 17 or less is typically not associated with a discharge to the patient's home setting. Based on the patient's AM-PAC score and their current ADL deficits, it is recommended that the patient have 3-5 sessions per week of Occupational Therapy at d/c to increase the patient's independence. Please see assessment section for further patient specific details. If patient discharges prior to next session this note will serve as a discharge summary. Please see below for the latest assessment towards goals. Patient Diagnosis(es): The primary encounter diagnosis was Pneumonia of right lung due to infectious organism, unspecified part of lung. Diagnoses of General weakness, Impaired mobility and ADLs, COVID-19, and Acute kidney injury superimposed on CKD [facility]) were also pertinent to this visit. Past Medical History:  has a past medical history of 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 chronic kidney disease ([facility] [address] Back pain, BPH (benign prostatic hyperplasia), CAD (coronary artery disease), CAD (coronary artery disease), Constipated, Hyperlipidemia, MI (myocardial infarction) ([facility] [address] Spinal stenosis, Stented coronary artery, Urinary hesitancy, and Vitamin D deficiency. Past Surgical History:  has a past surgical history that includes Coronary angioplasty with stent (10/18/15); Endoscopy, colon, diagnostic; Upper gastrointestinal endoscopy (10-); Upper gastrointestinal endoscopy (N/A, 2023); and Upper gastrointestinal endoscopy (2023).     Treatment Diagnosis: Decreased: ADLs, functional transfers/mobility      Assessment Performance deficits / Impairments: Decreased functional mobility ; Decreased ADL status; Decreased endurance;Decreased balance;Decreased cognition;Decreased safe awareness;Decreased strength  Assessment: Discussed with OTR: Pt tolerated session fairly well, more alert today, yet confused, disoriented and limited by decreased activity tolerance/endurance(c/o dizziness in sitting). Pt needing Min A x2 for bed mob, sit><stands with use of ivelisse stedy lift. Pt required overall Max A for ADL's. Will continue to see on acute in order to address the above deficits and maximize pt's functional performance. Based on performance today, would recommend ongoing skilled OT 3-5x/week at d/c to increase his safety, independence, and promote return to PLOF. Treatment Diagnosis: Decreased: ADLs, functional transfers/mobility  Prognosis: Fair  History: Pt is a 81 yo M who was admitted with weakness, SOB, found to have covid-19. PTA, pt lives at home w/ his son where he is typically independent in self-care and functional mobility using RW vs SPC. REQUIRES OT FOLLOW-UP: Yes  Activity Tolerance  Activity Tolerance: Treatment limited secondary to medical complications (free text); Patient limited by fatigue  Activity Tolerance Comments: C/o dizziness while seated on ivelisse stedy near end of tx        Plan   Occupational Therapy Plan  Times Per Week: 3-5  Times Per Day:  Once a day  Current Treatment Recommendations: Strengthening, ROM, Balance training, Functional mobility training, Endurance training, Self-Care / ADL, Safety education & training     Restrictions  Restrictions/Precautions  Restrictions/Precautions: Fall Risk, Isolation  Position Activity Restriction  Other position/activity restrictions: Covid-19; IV; aldrich    Subjective   General  Chart Reviewed: Yes, Orders, Progress Notes, Labs  Patient assessed for rehabilitation services?: Yes  Additional Pertinent Hx: per H&P: \"Patient is a 19-year-old male with past medical history of CAD, CKD stage IV, hyperlipidemia who presents to the hospital due to patient having generalized weakness. Patient also has poor appetite, fatigue as well as generalized weakness. Patient does not have any recent sick contacts, no reported fevers chills\"  Family / Caregiver Present: No  Referring Practitioner: Shay  Diagnosis: SOB  Subjective  Subjective: Pt met BS, in bed. Pt agreeable to therapy. Pt more alert, confused, initially thinking bed pan was under him. Pt requesting to use commode for BM. Pt complaining of dizziness when seated on ivelisse alanis. Social/Functional History  Social/Functional History  Lives With: Son  Type of Home: Apartment  Home Layout: One level  Home Access: Stairs to enter with rails  Entrance Stairs - Number of Steps: 13  Bathroom Shower/Tub: Tub/Shower unit  Bathroom Toilet: Standard  Bathroom Equipment: Grab bars in shower  Home Equipment: Maricel Grewal, rolling  Has the patient had two or more falls in the past year or any fall with injury in the past year?: Yes  ADL Assistance: Independent  Ambulation Assistance: Independent (RW in apt, SPC in community)  Transfer Assistance: Independent  Active : No  Occupation: Retired  Type of Occupation:        Objective       Safety Devices  Type of Devices: Patient at risk for falls;Nurse notified;Call light within reach; Heels elevated for pressure relief; Chair alarm in place; Left in chair     Toilet Transfers  Toilet Transfer: Dependent/Total  Toilet Transfers Comments: Shoaib alanis with assist x2  Wheelchair Bed Transfers  Level of Asssistance: Dependent/Total  Wheelchair Transfers Comments: via ivelisse alanis with assist x2     ADL  Toileting: Maximum assistance  Toileting Skilled Clinical Factors: Pt with valdemar. Pt voided loose, dark, bloody Bm (RN notified).      Bed mobility  Supine to Sit: Minimal assistance;2 Person assistance (HOB raised)  Scooting: Maximal assistance (x1 to EOB)  Transfers  Sit to stand: Minimal assistance;2 Person assistance  Stand to sit: Minimal assistance;2 Person assistance (x1 onto low commode)  Transfer Comments: x2 to/from bed using ivelisse stedy lift. Cognition  Overall Cognitive Status: Exceptions  Arousal/Alertness: Delayed responses to stimuli  Following Commands: Follows one step commands with repetition; Follows one step commands with increased time  Attention Span: Difficulty dividing attention  Memory: Decreased recall of recent events;Decreased short term memory  Safety Judgement: Decreased awareness of need for safety  Problem Solving: Decreased awareness of errors  Insights: Decreased awareness of deficits  Initiation: Requires cues for some  Sequencing: Requires cues for some  Orientation  Overall Orientation Status: Impaired  Orientation Level: Oriented to person;Oriented to place       Static Sitting Balance Exercises: Pt sitting CGA at EOB. Static Standing Balance: Pt standing on ivelisse stedy for ADL's, with CGA x1.    Education Given To: Patient  Education Provided: Role of Therapy;Transfer Training  Education Method: Verbal;Demonstration  Barriers to Learning: Cognition  Education Outcome: Continued education needed          AM-PAC Score        AM-[facility] Inpatient Daily Activity Raw Score: 15 (01/12/23 1516)  AM-PAC Inpatient ADL T-Scale Score : 34.69 (01/12/23 1516)  ADL Inpatient CMS 0-100% Score: 56.46 (01/12/23 1516)  ADL Inpatient CMS G-Code Modifier : CK (01/12/23 1516)    Goals  Short Term Goals  Time Frame for Short Term Goals: Prior to d/c.  Status: goals ongoing  Short Term Goal 1: Pt will complete ADL transfers w/ supervision  Short Term Goal 2: Pt will toilet w/ supervision  Short Term Goal 3: Pt will bathe w/ min A  Short Term Goal 4: Pt will groom in stance at sink w/ supervision  Short Term Goal 5: Pt will complete 10 reps UE therex in all planes to increase strength/endurance for ADLs  Long Term Goals  Time Frame for Long Term Goals : LTG=STG  Patient Goals Patient goals : to go home       Therapy Time   Individual Concurrent Group Co-treatment   Time In 1447         Time Out 1520         Minutes Jabari GREENE/L,515

## 2023-01-12 NOTE — CARE COORDINATION
Discharge Planning:   PT/OT: recommending SNF     Called to patient's room to discuss plan due to isolation status. Patient did not answer at this time. Called to patient's daughter and Yannick Shed #[phone]. Left voicemail notifying of patient nearing discharge, notified of [facility] SNF list sent via email and requested return email or phone call with choices. Per chart review, patient resides in [facility]. Referral sent to [facility] accepting facilities in the area: [address] [place] [address], [address], [address] [place] [address] and [facility]. The Plan for Transition of Care is related to the following treatment goals: COVID SNF list     The Patient's daughter was provided via e-mail with a choice of provider and agrees   with the discharge plan. [x] Yes [] No    Freedom of choice list was provided with basic dialogue that supports the patient's individualized plan of care/goals, treatment preferences and shares the quality data associated with the providers.  [x] Yes [] No    YAO Dallas, LACHO, Social Work/Case Management   [phone]  Electronically signed by YAO Dallas, LACHO on 1/12/2023 at 3:02 PM

## 2023-01-12 NOTE — PROGRESS NOTES
[facility]   Speech Therapy  Daily Dysphagia Treatment Note    Richie Jimenez  AGE: 80 y.o. 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)    Chronic constipation    Former cigarette smoker    Epigastric pain    Ventral hernia    Anemia in other chronic diseases classified elsewhere    Current moderate episode of major depressive disorder without prior episode (HCC)    Fatigue    Sleep disorder    Non-English speaking patient    Nocturia    Urinary frequency    Chronic kidney disease, stage IV (severe) (HCC)    Ataxia    Frequent falls    Frailty syndrome in geriatric patient    Chronic pain syndrome    Bradycardia    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 Patient also has poor appetite, fatigue as well as generalized weakness. Patient does not have any recent sick contacts, no reported fevers chills. No reported nausea vomiting diarrhea. 2023 COVID +     IMAGING:  CXR: 2023  Impression   Ill-defined opacities bilaterally.       GI note 1/10/23  80 y.o. male with a PMH of CAD with coronary stents, HLD, CKD stage IV, BPH who presented on 1/5/2023 with generalized weakness, decreased oral intake diagnosed with COVID-19 and bacterial pneumonia. Has developed worsening anemia during hospital course and came down to 6.9 1/8/23 and received 1 unit of blood. Repeat hgb 1/9/23 the same so recieved a 2nd unit. Then morning on 1/9/23 had explosive diarrhea with clots. Labs this am showed BUN 83 and Cr 4.0 (56/2.6 yesterday). LFT's AST 49, ALT 49, bili 0.3. Hgb 6.9 after 1 unit of blood and then 8.0 after a 2nd unit of blood (10.5 on admission). EGD 1/9/23 showed a normal esophagus without Mcdonald's or reflux changes, 3cm sliding hiatal hernia, normal stomach, 6mm clean-based duodenal ulcer, 15mm clean-based duodenal ulcer in the bulb. In the sweep, there was a 2cm ulcer with a visible vessel in the distal aspect of the ulcer. I was not able to see the very proximal aspect of the ulcer. Epinephrine 1:10,000 was injected in each of 4 quadrants around the ulcer with excellent blanching of tissue. A total of 3.5mL was injected. Next, the vessel was obliterated using gold probe cautery with excellent hemostasis. No bleeding at the end of the procedure. 2nd portion of the duodenum was normal.      Duodenal ulcer with hemorrhage and acute blood loss anemia  Acute renal failure  COVID pneumonia     PLAN :  PPI drip  H. Pylori stool antigen  Avoid NSAIDs  Agree with transfusion. Would watch for now. BUN improving. Hgb down 0.5 points. No other treatable lesion on endoscopy yesterday. Will transfuse and monitor. Continue PPI drip. I suspect this is old blood passing through rather than active bleeding but will need to watch. Subjective:   Current Diet Level: Regular with nectar thick liquids    1/10/2023 Clear liquids ; Thin liquids  (despite recommendation for mildly thick liquids prior to being made NPO)    1/11/23:  GI wrote for regular diet this date 1/11/23; nsg concern that pt cannot tolerate solids-recommendation for minced and moist/mildly thick liquids    Comments regarding tolerating Current Diet:   RN denies concerns with swallowing with current diet    Objective:     Pain: Did not state               Vision: [x]WFL []Impaired:  Hearing: []WFL [x]Impaired: Kiana    Cognitive/behavioral/communication:   Oriented to [x] self [x] place [x] Date (month and year) [] situation  []alert []lethargic  [x]cooperative []self-limiting   [x]confused at times  []distractible []agitated []impulsive  []verbally responsive []nonverbal [x]limited verbal responses  [x]follows one step commands []does not follow dx []follows complex commands  []aphasic []dysarthric  [] other:     Respiratory Status: [x]Room Air []O2 via nasal cannula []Other:  Dentition: []Adequate []Dentures [x]Missing Most Teeth []Edentulous []Other:  Vocal Quality: [x]Normal []Dysphonic  []Aphonic  []Hoarse []Wet []Weak []Other:  Volitional Cough: [x]Strong []Weak []Wet []Absent []Congested []Other  Volitional Swallow:   []Absent  [x]Delayed []Adequate []Required use of drink   Reflexive cough:   [x]Strong []Weak []Wet []Absent []Congested []Other:    Oral Mechanism Exam:  []WFL []Mild   [x] Moderate  []Severe  []To be assessed  Impaired:   []Left side      []Right side    [x]Labial ROM/Coordination    []Labial Symmetry   [x]Lingual ROM/Coordination   []Lingual Symmetry  []Gag  []Other:     Patient Positioning: Upright in bed ; bed does not got to 90 degrees    PO Trials: breakfast tray-improved PO intake consuming 100% of breakfast  Thin Liquids: suspect at risk for premature loss of bolus, delayed swallow initiation, decreased laryngeal elevation, immediate severe cough with thin liquid via tsp  Nectar thick liquids via straw: suspect at risk for premature loss of bolus, delayed swallow initiation, decreased laryngeal elevation, no overt s/s of aspiration or penetration   Honey Thick liquids : NT  Puree: adequate formation and clearance of bolus, suspect at risk for premature loss of bolus, delayed swallow initiation, decreased laryngeal elevation, no overt s/s of aspiration or penetration   minced and moist: slow oral phase, adequate clearance, suspect at risk for premature loss of bolus, delayed swallow initiation, decreased laryngeal elevation, no overt s/s of aspiration or penetration   Soft solid: max prolonged mastication and formation of bolus with patient self report of difficulty reporting 'I don't have teeth', concern for excess labor and incomplete mastication of soft solids, no overt s/s of aspiration or penetration     Dysphagia Tx:   Direct Dysphagia tx: PO tolerance as indicated above. Appears most optimal on minced and moist/mildly thick liquids  Dysphagia ex: N/A, main focus on PO tolerance  Training in compensatory strategies: small single sips, reviewed purpose of diet recommendations, sit upright  Pt response to ex/training: verbalized understanding, suspect ongoing education required    Goals:    Pt will functionally tolerate recommended diet with no overt clinical s/s of aspiration ongoing  Pt will demonstrate understanding of aspiration risk and precautions via education/demonstration with occasional prompting ongoing  Pt will advance to least restrictive diet as indicated  ongoing     Assessment:   Impressions:   Accepted and tolerated treatment at bedside  Patient alert, cooperative, pleasant; concern for potential for unreliable historian as responses tend to be vague at times; follows simple dx; verbally responsive. Moderate oral dysphagia characterized by decreased lingual coordination, generalized oral weakness, prolonged mastication of bolus, concern for excess labor and incomplete mastication 2/2 edentulism with soft and bite-sized and regular, suspect at risk for premature loss of bolus  Moderate pharyngeal stage dysphagia characterized by delayed swallow and decreased laryngeal elevation.  Immediate cough of thin liquids via tsp. No overt s/s of aspiration or penetration   Recommend cont minced and moist/mildly thick liquids   ST to follow for diet tolerance, upgrade readiness, and tx appropriateness monitor    Recommended Diet and Intervention 1/12/2023:  Diet Solids Recommendation:  Dysphagia II diet (minced and moist)   Liquid Consistency Recommendation:  Mildly (nectar) thick liquids  Recommended form of Meds: Meds crushed as able in puree      Compensatory Swallowing Strategies:  Upright as possible with all PO intake , Small bites/sips , Remain upright 30-45 min     EDUCATION:   Provided education regarding role of SLP, results of assessment, recommendations and general speech pathology plan of care. [] Pt verbalized understanding and agreement   [x] Pt requires ongoing learning   [] No evidence of comprehension     Dysphagia Prognosis: [] good [x]fair []poor []guarded     Current co-morbidites    Plan:   Continue Dysphagia Therapy: YES    Interventions: Diet Tolerance Monitoring , Patient/Family Education   Duration/Frequency of therapy while on unit: Speech therapy for dysphagia tx 3-5 times per week during acute care stay. Discharge Instructions:   Recommend ongoing SLP for dysphagia therapy upon discharge from hospital     This note serves as a D/C Summary in the event that this patient is discharged prior to the next therapy session.     Coded treatment time: 0  Total treatment time: [address], [place], [facility]  Speech-Language Pathologist  On 01/12/23 at 9:12 AM

## 2023-01-12 NOTE — PROGRESS NOTES
Gastroenterology Progress Note    Ray Pappas is a 80 y.o. male patient. Principal Problem:    SOB (shortness of breath)  Resolved Problems:    * No resolved hospital problems. *      SUBJECTIVE:  Has not had further bleeding. NO nausea or vomiting. Had a small non-bloody BM overnight. Feels constipated and received a laxative today. No fevers.      Current Facility-Administered Medications: phenol 1.4 % mouth spray 1 spray, 1 spray, Mouth/Throat, Q2H PRN  dorzolamide-timolol (COSOPT) 22.3-6.8 MG/ML ophthalmic solution 1 drop (Patient Supplied), 1 drop, Both Eyes, BID  0.9 % sodium chloride infusion, , IntraVENous, PRN  sodium chloride flush 0.9 % injection 5-40 mL, 5-40 mL, IntraVENous, 2 times per day  sodium chloride flush 0.9 % injection 5-40 mL, 5-40 mL, IntraVENous, PRN  0.9 % sodium chloride infusion, , IntraVENous, PRN  Benzocaine-Menthol (CEPACOL SORE THROAT) lozenge 1 lozenge, 1 lozenge, Oral, Q2H PRN  [Held by provider] ferrous sulfate EC tablet 324 mg, 324 mg, Oral, Daily with breakfast  docusate sodium (COLACE) capsule 100 mg, 100 mg, Oral, BID  bisacodyl (DULCOLAX) suppository 10 mg, 10 mg, Rectal, Daily PRN  bisacodyl (DULCOLAX) suppository 10 mg, 10 mg, Rectal, Once  sodium chloride flush 0.9 % injection 10 mL, 10 mL, IntraVENous, 2 times per day  sodium chloride flush 0.9 % injection 10 mL, 10 mL, IntraVENous, PRN  0.9 % sodium chloride infusion, , IntraVENous, PRN  promethazine (PHENERGAN) tablet 12.5 mg, 12.5 mg, Oral, Q6H PRN **OR** ondansetron (ZOFRAN) injection 4 mg, 4 mg, IntraVENous, Q6H PRN  acetaminophen (TYLENOL) tablet 650 mg, 650 mg, Oral, Q6H PRN **OR** acetaminophen (TYLENOL) suppository 650 mg, 650 mg, Rectal, Q6H PRN  [Held by provider] aspirin EC tablet 81 mg, 81 mg, Oral, Daily  atorvastatin (LIPITOR) tablet 40 mg, 40 mg, Oral, Nightly  [Held by provider] carvedilol (COREG) tablet 3.125 mg, 3.125 mg, Oral, BID WC  escitalopram (LEXAPRO) tablet 10 mg, 10 mg, Oral, Nightly  nitroGLYCERIN (NITROSTAT) SL tablet 0.4 mg, 0.4 mg, SubLINGual, Q5 Min PRN  polyethylene glycol (GLYCOLAX) packet 17 g, 17 g, Oral, Daily  senna (SENOKOT) tablet 8.6 mg, 1 tablet, Oral, BID  tamsulosin (FLOMAX) capsule 0.4 mg, 0.4 mg, Oral, Nightly  traMADol (ULTRAM) tablet 50 mg, 50 mg, Oral, Q8H PRN    Physical    VITALS:  BP (!) 137/56   Pulse 60   Temp 98.6 °F (37 °C) (Axillary)   Resp 27   Ht 5' 6\" (1.676 m)   Wt 154 lb 12.2 oz (70.2 kg)   SpO2 91%   BMI 24.98 kg/m²   TEMPERATURE:  Current - Temp: 98.6 °F (37 °C); Max - Temp  Av.1 °F (36.7 °C)  Min: 97.7 °F (36.5 °C)  Max: 98.6 °F (37 °C)    NAD  Eyes: No icterus  RRR  Lungs CTA Bilaterally, normal effort  Abdomen soft, ND, NT, Bowel sounds normal.  Ext: no edema  Neuro: No tremor  Psych: Alert and less confused. Data    Data Review:    Recent Labs     01/10/23  0559 01/10/23  1433 23  0612 23  1243 23  0010 23  0452   WBC 6.7  --  7.7  --   --   --  7.6   HGB 6.9*   < > 8.6*   < > 8.5* 8.3* 8.2*   HCT 20.8*   < > 25.9*   < > 26.3* 25.3* 24.7*   MCV 90.4  --  89.6  --   --   --  90.0     --  207  --   --   --  237    < > = values in this interval not displayed. Recent Labs     01/10/23  0600 23  0612 23  0452    141 142   K 4.0 4.0 4.2    110 110   CO2 21 20* 20*   BUN 76* 65* 60*   CREATININE 3.6* 3.3* 3.1*     Recent Labs     01/10/23  0418   AST 44*   ALT 40   BILIDIR <0.2   BILITOT <0.2   ALKPHOS 60     No results for input(s): LIPASE, AMYLASE in the last 72 hours. No results for input(s): PROTIME, INR in the last 72 hours. No results for input(s): PTT in the last 72 hours. ASSESSMENT:  80 y.o. male with a PMH of CAD with coronary stents, HLD, CKD stage IV, BPH who presented on 2023 with generalized weakness, decreased oral intake diagnosed with COVID-19 and bacterial pneumonia.   Has developed worsening anemia during hospital course and came down to 6.9 1/8/23 and received 1 unit of blood. Repeat hgb 1/9/23 the same so recieved a 2nd unit. Then morning on 1/9/23 had explosive diarrhea with clots. Labs this am showed BUN 83 and Cr 4.0 (56/2.6 yesterday). LFT's AST 49, ALT 49, bili 0.3. Hgb 6.9 after 1 unit of blood and then 8.0 after a 2nd unit of blood (10.5 on admission). EGD 1/9/23 showed a normal esophagus without Mcdonald's or reflux changes, 3cm sliding hiatal hernia, normal stomach, 6mm clean-based duodenal ulcer, 15mm clean-based duodenal ulcer in the bulb. In the sweep, there was a 2cm ulcer with a visible vessel in the distal aspect of the ulcer. I was not able to see the very proximal aspect of the ulcer. Epinephrine 1:10,000 was injected in each of 4 quadrants around the ulcer with excellent blanching of tissue. A total of 3.5mL was injected. Next, the vessel was obliterated using gold probe cautery with excellent hemostasis. No bleeding at the end of the procedure. 2nd portion of the duodenum was normal.     Duodenal ulcer with hemorrhage and acute blood loss anemia  Acute renal failure  COVID pneumonia    PLAN :  PPI drip x 72 hours and then bid. H. Pylori stool antigen  Avoid NSAIDs  Would continue to monitor. BUN improving. Hgb stable. OK for dysphagia diet. No further inpatient recs. Will sign off. Please call with questions. Thank you for allowing me to participate in the care of your patient. Please feel free to contact me with any concerns.   [address] [facility] [address], [place]

## 2023-01-12 NOTE — PROGRESS NOTES
Pt had one small dark red BM throughout the night. Pt complained of 4/10 abd pain, pt given PRN tylenol (refer to eMAR). Pt denies further needs. Call light within reach.        Electronically signed by Meet Carrizales RN on 1/12/2023 at 6:12 AM

## 2023-01-12 NOTE — PROGRESS NOTES
Hospitalist Progress Note    Patient:  Kirsty Bronson  Unit/Bed:O5U-1048/5127-01   YOB: 1931       MRN: [id number] Acct: [de-identified]  PCP: Billy Hall MD    Date of Admission: 1/5/2023  --------------------------    Chief Complaint:      Weakness     Hospital Course/interval history:     Kirsty Bronson is a 80 y.o. male hospitalized on 1/5/2023   with multiple medical condition including anemia, CKD stage IV, dyslipidemia, coronary artery disease, BPH, presented with generalized weakness. Decreased oral intake. Diagnosed with COVID-19 infection with probable superimposed bacterial infection    Broad-spectrum antibiotic initiated, respiratory care protocol. He did not require COVID-specific medications in the light of the lack of hypoxia. He is noted to have acute GI bleeding, underwent EGD which revealed multiple peptic ulcers. Assessment/plan:     Bacterial pneumonia superimposing, COVID 19 infection  -Date of diagnosis 12/5/2022  -No hypoxia.  -No COVID-specific medication.  -Completing 6-day course of ceftriaxone, 5 days of azithromycin.  -Respiratory care protocol, monitor respiratory status. Acute upper GI bleed    -Status post EGD 1/9 which revealed multiple duodenal ulcer, treated with ( 6mm clean-based duodenal ulcer, 15mm clean-based duodenal ulcer in the bulb. In the sweep, there was a 2cm ulcer with a visible vessel in the distal aspect of the ulcer. I was not able to see the very proximal aspect of the ulcer). Treated with epinephrine  -Continue holding aspirin  -Continue Protonix infusion for total of 72 hours then twice daily.   -Continue current modified diet.  -Outpatient follow-up with GI         Acute blood loss anemia  Hemoglobin remained stable. Continue monitoring. Acute kidney injury superimposing CKD versus progression of CKD. -Creatinine 3.3 today. Unclear baseline. Peaked at 4.     Acute metabolic encephalopathy,/delirium  Overall improving. Incidental pleural plaques    Code Status: Full Code         DVT prophylaxis: SCD secondary to GI bleed. Disposition: Discharge planning, PT and OT. Need ECF. Discussed with RN      Message left for his daughter. ----------------      Subjective:     Patient seen and examined  Doing well, oriented, communicative. Has no complaint. Diet: ADULT DIET; Dysphagia - Minced and Moist; Mildly Thick (Nectar)    OBJECTIVE     Exam:  BP (!) 106/58   Pulse 70   Temp 97.6 °F (36.4 °C) (Oral)   Resp 24   Ht 5' 6\" (1.676 m)   Wt 154 lb 12.2 oz (70.2 kg)   SpO2 91%   BMI 24.98 kg/m²           Gen: More alert and communicative today. Head: Normocephalic. Atraumatic. Eyes: Conjunctivae/corneas clear. ENT: Oral mucosa dry mucous membrane, no throat lesion  Neck: No JVD. No obvious thyromegaly. CVS: Nml S1S2, no murmur  , RRR  Pulmomary: Diminished air entry in both lungs. .  Gastrointestinal: Soft, no tenderness on exam.  Musculoskeletal: Trace bilateral edema. Warm  Neuro:  no focal deficit. Moves extremity spontaneously. Psychiatry: More interactive today.         Medications:  Reviewed    Infusion Medications    sodium chloride      sodium chloride      sodium chloride       Scheduled Medications    dorzolamide-timolol  1 drop Both Eyes BID    sodium chloride flush  5-40 mL IntraVENous 2 times per day    [Held by provider] ferrous sulfate  324 mg Oral Daily with breakfast    docusate sodium  100 mg Oral BID    bisacodyl  10 mg Rectal Once    sodium chloride flush  10 mL IntraVENous 2 times per day    [Held by provider] aspirin EC  81 mg Oral Daily    atorvastatin  40 mg Oral Nightly    [Held by provider] carvedilol  3.125 mg Oral BID WC    escitalopram  10 mg Oral Nightly    polyethylene glycol  17 g Oral Daily    senna  1 tablet Oral BID    tamsulosin  0.4 mg Oral Nightly     PRN Meds: phenol, sodium chloride, sodium chloride flush, sodium chloride, Benzocaine-Menthol, bisacodyl, sodium chloride flush, sodium chloride, promethazine **OR** ondansetron, acetaminophen **OR** acetaminophen, nitroGLYCERIN, traMADol      Intake/Output Summary (Last 24 hours) at 1/12/2023 1433  Last data filed at 1/12/2023 1300  Gross per 24 hour   Intake 120 ml   Output 1400 ml   Net -1280 ml             Labs:   Recent Labs     01/10/23  0559 01/10/23  1433 01/11/23  0612 01/11/23  1243 01/11/23 2002 01/12/23  0010 01/12/23  0452   WBC 6.7  --  7.7  --   --   --  7.6   HGB 6.9*   < > 8.6*   < > 8.5* 8.3* 8.2*   HCT 20.8*   < > 25.9*   < > 26.3* 25.3* 24.7*     --  207  --   --   --  237    < > = values in this interval not displayed. Recent Labs     01/10/23  0600 01/11/23  0612 01/12/23  0452    141 142   K 4.0 4.0 4.2    110 110   CO2 21 20* 20*   BUN 76* 65* 60*   CREATININE 3.6* 3.3* 3.1*   CALCIUM 7.6* 8.0* 7.8*     Recent Labs     01/10/23  0418   AST 44*   ALT 40   BILIDIR <0.2   BILITOT <0.2   ALKPHOS 60     No results for input(s): INR in the last 72 hours. No results for input(s): Curlie Lat in the last 72 hours. Urinalysis:      Lab Results   Component Value Date/Time    NITRU Negative 01/05/2023 06:49 PM    WBCUA 6 01/05/2023 06:49 PM    BACTERIA None Seen 01/05/2023 06:49 PM    RBCUA 1106 01/05/2023 06:49 PM    BLOODU LARGE 01/05/2023 06:49 PM    SPECGRAV 1.015 01/05/2023 06:49 PM    GLUCOSEU Negative 01/05/2023 06:49 PM       Radiology:  XR CHEST PORTABLE   Final Result   1. Patchy right basilar airspace disease could represent atelectasis versus   pneumonia. 2.  Multiple bilateral calcified pleural plaques         CT SOFT TISSUE NECK WO CONTRAST   Final Result   No acute findings, soft tissue mass or lymphadenopathy evident. XR CHEST 1 VIEW   Final Result   Ill-defined opacities bilaterally.                      Electronically signed by Alessandra Coates MD on 1/12/2023 at 2:33 PM

## 2023-01-13 LAB
ALBUMIN SERPL-MCNC: 2.4 G/DL (ref 3.4–5)
ANION GAP SERPL CALCULATED.3IONS-SCNC: 9 MMOL/L (ref 3–16)
BUN BLDV-MCNC: 50 MG/DL (ref 7–20)
CALCIUM SERPL-MCNC: 7.9 MG/DL (ref 8.3–10.6)
CHLORIDE BLD-SCNC: 110 MMOL/L (ref 99–110)
CO2: 22 MMOL/L (ref 21–32)
CREAT SERPL-MCNC: 3.1 MG/DL (ref 0.8–1.3)
GFR SERPL CREATININE-BSD FRML MDRD: 18 ML/MIN/{1.73_M2}
GLUCOSE BLD-MCNC: 109 MG/DL (ref 70–99)
HCT VFR BLD CALC: 26.7 % (ref 40.5–52.5)
HEMOGLOBIN: 8.8 G/DL (ref 13.5–17.5)
MCH RBC QN AUTO: 30.2 PG (ref 26–34)
MCHC RBC AUTO-ENTMCNC: 32.9 G/DL (ref 31–36)
MCV RBC AUTO: 91.8 FL (ref 80–100)
PDW BLD-RTO: 14.6 % (ref 12.4–15.4)
PHOSPHORUS: 2.6 MG/DL (ref 2.5–4.9)
PLATELET # BLD: 258 K/UL (ref 135–450)
PMV BLD AUTO: 8.9 FL (ref 5–10.5)
POTASSIUM SERPL-SCNC: 3.9 MMOL/L (ref 3.5–5.1)
RBC # BLD: 2.9 M/UL (ref 4.2–5.9)
SODIUM BLD-SCNC: 141 MMOL/L (ref 136–145)
WBC # BLD: 9.6 K/UL (ref 4–11)

## 2023-01-13 PROCEDURE — 2580000003 HC RX 258: Performed by: STUDENT IN AN ORGANIZED HEALTH CARE EDUCATION/TRAINING PROGRAM

## 2023-01-13 PROCEDURE — 36415 COLL VENOUS BLD VENIPUNCTURE: CPT

## 2023-01-13 PROCEDURE — 94760 N-INVAS EAR/PLS OXIMETRY 1: CPT

## 2023-01-13 PROCEDURE — 97530 THERAPEUTIC ACTIVITIES: CPT

## 2023-01-13 PROCEDURE — 85027 COMPLETE CBC AUTOMATED: CPT

## 2023-01-13 PROCEDURE — 6370000000 HC RX 637 (ALT 250 FOR IP): Performed by: INTERNAL MEDICINE

## 2023-01-13 PROCEDURE — 97535 SELF CARE MNGMENT TRAINING: CPT

## 2023-01-13 PROCEDURE — 2580000003 HC RX 258: Performed by: INTERNAL MEDICINE

## 2023-01-13 PROCEDURE — 97116 GAIT TRAINING THERAPY: CPT | Performed by: PHYSICAL THERAPIST

## 2023-01-13 PROCEDURE — 80069 RENAL FUNCTION PANEL: CPT

## 2023-01-13 PROCEDURE — 6370000000 HC RX 637 (ALT 250 FOR IP): Performed by: HOSPITALIST

## 2023-01-13 PROCEDURE — 92526 ORAL FUNCTION THERAPY: CPT

## 2023-01-13 PROCEDURE — 2060000000 HC ICU INTERMEDIATE R&B

## 2023-01-13 PROCEDURE — 97530 THERAPEUTIC ACTIVITIES: CPT | Performed by: PHYSICAL THERAPIST

## 2023-01-13 RX ORDER — PANTOPRAZOLE SODIUM 40 MG/1
40 TABLET, DELAYED RELEASE ORAL 2 TIMES DAILY
Status: DISCONTINUED | OUTPATIENT
Start: 2023-01-13 | End: 2023-01-15

## 2023-01-13 RX ADMIN — TRAMADOL HYDROCHLORIDE 50 MG: 50 TABLET ORAL at 22:33

## 2023-01-13 RX ADMIN — PANTOPRAZOLE SODIUM 40 MG: 40 TABLET, DELAYED RELEASE ORAL at 11:14

## 2023-01-13 RX ADMIN — ESCITALOPRAM OXALATE 10 MG: 10 TABLET ORAL at 20:32

## 2023-01-13 RX ADMIN — DORZOLAMIDE HYDROCHLORIDE AND TIMOLOL MALEATE 1 DROP: 20; 5 SOLUTION/ DROPS OPHTHALMIC at 11:16

## 2023-01-13 RX ADMIN — Medication 10 ML: at 20:42

## 2023-01-13 RX ADMIN — ACETAMINOPHEN 650 MG: 325 TABLET ORAL at 20:30

## 2023-01-13 RX ADMIN — TRAMADOL HYDROCHLORIDE 50 MG: 50 TABLET ORAL at 14:29

## 2023-01-13 RX ADMIN — SODIUM CHLORIDE, PRESERVATIVE FREE 10 ML: 5 INJECTION INTRAVENOUS at 20:32

## 2023-01-13 RX ADMIN — DOCUSATE SODIUM 100 MG: 100 CAPSULE, LIQUID FILLED ORAL at 11:24

## 2023-01-13 RX ADMIN — DOCUSATE SODIUM 100 MG: 100 CAPSULE, LIQUID FILLED ORAL at 20:32

## 2023-01-13 RX ADMIN — PANTOPRAZOLE SODIUM 40 MG: 40 TABLET, DELAYED RELEASE ORAL at 20:32

## 2023-01-13 RX ADMIN — SODIUM CHLORIDE, PRESERVATIVE FREE 10 ML: 5 INJECTION INTRAVENOUS at 11:14

## 2023-01-13 RX ADMIN — TAMSULOSIN HYDROCHLORIDE 0.4 MG: 0.4 CAPSULE ORAL at 20:32

## 2023-01-13 RX ADMIN — SENNOSIDES 8.6 MG: 8.6 TABLET, FILM COATED ORAL at 11:24

## 2023-01-13 RX ADMIN — Medication 10 ML: at 11:20

## 2023-01-13 RX ADMIN — DORZOLAMIDE HYDROCHLORIDE AND TIMOLOL MALEATE 1 DROP: 20; 5 SOLUTION/ DROPS OPHTHALMIC at 22:36

## 2023-01-13 RX ADMIN — ATORVASTATIN CALCIUM 40 MG: 40 TABLET, FILM COATED ORAL at 20:32

## 2023-01-13 RX ADMIN — SENNOSIDES 8.6 MG: 8.6 TABLET, FILM COATED ORAL at 20:32

## 2023-01-13 ASSESSMENT — PAIN DESCRIPTION - LOCATION
LOCATION: HEAD
LOCATION_2: GENERALIZED
LOCATION: HEAD
LOCATION_2: GENERALIZED
LOCATION: HEAD

## 2023-01-13 ASSESSMENT — PAIN DESCRIPTION - PAIN TYPE
TYPE: ACUTE PAIN
TYPE: ACUTE PAIN

## 2023-01-13 ASSESSMENT — PAIN DESCRIPTION - FREQUENCY: FREQUENCY: CONTINUOUS

## 2023-01-13 ASSESSMENT — PAIN SCALES - GENERAL
PAINLEVEL_OUTOF10: 7
PAINLEVEL_OUTOF10: 9
PAINLEVEL_OUTOF10: 0
PAINLEVEL_OUTOF10: 7
PAINLEVEL_OUTOF10: 7

## 2023-01-13 ASSESSMENT — ENCOUNTER SYMPTOMS
CHEST TIGHTNESS: 0
GASTROINTESTINAL NEGATIVE: 1
SHORTNESS OF BREATH: 0

## 2023-01-13 ASSESSMENT — PAIN - FUNCTIONAL ASSESSMENT
PAIN_FUNCTIONAL_ASSESSMENT: PREVENTS OR INTERFERES SOME ACTIVE ACTIVITIES AND ADLS
PAIN_FUNCTIONAL_ASSESSMENT: ACTIVITIES ARE NOT PREVENTED
PAIN_FUNCTIONAL_ASSESSMENT: ACTIVITIES ARE NOT PREVENTED

## 2023-01-13 ASSESSMENT — PAIN DESCRIPTION - INTENSITY
RATING_2: 7
RATING_2: 7

## 2023-01-13 ASSESSMENT — PAIN DESCRIPTION - DESCRIPTORS
DESCRIPTORS_2: ACHING
DESCRIPTORS_2: ACHING
DESCRIPTORS: ACHING
DESCRIPTORS: ACHING;THROBBING
DESCRIPTORS: ACHING

## 2023-01-13 ASSESSMENT — PAIN SCALES - WONG BAKER
WONGBAKER_NUMERICALRESPONSE: 0
WONGBAKER_NUMERICALRESPONSE: 0

## 2023-01-13 ASSESSMENT — PAIN DESCRIPTION - ONSET: ONSET: ON-GOING

## 2023-01-13 ASSESSMENT — PAIN DESCRIPTION - ORIENTATION: ORIENTATION: ANTERIOR

## 2023-01-13 NOTE — PROGRESS NOTES
PALLIATIVE MEDICINE PROGRESS NOTE     Patient name:Flash Talbert    EMR:[id number] RAYMUNDO:[id number]  Room/Bed:Q1P-1256/5127-01    LOS: 8 days        ASSESSMENT/RECOMMENDATIONS     80 y.o. male with debility, dysphagia, anemia, malnutrition, and CKD. Symptom Management:  Weakness - Working with PT/OT, will need SNF at discharge. Lives with son, however he also has significant medical concerns. Patient is not happy with plans for SNF, would like to return home with his son. Encouraged him to participate in therapy in order to get stronger to have a successful discharge. Dysphagia - ST following. Diet per recommendation. Anemia -stable. Malnutrition - Long standing concern after reviewing medical records. Discussed the role this plays in healing and overall prognosis. Would likely benefit from continuation of support such as SNF/long-term care. CKD - Nephrology following, not a candidate for dialysis. Stable. Patient/Family Goals of Care :    1/10/23 - I explained the highly personal nature of deciding how to approach serious illness, and outlined two extremes--continuing disease-focused, morbidity-inducing treatment with the possibility of prolonging life vs. focusing on comfort/quality of life while allowing disease progression and natural death. Emphasis was placed on the absence of a \"right\" answer, as opposed to making good decisions based on personal preferences and circumstances, with ongoing reevaluation and adjustment in treatment goals as the clinical course unfolds. Patient was alert and oriented throughout the conversation in his room. We reviewed his living well at length, he did confirm his decisions regarding CPR and intubation in the aspect of cardiac or respiratory arrest.  He does state that he would like to live and continue medical management up until that point. Reached out to patient's [facility]. KHALIDA, daughter Maribel Manzanares.   [place] through the 38-minute conversation, Maribel Manzanares did call [facility] He and Ora Ye are not 100% on the same page regarding the patient's current medical care. We discussed goals of care, patient's main goal is to return home where he lives with his son who also has several medical conditions. The 2 of them essentially financially support and help maintain each other's care. Given the patient's overall prognosis, at this point, he will likely not return immediately to his apartment, however will end up in a skilled nursing facility. Ora Ye is having a difficult time with the patient's \"waffling\" regarding his desire to live. Per his chart review, the patient has had conversations with his providers about \"why am I still living\". Per his daughters report he is also had those conversations with her and is questioning why he is the last of his siblings to be alive. He has had concerns with malnutrition and failure to thrive for quite some time per chart review, she confirmed. Her frustration now lies that he is essentially changing his mind stating that he has a lot to live for. Per her report, her  was very in depth when having CODE STATUS conversations when the patient was completing his living well and estate planning. At that time he chose not to undergo resuscitative measures in the event of cardiac or respiratory arrest.  He feels as though now he wishes to do so. We discussed that the patient's in goal essentially will not be met by aggressive management of his disease processes. She is under the mindset that if the patient were to undergo cardiac or respiratory arrest, temporary measures to revive the patient would be appropriate so that her siblings would be able to say goodbye. Her  does not agree with this decision, but states that she has the right to make that decision.   Ora Ye states that she needs to [place] conversations with her siblings\" so that they understand the magnitude of what is going on with their father and can come to terms with his prognosis therefore taking resuscitative measures off the table. She also states that she is \"trying to prevent my siblings from hating me for the rest of my life\" if I do not allow resuscitative measures. We discussed the risk vs benefit of aggressive measures understanding his desires and co-morbidities. Roberta Garcia and her  feel as though the patient would improve if he moved to [facility], however he has a co dependent relationship with his son and therefore will likely not move. Roberta Garcia is very overwhelmed, asked that the conversation was ended in order eat and take a walk. She would like to have a discussion again tomorrow allowing some time to continue discussions with her siblings and .      1/11/23 -left message with patient's daughter, Roberta Garcia to further discuss. Will await return call. Disposition/Discharge Plan :    Pending  Follows with outpatient [place]. Advance Directives:  Code status:  Full Code  Decision Maker: Neida Dhillon. Case Discussed with:  patient, floor RN, . Thank you for allowing us to participate in the care of this patient. SUBJECTIVE     Chief Complaint: Weakness    Last 24 hours:   No acute events overnight. Patient reports he is feeling well. Has questions about discharge, would like to return home. ROS:    Review of Systems   Constitutional:  Positive for activity change and fatigue. Negative for appetite change. Respiratory:  Negative for chest tightness and shortness of breath. Cardiovascular:  Negative for chest pain and leg swelling. Gastrointestinal: Negative. Musculoskeletal: Negative. Neurological:  Positive for weakness. Psychiatric/Behavioral: Negative. A complete 10 count ROS was obtained. Pertinent positives mentioned above in HPI/ROS. All others if not mentioned are negative.           OBJECTIVE   BP (!) 127/56   Pulse 77   Temp 97.6 °F (36.4 °C) (Oral)   Resp 18   Ht 5' 6\" (1.676 m)   Wt 141 lb 15.6 oz (64.4 kg)   SpO2 95%   BMI 22.92 kg/m²   I/O last 3 completed shifts: In: 320 [P.O.:320]  Out: 1925 [ZZJIT:4403]  I/O this shift:  In: 300 [P.O.:300]  Out: 325 [Urine:325]      Physical Exam  Constitutional:       Appearance: Normal appearance. Cardiovascular:      Rate and Rhythm: Normal rate. Pulses: Normal pulses. Pulmonary:      Effort: Pulmonary effort is normal.   Abdominal:      Palpations: Abdomen is soft. Neurological:      Mental Status: He is alert. Motor: Weakness present. Psychiatric:         Mood and Affect: Mood normal.         Thought Content:  Thought content normal.         Judgment: Judgment normal.        Total time: 15 minutes  >50% of time spent counseling patient at bedside or POA/family member if applicable , reviewing information and discussing care, coordinating with care team       Signed By: Electronically signed by LISSET Bales CNP on 1/13/2023 at 12:25 PM   Palliative Medicine   [phone]    January 13, 2023

## 2023-01-13 NOTE — PROGRESS NOTES
Hospitalist Progress Note    Patient:  Zeinab Holland  Unit/Bed:A1B-0254/5127-01   YOB: 1931       MRN: [id number] Acct: [de-identified]  PCP: Felicity Cisse MD    Date of Admission: 1/5/2023  --------------------------    Chief Complaint:      Weakness     Hospital Course/interval history:     Zeinab Holland is a 80 y.o. male hospitalized on 1/5/2023   with multiple medical condition including anemia, CKD stage IV, dyslipidemia, coronary artery disease, BPH, presented with generalized weakness. Decreased oral intake. Diagnosed with COVID-19 infection with probable superimposed bacterial infection    Broad-spectrum antibiotic initiated, respiratory care protocol. He did not require COVID-specific medications in the light of the lack of hypoxia. He is noted to have acute GI bleeding, underwent EGD which revealed multiple peptic ulcers. Assessment/plan:     Bacterial pneumonia superimposing, COVID 19 infection  -Date of diagnosis 12/5/2022  -No hypoxia.  -No COVID-specific medication.  -Completing 6-day course of ceftriaxone, 5 days of azithromycin.  -Respiratory care protocol, monitor respiratory status. Acute upper GI bleed    -Status post EGD 1/9 which revealed multiple duodenal ulcer, treated with ( 6mm clean-based duodenal ulcer, 15mm clean-based duodenal ulcer in the bulb. In the sweep, there was a 2cm ulcer with a visible vessel in the distal aspect of the ulcer. I was not able to see the very proximal aspect of the ulcer). Treated with epinephrine  -Continue holding aspirin  -Completed Protonix infusion for total of 72 hours then twice daily. ,  Started on Protonix twice daily.   -Continue current modified diet.  -Outpatient follow-up with GI         Acute blood loss anemia  Hemoglobin remained stable. Acute kidney injury superimposing CKD versus progression of CKD. -Creatinine 3 today. Unclear baseline. Peaked at 4.   Okay for discharge with plan for outpatient nephrology follow-up. Improving slowly. Continue monitoring    Acute metabolic encephalopathy,/delirium  Overall improving. Incidental pleural plaques    Code Status: Full Code         DVT prophylaxis: SCD secondary to GI bleed. Disposition: Medically stable for discharge to extended-care facility            ----------------      Subjective:      No complaint other than he needs to be clean. Tolerating diet. Hemoglobin remained stable. No other complaint    Diet: ADULT DIET; Dysphagia - Minced and Moist; Mildly Thick (Nectar)  ADULT ORAL NUTRITION SUPPLEMENT; Lunch, Dinner; Frozen Oral Supplement    OBJECTIVE     Exam:  BP (!) 127/56   Pulse 77   Temp 97.6 °F (36.4 °C) (Oral)   Resp 18   Ht 5' 6\" (1.676 m)   Wt 141 lb 15.6 oz (64.4 kg)   SpO2 95%   BMI 22.92 kg/m²        Unchanged exam finding as documented below  Gen: More alert and communicative  . Head: Normocephalic. Atraumatic. Eyes: Conjunctivae/corneas clear. ENT: Oral mucosa dry mucous membrane, no throat lesion  Neck: No JVD. No obvious thyromegaly. CVS: Nml S1S2, no murmur  , RRR  Pulmomary: Diminished air entry in both lungs. .  Gastrointestinal: Soft, no tenderness on exam.  Musculoskeletal: Trace bilateral edema. Warm  Neuro:  no focal deficit. Moves extremity spontaneously. Psychiatry: More interactive today.         Medications:  Reviewed    Infusion Medications    sodium chloride      sodium chloride      sodium chloride       Scheduled Medications    pantoprazole  40 mg Oral BID    dorzolamide-timolol  1 drop Both Eyes BID    sodium chloride flush  5-40 mL IntraVENous 2 times per day    [Held by provider] ferrous sulfate  324 mg Oral Daily with breakfast    docusate sodium  100 mg Oral BID    bisacodyl  10 mg Rectal Once    sodium chloride flush  10 mL IntraVENous 2 times per day    [Held by provider] aspirin EC  81 mg Oral Daily    atorvastatin  40 mg Oral Nightly    [Held by provider] carvedilol  3.125 mg Oral BID WC    escitalopram  10 mg Oral Nightly    polyethylene glycol  17 g Oral Daily    senna  1 tablet Oral BID    tamsulosin  0.4 mg Oral Nightly     PRN Meds: phenol, sodium chloride, sodium chloride flush, sodium chloride, Benzocaine-Menthol, bisacodyl, sodium chloride flush, sodium chloride, promethazine **OR** ondansetron, acetaminophen **OR** acetaminophen, nitroGLYCERIN, traMADol      Intake/Output Summary (Last 24 hours) at 1/13/2023 1327  Last data filed at 1/13/2023 1135  Gross per 24 hour   Intake 500 ml   Output 850 ml   Net -350 ml             Labs:   Recent Labs     01/11/23  0612 01/11/23  1243 01/12/23  0010 01/12/23  0452 01/13/23  0503   WBC 7.7  --   --  7.6 9.6   HGB 8.6*   < > 8.3* 8.2* 8.8*   HCT 25.9*   < > 25.3* 24.7* 26.7*     --   --  237 258    < > = values in this interval not displayed. Recent Labs     01/11/23  0612 01/12/23  0452 01/13/23  0503    142 141   K 4.0 4.2 3.9    110 110   CO2 20* 20* 22   BUN 65* 60* 50*   CREATININE 3.3* 3.1* 3.1*   CALCIUM 8.0* 7.8* 7.9*   PHOS  --   --  2.6     No results for input(s): AST, ALT, BILIDIR, BILITOT, ALKPHOS in the last 72 hours. No results for input(s): INR in the last 72 hours. No results for input(s): Wayna Khat in the last 72 hours. Urinalysis:      Lab Results   Component Value Date/Time    NITRU Negative 01/05/2023 06:49 PM    WBCUA 6 01/05/2023 06:49 PM    BACTERIA None Seen 01/05/2023 06:49 PM    RBCUA 1106 01/05/2023 06:49 PM    BLOODU LARGE 01/05/2023 06:49 PM    SPECGRAV 1.015 01/05/2023 06:49 PM    GLUCOSEU Negative 01/05/2023 06:49 PM       Radiology:  XR CHEST PORTABLE   Final Result   1. Patchy right basilar airspace disease could represent atelectasis versus   pneumonia. 2.  Multiple bilateral calcified pleural plaques         CT SOFT TISSUE NECK WO CONTRAST   Final Result   No acute findings, soft tissue mass or lymphadenopathy evident.          XR CHEST 1 VIEW   Final Result   Ill-defined opacities bilaterally.                      Electronically signed by Pedro De La Paz MD on 1/13/2023 at 1:27 PM

## 2023-01-13 NOTE — PROGRESS NOTES
[facility]   Speech Therapy  Daily Dysphagia Treatment Note    Joseluis Barry  AGE: 80 y.o. 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)    Chronic constipation    Former cigarette smoker    Epigastric pain    Ventral hernia    Anemia in other chronic diseases classified elsewhere    Current moderate episode of major depressive disorder without prior episode (HCC)    Fatigue    Sleep disorder    Non-English speaking patient    Nocturia    Urinary frequency    Chronic kidney disease, stage IV (severe) (HCC)    Ataxia    Frequent falls    Frailty syndrome in geriatric patient    Chronic pain syndrome    Bradycardia    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 chronic kidney disease (HCC)    Hyperkalemia    Chronic midline low back pain    SOB (shortness of breath)     No Known Allergies    Treatment Diagnosis: Dysphagia     Chart review: 2023 admitted with c/o generalized weakness, fatigue, poor appetite  MD ADMISSION H&P HPI:  Patient is a 72-year-old male with past medical history of CAD, CKD stage IV, hyperlipidemia who presents to the hospital due to patient having generalized weakness. Patient also has poor appetite, fatigue as well as generalized weakness. Patient does not have any recent sick contacts, no reported fevers chills. No reported nausea vomiting diarrhea. 2023 COVID +     IMAGING:  CXR: 2023  Impression   Ill-defined opacities bilaterally.       GI note 1/10/23  80 y.o. male with a PMH of CAD with coronary stents, HLD, CKD stage IV, BPH who presented on 1/5/2023 with generalized weakness, decreased oral intake diagnosed with COVID-19 and bacterial pneumonia. Has developed worsening anemia during hospital course and came down to 6.9 1/8/23 and received 1 unit of blood. Repeat hgb 1/9/23 the same so recieved a 2nd unit. Then morning on 1/9/23 had explosive diarrhea with clots. Labs this am showed BUN 83 and Cr 4.0 (56/2.6 yesterday). LFT's AST 49, ALT 49, bili 0.3. Hgb 6.9 after 1 unit of blood and then 8.0 after a 2nd unit of blood (10.5 on admission). EGD 1/9/23 showed a normal esophagus without Mcdonald's or reflux changes, 3cm sliding hiatal hernia, normal stomach, 6mm clean-based duodenal ulcer, 15mm clean-based duodenal ulcer in the bulb. In the sweep, there was a 2cm ulcer with a visible vessel in the distal aspect of the ulcer. I was not able to see the very proximal aspect of the ulcer. Epinephrine 1:10,000 was injected in each of 4 quadrants around the ulcer with excellent blanching of tissue. A total of 3.5mL was injected. Next, the vessel was obliterated using gold probe cautery with excellent hemostasis. No bleeding at the end of the procedure. 2nd portion of the duodenum was normal.      Duodenal ulcer with hemorrhage and acute blood loss anemia  Acute renal failure  COVID pneumonia     PLAN :  PPI drip  H. Pylori stool antigen  Avoid NSAIDs  Agree with transfusion. Would watch for now. BUN improving. Hgb down 0.5 points. No other treatable lesion on endoscopy yesterday. Will transfuse and monitor. Continue PPI drip. I suspect this is old blood passing through rather than active bleeding but will need to watch. Subjective:   Current Diet Level: Regular with nectar thick liquids    1/10/2023 Clear liquids ; Thin liquids  (despite recommendation for mildly thick liquids prior to being made NPO)    1/11/23:  GI wrote for regular diet this date 1/11/23; nsg concern that pt cannot tolerate solids-recommendation for minced and moist/mildly thick liquids    Comments regarding tolerating Current Diet:   No concerns with current diet    Objective:     Pain: Did not state               Vision: [x]WFL []Impaired:  Hearing: []WFL [x]Impaired: [place]    Cognitive/behavioral/communication:   Oriented to [x] self [x] place [x] Date (month and year) [] situation  []alert []lethargic  [x]cooperative []self-limiting   [x]confused at times  []distractible []agitated []impulsive  []verbally responsive []nonverbal [x]limited verbal responses  [x]follows one step commands []does not follow dx []follows complex commands  []aphasic []dysarthric  [] other:     Respiratory Status: [x]Room Air []O2 via nasal cannula []Other:  Dentition: []Adequate []Dentures [x]Missing Most Teeth []Edentulous []Other:  Vocal Quality: [x]Normal []Dysphonic  []Aphonic  []Hoarse []Wet []Weak []Other:  Volitional Cough: [x]Strong []Weak []Wet []Absent []Congested []Other  Volitional Swallow:   []Absent  [x]Delayed []Adequate []Required use of drink   Reflexive cough:   [x]Strong []Weak []Wet []Absent []Congested []Other:    Oral Mechanism Exam:  []WFL []Mild   [x] Moderate  []Severe  []To be assessed  Impaired:   []Left side      []Right side    [x]Labial ROM/Coordination    []Labial Symmetry   [x]Lingual ROM/Coordination   []Lingual Symmetry  []Gag  []Other:     Patient Positioning: Upright in bed ; bed does not got to 90 degrees    PO Trials: breakfast tray-improving PO intake  Thin Liquids: suspect at risk for premature loss of bolus, delayed swallow initiation, decreased laryngeal elevation, immediate severe cough with thin liquid via tsp  Nectar thick liquids via straw: suspect at risk for premature loss of bolus, delayed swallow initiation, decreased laryngeal elevation, no overt s/s of aspiration or penetration   Honey Thick liquids : NT  Puree: adequate formation and clearance of bolus, suspect at risk for premature loss of bolus, delayed swallow initiation, decreased laryngeal elevation, no overt s/s of aspiration or penetration   minced and moist: slow oral phase, adequate clearance, suspect at risk for premature loss of bolus, delayed swallow initiation, decreased laryngeal elevation, no overt s/s of aspiration or penetration   Soft solid: max prolonged mastication and formation of bolus with patient reporting increased difficulty, concern for excess labor and incomplete mastication of soft solids, immediate cough x1, concern for incomplete mastication before swallowing    Dysphagia Tx:   Direct Dysphagia tx: PO tolerance as indicated above. Appears most optimal on minced and moist/mildly thick liquids-suspect may be new baseline ? Dysphagia ex: lingual exercises  Training in compensatory strategies: small single sips, reviewed purpose of diet recommendations, sit upright  Pt response to ex/training: verbalized understanding, suspect ongoing education required    Goals:    Pt will functionally tolerate recommended diet with no overt clinical s/s of aspiration ongoing  Pt will demonstrate understanding of aspiration risk and precautions via education/demonstration with occasional prompting ongoing  Pt will advance to least restrictive diet as indicated  ongoing     Assessment:   Impressions:   Accepted and tolerated treatment at bedside  Patient alert, cooperative, pleasant; follows simple dx; verbally responsive. Moderate oral dysphagia characterized by decreased lingual coordination, generalized oral weakness, prolonged mastication of bolus, concern for excess labor and incomplete mastication 2/2 edentulism with soft and bite-sized and regular, suspect at risk for premature loss of bolus. Pt endorses being on a soft diet in home environment reporting he eats rice, mashed potatoes, etc due to majority of teeth missing   Moderate pharyngeal stage dysphagia characterized by delayed swallow and decreased laryngeal elevation.  Immediate cough of thin liquids via tsp. Overt s/s of aspiration or penetration with thin liquids and soft and bite sized  Recommend cont minced and moist/mildly thick liquids  ST to follow for diet tolerance, upgrade readiness, and tx appropriateness monitor    Recommended Diet and Intervention 1/13/2023:  Diet Solids Recommendation:  Dysphagia II diet (minced and moist)   Liquid Consistency Recommendation:  Mildly (nectar) thick liquids  Recommended form of Meds: Meds crushed as able in puree      Compensatory Swallowing Strategies:  Upright as possible with all PO intake , Small bites/sips , Remain upright 30-45 min     EDUCATION:   Provided education regarding role of SLP, results of assessment, recommendations and general speech pathology plan of care. [] Pt verbalized understanding and agreement   [x] Pt requires ongoing learning   [] No evidence of comprehension     Dysphagia Prognosis: [] good [x]fair []poor []guarded     Current co-morbidites    Plan:   Continue Dysphagia Therapy: YES    Interventions: Diet Tolerance Monitoring , Patient/Family Education   Duration/Frequency of therapy while on unit: Speech therapy for dysphagia tx 3-5 times per week during acute care stay. Discharge Instructions:   Recommend ongoing SLP for dysphagia therapy upon discharge from hospital     This note serves as a D/C Summary in the event that this patient is discharged prior to the next therapy session.     Coded treatment time: 0  Total treatment time: 225 [facility], 117 [facility], [facility]0 [facility]  Speech-Language Pathologist  On 01/13/23 at 8:43 AM

## 2023-01-13 NOTE — PROGRESS NOTES
Physical Therapy  Facility/Department: [facility]  Physical Therapy Initial Assessment    Name: Ebonie Dial  : 1931  MRN: [id number]  Date of Service: 2023    Discharge Recommendations:  Patient would benefit from continued therapy after discharge (3-5x/wk)   PT Equipment Recommendations  Other: defer to next level of care    Ebonie Dial scored a 17/24 on the AM-PAC short mobility form. Current research shows that an AM-PAC score of 17 or less is typically not associated with a discharge to the patient's home setting. Based on the patient's AM-PAC score and their current functional mobility deficits, it is recommended that the patient have 3-5 sessions per week of Physical Therapy at d/c to increase the patient's independence. Please see assessment section for further patient specific details. If patient discharges prior to next session this note will serve as a discharge summary. Please see below for the latest assessment towards goals. Patient Diagnosis(es): The primary encounter diagnosis was Pneumonia of right lung due to infectious organism, unspecified part of lung. Diagnoses of General weakness, Impaired mobility and ADLs, COVID-19, and Acute kidney injury superimposed on CKD [facility]) were also pertinent to this visit. Past Medical History:  has a past medical history of 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 chronic kidney disease ([facility] [address] Back pain, BPH (benign prostatic hyperplasia), CAD (coronary artery disease), CAD (coronary artery disease), Constipated, Hyperlipidemia, MI (myocardial infarction) ([facility] [address] Spinal stenosis, Stented coronary artery, Urinary hesitancy, and Vitamin D deficiency. Past Surgical History:  has a past surgical history that includes Coronary angioplasty with stent (10/18/15); Endoscopy, colon, diagnostic; Upper gastrointestinal endoscopy (10-); Upper gastrointestinal endoscopy (N/A, 2023); and Upper gastrointestinal endoscopy (2023).     Assessment   Body Structures, Functions, Activity Limitations Requiring Skilled Therapeutic Intervention: Decreased functional mobility ; Decreased ADL status; Decreased strength;Decreased balance;Decreased endurance  Assessment: Patient is a 19-year-old male with past medical history of CAD, CKD stage IV, hyperlipidemia who presents to the hospital on 1/5/23 due to patient having generalized weakness. Pt found to be COVID+. Prior to admission, pt living in apt setting with son, independent with ADLs and ambulation with SPC vs RW. Pt is making slow improvements but continues to need significant assist with functional tasks and has early fatigue; pt will benefit from continued therapy 3-5x/wk to address deficits to allow pt to regain his Ind  Treatment Diagnosis: impaired mobility  Therapy Prognosis: Fair  Decision Making: Medium Complexity  Barriers to Learning: [place]  Requires PT Follow-Up: Yes  Activity Tolerance  Activity Tolerance: Patient limited by endurance  Activity Tolerance Comments: limited by overall strength deficits     Plan   Physcial Therapy Plan  General Plan: 3-5 times per week  Specific Instructions for Next Treatment: cotx with OT  Current Treatment Recommendations: Strengthening, Balance training, Functional mobility training, Transfer training, Gait training, Endurance training, Neuromuscular re-education, Safety education & training, Equipment evaluation, education, & procurement, Patient/Caregiver education & training, Therapeutic activities  Safety Devices  Type of Devices: Patient at risk for falls, Nurse notified, Call light within reach, Heels elevated for pressure relief, Chair alarm in place, Left in chair     Restrictions  Restrictions/Precautions  Restrictions/Precautions: Fall Risk, Isolation  Position Activity Restriction  Other position/activity restrictions: Covid-19; IV; aldrich     Subjective   General  Chart Reviewed:  Yes  Additional Pertinent Hx: Patient is a 19-year-old male with past medical history of CAD, CKD stage IV, hyperlipidemia who presents to the hospital on 1/5/23 due to patient having generalized weakness. Pt found to be COVID+. Response To Previous Treatment: Patient with no complaints from previous session. Family / Caregiver Present: No  Referring Practitioner: Geneva Arauz MD  Referral Date : 01/05/23  Diagnosis: COVID+  Follows Commands: Within Functional Limits  Subjective  Subjective: pt pleasant and reports fatigue but wants to try and get up; reports he may needs to have a BM however was unsuccessful         Social/Functional History  Social/Functional History  Lives With: Son  Type of Home: Apartment  Home Layout: One level  Home Access: Stairs to enter with rails  Entrance Stairs - Number of Steps: 13  Bathroom Shower/Tub: Tub/Shower unit  Bathroom Toilet: Standard  Bathroom Equipment: Grab bars in [address] [place]: franny yS  Has the patient had two or more falls in the past year or any fall with injury in the past year?: Yes  ADL Assistance: Independent  Ambulation Assistance: Independent (RW in apt, SPC in community)  Transfer Assistance: Independent  Active : No  Occupation: Retired  Type of Occupation:   Vision/Hearing  Vision  Vision: Impaired (Reporting poor vision but does not wear glasses)  Vision Exceptions:  (kept eyes closed most of PT sessioin this date.)  Hearing  Hearing: Exceptions to [facility]  Hearing Exceptions: Hard of hearing/hearing concerns    Cognition   Orientation  Overall Orientation Status: Impaired  Orientation Level: Oriented to person;Oriented to place  Cognition  Overall Cognitive Status: Exceptions  Arousal/Alertness: Delayed responses to stimuli  Following Commands: Follows one step commands with repetition; Follows one step commands with increased time  Attention Span: Difficulty dividing attention  Memory: Decreased recall of recent events;Decreased short term memory  Safety Judgement: Decreased awareness of need for safety  Problem Solving: Decreased awareness of errors  Insights: Decreased awareness of deficits  Initiation: Requires cues for some  Sequencing: Requires cues for some     Objective   Heart Rate: 77  Heart Rate Source: Monitor  BP: (!) 127/56  BP Location: Left upper arm  BP Method: Automatic  Patient Position: Semi fowlers  MAP (Calculated): 80  Resp: 18  SpO2: 95 %  O2 Device: None (Room air)                             Bed mobility  Supine to Sit: Minimal assistance  Transfers  Sit to Stand: Minimal Assistance  Stand to Sit: Minimal Assistance  Ambulation  Surface: Level tile  Device: Rolling Walker  Assistance: Minimal assistance  Quality of Gait: flexed posture with small slow steps  Gait Deviations: Slow Donna;Decreased step length;Decreased step height  Comments: pt reported feeling dizzy during sesson     Balance  Comments: CGA for sitting EOB and for standing with RW           OutComes Score                                                  AM-PAC Score  AM-PAC Inpatient Mobility Raw Score : 17 (01/13/23 [facility])  AM-PAC Inpatient T-Scale Score : 42.13 (01/13/23 [facility])  Mobility Inpatient CMS 0-100% Score: 50.57 (01/13/23 [facility])  Mobility Inpatient CMS G-Code Modifier : CK (01/13/23 1231)          Tinneti Score       Goals  Short Term Goals  Time Frame for Short Term Goals: by acute discharge - all goals ongoing  Short Term Goal 1: bed mobility SBA  Short Term Goal 2: sit<>stand with SBA  Short Term Goal 3: ambulate > 30' with RW and SBA  Patient Goals   Patient Goals : none stated       Education  Patient Education  Education Given To: Patient  Education Provided Comments: reviewed call light and not getting up without assist  Education Method: Verbal  Education Outcome: Verbalized understanding      Therapy Time   Individual Concurrent Group Co-treatment   Time In 1130         Time Out 1208         Minutes 38                 SHANTEL AGUILAR PT   Electronically signed by SHANTEL AGUILAR PT on 1/13/2023 at 12:32 PM

## 2023-01-13 NOTE — CARE COORDINATION
Discharge Planning:   PT/OT: recommending SNF  [facility] at the [place], [place] and likely [address] can accept. Discussed with patient. Notified of accepting facilities. Patient aware and likely agreeable to [place]. Patient requested I speak to his wife to verify choice. The Plan for Transition of Care is related to the following treatment goals: COVID snf list     The Patient was provided with a choice of provider and agrees   with the discharge plan. [x] Yes [] No    Freedom of choice list was provided with basic dialogue that supports the patient's individualized plan of care/goals, treatment preferences and shares the quality data associated with the providers. [x] Yes [] No     PLAN: SNF     NEED: choice from daughter, [facility] precert, [facility], [facility] and transport. YAO Valentin, LACH[place], Social Work/Case Management   [phone]  Electronically signed by YAO Valentin LSW on 1/13/2023 at 12:43 PM    Called to [facility] #[phone]. Left voicemail notifying of the above information. Notified of [facility] ability to accept and notified of patient's preference. Electronically signed by YAO Valentin LSW on 1/13/2023 at 3:07 PM    Unable to find patient in [employer] system. Spoke to Frankie at [place] #[phone]. Confirmed she will initiate precert.      Electronically signed by YAO Valentin LSW on 1/13/2023 at 4:51 PM

## 2023-01-13 NOTE — PROGRESS NOTES
Comprehensive Nutrition Assessment    Type and Reason for Visit:  Initial    Nutrition Recommendations/Plan:   Continue Dysphagia- Minced and Moist, Mildly Thick diet  Start Magic Cup BID     Malnutrition Assessment:  Malnutrition Status: At risk for malnutrition (Comment) (01/13/23 0071)    Context:  Acute Illness       Nutrition Assessment:    LOS. Pt with PMH of CAD, CKD stage IV, HLD presented with generalized weakness and poor appetite. During admission pt experienced bloody stools. GI suspected GIB and underwent EGD and flex sig 1/9 to reveal multiple duodenal ulcers. Per SLP pt receives Dysphagia Minced and Moist diet with Mildly Thick liquids. PO intake has been inadequate throughout admission d/t limited appetite. No significant weight loss noted but pt is at nutritional risk d/t insufficient intakes. Will start Magic Cup TID to promote adequate nutrient intake. Nutrition Related Findings:    Labs reviewed. LBM 1/12. No edema noted. Wound Type: None       Current Nutrition Intake & Therapies:    Average Meal Intake:  (Variable)  Average Supplements Intake: None Ordered  ADULT DIET; Dysphagia - Minced and Moist; Mildly Thick (Nectar)  ADULT ORAL NUTRITION SUPPLEMENT; Lunch, Dinner; Frozen Oral Supplement    Anthropometric Measures:  Height: 5' 6\" (167.6 cm)  Ideal Body Weight (IBW): 142 lbs (65 kg)    Current Body Weight: 141 lb (64 kg),   IBW.  Weight Source: Bed Scale  Current BMI (kg/m2): 22.8  BMI Categories: Normal Weight (BMI 22.0 to 24.9) age over 72    Estimated Daily Nutrient Needs:  Energy Requirements Based On: Kcal/kg  Weight Used for Energy Requirements: Current  Energy (kcal/day): 1342-3553 kcal (25-30 kcal/kg)  Weight Used for Protein Requirements: Current  Protein (g/day): 64-77 gm (1.0-1.2 g/kg)  Method Used for Fluid Requirements: 1 ml/kcal    Nutrition Diagnosis:   Inadequate oral intake related to inadequate protein-energy intake as evidenced by variable intakes throughout admission and poor appetite. Nutrition Interventions:   Food and/or Nutrient Delivery: Continue Current Diet, Start Oral Nutrition Supplement  Nutrition Education/Counseling: No recommendation at this time  Coordination of Nutrition Care: Continue to monitor while inpatient    Goals:  Goals: PO intake 50% or greater, by next RD assessment    Nutrition Monitoring and Evaluation:   Behavioral-Environmental Outcomes: None Identified  Food/Nutrient Intake Outcomes: Food and Nutrient Intake, Supplement Intake  Physical Signs/Symptoms Outcomes: Biochemical Data, Nutrition Focused Physical Findings, Skin, Weight, Chewing or Swallowing, GI Status, Nausea or Vomiting, Meal Time Behavior    Discharge Planning:     Too soon to determine     Douglas Weber, [address] [place] [address], LD  Contact: [phone]

## 2023-01-13 NOTE — PROGRESS NOTES
Occupational Therapy  Facility/Department: [address] PROGRESSIVE CARE  Occupational Daily Treatment Note    Name: Shira Pemberton  : 1931  MRN: [id number]  Date of Service: 2023    Discharge Recommendations:  3-5 sessions per week, Patient would benefit from continued therapy after discharge          Patient Diagnosis(es): The primary encounter diagnosis was Pneumonia of right lung due to infectious organism, unspecified part of lung. Diagnoses of General weakness, Impaired mobility and ADLs, COVID-19, and Acute kidney injury superimposed on CKD [facility]) were also pertinent to this visit. 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 Past Surgical History:  has a past surgical history that includes Coronary angioplasty with stent (10/18/15); Endoscopy, colon, diagnostic; Upper gastrointestinal endoscopy (10-); Upper gastrointestinal endoscopy (N/A, 2023); and Upper gastrointestinal endoscopy (2023). Treatment Diagnosis: Decreased: ADLs, functional transfers/mobility        Shira Pemberton scored a 15/24 on the AM-PAC ADL Inpatient form. Current research shows that an AM-PAC score of 17 or less is typically not associated with a discharge to the patient's home setting. Based on the patient's AM-PAC score and their current ADL deficits, it is recommended that the patient have 3-5 sessions per week of Occupational Therapy at d/c to increase the patient's independence. Please see assessment section for further patient specific details. If patient discharges prior to next session this note will serve as a discharge summary. Please see below for the latest assessment towards goals. Assessment   Performance deficits / Impairments: Decreased functional mobility ; Decreased ADL status; Decreased endurance;Decreased balance;Decreased cognition;Decreased safe awareness;Decreased strength  Assessment: Discussed with OTR am pac score is 15 which indicates need for continued skilled OT to increase IND and decrease caregiver burden. Patient completed supine to sit with Min A. Min A for sit<>stand from EOB to Rw to commode to RW with cues for hand placement and safety. Functional mobility with RW with Min A, slightly unsteady gait. Patitent with aldrich in place. Anticipate that patient will be unable to return home at this time due to assist level requirements. Patient would benefit from low to mod therapy to maximize IND and decrease caregiver burden. Will cont with POC. REQUIRES OT FOLLOW-UP: Yes  Activity Tolerance  Activity Tolerance: Patient Tolerated treatment well        Plan   Occupational Therapy Plan  Times Per Week: 3-5  Times Per Day: Once a day  Current Treatment Recommendations: Strengthening, ROM, Balance training, Functional mobility training, Endurance training, Self-Care / ADL, Safety education & training     Restrictions  Restrictions/Precautions  Restrictions/Precautions: Fall Risk, Isolation  Position Activity Restriction  Other position/activity restrictions: Covid-19; IV; aldrich    Subjective   General  Chart Reviewed: Yes  Patient assessed for rehabilitation services?: Yes  Additional Pertinent Hx: per H&P: \"Patient is a 70-year-old male with past medical history of CAD, CKD stage IV, hyperlipidemia who presents to the hospital due to patient having generalized weakness. Patient also has poor appetite, fatigue as well as generalized weakness.   Patient does not have any recent sick contacts, no reported fevers chills\"  Response to previous treatment: Patient with no complaints from previous session  Family / Caregiver Present: No  Referring Practitioner: Shay  Diagnosis: SOB  Subjective  Subjective: Patient supine in bed upon arrival to room with PT. Patient agreeable to therapy. Patient alert, noted less confusion, agreeable to therapy     Social/Functional History  Social/Functional History  Lives With: Son  Type of Home: Apartment  Home Layout: One level  Home Access: Stairs to enter with rails  Entrance Stairs - Number of Steps: 13  Bathroom Shower/Tub: Tub/Shower unit  Bathroom Toilet: Standard  Bathroom Equipment: Grab bars in [address]: franny [address]  Has the patient had two or more falls in the past year or any fall with injury in the past year?: Yes  ADL Assistance: Independent  Ambulation Assistance: Independent (RW in apt, SPC in community)  Transfer Assistance: Independent  Active : No  Occupation: Retired  Type of Occupation:        Objective      Safety Devices  Type of Devices: Patient at risk for falls;Nurse notified;Call light within reach; Heels elevated for pressure relief; Chair alarm in place; Left in chair  Balance  Sitting: Intact  Standing: With support (Min A for standing with RW)  Toilet Transfers  Toilet - Technique: Ambulating  Toilet Transfer: Minimal assistance  Toilet Transfers Comments: cues for hand placement and safety     ADL  Feeding: Setup;Scoop assist;Bringing food to mouth assist;Moderate assistance  Feeding Skilled Clinical Factors: assist to scoop pudding from cup and bring to mouth, set up for beverage management  Toileting: Maximum assistance  Toileting Skilled Clinical Factors: Pt with aldrich     Activity Tolerance  Activity Tolerance: Patient limited by endurance  Activity Tolerance Comments: limited by overall strength deficits  Bed mobility  Supine to Sit: Minimal assistance  Sit to Supine: Unable to assess (up in chair at end of session)  Transfers  Sit to stand: Minimal assistance  Stand to sit: Minimal assistance  Transfer Comments: Min A for sit<>stand from EOB to RW to recliner chair with cues for hand placement and safety. Functional mobility with RW with Min A, slightly usteady gait due to reports of dizziness. Vision  Vision: Impaired (Reporting poor vision but does not wear glasses)  Vision Exceptions:  (kept eyes closed most of PT sessioin this date.)  Hearing  Hearing: Exceptions to [facility]  Hearing Exceptions: Hard of hearing/hearing concerns  Cognition  Overall Cognitive Status: Exceptions  Arousal/Alertness: Appropriate responses to stimuli  Following Commands: Follows one step commands with repetition; Follows one step commands with increased time  Attention Span: Difficulty dividing attention  Memory: Decreased recall of recent events;Decreased short term memory  Safety Judgement: Decreased awareness of need for safety  Problem Solving: Decreased awareness of errors  Insights: Decreased awareness of deficits  Initiation: Requires cues for some  Sequencing: Requires cues for some  Orientation  Overall Orientation Status: Impaired  Orientation Level: Oriented to person;Oriented to place                  Education Given To: Patient  Education Provided: Role of Therapy;Transfer Training;ADL Adaptive Strategies; Energy Conservation         AM-PAC Score        AM-[facility] Inpatient Daily Activity Raw Score: 15 (01/13/23 1303)  AM-PAC Inpatient ADL T-Scale Score : 34.69 (01/13/23 1303)  ADL Inpatient CMS 0-100% Score: 56.46 (01/13/23 1303)  ADL Inpatient CMS G-Code Modifier : CK (01/13/23 1303)       Goals  Short Term Goals  Time Frame for Short Term Goals: Prior to d/c.  Status: goals ongoing  Short Term Goal 1: Pt will complete ADL transfers w/ supervision  Short Term Goal 2: Pt will toilet w/ supervision  Short Term Goal 3: Pt will bathe w/ min A  Short Term Goal 4: Pt will groom in stance at sink w/ supervision  Short Term Goal 5: Pt will complete 10 reps UE therex in all planes to increase strength/endurance for ADLs  Long Term Goals  Time Frame for Long Term Goals : LTG=STG  Patient Goals   Patient goals : to go home       Therapy Time   Individual Concurrent Group Co-treatment   Time In 1130         Time Out 1208         Minutes 38                 Electronically signed by Nata Salmeron [id number] on 1/13/2023 at 1:13 PM

## 2023-01-13 NOTE — PROGRESS NOTES
Department of Internal Medicine  Nephrology Progress Note    HPI.    80 y.o. male whom we were asked to see for RONI on CKD. I see him in the office, last 08/22 with eGFR 18 ml/min. He presents with weakness and dyspnea. He was diagnosed with Covid-19. Renal cosn called for RONI . Events noted , chart reviewed     HPI:  Breathing stable. No CP. Renal function stable. ROS:  In bed. No fever. [address]:  medications reviewed. Physical Exam:    VITALS:  BP (!) 127/56   Pulse 77   Temp 97.6 °F (36.4 °C) (Oral)   Resp 18   Ht 5' 6\" (1.676 m)   Wt 141 lb 15.6 oz (64.4 kg)   SpO2 95%   BMI 22.92 kg/m²   24HR INTAKE/OUTPUT:    Intake/Output Summary (Last 24 hours) at 1/13/2023 1419  Last data filed at 1/13/2023 1135  Gross per 24 hour   Intake 500 ml   Output 850 ml   Net -350 ml         Constitutional:  Looks comfortable   Respiratory:  scattered rhonchi   Gastrointestinal No tenderness.   Normal Bowel Sounds  Cardiovascular:  S1, S2 RRR   Edema:   + edema  Skin:  no rash    DATA:    CBC:  Lab Results   Component Value Date/Time    WBC 9.6 01/13/2023 05:03 AM    RBC 2.90 01/13/2023 05:03 AM    HGB 8.8 01/13/2023 05:03 AM    HCT 26.7 01/13/2023 05:03 AM    MCV 91.8 01/13/2023 05:03 AM    MCH 30.2 01/13/2023 05:03 AM    MCHC 32.9 01/13/2023 05:03 AM    RDW 14.6 01/13/2023 05:03 AM     01/13/2023 05:03 AM    MPV 8.9 01/13/2023 05:03 AM     CMP:  Lab Results   Component Value Date/Time     01/13/2023 05:03 AM    K 3.9 01/13/2023 05:03 AM    K 4.2 01/12/2023 04:52 AM     01/13/2023 05:03 AM    CO2 22 01/13/2023 05:03 AM    BUN 50 01/13/2023 05:03 AM    CREATININE 3.1 01/13/2023 05:03 AM    GFRAA 24 11/05/2021 01:23 PM    AGRATIO 1.0 01/09/2023 09:43 AM    LABGLOM 18 01/13/2023 05:03 AM    GLUCOSE 109 01/13/2023 05:03 AM    PROT 5.0 01/10/2023 04:18 AM    CALCIUM 7.9 01/13/2023 05:03 AM    BILITOT <0.2 01/10/2023 04:18 AM    ALKPHOS 60 01/10/2023 04:18 AM    AST 44 01/10/2023 04:18 AM    ALT 40 01/10/2023 04:18 AM      Hepatic Function Panel:   Lab Results   Component Value Date/Time    ALKPHOS 60 01/10/2023 04:18 AM    ALT 40 01/10/2023 04:18 AM    AST 44 01/10/2023 04:18 AM    PROT 5.0 01/10/2023 04:18 AM    BILITOT <0.2 01/10/2023 04:18 AM    BILIDIR <0.2 01/10/2023 04:18 AM    IBILI see below 01/10/2023 04:18 AM      Phosphorus:   Lab Results   Component Value Date/Time    PHOS 2.6 01/13/2023 05:03 AM       ASSESSMENT/PLAN:    1) 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 CKD progression vs. pre-renal  - renal function slightly better and close to baseline  - I do not believe patient is an appropriate candidate for dialysis any more     2) Covid-19  - out of isolation     3) PNA  - off Abx     4) FEN  - metabolic acidosis              - monitor  - failure to thrive              - encouraged PO intake     5) anemia  - labs noted   - s/p PRBC 01/10/23    6) UGIB  - GI consulted  - on PPI gtt     7) AMS   Overall better. Patient can be discharged from renal standpoint.

## 2023-01-14 LAB
ALBUMIN SERPL-MCNC: 2 G/DL (ref 3.4–5)
ANION GAP SERPL CALCULATED.3IONS-SCNC: 10 MMOL/L (ref 3–16)
BUN BLDV-MCNC: 44 MG/DL (ref 7–20)
CALCIUM SERPL-MCNC: 7.9 MG/DL (ref 8.3–10.6)
CHLORIDE BLD-SCNC: 112 MMOL/L (ref 99–110)
CO2: 17 MMOL/L (ref 21–32)
CREAT SERPL-MCNC: 2.9 MG/DL (ref 0.8–1.3)
GFR SERPL CREATININE-BSD FRML MDRD: 20 ML/MIN/{1.73_M2}
GLUCOSE BLD-MCNC: 115 MG/DL (ref 70–99)
HCT VFR BLD CALC: 26.6 % (ref 40.5–52.5)
HEMOGLOBIN: 8.6 G/DL (ref 13.5–17.5)
MCH RBC QN AUTO: 29.9 PG (ref 26–34)
MCHC RBC AUTO-ENTMCNC: 32.3 G/DL (ref 31–36)
MCV RBC AUTO: 92.6 FL (ref 80–100)
PDW BLD-RTO: 14.7 % (ref 12.4–15.4)
PHOSPHORUS: 2.9 MG/DL (ref 2.5–4.9)
PLATELET # BLD: 268 K/UL (ref 135–450)
PMV BLD AUTO: 8.6 FL (ref 5–10.5)
POTASSIUM SERPL-SCNC: 4.6 MMOL/L (ref 3.5–5.1)
RBC # BLD: 2.87 M/UL (ref 4.2–5.9)
SARS-COV-2, NAAT: DETECTED
SODIUM BLD-SCNC: 139 MMOL/L (ref 136–145)
WBC # BLD: 14.3 K/UL (ref 4–11)

## 2023-01-14 PROCEDURE — 87338 HPYLORI STOOL AG IA: CPT

## 2023-01-14 PROCEDURE — 6360000002 HC RX W HCPCS: Performed by: HOSPITALIST

## 2023-01-14 PROCEDURE — 6370000000 HC RX 637 (ALT 250 FOR IP): Performed by: INTERNAL MEDICINE

## 2023-01-14 PROCEDURE — 2580000003 HC RX 258: Performed by: STUDENT IN AN ORGANIZED HEALTH CARE EDUCATION/TRAINING PROGRAM

## 2023-01-14 PROCEDURE — 80069 RENAL FUNCTION PANEL: CPT

## 2023-01-14 PROCEDURE — 6370000000 HC RX 637 (ALT 250 FOR IP): Performed by: HOSPITALIST

## 2023-01-14 PROCEDURE — 85027 COMPLETE CBC AUTOMATED: CPT

## 2023-01-14 PROCEDURE — 36415 COLL VENOUS BLD VENIPUNCTURE: CPT

## 2023-01-14 PROCEDURE — 87635 SARS-COV-2 COVID-19 AMP PRB: CPT

## 2023-01-14 PROCEDURE — 2580000003 HC RX 258: Performed by: INTERNAL MEDICINE

## 2023-01-14 PROCEDURE — 94760 N-INVAS EAR/PLS OXIMETRY 1: CPT

## 2023-01-14 PROCEDURE — 2060000000 HC ICU INTERMEDIATE R&B

## 2023-01-14 RX ORDER — SODIUM BICARBONATE 650 MG/1
650 TABLET ORAL 2 TIMES DAILY
Status: DISCONTINUED | OUTPATIENT
Start: 2023-01-14 | End: 2023-01-23 | Stop reason: HOSPADM

## 2023-01-14 RX ADMIN — DOCUSATE SODIUM 100 MG: 100 CAPSULE, LIQUID FILLED ORAL at 20:07

## 2023-01-14 RX ADMIN — PANTOPRAZOLE SODIUM 40 MG: 40 TABLET, DELAYED RELEASE ORAL at 20:06

## 2023-01-14 RX ADMIN — TAMSULOSIN HYDROCHLORIDE 0.4 MG: 0.4 CAPSULE ORAL at 20:09

## 2023-01-14 RX ADMIN — SODIUM CHLORIDE, PRESERVATIVE FREE 10 ML: 5 INJECTION INTRAVENOUS at 10:07

## 2023-01-14 RX ADMIN — TRAMADOL HYDROCHLORIDE 50 MG: 50 TABLET ORAL at 20:08

## 2023-01-14 RX ADMIN — TRAMADOL HYDROCHLORIDE 50 MG: 50 TABLET ORAL at 09:56

## 2023-01-14 RX ADMIN — SODIUM BICARBONATE 650 MG: 650 TABLET ORAL at 11:48

## 2023-01-14 RX ADMIN — SENNOSIDES 8.6 MG: 8.6 TABLET, FILM COATED ORAL at 09:56

## 2023-01-14 RX ADMIN — DORZOLAMIDE HYDROCHLORIDE AND TIMOLOL MALEATE 1 DROP: 20; 5 SOLUTION/ DROPS OPHTHALMIC at 19:36

## 2023-01-14 RX ADMIN — SODIUM CHLORIDE, PRESERVATIVE FREE 10 ML: 5 INJECTION INTRAVENOUS at 19:32

## 2023-01-14 RX ADMIN — ESCITALOPRAM OXALATE 10 MG: 10 TABLET ORAL at 20:07

## 2023-01-14 RX ADMIN — PANTOPRAZOLE SODIUM 40 MG: 40 TABLET, DELAYED RELEASE ORAL at 09:56

## 2023-01-14 RX ADMIN — Medication 10 ML: at 10:08

## 2023-01-14 RX ADMIN — SENNOSIDES 8.6 MG: 8.6 TABLET, FILM COATED ORAL at 20:07

## 2023-01-14 RX ADMIN — SODIUM BICARBONATE 650 MG: 650 TABLET ORAL at 20:08

## 2023-01-14 RX ADMIN — DORZOLAMIDE HYDROCHLORIDE AND TIMOLOL MALEATE 1 DROP: 20; 5 SOLUTION/ DROPS OPHTHALMIC at 10:06

## 2023-01-14 RX ADMIN — ACETAMINOPHEN 650 MG: 325 TABLET ORAL at 11:48

## 2023-01-14 RX ADMIN — POLYETHYLENE GLYCOL 3350 17 G: 17 POWDER, FOR SOLUTION ORAL at 09:56

## 2023-01-14 RX ADMIN — IRON SUCROSE 200 MG: 20 INJECTION, SOLUTION INTRAVENOUS at 11:48

## 2023-01-14 RX ADMIN — ATORVASTATIN CALCIUM 40 MG: 40 TABLET, FILM COATED ORAL at 20:07

## 2023-01-14 ASSESSMENT — PAIN SCALES - WONG BAKER
WONGBAKER_NUMERICALRESPONSE: 0
WONGBAKER_NUMERICALRESPONSE: 2

## 2023-01-14 ASSESSMENT — PAIN DESCRIPTION - ORIENTATION
ORIENTATION: ANTERIOR
ORIENTATION: ANTERIOR
ORIENTATION: ANTERIOR;MID
ORIENTATION: ANTERIOR

## 2023-01-14 ASSESSMENT — PAIN SCALES - GENERAL
PAINLEVEL_OUTOF10: 7
PAINLEVEL_OUTOF10: 0
PAINLEVEL_OUTOF10: 5
PAINLEVEL_OUTOF10: 4
PAINLEVEL_OUTOF10: 5
PAINLEVEL_OUTOF10: 4
PAINLEVEL_OUTOF10: 5
PAINLEVEL_OUTOF10: 6

## 2023-01-14 ASSESSMENT — PAIN DESCRIPTION - PAIN TYPE
TYPE: ACUTE PAIN

## 2023-01-14 ASSESSMENT — PAIN DESCRIPTION - INTENSITY
RATING_2: 5
RATING_2: 6

## 2023-01-14 ASSESSMENT — PAIN DESCRIPTION - LOCATION
LOCATION: HEAD
LOCATION_2: GENERALIZED
LOCATION: HEAD
LOCATION: HEAD
LOCATION_2: ABDOMEN
LOCATION: HEAD
LOCATION: HEAD

## 2023-01-14 ASSESSMENT — PAIN DESCRIPTION - ONSET
ONSET: ON-GOING

## 2023-01-14 ASSESSMENT — PAIN DESCRIPTION - FREQUENCY
FREQUENCY: CONTINUOUS

## 2023-01-14 ASSESSMENT — PAIN DESCRIPTION - DESCRIPTORS
DESCRIPTORS: ACHING
DESCRIPTORS_2: SORE
DESCRIPTORS: ACHING

## 2023-01-14 ASSESSMENT — PAIN - FUNCTIONAL ASSESSMENT
PAIN_FUNCTIONAL_ASSESSMENT: ACTIVITIES ARE NOT PREVENTED

## 2023-01-14 NOTE — PROGRESS NOTES
Department of Internal Medicine  Nephrology Progress Note    HPI.    80 y.o. male whom we were asked to see for RONI on CKD. Followed by Dr. Inga Graves in the office, last 08/22 with eGFR 18 ml/min. He presents with weakness and dyspnea. He was diagnosed with Covid-19. Renal cosn called for RONI . Events noted , chart reviewed     The patient was seen and examined; he feels well today with no CP, SOB, nausea or vomiting. ROS: No fever or chills. Social: No family at bedside. Physical Exam:    VITALS:  BP (!) 113/52   Pulse 72   Temp 98.1 °F (36.7 °C) (Oral)   Resp 20   Ht 5' 6\" (1.676 m)   Wt 140 lb 10.5 oz (63.8 kg)   SpO2 91%   BMI 22.70 kg/m²   24HR INTAKE/OUTPUT:    Intake/Output Summary (Last 24 hours) at 1/14/2023 1028  Last data filed at 1/14/2023 1026  Gross per 24 hour   Intake 880 ml   Output 1225 ml   Net -345 ml         Constitutional:  Looks comfortable   Respiratory:  scattered rhonchi   Gastrointestinal No tenderness.   Normal Bowel Sounds  Cardiovascular:  S1, S2 RRR   Edema:   + edema  Skin:  no rash    DATA:    CBC:  Lab Results   Component Value Date/Time    WBC 14.3 01/14/2023 07:33 AM    RBC 2.87 01/14/2023 07:33 AM    HGB 8.6 01/14/2023 07:33 AM    HCT 26.6 01/14/2023 07:33 AM    MCV 92.6 01/14/2023 07:33 AM    MCH 29.9 01/14/2023 07:33 AM    MCHC 32.3 01/14/2023 07:33 AM    RDW 14.7 01/14/2023 07:33 AM     01/14/2023 07:33 AM    MPV 8.6 01/14/2023 07:33 AM     CMP:  Lab Results   Component Value Date/Time     01/14/2023 05:25 AM    K 4.6 01/14/2023 05:25 AM    K 4.2 01/12/2023 04:52 AM     01/14/2023 05:25 AM    CO2 17 01/14/2023 05:25 AM    BUN 44 01/14/2023 05:25 AM    CREATININE 2.9 01/14/2023 05:25 AM    GFRAA 24 11/05/2021 01:23 PM    AGRATIO 1.0 01/09/2023 09:43 AM    LABGLOM 20 01/14/2023 05:25 AM    GLUCOSE 115 01/14/2023 05:25 AM    PROT 5.0 01/10/2023 04:18 AM    CALCIUM 7.9 01/14/2023 05:25 AM    BILITOT <0.2 01/10/2023 04:18 AM    ALKPHOS 60 01/10/2023 04:18 AM    AST 44 01/10/2023 04:18 AM    ALT 40 01/10/2023 04:18 AM      Hepatic Function Panel:   Lab Results   Component Value Date/Time    ALKPHOS 60 01/10/2023 04:18 AM    ALT 40 01/10/2023 04:18 AM    AST 44 01/10/2023 04:18 AM    PROT 5.0 01/10/2023 04:18 AM    BILITOT <0.2 01/10/2023 04:18 AM    BILIDIR <0.2 01/10/2023 04:18 AM    IBILI see below 01/10/2023 04:18 AM      Phosphorus:   Lab Results   Component Value Date/Time    PHOS 2.9 01/14/2023 05:25 AM       ASSESSMENT/PLAN:    1) 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 CKD progression vs. pre-renal  - renal function slightly better and close to baseline     2) Covid-19  - out of isolation     3) PNA  - off Abx     4) FEN  - metabolic acidosis              - start sodium bicarbonate supplementation      5) anemia  - labs noted   - s/p PRBC 01/10/23    6) UGIB  - GI consulted  - on PPI gtt     7) AMS   Overall better.

## 2023-01-14 NOTE — PLAN OF CARE
Problem: Gastrointestinal - Adult  Goal: Maintains or returns to baseline bowel function  1/14/2023 0337 by Belkis Robb RN  Outcome: Not Progressing  Note: Pt continues to have red/brown BMs. Stool sample cup at bedside and RN placed a hat in the toilet. RN wrote that a stool sample was needed on pt's white board in addition to informing the PCA and family at bedside. H/H is stable. Problem: Discharge Planning  Goal: Discharge to home or other facility with appropriate resources  1/14/2023 0337 by Belkis Robb RN  Outcome: Progressing  Note: Pt's daughter Melida Scales is requesting that the pt be retested for COVID prior to discharge to determine if he needs to be discharged to a [facility]. Pt's daughter says that he has had COVID since the beginning of the year and hasn't been febrile. RN encouraged her to follow up with the day shift provider and wrote a reminder on the pt's white board. Problem: Skin/Tissue Integrity  Goal: Absence of new skin breakdown  Description: 1. Monitor for areas of redness and/or skin breakdown  1/14/2023 0337 by Belkis Robb RN  Outcome: Progressing  Note: Pt's skin is dry and intact. Pt is able to reposition himself in bed but needs intermittent encouragement. Staff assist with repositioning as needed. B heels floated using a pillow. Problem: Respiratory - Adult  Goal: Achieves optimal ventilation and oxygenation  1/14/2023 0337 by Belkis Robb RN  Outcome: Progressing  Note: Pt is in Droplet Plus isolation for + COVID. Pt is on RA. Pt has crackles in his RLL. Problem: Skin/Tissue Integrity - Adult  Goal: Oral mucous membranes remain intact  1/14/2023 0337 by Belkis Robb RN  Outcome: Progressing  Note: Pt refused oral care at HS. He deferred until the AM. Pt aware that supplies in bathroom when needed.       Problem: Musculoskeletal - Adult  Goal: Return ADL status to a safe level of function  1/14/2023 0337 by Belkis Robb RN  Outcome: Progressing  Note: Pt set off the bed alarm trying to get up to go to the bathroom. Fall prevention strategies in place however pt overestimates abilities. Fall risk bracelet placed. Fall risk sign and socks already in place. Pt was a CGA with the walker to and from the bathroom. He tolerated activity fairly well. He has \"arthritis\" and moving can cause him pain. Problem: Pain  Goal: Verbalizes/displays adequate comfort level or baseline comfort level  1/14/2023 0337 by Amna Redding RN  Outcome: Progressing  Note: Pt requesting tylenol for chronic arthritic pain. Pt not due for tramadol at that time. Pt rated his pain 7/10 prior to tylenol and his pain was unchanged. RN administered tramadol when it was due. Pt noted to be resting in bed with eyes closed upon reassessment.

## 2023-01-14 NOTE — PROGRESS NOTES
Hospitalist Progress Note    Patient:  Zeinba Holland  Unit/Bed:B6F-4350/5127-01   YOB: 1931       MRN: [id number] Acct: [de-identified]  PCP: Felicity Cisse MD    Date of Admission: 1/5/2023  --------------------------    Chief Complaint:      Weakness     Hospital Course/interval history:     Zeinab Holland is a 80 y.o. male hospitalized on 1/5/2023   with multiple medical condition including anemia, CKD stage IV, dyslipidemia, coronary artery disease, BPH, presented with generalized weakness. Decreased oral intake. Diagnosed with COVID-19 infection with probable superimposed bacterial infection    Broad-spectrum antibiotic initiated, respiratory care protocol. He did not require COVID-specific medications in the light of the lack of hypoxia. He is noted to have acute GI bleeding, underwent EGD which revealed multiple peptic ulcers. Assessment/plan:     Bacterial pneumonia superimposing, COVID 19 infection  -Date of diagnosis 12/5/2022  -No hypoxia.  -No COVID-specific medication.  -Completing 6-day course of ceftriaxone, 5 days of azithromycin.  -Respiratory care protocol, monitor respiratory status.    -Patient symptoms started before Sharath. Discontinue isolation. Acute upper GI bleed    -Status post EGD 1/9 which revealed multiple duodenal ulcer, treated with ( 6mm clean-based duodenal ulcer, 15mm clean-based duodenal ulcer in the bulb. In the sweep, there was a 2cm ulcer with a visible vessel in the distal aspect of the ulcer. I was not able to see the very proximal aspect of the ulcer). Treated with epinephrine  -Continue holding aspirin  -Completed Protonix infusion for total of 72 hours then twice daily. ,  Started on Protonix twice daily.   -Continue current modified diet.  -Outpatient follow-up with GI           Acute blood loss anemia  Hemoglobin remained stable. Will give IV iron. Acute kidney injury superimposing CKD versus progression of CKD. -Creatinine2.9today. Unclear baseline. Peaked at 4.     -Outpatient follow-up with nephrology  Remove Chauhan catheter, voiding trial    Acute metabolic encephalopathy,/delirium  resovled    Incidental pleural plaques    Code Status: Full Code         DVT prophylaxis: SCD secondary to GI bleed. Disposition: Medically stable for discharge. Awaiting accepting ECF. Difficulty noticed with being COVID-positive.            ----------------      Subjective:      Patient seen and examined  He has no complaint today. No major overnight events. Daughter at bedside reported that she would like  more expanded list of ECF (limited choice giving the COVID positivity)    Diet: ADULT DIET; Dysphagia - Minced and Moist; Mildly Thick (Nectar)  ADULT ORAL NUTRITION SUPPLEMENT; Lunch, Dinner; Frozen Oral Supplement    OBJECTIVE     Exam:  BP (!) 131/55   Pulse 61   Temp 97.8 °F (36.6 °C) (Oral)   Resp 13   Ht 5' 6\" (1.676 m)   Wt 140 lb 10.5 oz (63.8 kg)   SpO2 92%   BMI 22.70 kg/m²        Gen: Not in distress. Alert. Head: Normocephalic. Atraumatic. Eyes: Conjunctivae/corneas clear. ENT: Oral mucosa moist  Neck: No JVD. No obvious thyromegaly. CVS: Nml S1S2, no murmur  , RRR  Pulmomary: Clear bilaterally. No crackles. No wheezes. Gastrointestinal: Soft, non tender, non distend, . Musculoskeletal: No edema. Warm  Neuro: No focal deficit. Moves extremity spontaneously. Psychiatry: Appropriate affect. Not agitated.       Medications:  Reviewed    Infusion Medications    sodium chloride      sodium chloride      sodium chloride       Scheduled Medications    sodium bicarbonate  650 mg Oral BID    pantoprazole  40 mg Oral BID    dorzolamide-timolol  1 drop Both Eyes BID    sodium chloride flush  5-40 mL IntraVENous 2 times per day    docusate sodium  100 mg Oral BID    sodium chloride flush  10 mL IntraVENous 2 times per day    [Held by provider] aspirin EC  81 mg Oral Daily    atorvastatin  40 mg Oral Nightly    [Held by provider] carvedilol  3.125 mg Oral BID     escitalopram  10 mg Oral Nightly    polyethylene glycol  17 g Oral Daily    senna  1 tablet Oral BID    tamsulosin  0.4 mg Oral Nightly     PRN Meds: phenol, sodium chloride, sodium chloride flush, sodium chloride, Benzocaine-Menthol, bisacodyl, sodium chloride flush, sodium chloride, promethazine **OR** ondansetron, acetaminophen **OR** acetaminophen, nitroGLYCERIN, traMADol      Intake/Output Summary (Last 24 hours) at 1/14/2023 1340  Last data filed at 1/14/2023 1327  Gross per 24 hour   Intake 580 ml   Output 1200 ml   Net -620 ml             Labs:   Recent Labs     01/12/23 0452 01/13/23  0503 01/14/23  0733   WBC 7.6 9.6 14.3*   HGB 8.2* 8.8* 8.6*   HCT 24.7* 26.7* 26.6*    258 268     Recent Labs     01/12/23 0452 01/13/23  0503 01/14/23  0525    141 139   K 4.2 3.9 4.6    110 112*   CO2 20* 22 17*   BUN 60* 50* 44*   CREATININE 3.1* 3.1* 2.9*   CALCIUM 7.8* 7.9* 7.9*   PHOS  --  2.6 2.9     No results for input(s): AST, ALT, BILIDIR, BILITOT, ALKPHOS in the last 72 hours. No results for input(s): INR in the last 72 hours. No results for input(s): Margaret Height in the last 72 hours. Urinalysis:      Lab Results   Component Value Date/Time    NITRU Negative 01/05/2023 06:49 PM    WBCUA 6 01/05/2023 06:49 PM    BACTERIA None Seen 01/05/2023 06:49 PM    RBCUA 1106 01/05/2023 06:49 PM    BLOODU LARGE 01/05/2023 06:49 PM    SPECGRAV 1.015 01/05/2023 06:49 PM    GLUCOSEU Negative 01/05/2023 06:49 PM       Radiology:  XR CHEST PORTABLE   Final Result   1. Patchy right basilar airspace disease could represent atelectasis versus   pneumonia. 2.  Multiple bilateral calcified pleural plaques         CT SOFT TISSUE NECK WO CONTRAST   Final Result   No acute findings, soft tissue mass or lymphadenopathy evident. XR CHEST 1 VIEW   Final Result   Ill-defined opacities bilaterally.                      Electronically signed by Hazel Hernandez MD on 1/14/2023 at 1:40 PM

## 2023-01-14 NOTE — FLOWSHEET NOTE
01/14/23 0511   Cardiac Monitor   Cardiac/Telemetry Monitor On (S)  No  (No active telemetry orders and no significant alarms in last 24 hours.  Monitor discontinued and [facility] aware.)

## 2023-01-14 NOTE — PLAN OF CARE
Problem: Discharge Planning  Goal: Discharge to home or other facility with appropriate resources  Outcome: Progressing     Problem: Skin/Tissue Integrity  Goal: Absence of new skin breakdown  Description: 1. Monitor for areas of redness and/or skin breakdown  2. Assess vascular access sites hourly  3. Every 4-6 hours minimum:  Change oxygen saturation probe site  4. Every 4-6 hours:  If on nasal continuous positive airway pressure, respiratory therapy assess nares and determine need for appliance change or resting period.   Outcome: Progressing     Problem: Neurosensory - Adult  Goal: Achieves stable or improved neurological status  Outcome: Progressing  Goal: Achieves maximal functionality and self care  Outcome: Progressing     Problem: Respiratory - Adult  Goal: Achieves optimal ventilation and oxygenation  Outcome: Progressing     Problem: Cardiovascular - Adult  Goal: Maintains optimal cardiac output and hemodynamic stability  Outcome: Progressing  Goal: Absence of cardiac dysrhythmias or at baseline  Outcome: Progressing     Problem: Skin/Tissue Integrity - Adult  Goal: Skin integrity remains intact  Outcome: Progressing  Goal: Incisions, wounds, or drain sites healing without S/S of infection  Outcome: Progressing  Goal: Oral mucous membranes remain intact  Outcome: Progressing     Problem: Musculoskeletal - Adult  Goal: Return mobility to safest level of function  Outcome: Progressing  Goal: Maintain proper alignment of affected body part  Outcome: Progressing  Goal: Return ADL status to a safe level of function  Outcome: Progressing     Problem: Gastrointestinal - Adult  Goal: Minimal or absence of nausea and vomiting  Outcome: Progressing  Goal: Maintains or returns to baseline bowel function  Outcome: Progressing     Problem: Genitourinary - Adult  Goal: Urinary catheter remains patent  Outcome: Progressing     Problem: Infection - Adult  Goal: Absence of infection at discharge  Outcome: Progressing  Goal: Absence of infection during hospitalization  Outcome: Progressing  Goal: Absence of fever/infection during anticipated neutropenic period  Outcome: Progressing     Problem: Metabolic/Fluid and Electrolytes - Adult  Goal: Electrolytes maintained within normal limits  Outcome: Progressing  Goal: Hemodynamic stability and optimal renal function maintained  Outcome: Progressing  Goal: Glucose maintained within prescribed range  Outcome: Progressing     Problem: Hematologic - Adult  Goal: Maintains hematologic stability  Outcome: Progressing     Problem: ABCDS Injury Assessment  Goal: Absence of physical injury  Outcome: Progressing     Problem: Safety - Adult  Goal: Free from fall injury  Outcome: Progressing     Problem: Pain  Goal: Verbalizes/displays adequate comfort level or baseline comfort level  Outcome: Progressing     Problem: Nutrition Deficit:  Goal: Optimize nutritional status  Outcome: Progressing

## 2023-01-14 NOTE — CARE COORDINATION
Discharge Planning:  SW spoke with pts dgtr, Natali German [phone]. Natali German stated she is concerned about the ratings of the [facility]. Pt was first diagnosed with COVID on 1/05/2023. Natali German is requesting a referral to the following facilities as pt is close to  being out of quarantine for COVID:    [facility] [phone] left  [phone] of Network  [phone]-Pt would need 2 negative rapid COVID tests within 48 hrs of each other if pt is not yet at the 14 day rambo. CHRISSY updated pts bedside RN. Rapid test ordered today. Referrals faxed and called to the above noted facilities.    YAO Owens LSMILLICENT  [phone]  Electronically signed by Lali Durham on 1/14/2023 at 11:50 AM

## 2023-01-15 LAB
ALBUMIN SERPL-MCNC: 2.3 G/DL (ref 3.4–5)
ANION GAP SERPL CALCULATED.3IONS-SCNC: 9 MMOL/L (ref 3–16)
BUN BLDV-MCNC: 49 MG/DL (ref 7–20)
CALCIUM SERPL-MCNC: 7.8 MG/DL (ref 8.3–10.6)
CHLORIDE BLD-SCNC: 113 MMOL/L (ref 99–110)
CO2: 21 MMOL/L (ref 21–32)
CREAT SERPL-MCNC: 3 MG/DL (ref 0.8–1.3)
GFR SERPL CREATININE-BSD FRML MDRD: 19 ML/MIN/{1.73_M2}
GLUCOSE BLD-MCNC: 99 MG/DL (ref 70–99)
HCT VFR BLD CALC: 24.6 % (ref 40.5–52.5)
HCT VFR BLD CALC: 25.7 % (ref 40.5–52.5)
HCT VFR BLD CALC: 26 % (ref 40.5–52.5)
HEMOGLOBIN: 7.7 G/DL (ref 13.5–17.5)
HEMOGLOBIN: 7.9 G/DL (ref 13.5–17.5)
HEMOGLOBIN: 7.9 G/DL (ref 13.5–17.5)
MCH RBC QN AUTO: 29.6 PG (ref 26–34)
MCHC RBC AUTO-ENTMCNC: 31.2 G/DL (ref 31–36)
MCV RBC AUTO: 95.1 FL (ref 80–100)
PDW BLD-RTO: 15.1 % (ref 12.4–15.4)
PHOSPHORUS: 3.1 MG/DL (ref 2.5–4.9)
PLATELET # BLD: 256 K/UL (ref 135–450)
PMV BLD AUTO: 8.2 FL (ref 5–10.5)
POTASSIUM SERPL-SCNC: 4.4 MMOL/L (ref 3.5–5.1)
RBC # BLD: 2.59 M/UL (ref 4.2–5.9)
SODIUM BLD-SCNC: 143 MMOL/L (ref 136–145)
WBC # BLD: 11.1 K/UL (ref 4–11)

## 2023-01-15 PROCEDURE — 85018 HEMOGLOBIN: CPT

## 2023-01-15 PROCEDURE — 6370000000 HC RX 637 (ALT 250 FOR IP): Performed by: INTERNAL MEDICINE

## 2023-01-15 PROCEDURE — 80069 RENAL FUNCTION PANEL: CPT

## 2023-01-15 PROCEDURE — 2580000003 HC RX 258: Performed by: HOSPITALIST

## 2023-01-15 PROCEDURE — 85014 HEMATOCRIT: CPT

## 2023-01-15 PROCEDURE — 85027 COMPLETE CBC AUTOMATED: CPT

## 2023-01-15 PROCEDURE — 6360000002 HC RX W HCPCS: Performed by: INTERNAL MEDICINE

## 2023-01-15 PROCEDURE — C9113 INJ PANTOPRAZOLE SODIUM, VIA: HCPCS | Performed by: INTERNAL MEDICINE

## 2023-01-15 PROCEDURE — 51702 INSERT TEMP BLADDER CATH: CPT

## 2023-01-15 PROCEDURE — 2580000003 HC RX 258: Performed by: INTERNAL MEDICINE

## 2023-01-15 PROCEDURE — 6360000002 HC RX W HCPCS: Performed by: HOSPITALIST

## 2023-01-15 PROCEDURE — 2060000000 HC ICU INTERMEDIATE R&B

## 2023-01-15 PROCEDURE — 94760 N-INVAS EAR/PLS OXIMETRY 1: CPT

## 2023-01-15 PROCEDURE — 36415 COLL VENOUS BLD VENIPUNCTURE: CPT

## 2023-01-15 PROCEDURE — 6370000000 HC RX 637 (ALT 250 FOR IP): Performed by: HOSPITALIST

## 2023-01-15 PROCEDURE — 51798 US URINE CAPACITY MEASURE: CPT

## 2023-01-15 RX ORDER — 0.9 % SODIUM CHLORIDE 0.9 %
500 INTRAVENOUS SOLUTION INTRAVENOUS ONCE
Status: COMPLETED | OUTPATIENT
Start: 2023-01-15 | End: 2023-01-15

## 2023-01-15 RX ADMIN — SODIUM CHLORIDE 8 MG/HR: 9 INJECTION, SOLUTION INTRAVENOUS at 22:48

## 2023-01-15 RX ADMIN — ESCITALOPRAM OXALATE 10 MG: 10 TABLET ORAL at 21:55

## 2023-01-15 RX ADMIN — DORZOLAMIDE HYDROCHLORIDE AND TIMOLOL MALEATE 1 DROP: 20; 5 SOLUTION/ DROPS OPHTHALMIC at 09:13

## 2023-01-15 RX ADMIN — SODIUM CHLORIDE, PRESERVATIVE FREE 10 ML: 5 INJECTION INTRAVENOUS at 21:56

## 2023-01-15 RX ADMIN — SODIUM CHLORIDE, PRESERVATIVE FREE 10 ML: 5 INJECTION INTRAVENOUS at 09:11

## 2023-01-15 RX ADMIN — IRON SUCROSE 300 MG: 20 INJECTION, SOLUTION INTRAVENOUS at 15:58

## 2023-01-15 RX ADMIN — TAMSULOSIN HYDROCHLORIDE 0.4 MG: 0.4 CAPSULE ORAL at 21:55

## 2023-01-15 RX ADMIN — SENNOSIDES 8.6 MG: 8.6 TABLET, FILM COATED ORAL at 21:55

## 2023-01-15 RX ADMIN — ACETAMINOPHEN 650 MG: 325 TABLET ORAL at 21:55

## 2023-01-15 RX ADMIN — SODIUM CHLORIDE 500 ML: 9 INJECTION, SOLUTION INTRAVENOUS at 16:05

## 2023-01-15 RX ADMIN — SODIUM BICARBONATE 650 MG: 650 TABLET ORAL at 09:10

## 2023-01-15 RX ADMIN — DOCUSATE SODIUM 100 MG: 100 CAPSULE, LIQUID FILLED ORAL at 21:55

## 2023-01-15 RX ADMIN — TRAMADOL HYDROCHLORIDE 50 MG: 50 TABLET ORAL at 18:28

## 2023-01-15 RX ADMIN — POLYETHYLENE GLYCOL 3350 17 G: 17 POWDER, FOR SOLUTION ORAL at 09:09

## 2023-01-15 RX ADMIN — ACETAMINOPHEN 650 MG: 325 TABLET ORAL at 11:53

## 2023-01-15 RX ADMIN — SENNOSIDES 8.6 MG: 8.6 TABLET, FILM COATED ORAL at 09:10

## 2023-01-15 RX ADMIN — SODIUM BICARBONATE 650 MG: 650 TABLET ORAL at 21:55

## 2023-01-15 RX ADMIN — PANTOPRAZOLE SODIUM 40 MG: 40 TABLET, DELAYED RELEASE ORAL at 09:09

## 2023-01-15 RX ADMIN — DOCUSATE SODIUM 100 MG: 100 CAPSULE, LIQUID FILLED ORAL at 09:10

## 2023-01-15 RX ADMIN — DORZOLAMIDE HYDROCHLORIDE AND TIMOLOL MALEATE 1 DROP: 20; 5 SOLUTION/ DROPS OPHTHALMIC at 21:56

## 2023-01-15 RX ADMIN — TRAMADOL HYDROCHLORIDE 50 MG: 50 TABLET ORAL at 09:09

## 2023-01-15 RX ADMIN — ATORVASTATIN CALCIUM 40 MG: 40 TABLET, FILM COATED ORAL at 21:57

## 2023-01-15 ASSESSMENT — PAIN DESCRIPTION - DESCRIPTORS
DESCRIPTORS: ACHING
DESCRIPTORS: ACHING
DESCRIPTORS: ACHING;THROBBING
DESCRIPTORS: ACHING;POUNDING
DESCRIPTORS: ACHING

## 2023-01-15 ASSESSMENT — PAIN DESCRIPTION - ONSET
ONSET: ON-GOING

## 2023-01-15 ASSESSMENT — PAIN DESCRIPTION - PAIN TYPE
TYPE: ACUTE PAIN

## 2023-01-15 ASSESSMENT — PAIN SCALES - GENERAL
PAINLEVEL_OUTOF10: 4
PAINLEVEL_OUTOF10: 7
PAINLEVEL_OUTOF10: 5

## 2023-01-15 ASSESSMENT — PAIN DESCRIPTION - LOCATION
LOCATION: HEAD

## 2023-01-15 ASSESSMENT — PAIN SCALES - WONG BAKER
WONGBAKER_NUMERICALRESPONSE: 0

## 2023-01-15 ASSESSMENT — PAIN DESCRIPTION - FREQUENCY: FREQUENCY: CONTINUOUS

## 2023-01-15 ASSESSMENT — PAIN DESCRIPTION - ORIENTATION
ORIENTATION: ANTERIOR

## 2023-01-15 ASSESSMENT — PAIN - FUNCTIONAL ASSESSMENT
PAIN_FUNCTIONAL_ASSESSMENT: ACTIVITIES ARE NOT PREVENTED

## 2023-01-15 NOTE — CARE COORDINATION
CHRISSY checked status of pre-cert and it remains pending. Respectfully submitted,    SURESH Clemente-S  [facility]   [phone]    Electronically signed by SHAINA Henao on 1/15/2023 at 4:59 PM

## 2023-01-15 NOTE — PROGRESS NOTES
Hospitalist Progress Note    Patient:  Joseluis Barry  Unit/Bed:A2E-2556/5127-01   YOB: 1931       MRN: [id number] Acct: [de-identified]  PCP: Ya Hodgson MD    Date of Admission: 1/5/2023  --------------------------    Chief Complaint:      Weakness     Hospital Course/interval history:     Joseluis Barry is a 80 y.o. male hospitalized on 1/5/2023   with multiple medical condition including anemia, CKD stage IV, dyslipidemia, coronary artery disease, BPH, presented with generalized weakness. Decreased oral intake. Diagnosed with COVID-19 infection with probable superimposed bacterial infection    Broad-spectrum antibiotic initiated, respiratory care protocol. He did not require COVID-specific medications in the light of the lack of hypoxia. He is noted to have acute GI bleeding, underwent EGD which revealed multiple peptic ulcers. Barrier for discharge: Arrangement for ECF. Hemoglobin trended down slowly we will monitor for any sign of GI bleed. Repeat hemoglobin in the a.m. before discharge. Assessment/plan:       Acute upper GI bleed    -Status post EGD 1/9 which revealed multiple duodenal ulcer, treated with ( 6mm clean-based duodenal ulcer, 15mm clean-based duodenal ulcer in the bulb. In the sweep, there was a 2cm ulcer with a visible vessel in the distal aspect of the ulcer. I was not able to see the very proximal aspect of the ulcer). Treated with epinephrine  -Continue holding aspirin  -Completed Protonix infusion for total of 72 ,now on oral PPI   -Hemoglobin trend from  8.6 to 7.7 to 7.9. We will continue monitoring. Doubt active GI bleeding we will recheck again tomorrow.       Bacterial pneumonia superimposing, COVID 19 infection  -Date of diagnosis 1/5/2023  -symptoms started 12/25/ 22  -No hypoxia.  -No COVID-specific medications.  -Completing 6-day course of ceftriaxone, 5 days of azithromycin.  -Respiratory care protocol, monitor respiratory status.    -Patient symptoms started before Arthur. Discontinue isolation. Acute blood loss anemia  Hemoglobin remain hemoglobin variability noted. Unclear if this is a slow bleed or lab error. Continue monitoring. Ed stable. Will give IV iron. Acute kidney injury superimposing CKD versus progression of CKD. -Creatinine 3 today which is likely the new baseline. Santiago Lovely at 4.      -Evaluated by nephrology, plan for outpatient follow-up. Remove Chauhan catheter, voiding trial    Acute metabolic encephalopathy,/delirium  resovled    Incidental pleural plaques    Code Status: Full Code       DVT prophylaxis: SCD secondary to GI bleed. Disposition:              ----------------      Subjective:      Patient seen and examined  Continue to have colored stool per nursing staff, no no hematemesis  Patient denied abdominal pain, tolerating diet. Low hemoglobin this morning but improved on subsequent blood work. Unclear if he continued to have a slow bleed. We will repeat hemoglobin tomorrow    Diet: ADULT DIET; Dysphagia - Minced and Moist; Mildly Thick (Nectar)  ADULT ORAL NUTRITION SUPPLEMENT; Lunch, Dinner; Frozen Oral Supplement    OBJECTIVE     Exam:  /61   Pulse 54   Temp 97.3 °F (36.3 °C) (Oral)   Resp 24   Ht 5' 6\" (1.676 m)   Wt 140 lb 10.5 oz (63.8 kg)   SpO2 92%   BMI 22.70 kg/m²        Gen: Not in distress. Alert. Head: Normocephalic. Atraumatic. Eyes: Conjunctivae/corneas clear. ENT: Oral mucosa moist  Neck: No JVD. No obvious thyromegaly. CVS: Nml S1S2, no murmur  , RRR  Pulmomary: Clear bilaterally. No crackles. No wheezes. Gastrointestinal: Soft, non tender, non distend, . Musculoskeletal: No edema. Warm  Neuro: No focal deficit. Moves extremity spontaneously. Psychiatry: Appropriate affect. Not agitated.       Medications:  Reviewed    Infusion Medications    sodium chloride      sodium chloride      sodium chloride       Scheduled Medications    sodium bicarbonate  650 mg Oral BID pantoprazole  40 mg Oral BID    dorzolamide-timolol  1 drop Both Eyes BID    sodium chloride flush  5-40 mL IntraVENous 2 times per day    docusate sodium  100 mg Oral BID    sodium chloride flush  10 mL IntraVENous 2 times per day    [Held by provider] aspirin EC  81 mg Oral Daily    atorvastatin  40 mg Oral Nightly    [Held by provider] carvedilol  3.125 mg Oral BID WC    escitalopram  10 mg Oral Nightly    polyethylene glycol  17 g Oral Daily    senna  1 tablet Oral BID    tamsulosin  0.4 mg Oral Nightly     PRN Meds: phenol, sodium chloride, sodium chloride flush, sodium chloride, Benzocaine-Menthol, bisacodyl, sodium chloride flush, sodium chloride, promethazine **OR** ondansetron, acetaminophen **OR** acetaminophen, nitroGLYCERIN, traMADol      Intake/Output Summary (Last 24 hours) at 1/15/2023 1353  Last data filed at 1/15/2023 0366  Gross per 24 hour   Intake 930 ml   Output 150 ml   Net 780 ml             Labs:   Recent Labs     01/13/23  0503 01/14/23  0733 01/15/23  0518 01/15/23  1053   WBC 9.6 14.3* 11.1*  --    HGB 8.8* 8.6* 7.7* 7.9*   HCT 26.7* 26.6* 24.6* 25.7*    268 256  --      Recent Labs     01/13/23  0503 01/14/23  0525 01/15/23  0518    139 143   K 3.9 4.6 4.4    112* 113*   CO2 22 17* 21   BUN 50* 44* 49*   CREATININE 3.1* 2.9* 3.0*   CALCIUM 7.9* 7.9* 7.8*   PHOS 2.6 2.9 3.1     No results for input(s): AST, ALT, BILIDIR, BILITOT, ALKPHOS in the last 72 hours. No results for input(s): INR in the last 72 hours. No results for input(s): Scott Shorts in the last 72 hours. Urinalysis:      Lab Results   Component Value Date/Time    NITRU Negative 01/05/2023 06:49 PM    WBCUA 6 01/05/2023 06:49 PM    BACTERIA None Seen 01/05/2023 06:49 PM    RBCUA 1106 01/05/2023 06:49 PM    BLOODU LARGE 01/05/2023 06:49 PM    SPECGRAV 1.015 01/05/2023 06:49 PM    GLUCOSEU Negative 01/05/2023 06:49 PM       Radiology:  XR CHEST PORTABLE   Final Result   1.   Patchy right basilar airspace disease could represent atelectasis versus   pneumonia. 2.  Multiple bilateral calcified pleural plaques         CT SOFT TISSUE NECK WO CONTRAST   Final Result   No acute findings, soft tissue mass or lymphadenopathy evident. XR CHEST 1 VIEW   Final Result   Ill-defined opacities bilaterally.                      Electronically signed by Stacy Carmona MD on 1/15/2023 at 1:53 PM

## 2023-01-15 NOTE — CARE COORDINATION
SW received phone call from patient's daughter Lobo Sánchez stating that because he is out of quarantine they would like us to explore alternative facilities. SW advised that [facility] was out of network. Loffles explained that he had to test negative twice with two rapids and yesterday his COVID was still positive. CHRISSY reached out to [place] in admissions at [facility]. They can accept and because this is day 10 the soonest he can transfer to them will be tomorrow. CHRISSY will start pre-cert if family is agreeable. Respectfully submitted,    SHAINA Spence  [facility]   [phone]    Electronically signed by SHAINA Cain on 1/15/2023 at 11:29 AM

## 2023-01-15 NOTE — PLAN OF CARE
Problem: Discharge Planning  Goal: Discharge to home or other facility with appropriate resources  Outcome: Progressing     Problem: Skin/Tissue Integrity  Goal: Absence of new skin breakdown  Description: 1. Monitor for areas of redness and/or skin breakdown  2. Assess vascular access sites hourly  3. Every 4-6 hours minimum:  Change oxygen saturation probe site  4. Every 4-6 hours:  If on nasal continuous positive airway pressure, respiratory therapy assess nares and determine need for appliance change or resting period.   Outcome: Progressing     Problem: Neurosensory - Adult  Goal: Achieves stable or improved neurological status  Outcome: Progressing  Goal: Achieves maximal functionality and self care  Outcome: Progressing     Problem: Respiratory - Adult  Goal: Achieves optimal ventilation and oxygenation  Outcome: Progressing     Problem: Cardiovascular - Adult  Goal: Maintains optimal cardiac output and hemodynamic stability  Outcome: Progressing  Goal: Absence of cardiac dysrhythmias or at baseline  Outcome: Progressing     Problem: Skin/Tissue Integrity - Adult  Goal: Skin integrity remains intact  Outcome: Progressing  Goal: Oral mucous membranes remain intact  Outcome: Progressing     Problem: Musculoskeletal - Adult  Goal: Return mobility to safest level of function  Outcome: Progressing  Goal: Return ADL status to a safe level of function  Outcome: Progressing     Problem: Gastrointestinal - Adult  Goal: Maintains or returns to baseline bowel function  Outcome: Progressing     Problem: Genitourinary - Adult  Goal: Urinary catheter remains patent  Outcome: Progressing     Problem: Infection - Adult  Goal: Absence of infection at discharge  Outcome: Progressing     Problem: Metabolic/Fluid and Electrolytes - Adult  Goal: Electrolytes maintained within normal limits  Outcome: Progressing  Goal: Hemodynamic stability and optimal renal function maintained  Outcome: Progressing     Problem: Hematologic - Adult  Goal: Maintains hematologic stability  Outcome: Progressing     Problem: ABCDS Injury Assessment  Goal: Absence of physical injury  Outcome: Progressing     Problem: Safety - Adult  Goal: Free from fall injury  Outcome: Progressing     Problem: Pain  Goal: Verbalizes/displays adequate comfort level or baseline comfort level  Outcome: Progressing     Problem: Nutrition Deficit:  Goal: Optimize nutritional status  Outcome: Progressing

## 2023-01-15 NOTE — CARE COORDINATION
[facility] PRE-CERT REQUEST SUBMITTED VIA [facility] ACCESS PORTAL    REQUESTED SETTING:  [facility]    ANTICIPATED ADMIT DATE TO SNF:  1/16/2023    [address] #:  [phone]      Respectfully submitted,    SHAINA Ponce  [facility]   [phone]    Electronically signed by SHAINA Zambrano on 1/15/2023 at 11:49 AM

## 2023-01-15 NOTE — PLAN OF CARE
Problem: Respiratory - Adult  Goal: Achieves optimal ventilation and oxygenation  1/15/2023 0039 by Nadiya Staples RN  Outcome: Progressing  Note: Droplet Plus isolation discontinued by physician. Pt asymptomatic from a respiratory standpoint and on RA. Problem: Genitourinary - Adult  Goal: Urinary catheter remains patent  1/15/2023 0039 by Nadiya Staples RN  Outcome: Completed  Note: Pt's aldrich removed prior the beginning of the shift. Pt has been up to void twice since the catheter was removed. Problem: Hematologic - Adult  Goal: Maintains hematologic stability  1/15/2023 0039 by Nadiya Staples RN  Outcome: Progressing  Note: Pt's Hgb 8.6 (8.8). Pt received IV Iron replacement. Next CBC ordered for the AM. Pt continues to have dark red, brown BMs. He has had 2 small BMs since the beginning of the shift. VSS on RA. Problem: Safety - Adult  Goal: Free from fall injury  1/15/2023 0039 by Nadiya Staples RN  Outcome: Progressing  Note: Pt is a high fall risk. Bed in locked, low position with side rails up X 2 and an active bed alarm. Fall risk sign, socks, and bracelet in place. Pt is up with a standby assist and a walker. Problem: Pain  Goal: Verbalizes/displays adequate comfort level or baseline comfort level  1/15/2023 0039 by Nadiya Staples RN  Outcome: Progressing  Note: Pt c/o pain 5/10. He says he has a HA and ABD pain. He says the ABD pain is not new and he has [place] it\" off and on for \"many years. \" Pt medicated with prn tramadol. He was noted to be resting in bed with eyes closed. At [facility], he stated his pain was 4/10 and that he was okay with going back to bed without taking anything.

## 2023-01-15 NOTE — PROGRESS NOTES
Department of Internal Medicine  Nephrology Progress Note    HPI.    80 y.o. male whom we were asked to see for RONI on CKD. Followed by Dr. Abigail Flor in the office, last 08/22 with eGFR 18 ml/min. He presents with weakness and dyspnea. He was diagnosed with Covid-19. Renal cosn called for RONI . Events noted , chart reviewed     The patient was seen and examined; he was resting comfortably with no acute events noted overnight. ROS: No fever or chills. Social: No family at bedside. Physical Exam:    VITALS:  /61   Pulse 54   Temp 97.3 °F (36.3 °C) (Oral)   Resp 24   Ht 5' 6\" (1.676 m)   Wt 140 lb 10.5 oz (63.8 kg)   SpO2 92%   BMI 22.70 kg/m²   24HR INTAKE/OUTPUT:    Intake/Output Summary (Last 24 hours) at 1/15/2023 1049  Last data filed at 1/15/2023 5913  Gross per 24 hour   Intake 930 ml   Output 450 ml   Net 480 ml         Constitutional:  Looks comfortable   Respiratory:  scattered rhonchi   Gastrointestinal No tenderness.   Normal Bowel Sounds  Cardiovascular:  S1, S2 RRR   Edema:   + edema  Skin:  no rash    DATA:    CBC:  Lab Results   Component Value Date/Time    WBC 11.1 01/15/2023 05:18 AM    RBC 2.59 01/15/2023 05:18 AM    HGB 7.7 01/15/2023 05:18 AM    HCT 24.6 01/15/2023 05:18 AM    MCV 95.1 01/15/2023 05:18 AM    MCH 29.6 01/15/2023 05:18 AM    MCHC 31.2 01/15/2023 05:18 AM    RDW 15.1 01/15/2023 05:18 AM     01/15/2023 05:18 AM    MPV 8.2 01/15/2023 05:18 AM     CMP:  Lab Results   Component Value Date/Time     01/15/2023 05:18 AM    K 4.4 01/15/2023 05:18 AM    K 4.2 01/12/2023 04:52 AM     01/15/2023 05:18 AM    CO2 21 01/15/2023 05:18 AM    BUN 49 01/15/2023 05:18 AM    CREATININE 3.0 01/15/2023 05:18 AM    GFRAA 24 11/05/2021 01:23 PM    AGRATIO 1.0 01/09/2023 09:43 AM    LABGLOM 19 01/15/2023 05:18 AM    GLUCOSE 99 01/15/2023 05:18 AM    PROT 5.0 01/10/2023 04:18 AM    CALCIUM 7.8 01/15/2023 05:18 AM    BILITOT <0.2 01/10/2023 04:18 AM    ALKPHOS 60 01/10/2023 04:18 AM    AST 44 01/10/2023 04:18 AM    ALT 40 01/10/2023 04:18 AM      Hepatic Function Panel:   Lab Results   Component Value Date/Time    ALKPHOS 60 01/10/2023 04:18 AM    ALT 40 01/10/2023 04:18 AM    AST 44 01/10/2023 04:18 AM    PROT 5.0 01/10/2023 04:18 AM    BILITOT <0.2 01/10/2023 04:18 AM    BILIDIR <0.2 01/10/2023 04:18 AM    IBILI see below 01/10/2023 04:18 AM      Phosphorus:   Lab Results   Component Value Date/Time    PHOS 3.1 01/15/2023 05:18 AM       ASSESSMENT/PLAN:    1) 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 CKD progression vs. pre-renal  - renal function fluctuating but close to baseline     2) Covid-19  - out of isolation     3) PNA  - off Abx     4) FEN  - metabolic acidosis              - started sodium bicarbonate supplementation      5) anemia  - labs noted   - s/p PRBC 01/10/23    6) UGIB  - GI consulted  - on PPI gtt     7) AMS   Overall better.

## 2023-01-16 ENCOUNTER — ANESTHESIA EVENT (OUTPATIENT)
Dept: ENDOSCOPY | Age: 88
End: 2023-01-16
Payer: MEDICARE

## 2023-01-16 ENCOUNTER — ANESTHESIA (OUTPATIENT)
Dept: ENDOSCOPY | Age: 88
End: 2023-01-16
Payer: MEDICARE

## 2023-01-16 LAB
ALBUMIN SERPL-MCNC: 2.2 G/DL (ref 3.4–5)
ANION GAP SERPL CALCULATED.3IONS-SCNC: 11 MMOL/L (ref 3–16)
BUN BLDV-MCNC: 45 MG/DL (ref 7–20)
CALCIUM SERPL-MCNC: 8 MG/DL (ref 8.3–10.6)
CHLORIDE BLD-SCNC: 112 MMOL/L (ref 99–110)
CO2: 19 MMOL/L (ref 21–32)
CREAT SERPL-MCNC: 3.1 MG/DL (ref 0.8–1.3)
GFR SERPL CREATININE-BSD FRML MDRD: 18 ML/MIN/{1.73_M2}
GLUCOSE BLD-MCNC: 91 MG/DL (ref 70–99)
HCT VFR BLD CALC: 24.9 % (ref 40.5–52.5)
HCT VFR BLD CALC: ABNORMAL % (ref 40.5–52.5)
HEMOGLOBIN: 7.8 G/DL (ref 13.5–17.5)
HEMOGLOBIN: ABNORMAL G/DL (ref 13.5–17.5)
MCH RBC QN AUTO: 29.2 PG (ref 26–34)
MCH RBC QN AUTO: ABNORMAL PG (ref 26–34)
MCHC RBC AUTO-ENTMCNC: 31.1 G/DL (ref 31–36)
MCHC RBC AUTO-ENTMCNC: ABNORMAL G/DL (ref 31–36)
MCV RBC AUTO: 93.7 FL (ref 80–100)
MCV RBC AUTO: ABNORMAL FL (ref 80–100)
PDW BLD-RTO: 15.2 % (ref 12.4–15.4)
PDW BLD-RTO: ABNORMAL % (ref 12.4–15.4)
PHOSPHORUS: 3.6 MG/DL (ref 2.5–4.9)
PLATELET # BLD: 289 K/UL (ref 135–450)
PMV BLD AUTO: 8.7 FL (ref 5–10.5)
POTASSIUM SERPL-SCNC: 4.7 MMOL/L (ref 3.5–5.1)
RBC # BLD: 2.66 M/UL (ref 4.2–5.9)
RBC # BLD: ABNORMAL M/UL (ref 4.2–5.9)
SODIUM BLD-SCNC: 142 MMOL/L (ref 136–145)
WBC # BLD: 10.7 K/UL (ref 4–11)
WBC # BLD: ABNORMAL K/UL (ref 4–11)

## 2023-01-16 PROCEDURE — C9113 INJ PANTOPRAZOLE SODIUM, VIA: HCPCS | Performed by: INTERNAL MEDICINE

## 2023-01-16 PROCEDURE — 2580000003 HC RX 258: Performed by: INTERNAL MEDICINE

## 2023-01-16 PROCEDURE — 85027 COMPLETE CBC AUTOMATED: CPT

## 2023-01-16 PROCEDURE — 9990000010 HC NO CHARGE VISIT: Performed by: PHYSICAL THERAPIST

## 2023-01-16 PROCEDURE — 0W3P8ZZ CONTROL BLEEDING IN GASTROINTESTINAL TRACT, VIA NATURAL OR ARTIFICIAL OPENING ENDOSCOPIC: ICD-10-PCS | Performed by: INTERNAL MEDICINE

## 2023-01-16 PROCEDURE — 2060000000 HC ICU INTERMEDIATE R&B

## 2023-01-16 PROCEDURE — 6360000002 HC RX W HCPCS: Performed by: INTERNAL MEDICINE

## 2023-01-16 PROCEDURE — 3700000000 HC ANESTHESIA ATTENDED CARE: Performed by: INTERNAL MEDICINE

## 2023-01-16 PROCEDURE — 7100000001 HC PACU RECOVERY - ADDTL 15 MIN: Performed by: INTERNAL MEDICINE

## 2023-01-16 PROCEDURE — 2500000003 HC RX 250 WO HCPCS: Performed by: NURSE ANESTHETIST, CERTIFIED REGISTERED

## 2023-01-16 PROCEDURE — 6370000000 HC RX 637 (ALT 250 FOR IP): Performed by: INTERNAL MEDICINE

## 2023-01-16 PROCEDURE — 3609013800 HC EGD SUBMUCOSAL/BOTOX INJECTION: Performed by: INTERNAL MEDICINE

## 2023-01-16 PROCEDURE — 6360000002 HC RX W HCPCS: Performed by: NURSE ANESTHETIST, CERTIFIED REGISTERED

## 2023-01-16 PROCEDURE — 2709999900 HC NON-CHARGEABLE SUPPLY: Performed by: INTERNAL MEDICINE

## 2023-01-16 PROCEDURE — 36415 COLL VENOUS BLD VENIPUNCTURE: CPT

## 2023-01-16 PROCEDURE — 7100000000 HC PACU RECOVERY - FIRST 15 MIN: Performed by: INTERNAL MEDICINE

## 2023-01-16 PROCEDURE — 2580000003 HC RX 258: Performed by: NURSE ANESTHETIST, CERTIFIED REGISTERED

## 2023-01-16 PROCEDURE — 3E0G8GC INTRODUCTION OF OTHER THERAPEUTIC SUBSTANCE INTO UPPER GI, VIA NATURAL OR ARTIFICIAL OPENING ENDOSCOPIC: ICD-10-PCS | Performed by: INTERNAL MEDICINE

## 2023-01-16 PROCEDURE — 80069 RENAL FUNCTION PANEL: CPT

## 2023-01-16 PROCEDURE — 3700000001 HC ADD 15 MINUTES (ANESTHESIA): Performed by: INTERNAL MEDICINE

## 2023-01-16 PROCEDURE — 2720000010 HC SURG SUPPLY STERILE: Performed by: INTERNAL MEDICINE

## 2023-01-16 PROCEDURE — 3609013000 HC EGD TRANSORAL CONTROL BLEEDING ANY METHOD: Performed by: INTERNAL MEDICINE

## 2023-01-16 RX ORDER — ONDANSETRON 2 MG/ML
4 INJECTION INTRAMUSCULAR; INTRAVENOUS
Status: DISCONTINUED | OUTPATIENT
Start: 2023-01-16 | End: 2023-01-16

## 2023-01-16 RX ORDER — GLYCOPYRROLATE 0.2 MG/ML
INJECTION INTRAMUSCULAR; INTRAVENOUS PRN
Status: DISCONTINUED | OUTPATIENT
Start: 2023-01-16 | End: 2023-01-16 | Stop reason: SDUPTHER

## 2023-01-16 RX ORDER — SODIUM CHLORIDE 9 MG/ML
INJECTION, SOLUTION INTRAVENOUS PRN
Status: DISCONTINUED | OUTPATIENT
Start: 2023-01-16 | End: 2023-01-16

## 2023-01-16 RX ORDER — SODIUM CHLORIDE 0.9 % (FLUSH) 0.9 %
5-40 SYRINGE (ML) INJECTION PRN
Status: DISCONTINUED | OUTPATIENT
Start: 2023-01-16 | End: 2023-01-16

## 2023-01-16 RX ORDER — PROPOFOL 10 MG/ML
INJECTION, EMULSION INTRAVENOUS PRN
Status: DISCONTINUED | OUTPATIENT
Start: 2023-01-16 | End: 2023-01-16 | Stop reason: SDUPTHER

## 2023-01-16 RX ORDER — SODIUM CHLORIDE 0.9 % (FLUSH) 0.9 %
5-40 SYRINGE (ML) INJECTION EVERY 12 HOURS SCHEDULED
Status: DISCONTINUED | OUTPATIENT
Start: 2023-01-16 | End: 2023-01-16

## 2023-01-16 RX ORDER — DROPERIDOL 2.5 MG/ML
0.62 INJECTION, SOLUTION INTRAMUSCULAR; INTRAVENOUS
Status: DISCONTINUED | OUTPATIENT
Start: 2023-01-16 | End: 2023-01-16

## 2023-01-16 RX ORDER — LIDOCAINE HYDROCHLORIDE 20 MG/ML
INJECTION, SOLUTION EPIDURAL; INFILTRATION; INTRACAUDAL; PERINEURAL PRN
Status: DISCONTINUED | OUTPATIENT
Start: 2023-01-16 | End: 2023-01-16 | Stop reason: SDUPTHER

## 2023-01-16 RX ORDER — EPINEPHRINE 1 MG/ML(1)
AMPUL (ML) INJECTION PRN
Status: DISCONTINUED | OUTPATIENT
Start: 2023-01-16 | End: 2023-01-16 | Stop reason: ALTCHOICE

## 2023-01-16 RX ORDER — SODIUM CHLORIDE 9 MG/ML
INJECTION, SOLUTION INTRAVENOUS CONTINUOUS PRN
Status: DISCONTINUED | OUTPATIENT
Start: 2023-01-16 | End: 2023-01-16 | Stop reason: SDUPTHER

## 2023-01-16 RX ORDER — PHENYLEPHRINE HCL IN 0.9% NACL 1 MG/10 ML
SYRINGE (ML) INTRAVENOUS PRN
Status: DISCONTINUED | OUTPATIENT
Start: 2023-01-16 | End: 2023-01-16 | Stop reason: SDUPTHER

## 2023-01-16 RX ADMIN — SODIUM CHLORIDE: 9 INJECTION, SOLUTION INTRAVENOUS at 14:34

## 2023-01-16 RX ADMIN — Medication 200 MCG: at 15:03

## 2023-01-16 RX ADMIN — LIDOCAINE HYDROCHLORIDE 60 MG: 20 INJECTION, SOLUTION EPIDURAL; INFILTRATION; INTRACAUDAL; PERINEURAL at 14:38

## 2023-01-16 RX ADMIN — DORZOLAMIDE HYDROCHLORIDE AND TIMOLOL MALEATE 1 DROP: 20; 5 SOLUTION/ DROPS OPHTHALMIC at 09:24

## 2023-01-16 RX ADMIN — TAMSULOSIN HYDROCHLORIDE 0.4 MG: 0.4 CAPSULE ORAL at 21:47

## 2023-01-16 RX ADMIN — SENNOSIDES 8.6 MG: 8.6 TABLET, FILM COATED ORAL at 09:24

## 2023-01-16 RX ADMIN — SENNOSIDES 8.6 MG: 8.6 TABLET, FILM COATED ORAL at 21:47

## 2023-01-16 RX ADMIN — ESCITALOPRAM OXALATE 10 MG: 10 TABLET ORAL at 21:47

## 2023-01-16 RX ADMIN — SODIUM CHLORIDE, PRESERVATIVE FREE 10 ML: 5 INJECTION INTRAVENOUS at 09:24

## 2023-01-16 RX ADMIN — PROPOFOL 30 MG: 10 INJECTION, EMULSION INTRAVENOUS at 14:41

## 2023-01-16 RX ADMIN — SODIUM BICARBONATE 650 MG: 650 TABLET ORAL at 09:24

## 2023-01-16 RX ADMIN — DORZOLAMIDE HYDROCHLORIDE AND TIMOLOL MALEATE 1 DROP: 20; 5 SOLUTION/ DROPS OPHTHALMIC at 21:47

## 2023-01-16 RX ADMIN — SODIUM CHLORIDE 8 MG/HR: 9 INJECTION, SOLUTION INTRAVENOUS at 20:10

## 2023-01-16 RX ADMIN — SODIUM BICARBONATE 650 MG: 650 TABLET ORAL at 21:47

## 2023-01-16 RX ADMIN — ATORVASTATIN CALCIUM 40 MG: 40 TABLET, FILM COATED ORAL at 21:47

## 2023-01-16 RX ADMIN — PROPOFOL 20 MG: 10 INJECTION, EMULSION INTRAVENOUS at 14:43

## 2023-01-16 RX ADMIN — DOCUSATE SODIUM 100 MG: 100 CAPSULE, LIQUID FILLED ORAL at 09:24

## 2023-01-16 RX ADMIN — SODIUM CHLORIDE 8 MG/HR: 9 INJECTION, SOLUTION INTRAVENOUS at 08:23

## 2023-01-16 RX ADMIN — GLYCOPYRROLATE 0.2 MG: 0.2 INJECTION, SOLUTION INTRAMUSCULAR; INTRAVENOUS at 14:38

## 2023-01-16 RX ADMIN — Medication 200 MCG: at 14:47

## 2023-01-16 RX ADMIN — PROPOFOL 50 MG: 10 INJECTION, EMULSION INTRAVENOUS at 14:38

## 2023-01-16 ASSESSMENT — PAIN - FUNCTIONAL ASSESSMENT: PAIN_FUNCTIONAL_ASSESSMENT: 0-10

## 2023-01-16 ASSESSMENT — PAIN SCALES - WONG BAKER
WONGBAKER_NUMERICALRESPONSE: 0

## 2023-01-16 ASSESSMENT — PAIN SCALES - GENERAL: PAINLEVEL_OUTOF10: 0

## 2023-01-16 ASSESSMENT — LIFESTYLE VARIABLES: SMOKING_STATUS: 0

## 2023-01-16 ASSESSMENT — ENCOUNTER SYMPTOMS: SHORTNESS OF BREATH: 1

## 2023-01-16 NOTE — PROGRESS NOTES
Hospital Medicine Progress Note     Date:  1/16/2023    PCP: Bessie Mcdonald MD (Tel: [phone])    Date of Admission: 1/5/2023    Chief complaint:   Chief Complaint   Patient presents with    Fatigue     Pt. normally able to ambulate but not at this time and is extremely weak. Brief admission history: 70-year-old male with history of CKD stage IV, anemia of chronic disease, BPH, CAD, hyperlipidemia, who was admitted with generalized weakness, decreased oral intake, diagnosed with COVID-19 and bacterial pneumonia. Assessment/plan:  Bacterial pneumonia, likely superimposed in the setting of COVID-19 pneumonia. Procalcitonin level was elevated. Completed course of antibiotics. Did not require COVID-specific treatment as patient was not hypoxic. Sore throat. Secondary to COVID-19 infection. As needed lozenges. Acute kidney injury on CKD stage IV, improved with intravenous fluid. Generalized weakness. Likely secondary to acute medical conditions. Treat underlying conditions as noted above. Therapy evaluation in place. Going to SNF at discharge. Asymptomatic bacteriuria. Patient was already on antibiotics for other indication as noted above. There is no indication for treatment of asymptomatic bacteriuria. Acute upper GI bleed. Status post EGD with multiple duodenal ulcers (6 mm clean-based duodenal ulcer, 15 mm clean-based duodenal bulb ulcer, 2 cm ulcer with visible vessel in distal aspect of ulcer, treated - see EGD report. On PPI. GI recommends avoiding NSAIDs. Follow up with GI as outpatient. Continues to have low GI bleed - GI planning repeat EGD today. Acute on chronic anemia with superimposed anemia of chronic disease. Secondary to #1. He has required multiple units of PRBC transfusions this hospital stay. Recommend repeat H&H within 1 week of discharge. Other comorbidities: history of CKD stage IV, anemia of chronic disease, BPH, CAD, hyperlipidemia. Disposition. Possible discharge to SNF in 1-2 days. Diet: Diet NPO Exceptions are: Sips of Water with Meds, Other (Specify); Specify Other Exceptions: Nectar thick liquids    Code status: Full Code   ----------  Subjective  No complaints. No SOB. Objective  Physical exam:  Vitals: /66   Pulse 57   Temp 97.4 °F (36.3 °C) (Oral)   Resp 18   Ht 5' 6\" (1.676 m)   Wt 144 lb 6.4 oz (65.5 kg)   SpO2 92%   BMI 23.31 kg/m²   Gen/overall appearance: Not in acute distress. Alert. Oriented x3. Head: Normocephalic, atraumatic  Eyes: EOMI, good acuity  ENT: Oral mucosa moist  Neck: No JVD, thyromegaly  CVS: Nml S1S2, no MRG, RRR  Pulm: Clear bilaterally. No crackles/wheezes  Gastrointestinal: Soft, NT/ND, +BS  Musculoskeletal: No edema. Warm  Neuro: No focal deficit. Moves extremity spontaneously. Psychiatry: Appropriate affect. Not agitated. Skin: Warm, dry with normal turgor. No rash  Capillary refill: Brisk,< 3 seconds   Peripheral Pulses: +2 palpable, equal bilaterally     24HR INTAKE/OUTPUT:    Intake/Output Summary (Last 24 hours) at 1/16/2023 0817  Last data filed at 1/16/2023 0538  Gross per 24 hour   Intake 1323.3 ml   Output 950 ml   Net 373.3 ml       I/O last 3 completed shifts: In: 1773.3 [P.O.:920; I.V.:88.2; IV Piggyback:765.1]  Out: 950 [Urine:950]  No intake/output data recorded.   Meds:    sodium bicarbonate  650 mg Oral BID    dorzolamide-timolol  1 drop Both Eyes BID    docusate sodium  100 mg Oral BID    sodium chloride flush  10 mL IntraVENous 2 times per day    atorvastatin  40 mg Oral Nightly    escitalopram  10 mg Oral Nightly    polyethylene glycol  17 g Oral Daily    senna  1 tablet Oral BID    tamsulosin  0.4 mg Oral Nightly     Infusions:    pantoprozole (PROTONIX) infusion 8 mg/hr (01/16/23 0538)    sodium chloride      sodium chloride      sodium chloride       PRN Meds: phenol, sodium chloride, sodium chloride flush, sodium chloride, Benzocaine-Menthol, bisacodyl, sodium chloride flush, sodium chloride, promethazine **OR** ondansetron, acetaminophen **OR** acetaminophen, nitroGLYCERIN, traMADol    Labs/imaging:  CBC:   Recent Labs     01/14/23  0733 01/15/23  0518 01/15/23  1053 01/15/23  1855 01/16/23  0701   WBC 14.3* 11.1*  --   --  10.7   HGB 8.6* 7.7* 7.9* 7.9* 7.8*    256  --   --  289       BMP:    Recent Labs     01/14/23  0525 01/15/23  0518 01/16/23  0444    143 142   K 4.6 4.4 4.7   * 113* 112*   CO2 17* 21 19*   BUN 44* 49* 45*   CREATININE 2.9* 3.0* 3.1*   GLUCOSE 115* 99 91       Hepatic:   No results for input(s): AST, ALT, ALB, BILITOT, ALKPHOS in the last 72 hours.       Enmanuel Chapin MD  -------------------------------  Rounding hospitalist

## 2023-01-16 NOTE — PROGRESS NOTES
Medications administered and monitored by CRNA, see anesthesia record.     Electronically signed by Shasta Hamilton RN on 1/16/2023 at 2:31 PM

## 2023-01-16 NOTE — OP NOTE
Esophagogastroduodenoscopy Note    Patient:   Johanna Patel    :    1931    Facility:   [facility] [Inpatient]   Referring/PCP: Kiersten Marrufo MD    Procedure:   Esophagogastroduodenoscopy   Date:     2023   Endoscopist:  Huber Mccauley MD     Preoperative Diagnosis:    40-year-old male seen in consultation for persistent evidence of ongoing GI bleed. The patient had a recent upper endoscopy, a week ago, revealing a 2 cm duodenal ulcer with a visible vessel. Endoscopic therapy was performed with a gold probe. Postoperative Diagnosis: Duodenal ulcers. 1 ulcer with adherent clot. Epinephrine injected. Clot removed. Visible vessel noted. Hemoclip placed with adequate apposition of the ulcer edges. Anesthesia: MAC    Estimated blood loss: Minimal    Complications: None    Description of Procedure:  Informed consent was obtained from the patient after explanation of the procedure including indications, description of the procedure,  benefits and possible risks and complications of the procedure, and alternatives. Questions were answered. The patient's history was reviewed and a directed physical examination was performed prior to the procedure. Patient was monitored throughout the procedure with pulse oximetry and periodic assessment of vital signs. Patient was sedated as noted above. With the patient in the left lateral decubitus position, the Olympus videoendoscope was placed in the patient's mouth and under direct visualization passed into the esophagus. Visualization of the esophagus, stomach, and duodenum was performed during both introduction and withdrawal of the endoscope and retroflexed view of the proximal stomach was obtained. The scope was passed to the 2nd portion of the duodenum. The patient tolerated the procedure well and was taken to the recovery area in good condition. Findings:  The esophageal mucosa appeared normal.  The gastroesophageal junction is also normal.  The patient has a small hiatal hernia. Examination of the stomach revealed a normal gastric mucosa. In the duodenum, multiple ulcers were noted. Most ulcers had a clean base. However, an adherent clot was noted on an ulcer in the proximal duodenal bulb. There was no evidence of active bleeding. Epinephrine 1: 10,000 was injected on both sides of the ulcer. Next, the clot was removed. The base of the ulcer was visualized. A small visible vessel was noted. Next, a large Hemoclip was placed. Adequate positioning was confirmed. Multiple flushes were performed . No active bleeding was noted. Recommendations: Resume IV PPI. Start clear liquid diet. Patient has multiple ulcers in the duodenum. He remains at risk for rebleed. Continue to monitor closely. Trend H&H.     Taryn Arshad MD, MD

## 2023-01-16 NOTE — PROGRESS NOTES
Pt has procedure this shift for EGD. MD was in to see and talk about procedure this morning. Pt was agreeable to procedure. Pt stated he could not see paperwork and was asked if if we could call son for consent and he stated yes. Pt is alert and oriented x4. Son Yuly Padilla called for consent.

## 2023-01-16 NOTE — PLAN OF CARE
Problem: Discharge Planning  Goal: Discharge to home or other facility with appropriate resources  Outcome: Progressing     Problem: Skin/Tissue Integrity  Goal: Absence of new skin breakdown  Description: 1. Monitor for areas of redness and/or skin breakdown  2. Assess vascular access sites hourly  3. Every 4-6 hours minimum:  Change oxygen saturation probe site  4. Every 4-6 hours:  If on nasal continuous positive airway pressure, respiratory therapy assess nares and determine need for appliance change or resting period.   Outcome: Progressing     Problem: Neurosensory - Adult  Goal: Achieves stable or improved neurological status  Outcome: Progressing  Goal: Achieves maximal functionality and self care  Outcome: Progressing     Problem: Respiratory - Adult  Goal: Achieves optimal ventilation and oxygenation  Outcome: Progressing     Problem: Cardiovascular - Adult  Goal: Maintains optimal cardiac output and hemodynamic stability  Outcome: Progressing  Goal: Absence of cardiac dysrhythmias or at baseline  Outcome: Progressing     Problem: Skin/Tissue Integrity - Adult  Goal: Skin integrity remains intact  Outcome: Progressing  Goal: Oral mucous membranes remain intact  Outcome: Progressing     Problem: Musculoskeletal - Adult  Goal: Return mobility to safest level of function  Outcome: Progressing  Goal: Return ADL status to a safe level of function  Outcome: Progressing     Problem: Gastrointestinal - Adult  Goal: Maintains or returns to baseline bowel function  Outcome: Progressing     Problem: Infection - Adult  Goal: Absence of infection at discharge  Outcome: Progressing     Problem: Metabolic/Fluid and Electrolytes - Adult  Goal: Electrolytes maintained within normal limits  Outcome: Progressing  Goal: Hemodynamic stability and optimal renal function maintained  Outcome: Progressing     Problem: Hematologic - Adult  Goal: Maintains hematologic stability  Outcome: Progressing     Problem: ABCDS Injury Assessment  Goal: Absence of physical injury  Outcome: Progressing     Problem: Safety - Adult  Goal: Free from fall injury  Outcome: Progressing     Problem: Pain  Goal: Verbalizes/displays adequate comfort level or baseline comfort level  Outcome: Progressing     Problem: Nutrition Deficit:  Goal: Optimize nutritional status  Outcome: Progressing

## 2023-01-16 NOTE — PLAN OF CARE
Problem: Discharge Planning  Goal: Discharge to home or other facility with appropriate resources  1/16/2023 0547 by Sj Villaseñor RN  Outcome: Not Progressing  Note: Pt's Hgb has been trending down. The last 2 checks have been stable, RN awaiting results from AM labs. GI consulted again and pt started on protonix drip. Problem: Skin/Tissue Integrity  Goal: Absence of new skin breakdown  Description: 1. Monitor for areas of redness and/or skin breakdown  1/16/2023 0547 by Sj Villaseñor RN  Outcome: Progressing  Note: Pt is able to reposition himself in the bed. He has been up to the toilet a few times t/o the night for BMs. Problem: Respiratory - Adult  Goal: Achieves optimal ventilation and oxygenation  1/16/2023 0547 by Sj Villaseñor RN  Outcome: Adequate for Discharge  Note: Pt on RA and tolerates walking from bed to bathroom well. Problem: Cardiovascular - Adult  Goal: Maintains optimal cardiac output and hemodynamic stability  1/16/2023 0547 by Sj Villaseñor RN  Outcome: Progressing  Note: Per nursing report, pt had episode of hypotension during the day and was given a 500 ml NS bolus. Pt's BP stable overnight after bolus given during the day. Problem: Safety - Adult  Goal: Free from fall injury  1/16/2023 0547 by Sj Villaseñor RN  Outcome: Progressing  Note: Pt is a high fall risk. Bed in locked, low position with side rails up X 2 and an active bed alarm. Fall risk sign, socks, and bracelet in place. Proactive toilet assist offered t/o shift.       Problem: Gastrointestinal - Adult  Goal: Maintains or returns to baseline bowel function  1/16/2023 0547 by Sj Villaseñor RN  Outcome: Not Progressing     Problem: Hematologic - Adult  Goal: Maintains hematologic stability  1/16/2023 0547 by Sj Villaseñor RN  Outcome: Not Progressing    Problem: Nutrition Deficit:  Goal: Optimize nutritional status  1/16/2023 0547 by Sj Villaseñor RN  Outcome: Not Progressing  Note: Pt made NPO except for sips with meds. RN updated white board and notified pt. RN removed all food and drinks from pt's bedside table. Prior to becoming NPO, pt had a DN tray and only ate the frozen supplement (100%) off it. Pt had a regular, vanilla pudding (100%) with meds.

## 2023-01-16 NOTE — CARE COORDINATION
Discharge Planning:   Spoke to Bernice at [facility]. Confirmed precert remains pending. Per [facility] is listed with birth date of 6/16/1931. Called to registration, notified of patient's date of birth per insurance information. PLAN: [facility]     NEED: Precert, Hens, completed HELENA and transportation.      YAO Chowdhury, LACHO, Social Work/Case Management   [phone]  Electronically signed by YAO Chowdhury, LACHO on 1/16/2023 at 11:20 AM

## 2023-01-16 NOTE — CARE COORDINATION
[facility] Authorization Received via [employer] Portal:    Plan Auth ID:    Bhavik Hines ID:  [id number]  Service:  SNF  Approval Dates:  01/16/2023-01/18/2023  Next Review Date:  01/18/2023    Zenaida Davies RN, BSN, Case Management  Phone: [phone]  Electronically signed by Zenaida Davies RN on 1/16/2023 at 12:07 PM

## 2023-01-16 NOTE — PROGRESS NOTES
1458 pt arrived from endo, sats stable, pt sedate, BP low- treated with christi before leaving the bedside by CARMEN Hernandez.

## 2023-01-16 NOTE — PROGRESS NOTES
Low pressure reported by nursing staff. Improved after 500 ML of fluid. Recheck H&H stable at 7.9. Staff reported that the stool color is like jelly maroon color. Will continue to monitor hb. If b less than 7 will transfuse and notify GI  Patient has recurrence of urinary  retention >800 ml , aldrich re inserted.

## 2023-01-16 NOTE — PROGRESS NOTES
4 Eyes Skin Assessment     NAME:  Flash Bradford  YOB: 1931  MEDICAL RECORD NUMBER:  [id number]    The patient is being assessed for  Post-Op Surgical    I agree that One RN have performed a thorough Head to Toe Skin Assessment on the patient. ALL assessment sites listed below have been assessed.      Areas assessed by both nurses:    Head, Face, Ears, Shoulders, Back, Chest, Arms, Elbows, Hands, Sacrum. Buttock, Coccyx, Ischium, and Legs. Feet and Heels        Does the Patient have a Wound? No noted wound(s)       Magen Prevention initiated by RN: No   Wound Care Orders initiated by RN: No    Pressure Injury (Stage 3,4, Unstageable, DTI, NWPT, and Complex wounds) if present place referral order by RN under : No    New and Established Ostomies, if present place, referral order under : No      Nurse 1 eSignature: Electronically signed by Sofie Richardson RN on 1/16/23 at 5:36 PM EST    **SHARE this note so that the co-signing nurse is able to place an eSignature**    Nurse 2 eSignature: Electronically signed by Johana Lopez RN on 1/16/23 at 6:30 PM EST

## 2023-01-16 NOTE — PROGRESS NOTES
Speech Language Pathology    Dysphagia tx attempted. Patient NPO for upcoming procedure this PM. ST to hold today. Will re-attempt t+1 pending results of procedure. If SLP eval/tx is deemed no longer indicated as medical work-up progresses, please discontinue SLP eval/tx order. Thank you.   Mable Clinton MA CCC-SLP #[id number]  Speech-Language Pathologist  01/16/23  13:24

## 2023-01-16 NOTE — CONSULTS
INPATIENT PROGRESS NOTE        IDENTIFYING DATA/REASON FOR CONSULTATION   PATIENT:  Darwin Lira  MRN:  [id number]  ADMIT DATE: 2023  TIME OF EVALUATION: 2023 8:37 AM  HOSPITAL STAY:   LOS: 11 days   CONSULTING PHYSICIAN: Taryn Gordon MD   REASON FOR CONSULTATION:     Subjective:    Patient seen in follow up. EGD 23 showed 2 cm ulcer with visible vessel in duodenal sweep treated with epi injection and gold probe cautery. Pt completed 72 hrs of PPI gtt. We have been asked to re-evaluate pt due to concern for rebleeding and drop in hgb. According to RN pt has been passing small dark clots per rectum concerning for blood clots. It has been occurring past 3 nights. Hgb 7.7->7.9->7.9->7.8 past 24 hrs    MEDICATIONS   SCHEDULED:  sodium bicarbonate, 650 mg, BID  dorzolamide-timolol, 1 drop, BID  docusate sodium, 100 mg, BID  sodium chloride flush, 10 mL, 2 times per day  atorvastatin, 40 mg, Nightly  escitalopram, 10 mg, Nightly  polyethylene glycol, 17 g, Daily  senna, 1 tablet, BID  tamsulosin, 0.4 mg, Nightly      FLUIDS/DRIPS:     pantoprozole (PROTONIX) infusion 8 mg/hr (23 0823)    sodium chloride      sodium chloride      sodium chloride       PRNs: phenol, 1 spray, Q2H PRN  sodium chloride, , PRN  sodium chloride flush, 5-40 mL, PRN  sodium chloride, , PRN  Benzocaine-Menthol, 1 lozenge, Q2H PRN  bisacodyl, 10 mg, Daily PRN  sodium chloride flush, 10 mL, PRN  sodium chloride, , PRN  promethazine, 12.5 mg, Q6H PRN   Or  ondansetron, 4 mg, Q6H PRN  acetaminophen, 650 mg, Q6H PRN   Or  acetaminophen, 650 mg, Q6H PRN  nitroGLYCERIN, 0.4 mg, Q5 Min PRN  traMADol, 50 mg, Q8H PRN      ALLERGIES:  No Known Allergies      PHYSICAL EXAM   VITALS:  BP (!) 120/46   Pulse 60   Temp 97.7 °F (36.5 °C) (Oral)   Resp 20   Ht 5' 6\" (1.676 m)   Wt 144 lb 6.4 oz (65.5 kg)   SpO2 95%   BMI 23.31 kg/m²   TEMPERATURE:  Current - Temp: 97.7 °F (36.5 °C);  Max - Temp  Av.4 °F (36.3 °C)  Min: 97.3 °F (36.3 °C)  Max: 97.7 °F (36.5 °C)    Physical Exam:  General appearance: alert, cooperative, no distress  Eyes: Anicteric  Head: Normocephalic, without obvious abnormality  Lungs: clear to auscultation bilaterally, Normal Effort  Heart: regular rate and rhythm, normal S1 and S2, no murmurs or rubs  Abdomen: soft, non-distended,minimally tender epigastric area. Bowel sounds normal.   Extremities: atraumatic, no cyanosis or edema  Skin: warm and dry, no jaundice  Neuro: Grossly intact, A&OX3    LABS AND IMAGING   Laboratory   Recent Labs     01/14/23  0733 01/15/23  0518 01/15/23  1053 01/15/23  1855 01/16/23  0701   WBC 14.3* 11.1*  --   --  10.7   HGB 8.6* 7.7* 7.9* 7.9* 7.8*   HCT 26.6* 24.6* 25.7* 26.0* 24.9*   MCV 92.6 95.1  --   --  93.7    256  --   --  289     Recent Labs     01/14/23  0525 01/15/23  0518 01/16/23  0444    143 142   K 4.6 4.4 4.7   * 113* 112*   CO2 17* 21 19*   PHOS 2.9 3.1 3.6   BUN 44* 49* 45*   CREATININE 2.9* 3.0* 3.1*     No results for input(s): AST, ALT, ALB, BILIDIR, BILITOT, ALKPHOS in the last 72 hours. No results for input(s): LIPASE, AMYLASE in the last 72 hours. No results for input(s): PROTIME, INR in the last 72 hours. Imaging  XR CHEST PORTABLE   Final Result   1. Patchy right basilar airspace disease could represent atelectasis versus   pneumonia. 2.  Multiple bilateral calcified pleural plaques         CT SOFT TISSUE NECK WO CONTRAST   Final Result   No acute findings, soft tissue mass or lymphadenopathy evident. XR CHEST 1 VIEW   Final Result   Ill-defined opacities bilaterally. Endoscopy  EGD 1/9/23 with Dr. Suni Montenegro  Normal esophagus without Mcdonald's or reflux changes, 3cm sliding hiatal hernia, normal stomach, 6mm clean-based duodenal ulcer, 15mm clean-based duodenal ulcer in the bulb. In the sweep, there was a 2cm ulcer with a visible vessel in the distal aspect of the ulcer.   I was not able to see the very proximal aspect of the ulcer. Epinephrine 1:10,000 was injected in each of 4 quadrants around the ulcer with excellent blanching of tissue. A total of 3.5mL was injected. Next, the vessel was obliterated using gold probe cautery with excellent hemostasis. No bleeding at the end of the procedure. 2nd portion of the duodenum was normal.       ASSESSMENT AND RECOMMENDATIONS   Kirsty Bronson is a 80 y.o. male with PMH of CAD with coronary stents, HLD, CKD stage IV, chronic anemia BPH who presented on 1/5/2023 with generalized weakness, decreased oral intake. Diagnosed with COVID-19, bacterial pneumonia, acute on CKD. We have been consulted regarding bloody stool, acute on  chronic anemia. EGD 1/9 showed 2 cm ulcer with visible vessel in duodenal sweep treated with epi injection and gold probe cautery. Duodenal ulcer with hemorrhage and acute blood loss anemia. Still passing blood clots and is 7 days post EGD. Will repeat EGD today. Acute renal failure  COVID pneumonia    RECOMMENDATIONS:    Plan for EGD today with Dr. Deonte Brower PPI therapy  Monitor h/h and transfuse as needed to maintain hgb >7.0  Follow up on H pylori stool Ag        If you have any questions or need any further information, please feel free to contact us [phone]. Thank you for allowing us to participate in the care of Kirsty Bronson. The note was completed using Dragon voice recognition transcription. Every effort was made to ensure accuracy; however, inadvertent transcription errors may be present despite my best efforts to edit errors.     Ambar BAILEY

## 2023-01-16 NOTE — ANESTHESIA POSTPROCEDURE EVALUATION
[facility] Department of Anesthesiology  Post-Anesthesia Note       Name:  Flash Bradford                                  Age:  89 y.o.  MRN:  [id number]     Last Vitals & Oxygen Saturation: /61   Pulse 69   Temp 98.5 °F (36.9 °C) (Temporal)   Resp 28   Ht 5' 6\" (1.676 m)   Wt 144 lb 6.4 oz (65.5 kg)   SpO2 94%   BMI 23.31 kg/m²   Patient Vitals for the past 4 hrs:   BP Temp Temp src Pulse Resp SpO2   01/16/23 1520 101/61 -- -- 69 28 94 %   01/16/23 1515 (!) 97/49 -- -- 72 27 95 %   01/16/23 1510 (!) 90/42 -- -- 70 21 99 %   01/16/23 1505 (!) 93/43 -- -- 73 21 100 %   01/16/23 1502 (!) 97/54 -- -- 74 25 100 %   01/16/23 1500 (!) 76/40 -- -- -- -- 100 %   01/16/23 1459 -- 98.5 °F (36.9 °C) Temporal 74 24 100 %   01/16/23 1349 (!) 123/38 99.1 °F (37.3 °C) Temporal 63 22 93 %       Level of consciousness:  Awake, alert    Respiratory: Respirations easy, no distress. Stable.    Cardiovascular: Hemodynamically stable.    Hydration: Adequate.    PONV: Adequately managed.    Post-op pain: Adequately controlled.    Post-op assessment: Tolerated anesthetic well without complication.    Complications:  None.    Kory Franz MD  January 16, 2023   4:19 PM

## 2023-01-16 NOTE — PLAN OF CARE
Problem: Discharge Planning  Goal: Discharge to home or other facility with appropriate resources  1/16/2023 0547 by Lilliam Cortez RN  Outcome: Not Progressing  Flowsheets (Taken 1/15/2023 2023)  Discharge to home or other facility with appropriate resources: Identify barriers to discharge with patient and caregiver  Note: Pt's Hgb has been trending down. The last 2 checks have been stable, RN awaiting results from AM labs. GI consulted again and pt started on protonix drip. Problem: Gastrointestinal - Adult  Goal: Maintains or returns to baseline bowel function  1/16/2023 1304 by Shirley Lacey RN  Flowsheets (Taken 1/16/2023 1304)  Maintains or returns to baseline bowel function: Assess bowel function  Note: Small red watery BM noted this shift.  Pt states no pain   1/16/2023 0547 by Lilliam Cortez RN  Outcome: Not Progressing  Flowsheets (Taken 1/15/2023 2023)  Maintains or returns to baseline bowel function:   Assess bowel function   Encourage oral fluids to ensure adequate hydration   Administer ordered medications as needed   Encourage mobilization and activity     Problem: Hematologic - Adult  Goal: Maintains hematologic stability  1/16/2023 0547 by Lilliam Cortez RN  Outcome: Not Progressing  Flowsheets (Taken 1/15/2023 2023)  Maintains hematologic stability:   Assess for signs and symptoms of bleeding or hemorrhage   Monitor labs for bleeding or clotting disorders   Administer blood products/factors as ordered     Problem: Nutrition Deficit:  Goal: Optimize nutritional status  1/16/2023 0547 by Lilliam Cortez RN  Outcome: Not Progressing  Flowsheets (Taken 1/14/2023 0337)  Nutrient intake appropriate for improving, restoring, or maintaining nutritional needs:   Assess nutritional status and recommend course of action   Monitor oral intake, labs, and treatment plans   Recommend appropriate diets, oral nutritional supplements, and vitamin/mineral supplements   Provide specific nutrition education to patient or family as appropriate  Note: Pt made NPO except for sips with meds. RN updated white board and notified pt. RN removed all food and drinks from pt's bedside table. Prior to becoming NPO, pt had a DN tray and only ate the frozen supplement (100%) off it. Pt had a regular, vanilla pudding (100%) with meds.

## 2023-01-16 NOTE — PROGRESS NOTES
Physical Therapy  Mykel Pacheco  [phone]  [id number]    Attempted to see for PT session however pt refused; pt states he is having that test done today and he is too tired to get up; will continue to follow   Electronically signed by SHANTEL AGUILAR, PT on 1/16/2023 at 9:07 AM

## 2023-01-16 NOTE — PROGRESS NOTES
Endo called nurse and stated patient had ulcer clipped and epi was applied to site. Pt headed to PACU for recovery.  Clip card is in the chart

## 2023-01-16 NOTE — ANESTHESIA PRE PROCEDURE
Department of Anesthesiology  Preprocedure Note       Name:  Flash Bradford   Age:  89 y.o.  :  1931                                          MRN:  [id number]         Date:  2023      Surgeon: Surgeon(s):  Gonzales Dueñas MD    Procedure: Procedure(s):  EGD DIAGNOSTIC ONLY    Medications prior to admission:   Prior to Admission medications    Medication Sig Start Date End Date Taking? Authorizing Provider   traMADol (ULTRAM) 50 MG tablet Take 50 mg by mouth every 8 hours as needed for Pain.   Yes Historical Provider, MD   solifenacin (VESICARE) 5 MG tablet TAKE 1 TABLET BY MOUTH DAILY 22   Any Robertson MD   atorvastatin (LIPITOR) 40 MG tablet TAKE 1 TABLET BY MOUTH DAILY 22   Any Robertson MD   tamsulosin (FLOMAX) 0.4 MG capsule TAKE 1 CAPSULE BY MOUTH EVERY DAY  Patient taking differently: Take 0.4 mg by mouth at bedtime 22   Any Robertson MD   carvedilol (COREG) 3.125 MG tablet TAKE 1 TABLET BY MOUTH TWICE DAILY 22   Any Robertson MD   omeprazole (PRILOSEC) 40 MG delayed release capsule TAKE 1 CAPSULE BY MOUTH DAILY 22   Any Robertson MD   escitalopram (LEXAPRO) 10 MG tablet TAKE 1 TABLET BY MOUTH DAILY  Patient taking differently: Take 10 mg by mouth at bedtime 22   Any Robertson MD   senna (SENOKOT) 8.6 MG tablet Take 1 tablet by mouth 2 times daily 22  Any Robertson MD   dorzolamide-timolol (COSOPT) 22.3-6.8 MG/ML ophthalmic solution INSTILL 1 DROP IN BOTH EYES TWICE DAILY 21   Historical Provider, MD   polyethylene glycol (GLYCOLAX) 17 GM/SCOOP powder Take 17 g by mouth 2 times daily MIX 17 GRAMS IN WATER AND DRINK DAILY  Patient taking differently: Take 17 g by mouth daily MIX 17 GRAMS IN WATER AND DRINK DAILY 10/5/21   Any Robertson MD   nitroGLYCERIN (NITROSTAT) 0.4 MG SL tablet Place 1 tablet under the tongue every 5 minutes as needed for Chest pain 10/23/20   Lonnie Alves MD   aspirin EC 81 MG EC tablet  Take 1 tablet by mouth daily 9/4/20   Emmer Fabry, MD   Multiple Vitamins-Minerals (ICAPS AREDS 2 PO) Take 1 tablet by mouth daily    Historical Provider, MD   patiromer sorbitex calcium (VELTASSA) 8.4 g PACK packet Take 8.4 g by mouth daily     Historical Provider, MD       Current medications:    Current Facility-Administered Medications   Medication Dose Route Frequency Provider Last Rate Last Admin    pantoprazole (PROTONIX) 80 mg in sodium chloride 0.9 % 100 mL infusion  8 mg/hr IntraVENous Continuous Darrall Sever, MD 10 mL/hr at 01/16/23 0823 8 mg/hr at 01/16/23 0823    sodium bicarbonate tablet 650 mg  650 mg Oral BID Stanley MD Fran   650 mg at 01/16/23 0924    phenol 1.4 % mouth spray 1 spray  1 spray Mouth/Throat Q2H PRN Kathy Shirley MD        dorzolamide-timolol (COSOPT) 22.3-6.8 MG/ML ophthalmic solution 1 drop (Patient Supplied)  1 drop Both Eyes BID LISSET Campa - CNP   1 drop at 01/16/23 0924    0.9 % sodium chloride infusion   IntraVENous PRN Darrall Sever, MD        sodium chloride flush 0.9 % injection 5-40 mL  5-40 mL IntraVENous PRN Leonora Godwin MD        0.9 % sodium chloride infusion   IntraVENous PRN Leonora Godwin MD        Benzocaine-Menthol (CEPACOL SORE THROAT) lozenge 1 lozenge  1 lozenge Oral Q2H PRN Darrall Sever, MD   1 lozenge at 01/09/23 5646    docusate sodium (COLACE) capsule 100 mg  100 mg Oral BID Darrall Sever, MD   100 mg at 01/16/23 5841    bisacodyl (DULCOLAX) suppository 10 mg  10 mg Rectal Daily PRN Darrall Sever, MD        sodium chloride flush 0.9 % injection 10 mL  10 mL IntraVENous 2 times per day Darrall Sever, MD   10 mL at 01/16/23 0924    sodium chloride flush 0.9 % injection 10 mL  10 mL IntraVENous PRN Darrall Sever, MD        0.9 % sodium chloride infusion   IntraVENous PRN Darrall Sever, MD        promethazine (PHENERGAN) tablet 12.5 mg  12.5 mg Oral Q6H PRN Darrall Sever, MD        Or   Rtia Reid ondansetron (ZOFRAN) injection 4 mg  4 mg IntraVENous Q6H PRN Job Darden MD        acetaminophen (TYLENOL) tablet 650 mg  650 mg Oral Q6H PRN Job Darden MD   650 mg at 01/15/23 2155    Or    acetaminophen (TYLENOL) suppository 650 mg  650 mg Rectal Q6H PRN Job Darden MD        atorvastatin (LIPITOR) tablet 40 mg  40 mg Oral Nightly Job Darden MD   40 mg at 01/15/23 2157    escitalopram (LEXAPRO) tablet 10 mg  10 mg Oral Nightly Job Darden MD   10 mg at 01/15/23 2155    nitroGLYCERIN (NITROSTAT) SL tablet 0.4 mg  0.4 mg SubLINGual Q5 Min PRN Job Darden MD        polyethylene glycol [facility]) packet 17 g  17 g Oral Daily Job Darden MD   17 g at 01/15/23 2640    senna (SENOKOT) tablet 8.6 mg  1 tablet Oral BID Job Darden MD   8.6 mg at 01/16/23 4407    tamsulosin (FLOMAX) capsule 0.4 mg  0.4 mg Oral Nightly Job Darden MD   0.4 mg at 01/15/23 2155    traMADol (ULTRAM) tablet 50 mg  50 mg Oral Q8H PRN Job Darden MD   50 mg at 01/15/23 1828       Allergies:  No Known Allergies    Problem List:    Patient Active Problem List   Diagnosis Code    Back pain M54.9    Benign nodular prostatic hyperplasia with lower urinary tract symptoms N40.1    Spinal stenosis M48.00    Coronary artery disease involving native coronary artery of native heart without angina pectoris I25.10    Stented coronary artery Z95.5    Vitamin D deficiency E55.9    Essential hypertension I10    Hyperlipidemia E78.5    Slow transit constipation K59.01    Psoriasis L40.9    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, 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    Chronic constipation K59.09    Former cigarette smoker Z87.891    Epigastric pain R10.13    Ventral hernia K43.9    Anemia in other chronic diseases classified elsewhere D63.8    Current moderate episode of major depressive disorder without prior episode (HCC) F32.1    Fatigue R53.83    Sleep disorder G47.9    Non-English speaking patient Z78.9    Nocturia R35.1    Urinary frequency R35.0    Chronic kidney disease, stage IV (severe) (HCC) N18.4    Ataxia R27.0    Frequent falls R29.6    Frailty syndrome in geriatric patient R48    Chronic pain syndrome G89.4    Bradycardia R00.1    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 chronic kidney disease (HCC) N18.4, D63.1    Hyperkalemia E87.5    Chronic midline low back pain M54.50, G89.29    SOB (shortness of breath) R06.02       Past Medical History:        Diagnosis Date    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 chronic kidney disease ([facility] [address] 2021    Back pain 2013    BPH (benign prostatic hyperplasia) 2013    CAD (coronary artery disease)     CAD (coronary artery disease) 2013    Constipated 2013    Hyperlipidemia     MI (myocardial infarction) ([facility] [address] 10/18/15    Spinal stenosis 2013    Stented coronary artery 2013    Urinary hesitancy     Vitamin D deficiency 2013       Past Surgical History:        Procedure Laterality Date    CORONARY ANGIOPLASTY WITH STENT PLACEMENT  10/18/15    ENDOSCOPY, COLON, DIAGNOSTIC      UPPER GASTROINTESTINAL ENDOSCOPY  10-    UPPER GASTROINTESTINAL ENDOSCOPY N/A 2023    EGD SUBMUCOSAL EPINEPHRINE INJECTION performed by Olivia Estrada MD at [place] [address]  2023    EGD  ABLATION WITH GOLD PROBE performed by Olivia Estrada MD at [address] [place] [address] History:    Social History     Tobacco Use    Smoking status: Former     Packs/day: 0.50     Types: Cigarettes     Quit date: 2013     Years since quittin.0    Smokeless tobacco: Never   Substance Use Topics    Alcohol use:  No                                Counseling given: Not Answered      Vital Signs (Current):   Vitals:    23 0423 23 0815 23 1112 23 1349   BP: 101/66 (!) 120/46 (!) 99/47 (!) 123/38   Pulse: 57 60 66 63   Resp: 18 20  22   Temp: 97.4 °F (36.3 °C) 97.7 °F (36.5 °C) 97.6 °F (36.4 °C) 99.1 °F (37.3 °C)   TempSrc: Oral Oral Oral Temporal   SpO2: 92% 95% 92% 93%   Weight:       Height:                                                  BP Readings from Last 3 Encounters:   01/16/23 (!) 123/38   10/06/22 128/60   07/05/22 (!) 155/70       NPO Status: Time of last liquid consumption: 0800                        Time of last solid consumption: 0800                        Date of last liquid consumption: 01/09/23                        Date of last solid food consumption: 01/09/23    BMI:   Wt Readings from Last 3 Encounters:   01/16/23 144 lb 6.4 oz (65.5 kg)   10/06/22 150 lb (68 kg)   07/05/22 150 lb (68 kg)     Body mass index is 23.31 kg/m². CBC:   Lab Results   Component Value Date/Time    WBC 10.7 01/16/2023 07:01 AM    RBC 2.66 01/16/2023 07:01 AM    HGB 7.8 01/16/2023 07:01 AM    HCT 24.9 01/16/2023 07:01 AM    MCV 93.7 01/16/2023 07:01 AM    RDW 15.2 01/16/2023 07:01 AM     01/16/2023 07:01 AM       CMP:   Lab Results   Component Value Date/Time     01/16/2023 04:44 AM    K 4.7 01/16/2023 04:44 AM    K 4.2 01/12/2023 04:52 AM     01/16/2023 04:44 AM    CO2 19 01/16/2023 04:44 AM    BUN 45 01/16/2023 04:44 AM    CREATININE 3.1 01/16/2023 04:44 AM    GFRAA 24 11/05/2021 01:23 PM    AGRATIO 1.0 01/09/2023 09:43 AM    LABGLOM 18 01/16/2023 04:44 AM    GLUCOSE 91 01/16/2023 04:44 AM    PROT 5.0 01/10/2023 04:18 AM    CALCIUM 8.0 01/16/2023 04:44 AM    BILITOT <0.2 01/10/2023 04:18 AM    ALKPHOS 60 01/10/2023 04:18 AM    AST 44 01/10/2023 04:18 AM    ALT 40 01/10/2023 04:18 AM       POC Tests: No results for input(s): POCGLU, POCNA, POCK, POCCL, POCBUN, POCHEMO, POCHCT in the last 72 hours.     Coags:   Lab Results   Component Value Date/Time    PROTIME 15.4 01/09/2023 09:43 AM    INR 1.23 01/09/2023 09:43 AM    APTT 29.1 10/18/2015 09:39 AM       HCG (If Applicable): No results found for: PREGTESTUR, PREGSERUM, HCG, HCGQUANT     ABGs: No results found for: PHART, PO2ART, YWM3QLH, KVV5IKF, BEART, V1RWLMTD     Type & Screen (If Applicable):  No results found for: LABABO, LABRH    Drug/Infectious Status (If Applicable):  No results found for: HIV, HEPCAB    COVID-19 Screening (If Applicable):   Lab Results   Component Value Date/Time    COVID19 DETECTED 01/14/2023 04:40 PM           Anesthesia Evaluation  Patient summary reviewed no history of anesthetic complications:   Airway: Mallampati: II       Mouth opening: > = 3 FB   Dental:      Comment: Majority of teeth missing. Dark residue noted on tongue, suspect recent coffee ground emesis. Pulmonary:   (+) pneumonia:  shortness of breath:  recent URI (Covid +):  rhonchi decreased breath sounds     (-) not a current smoker                           Cardiovascular:  Exercise tolerance: good (>4 METS),   (+) hypertension:, past MI:, CAD:, CABG/stent (s/p PCI):, COLVIN:, hyperlipidemia    (-) weak pulses and peripheral edema (trace)        Rate: normal           Beta Blocker:  Dose within 24 Hrs (carvedilol)      ROS comment: Echocardiogram (2015):  Summary  LVH with normal systolic function. EF 60%. Grade I Diastolic dysfunction. No wall motion abnormalities seen. Trivial mitral, aortic, tricuspid and pulmonic regurgitation present. RVSP estimated at 30 mmHg. Neuro/Psych:   (+) neuromuscular disease (ataxia):, depression/anxiety    (-) seizures, TIA and CVA           GI/Hepatic/Renal:   (+) renal disease (CKD-4): CRI,      (-) liver disease and no morbid obesity       Endo/Other:    (+) blood dyscrasia: anemia:., electrolyte abnormalities (K+: 4.7), .    (-) diabetes mellitus               Abdominal:         (-) obese Abdomen: soft. Vascular: negative vascular ROS. Other Findings: Appears frail. Anesthesia Plan      MAC     ASA 4 - emergent     (Patient has PMHx of CAD s/p PCI, HTN, HLD, CKD, MI, anemia, GI bleed presents for EGD.     Patient is confused, obtained verbal telephone consent from Son Yoel Mayberry. Discussed risks and benefits to sedation including nausea, vomiting, allergic reaction, headache, delayed cognitive recovery, stroke, heart attack, respiratory depression, and death which patient understood and agreed to proceed. The patient was given the opportunity to ask questions and all questions were answered to the patient's satisfaction.  )  Induction: intravenous. MIPS: Postoperative opioids intended and Prophylactic antiemetics administered. Anesthetic plan and risks discussed with child/children. Use of blood products discussed with child/children whom consented to blood products. Plan discussed with CRNA. This pre-anesthesia assessment may be used as a history and physical.    DOS STAFF ADDENDUM:    Pt seen and examined, chart reviewed (including anesthesia, drug and allergy history). No interval changes to history and physical examination. Anesthetic plan, risks, benefits, alternatives, and personnel involved discussed with patient. Patient verbalized an understanding and agrees to proceed.       Anabell Hoyos MD  January 16, 2023  2:09 PM

## 2023-01-16 NOTE — PROGRESS NOTES
Pt had a PIV in his R distal FA that was removed on 1/13 d/t leaking and redness. The old PIV site is still reddened and tender. Pt's WBC did increase the morning of 1/14 and has remained elevated since. 01/13/23 2030   [REMOVED] Peripheral IV 01/10/23 Distal;Right;Dorsal Forearm   Removal Date/Time: 01/13/23 2030  Placement Date/Time: 01/10/23 1900   Orientation: Distal;Right;Dorsal  Location: Forearm  Removal Reason: Leaking  Earliest Known Removed: 01/13/23  Post Removal Assessment: Catheter intact;Dressing applied; No complic. .. Site Assessment Leaking;Tender   Line Status Flushed;Leaking   Dressing Status New drainage noted; Old drainage noted   Dressing Intervention Removed

## 2023-01-16 NOTE — PROGRESS NOTES
Department of Internal Medicine  Nephrology Progress Note    HPI.    80 y.o. male whom we were asked to see for RONI on CKD. Followed by Dr. Capo Marshall in the office, last 08/22 with eGFR 18 ml/min. He presents with weakness and dyspnea. He was diagnosed with Covid-19. Renal cosn called for RONI . Events noted , chart reviewed     HPI:  Breathing comfortably. No CP.  ROS:  In bed. [address]:  medications reviewed. Physical Exam:    VITALS:  BP (!) 97/51   Pulse 61   Temp 97.5 °F (36.4 °C) (Oral)   Resp 16   Ht 5' 6\" (1.676 m)   Wt 144 lb 6.4 oz (65.5 kg)   SpO2 96%   BMI 23.31 kg/m²   24HR INTAKE/OUTPUT:    Intake/Output Summary (Last 24 hours) at 1/16/2023 1638  Last data filed at 1/16/2023 1614  Gross per 24 hour   Intake 1263.3 ml   Output 1450 ml   Net -186.7 ml         Constitutional:  Looks comfortable   Respiratory:  scattered rhonchi   Gastrointestinal No tenderness.   Normal Bowel Sounds  Cardiovascular:  S1, S2 RRR   Edema:   + edema  Skin:  no rash    DATA:    CBC:  Lab Results   Component Value Date/Time    WBC 10.7 01/16/2023 07:01 AM    RBC 2.66 01/16/2023 07:01 AM    HGB 7.8 01/16/2023 07:01 AM    HCT 24.9 01/16/2023 07:01 AM    MCV 93.7 01/16/2023 07:01 AM    MCH 29.2 01/16/2023 07:01 AM    MCHC 31.1 01/16/2023 07:01 AM    RDW 15.2 01/16/2023 07:01 AM     01/16/2023 07:01 AM    MPV 8.7 01/16/2023 07:01 AM     CMP:  Lab Results   Component Value Date/Time     01/16/2023 04:44 AM    K 4.7 01/16/2023 04:44 AM    K 4.2 01/12/2023 04:52 AM     01/16/2023 04:44 AM    CO2 19 01/16/2023 04:44 AM    BUN 45 01/16/2023 04:44 AM    CREATININE 3.1 01/16/2023 04:44 AM    GFRAA 24 11/05/2021 01:23 PM    AGRATIO 1.0 01/09/2023 09:43 AM    LABGLOM 18 01/16/2023 04:44 AM    GLUCOSE 91 01/16/2023 04:44 AM    PROT 5.0 01/10/2023 04:18 AM    CALCIUM 8.0 01/16/2023 04:44 AM    BILITOT <0.2 01/10/2023 04:18 AM    ALKPHOS 60 01/10/2023 04:18 AM    AST 44 01/10/2023 04:18 AM    ALT 40 01/10/2023 04:18 AM      Hepatic Function Panel:   Lab Results   Component Value Date/Time    ALKPHOS 60 01/10/2023 04:18 AM    ALT 40 01/10/2023 04:18 AM    AST 44 01/10/2023 04:18 AM    PROT 5.0 01/10/2023 04:18 AM    BILITOT <0.2 01/10/2023 04:18 AM    BILIDIR <0.2 01/10/2023 04:18 AM    IBILI see below 01/10/2023 04:18 AM      Phosphorus:   Lab Results   Component Value Date/Time    PHOS 3.6 01/16/2023 04:44 AM       ASSESSMENT/PLAN:    1) 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 CKD progression vs. pre-renal  - renal function fluctuating but close to baseline     2) Covid-19  - out of isolation     3) PNA  - off Abx     4) FEN  - metabolic acidosis              - started sodium bicarbonate supplementation      5) anemia  - labs noted   - s/p PRBC 01/10/23    6) UGIB  - GI consulted  - EGD showed duodenal ulcer with adherent clot     7) AMS   Overall better.

## 2023-01-16 NOTE — PROGRESS NOTES
Speech Language Pathology    Pt NPO for EGD later today. Will hold ST this date due to NPO status. Plan:  ST to follow up with pt on 1/17/2023.     Grayce Romberg, MS CCC/SLP [id number]  Speech Language Pathologist  01/16/23  1:24 PM

## 2023-01-17 ENCOUNTER — APPOINTMENT (OUTPATIENT)
Dept: GENERAL RADIOLOGY | Age: 88
DRG: 871 | End: 2023-01-17
Payer: MEDICARE

## 2023-01-17 PROBLEM — J15.9 BACTERIAL PNEUMONIA: Status: ACTIVE | Noted: 2023-01-17

## 2023-01-17 LAB
ABO/RH: NORMAL
ABO/RH: NORMAL
ALBUMIN SERPL-MCNC: 2.2 G/DL (ref 3.4–5)
ANION GAP SERPL CALCULATED.3IONS-SCNC: 9 MMOL/L (ref 3–16)
ANTIBODY SCREEN: NORMAL
BLOOD BANK DISPENSE STATUS: NORMAL
BLOOD BANK PRODUCT CODE: NORMAL
BPU ID: NORMAL
BUN BLDV-MCNC: 47 MG/DL (ref 7–20)
CALCIUM SERPL-MCNC: 8 MG/DL (ref 8.3–10.6)
CHLORIDE BLD-SCNC: 113 MMOL/L (ref 99–110)
CO2: 21 MMOL/L (ref 21–32)
CREAT SERPL-MCNC: 3.1 MG/DL (ref 0.8–1.3)
DESCRIPTION BLOOD BANK: NORMAL
GFR SERPL CREATININE-BSD FRML MDRD: 18 ML/MIN/{1.73_M2}
GLUCOSE BLD-MCNC: 106 MG/DL (ref 70–99)
H PYLORI ANTIGEN STOOL: NEGATIVE
HCT VFR BLD CALC: 19.7 % (ref 40.5–52.5)
HCT VFR BLD CALC: 22.5 % (ref 40.5–52.5)
HCT VFR BLD CALC: 22.7 % (ref 40.5–52.5)
HCT VFR BLD CALC: 25.8 % (ref 40.5–52.5)
HEMOGLOBIN: 6.2 G/DL (ref 13.5–17.5)
HEMOGLOBIN: 7.1 G/DL (ref 13.5–17.5)
HEMOGLOBIN: 7.2 G/DL (ref 13.5–17.5)
HEMOGLOBIN: 8.2 G/DL (ref 13.5–17.5)
MCH RBC QN AUTO: 29.6 PG (ref 26–34)
MCHC RBC AUTO-ENTMCNC: 31.9 G/DL (ref 31–36)
MCV RBC AUTO: 92.9 FL (ref 80–100)
PDW BLD-RTO: 14.7 % (ref 12.4–15.4)
PHOSPHORUS: 2.7 MG/DL (ref 2.5–4.9)
PLATELET # BLD: 295 K/UL (ref 135–450)
PMV BLD AUTO: 8.5 FL (ref 5–10.5)
POTASSIUM SERPL-SCNC: 4.5 MMOL/L (ref 3.5–5.1)
RBC # BLD: 2.42 M/UL (ref 4.2–5.9)
SODIUM BLD-SCNC: 143 MMOL/L (ref 136–145)
WBC # BLD: 12.1 K/UL (ref 4–11)

## 2023-01-17 PROCEDURE — 97530 THERAPEUTIC ACTIVITIES: CPT | Performed by: PHYSICAL THERAPIST

## 2023-01-17 PROCEDURE — 85014 HEMATOCRIT: CPT

## 2023-01-17 PROCEDURE — 2060000000 HC ICU INTERMEDIATE R&B

## 2023-01-17 PROCEDURE — 6370000000 HC RX 637 (ALT 250 FOR IP): Performed by: INTERNAL MEDICINE

## 2023-01-17 PROCEDURE — 94760 N-INVAS EAR/PLS OXIMETRY 1: CPT

## 2023-01-17 PROCEDURE — 2580000003 HC RX 258: Performed by: INTERNAL MEDICINE

## 2023-01-17 PROCEDURE — 85027 COMPLETE CBC AUTOMATED: CPT

## 2023-01-17 PROCEDURE — 36415 COLL VENOUS BLD VENIPUNCTURE: CPT

## 2023-01-17 PROCEDURE — 97116 GAIT TRAINING THERAPY: CPT | Performed by: PHYSICAL THERAPIST

## 2023-01-17 PROCEDURE — 85018 HEMOGLOBIN: CPT

## 2023-01-17 PROCEDURE — 86923 COMPATIBILITY TEST ELECTRIC: CPT

## 2023-01-17 PROCEDURE — 86900 BLOOD TYPING SEROLOGIC ABO: CPT

## 2023-01-17 PROCEDURE — 71045 X-RAY EXAM CHEST 1 VIEW: CPT

## 2023-01-17 PROCEDURE — 86850 RBC ANTIBODY SCREEN: CPT

## 2023-01-17 PROCEDURE — C9113 INJ PANTOPRAZOLE SODIUM, VIA: HCPCS | Performed by: INTERNAL MEDICINE

## 2023-01-17 PROCEDURE — 92526 ORAL FUNCTION THERAPY: CPT

## 2023-01-17 PROCEDURE — 80069 RENAL FUNCTION PANEL: CPT

## 2023-01-17 PROCEDURE — P9016 RBC LEUKOCYTES REDUCED: HCPCS

## 2023-01-17 PROCEDURE — 86901 BLOOD TYPING SEROLOGIC RH(D): CPT

## 2023-01-17 PROCEDURE — 6360000002 HC RX W HCPCS: Performed by: INTERNAL MEDICINE

## 2023-01-17 PROCEDURE — 36430 TRANSFUSION BLD/BLD COMPNT: CPT

## 2023-01-17 RX ORDER — SODIUM CHLORIDE 9 MG/ML
INJECTION, SOLUTION INTRAVENOUS PRN
Status: DISCONTINUED | OUTPATIENT
Start: 2023-01-17 | End: 2023-01-20

## 2023-01-17 RX ADMIN — SODIUM BICARBONATE 650 MG: 650 TABLET ORAL at 20:44

## 2023-01-17 RX ADMIN — TAMSULOSIN HYDROCHLORIDE 0.4 MG: 0.4 CAPSULE ORAL at 20:43

## 2023-01-17 RX ADMIN — ACETAMINOPHEN 650 MG: 325 TABLET ORAL at 20:43

## 2023-01-17 RX ADMIN — DOCUSATE SODIUM 100 MG: 100 CAPSULE, LIQUID FILLED ORAL at 09:19

## 2023-01-17 RX ADMIN — ACETAMINOPHEN 650 MG: 325 TABLET ORAL at 09:19

## 2023-01-17 RX ADMIN — SODIUM CHLORIDE, PRESERVATIVE FREE 10 ML: 5 INJECTION INTRAVENOUS at 09:20

## 2023-01-17 RX ADMIN — SODIUM CHLORIDE, PRESERVATIVE FREE 10 ML: 5 INJECTION INTRAVENOUS at 20:44

## 2023-01-17 RX ADMIN — SODIUM CHLORIDE 8 MG/HR: 9 INJECTION, SOLUTION INTRAVENOUS at 16:21

## 2023-01-17 RX ADMIN — POLYETHYLENE GLYCOL 3350 17 G: 17 POWDER, FOR SOLUTION ORAL at 09:18

## 2023-01-17 RX ADMIN — SENNOSIDES 8.6 MG: 8.6 TABLET, FILM COATED ORAL at 09:19

## 2023-01-17 RX ADMIN — DORZOLAMIDE HYDROCHLORIDE AND TIMOLOL MALEATE 1 DROP: 20; 5 SOLUTION/ DROPS OPHTHALMIC at 20:51

## 2023-01-17 RX ADMIN — ATORVASTATIN CALCIUM 40 MG: 40 TABLET, FILM COATED ORAL at 20:43

## 2023-01-17 RX ADMIN — DORZOLAMIDE HYDROCHLORIDE AND TIMOLOL MALEATE 1 DROP: 20; 5 SOLUTION/ DROPS OPHTHALMIC at 09:31

## 2023-01-17 RX ADMIN — ESCITALOPRAM OXALATE 10 MG: 10 TABLET ORAL at 20:43

## 2023-01-17 RX ADMIN — SENNOSIDES 8.6 MG: 8.6 TABLET, FILM COATED ORAL at 20:44

## 2023-01-17 RX ADMIN — DOCUSATE SODIUM 100 MG: 100 CAPSULE, LIQUID FILLED ORAL at 20:44

## 2023-01-17 RX ADMIN — SODIUM BICARBONATE 650 MG: 650 TABLET ORAL at 09:18

## 2023-01-17 ASSESSMENT — PAIN DESCRIPTION - ONSET: ONSET: SUDDEN

## 2023-01-17 ASSESSMENT — PAIN - FUNCTIONAL ASSESSMENT
PAIN_FUNCTIONAL_ASSESSMENT: ACTIVITIES ARE NOT PREVENTED
PAIN_FUNCTIONAL_ASSESSMENT: ACTIVITIES ARE NOT PREVENTED

## 2023-01-17 ASSESSMENT — PAIN DESCRIPTION - FREQUENCY: FREQUENCY: INTERMITTENT

## 2023-01-17 ASSESSMENT — PAIN DESCRIPTION - LOCATION
LOCATION: NECK
LOCATION: HEAD
LOCATION: HEAD

## 2023-01-17 ASSESSMENT — ENCOUNTER SYMPTOMS
CHEST TIGHTNESS: 0
GASTROINTESTINAL NEGATIVE: 1
SHORTNESS OF BREATH: 0

## 2023-01-17 ASSESSMENT — PAIN DESCRIPTION - DESCRIPTORS
DESCRIPTORS: ACHING

## 2023-01-17 ASSESSMENT — PAIN SCALES - GENERAL
PAINLEVEL_OUTOF10: 2
PAINLEVEL_OUTOF10: 2
PAINLEVEL_OUTOF10: 6
PAINLEVEL_OUTOF10: 0
PAINLEVEL_OUTOF10: 2

## 2023-01-17 ASSESSMENT — PAIN DESCRIPTION - PAIN TYPE
TYPE: ACUTE PAIN
TYPE: ACUTE PAIN

## 2023-01-17 ASSESSMENT — PAIN DESCRIPTION - ORIENTATION: ORIENTATION: LEFT

## 2023-01-17 NOTE — CONSULTS
Brief Nutrition Note     Consult for poor intake/appetite. RD currently following patient. See RD note from 1/13 for full assessment. Will f/u on previously scheduled date. Pt on thickened liquids, will modify supplement to [employer] as [employer] is not allowed on diet since it is a thin liquid.       Electronically signed by Roxane Holm RD, LD on 1/17/2023 at 2:16 PM

## 2023-01-17 NOTE — PROGRESS NOTES
Pt's diet was changed and he is not tolerating soft and bite sized. Pt is coughing and spit all the food out. Pt repeats over and over he needs softer food. ST notified and diet was changed back to minced and moist. MD was notified of possible aspiration. Awaiting new orders.

## 2023-01-17 NOTE — PROGRESS NOTES
Blood administration started at 1733 time of blood hitting veins. Sat with patient for 15 minutes. Zero side effects. Vitals retaken at 21 . System was down. Rate increased to 120mL/hr manually entered. Blood pressure slightly higher due to speaking with  in room. Rechecked and bp was normal 129/70. Call light in reach.

## 2023-01-17 NOTE — PROGRESS NOTES
[facility]   Speech Therapy  Daily Dysphagia Treatment Note    Getachew Salinas  AGE: 80 y.o. 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)    Chronic constipation    Former cigarette smoker    Epigastric pain    Ventral hernia    Anemia in other chronic diseases classified elsewhere    Current moderate episode of major depressive disorder without prior episode (HCC)    Fatigue    Sleep disorder    Non-English speaking patient    Nocturia    Urinary frequency    Chronic kidney disease, stage IV (severe) (HCC)    Ataxia    Frequent falls    Frailty syndrome in geriatric patient    Chronic pain syndrome    Bradycardia    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 chronic kidney disease (HCC)    Hyperkalemia    Chronic midline low back pain    SOB (shortness of breath)     No Known Allergies    Treatment Diagnosis: Dysphagia     Chart review: 2023 admitted with c/o generalized weakness, fatigue, poor appetite  MD ADMISSION H&P HPI:  Patient is a 68-year-old male with past medical history of CAD, CKD stage IV, hyperlipidemia who presents to the hospital due to patient having generalized weakness. Patient also has poor appetite, fatigue as well as generalized weakness. Patient does not have any recent sick contacts, no reported fevers chills. No reported nausea vomiting diarrhea. 2023 COVID +     IMAGING:  CXR: 2023  Impression   Ill-defined opacities bilaterally.       GI note 1/10/23  80 y.o. male with a PMH of CAD with coronary stents, HLD, CKD stage IV, BPH who presented on 1/5/2023 with generalized weakness, decreased oral intake diagnosed with COVID-19 and bacterial pneumonia. Has developed worsening anemia during hospital course and came down to 6.9 1/8/23 and received 1 unit of blood. Repeat hgb 1/9/23 the same so recieved a 2nd unit. Then morning on 1/9/23 had explosive diarrhea with clots. Labs this am showed BUN 83 and Cr 4.0 (56/2.6 yesterday). LFT's AST 49, ALT 49, bili 0.3. Hgb 6.9 after 1 unit of blood and then 8.0 after a 2nd unit of blood (10.5 on admission). EGD 1/9/23 showed a normal esophagus without Mcdonald's or reflux changes, 3cm sliding hiatal hernia, normal stomach, 6mm clean-based duodenal ulcer, 15mm clean-based duodenal ulcer in the bulb. In the sweep, there was a 2cm ulcer with a visible vessel in the distal aspect of the ulcer. I was not able to see the very proximal aspect of the ulcer. Epinephrine 1:10,000 was injected in each of 4 quadrants around the ulcer with excellent blanching of tissue. A total of 3.5mL was injected. Next, the vessel was obliterated using gold probe cautery with excellent hemostasis. No bleeding at the end of the procedure. 2nd portion of the duodenum was normal.      Duodenal ulcer with hemorrhage and acute blood loss anemia  Acute renal failure  COVID pneumonia     PLAN :  PPI drip  H. Pylori stool antigen  Avoid NSAIDs  Agree with transfusion. Would watch for now. BUN improving. Hgb down 0.5 points. No other treatable lesion on endoscopy yesterday. Will transfuse and monitor. Continue PPI drip. I suspect this is old blood passing through rather than active bleeding but will need to watch. GI note 1/16/23-EGD   The esophageal mucosa appeared normal.  The gastroesophageal junction is also normal.  The patient has a small hiatal hernia. Examination of the stomach revealed a normal gastric mucosa. In the duodenum, multiple ulcers were noted. Most ulcers had a clean base.   However, an adherent clot was noted on an ulcer in the proximal duodenal bulb. There was no evidence of active bleeding. Epinephrine 1: 10,000 was injected on both sides of the ulcer. Next, the clot was removed. The base of the ulcer was visualized. A small visible vessel was noted. Next, a large Hemoclip was placed. Adequate positioning was confirmed. Multiple flushes were performed . No active bleeding was noted. Recommendations: Resume IV PPI. Start clear liquid diet. Patient has multiple ulcers in the duodenum. He remains at risk for rebleed. Continue to monitor closely. Trend H&H. Subjective:   Current Diet Level: Regular, thin liquids    1/10/2023 Clear liquids ; Thin liquids  (despite recommendation for mildly thick liquids prior to being made NPO)    1/11/23: GI wrote for regular diet this date 1/11/23; nsg concern that pt cannot tolerate solids-recommendation for minced and moist/mildly thick liquids    1/16/23:  GI wrote for regular diet/thin liquids despite patient previously on minced and moist, mildly thick liquids, nursing reports she has been thickening his drinks    Comments regarding tolerating Current Diet:   RN concern for aspiration due to bronchi in RLL, MD made aware, reports has been thickening drinks    Objective:     Pain: Did not state               Vision: [x]WFL []Impaired:  Hearing: []WFL [x]Impaired: [place]    Cognitive/behavioral/communication:   Oriented to [x] self [x] place [x] Date (month and year) [] situation  []alert []lethargic  [x]cooperative []self-limiting   [x]confused at times  []distractible []agitated []impulsive  []verbally responsive []nonverbal [x]limited verbal responses  [x]follows one step commands []does not follow dx []follows complex commands  []aphasic []dysarthric  [] other:     Respiratory Status: [x]Room Air []O2 via nasal cannula []Other:  Dentition: []Adequate []Dentures [x]Missing Most Teeth []Edentulous []Other:  Vocal Quality: [x]Normal []Dysphonic  []Aphonic  []Hoarse []Wet []Weak []Other:  Volitional Cough: [x]Strong []Weak []Wet []Absent []Congested []Other  Volitional Swallow:   []Absent  [x]Delayed []Adequate []Required use of drink   Reflexive cough:   [x]Strong []Weak []Wet []Absent []Congested []Other:    Oral Mechanism Exam:  []WFL []Mild   [x] Moderate  []Severe  []To be assessed  Impaired:   []Left side      []Right side    [x]Labial ROM/Coordination    []Labial Symmetry   [x]Lingual ROM/Coordination   []Lingual Symmetry  []Gag  []Other:     Patient Positioning: Upright in bed ; bed does not got to 90 degrees    PO Trials: reduced appetite this session, only agreeable to a couple bites  Thin Liquids: suspect at risk for premature loss of bolus, delayed swallow initiation, decreased laryngeal elevation, immediate severe cough with thin liquid via tsp and cup, increased belching noted  Nectar thick liquids via straw: suspect at risk for premature loss of bolus, delayed swallow initiation, decreased laryngeal elevation, no overt s/s of aspiration or penetration, increased belching noted  Honey Thick liquids : NT  Puree: adequate formation and clearance of bolus, suspect at risk for premature loss of bolus, delayed swallow initiation, decreased laryngeal elevation, no overt s/s of aspiration or penetration   minced and moist: slow oral phase, adequate clearance, suspect at risk for premature loss of bolus, delayed swallow initiation, decreased laryngeal elevation, no overt s/s of aspiration or penetration   Soft solid x3 bites: prolonged mastication and formation of bolus, improved ability to masticate soft solid prolonged but effective with no oral residue noted, no overt s/s of aspiration or penetration     Dysphagia Tx:   Direct Dysphagia tx: PO tolerance as indicated above. Limited PO intake, but appears to tolerate soft and bite sized with decreased difficulty.  Continues to demonstrate s/s of aspiration/penetration with thin liquids. Dysphagia ex: declined-reported having headache  Training in compensatory strategies: small single sips, reviewed purpose of diet recommendations, sit upright  Pt response to ex/training: verbalized understanding, suspect ongoing education required    Goals:    Pt will functionally tolerate recommended diet with no overt clinical s/s of aspiration ongoing  Pt will demonstrate understanding of aspiration risk and precautions via education/demonstration with occasional prompting ongoing  Pt will advance to least restrictive diet as indicated  ongoing     Assessment:   Impressions:   Accepted and tolerated treatment at bedside  Patient alert, cooperative, pleasant; follows simple dx; verbally responsive, oriented to self, situation, kind of place, month, year and day  Mild-moderate oral dysphagia characterized by decreased lingual coordination, generalized oral weakness, prolonged mastication of bolus improving ability to masticate soft and bite-sized, suspect at risk for premature loss of bolus. Pt endorses being on a soft diet in home environment reporting he eats rice, mashed potatoes, spaghetti, etc due to majority of teeth missing   Moderate pharyngeal stage dysphagia characterized by delayed swallow and decreased laryngeal elevation. Immediate cough of thin liquids via tsp. Overt s/s of aspiration or penetration with thin liquids.  Increased belching noted this AM with liquids  Recommend soft and bite-sized/ cont mildly thick liquids  ST to follow for diet tolerance, upgrade readiness, and tx appropriateness monitor    Recommended Diet and Intervention 1/17/2023:  Diet Solids Recommendation:  Soft and bite-sized   Liquid Consistency Recommendation:  Mildly (nectar) thick liquids  Recommended form of Meds: Meds crushed as able in puree      Compensatory Swallowing Strategies:  Upright as possible with all PO intake , Small bites/sips , Remain upright 30-45 min     EDUCATION:   Provided education regarding role of SLP, results of assessment, recommendations and general speech pathology plan of care. [] Pt verbalized understanding and agreement   [x] Pt requires ongoing learning   [] No evidence of comprehension     Dysphagia Prognosis: [] good [x]fair []poor []guarded     Current co-morbidites    Plan:   Continue Dysphagia Therapy: YES    Interventions: Diet Tolerance Monitoring , Patient/Family Education   Duration/Frequency of therapy while on unit: Speech therapy for dysphagia tx 3-5 times per week during acute care stay. Discharge Instructions:   Recommend ongoing SLP for dysphagia therapy upon discharge from hospital     This note serves as a D/C Summary in the event that this patient is discharged prior to the next therapy session.     Coded treatment time: 0  Total treatment time: [address], [address] [place] [address]  Speech-Language Pathologist  On 01/17/23 at 8:23 AM

## 2023-01-17 NOTE — PROGRESS NOTES
Physical Therapy  Facility/Department: [facility]  Physical Therapy Treatment Note    Name: Zeinab Holland  : 1931  MRN: [id number]  Date of Service: 2023    Discharge Recommendations:  Patient would benefit from continued therapy after discharge (3-5x/wk)   PT Equipment Recommendations  Other: defer to next level of care    Zeinab Holland scored a 17/24 on the AM-PAC short mobility form. Current research shows that an AM-PAC score of 17 or less is typically not associated with a discharge to the patient's home setting. Based on the patient's AM-PAC score and their current functional mobility deficits, it is recommended that the patient have 3-5 sessions per week of Physical Therapy at d/c to increase the patient's independence. Please see assessment section for further patient specific details. If patient discharges prior to next session this note will serve as a discharge summary. Please see below for the latest assessment towards goals. Patient Diagnosis(es): The primary encounter diagnosis was Pneumonia of right lung due to infectious organism, unspecified part of lung. Diagnoses of General weakness, Impaired mobility and ADLs, COVID-19, and Acute kidney injury superimposed on CKD [facility]) were also pertinent to this visit. Past Medical History:  has a past medical history of 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 chronic kidney disease ([facility] [address] Back pain, BPH (benign prostatic hyperplasia), CAD (coronary artery disease), CAD (coronary artery disease), Constipated, Hyperlipidemia, MI (myocardial infarction) ([facility] [address] Spinal stenosis, Stented coronary artery, Urinary hesitancy, and Vitamin D deficiency. Past Surgical History:  has a past surgical history that includes Coronary angioplasty with stent (10/18/15); Endoscopy, colon, diagnostic; Upper gastrointestinal endoscopy (10-); Upper gastrointestinal endoscopy (N/A, 2023); Upper gastrointestinal endoscopy (2023);  Upper gastrointestinal endoscopy (N/A, 1/16/2023); and Upper gastrointestinal endoscopy (1/16/2023). Assessment   Body Structures, Functions, Activity Limitations Requiring Skilled Therapeutic Intervention: Decreased functional mobility ; Decreased ADL status; Decreased strength;Decreased balance;Decreased endurance  Assessment: Patient is a 24-year-old male with past medical history of CAD, CKD stage IV, hyperlipidemia who presents to the hospital on 1/5/23 due to patient having generalized weakness. Pt found to be COVID+. Prior to admission, pt living in apt setting with son, independent with ADLs and ambulation with SPC vs RW.   Pt is making slow improvements but continues to need significant assist with functional tasks and has early fatigue; pt will benefit from continued therapy 3-5x/wk to address deficits to allow pt to regain his Ind  Treatment Diagnosis: impaired mobility  Therapy Prognosis: Fair  Clinical Presentation: evolving  Requires PT Follow-Up: Yes  Activity Tolerance  Activity Tolerance: Patient limited by endurance  Activity Tolerance Comments: limited by overall strength deficits     Plan   Physcial Therapy Plan  General Plan: 3-5 times per week  Specific Instructions for Next Treatment: cotx with OT  Current Treatment Recommendations: Strengthening, Balance training, Functional mobility training, Transfer training, Gait training, Endurance training, Neuromuscular re-education, Safety education & training, Equipment evaluation, education, & procurement, Patient/Caregiver education & training, Therapeutic activities  Safety Devices  Type of Devices: Call light within reach, Chair alarm in place, Left in chair, Nurse notified     Restrictions  Restrictions/Precautions  Restrictions/Precautions: Fall Risk  Position Activity Restriction  Other position/activity restrictions: Covid-19; IV; aldrich     Subjective   General  Chart Reviewed: Yes  Response To Previous Treatment: Patient with no complaints from previous session. Family / Caregiver Present: No  Referring Practitioner: Sandor Brown MD  Referral Date : 01/05/23  Diagnosis: COVID+ no longer in isolation  Follows Commands: Within Functional Limits  Subjective  Subjective: pt reports fatigue but willing to get up with therapy with encouragement         Social/Functional History  Social/Functional History  Lives With: Son  Type of Home: Apartment  Home Layout: One level  Home Access: Stairs to enter with rails  Entrance Stairs - Number of Steps: 13  Bathroom Shower/Tub: Tub/Shower unit  Bathroom Toilet: Standard  Bathroom Equipment: Grab bars in [id number] [place]: Laina Peals, Walker, rolling  Has the patient had two or more falls in the past year or any fall with injury in the past year?: Yes  ADL Assistance: Independent  Ambulation Assistance: Independent (RW in apt, SPC in community)  Transfer Assistance: Independent  Active : No  Occupation: Retired  Type of Occupation:   Vision/Hearing  Vision  Vision: Impaired (Reporting poor vision but does not wear glasses)  Vision Exceptions:  (kept eyes closed most of PT sessioin this date.)  Hearing  Hearing: Exceptions to [facility]  Hearing Exceptions: Hard of hearing/hearing concerns    Cognition   Orientation  Overall Orientation Status: Impaired  Orientation Level: Oriented to person;Oriented to place  Cognition  Overall Cognitive Status: Exceptions  Arousal/Alertness: Appropriate responses to stimuli  Following Commands: Follows one step commands with repetition; Follows one step commands with increased time  Attention Span: Difficulty dividing attention  Memory: Decreased recall of recent events;Decreased short term memory  Safety Judgement: Decreased awareness of need for safety  Problem Solving: Decreased awareness of errors  Insights: Decreased awareness of deficits  Initiation: Requires cues for some  Sequencing: Requires cues for some     Objective   Heart Rate: 59  Heart Rate Source: Monitor  BP: (!) 128/40  BP Location: Right upper arm  BP Method: Automatic  Patient Position: Semi fowlers  MAP (Calculated): 69  Resp: 16  SpO2: 95 %  O2 Device: None (Room air)                             Bed mobility  Supine to Sit: Minimal assistance  Transfers  Sit to Stand: Minimal Assistance  Stand to Sit: Minimal Assistance  Ambulation  Surface: Level tile  Device: Rolling Walker  Assistance: Minimal assistance  Quality of Gait: flexed posture with small slow steps; therapist managed IV pole  Gait Deviations: Slow Donna;Decreased step length;Decreased step height  Distance: 10' and 25'  Comments: pt reported feeeling dizzy during session; used commode and had a small BM, needed assist to cleanse     Balance  Comments: CGA for sitting EOB and CGA/min A for standing with RW           OutComes Score                                                  AM-PAC Score  AM-PAC Inpatient Mobility Raw Score : 17 (01/13/23 [facility])  AM-PAC Inpatient T-Scale Score : 42.13 (01/13/23 [facility])  Mobility Inpatient CMS 0-100% Score: 50.57 (01/13/23 [facility])  Mobility Inpatient CMS G-Code Modifier : CK (01/13/23 1231)          Tinneti Score       Goals  Short Term Goals  Time Frame for Short Term Goals: by acute discharge - all goals ongoing  Short Term Goal 1: bed mobility SBA  Short Term Goal 2: sit<>stand with SBA  Short Term Goal 3: ambulate > 30' with RW and SBA  Patient Goals   Patient Goals : none stated       Education  Patient Education  Education Given To: Patient  Education Provided Comments: reviewed call light and not getting up without assist  Education Method: Verbal  Education Outcome: Verbalized understanding      Therapy Time   Individual Concurrent Group Co-treatment   Time In 0910         Time Out 1000         Minutes 50                 SHANTEL AGUILAR PT   Electronically signed by SHANTEL AGUILAR PT on 1/17/2023 at 10:00 AM

## 2023-01-17 NOTE — PROGRESS NOTES
[facility]   Speech Therapy  Daily Dysphagia Treatment Note/re-assessment    Flash Bradford  AGE: 89 y.o.   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)    Chronic constipation    Former cigarette smoker    Epigastric pain    Ventral hernia    Anemia in other chronic diseases classified elsewhere    Current moderate episode of major depressive disorder without prior episode (HCC)    Fatigue    Sleep disorder    Non-English speaking patient    Nocturia    Urinary frequency    Chronic kidney disease, stage IV (severe) (HCC)    Ataxia    Frequent falls    Frailty syndrome in geriatric patient    Chronic pain syndrome    Bradycardia    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 chronic kidney disease (HCC)    Hyperkalemia    Chronic midline low back pain    SOB (shortness of breath)     No Known Allergies    Treatment Diagnosis: Dysphagia     Chart review: 2023 admitted with c/o generalized weakness, fatigue, poor appetite  MD ADMISSION H&P HPI:  Patient is a 89-year-old male with past medical history of CAD, CKD stage IV, hyperlipidemia who presents to the hospital due to patient having generalized weakness.  Patient also has poor appetite, fatigue as well as generalized weakness.  Patient does not have any recent sick contacts, no reported fevers chills.  No reported nausea vomiting diarrhea.     2023 COVID +     IMAGING:    CXR 23:   Impression   1.  Patchy bibasilar airspace disease which could reflect atelectasis versus   pneumonia.       2.  Stable  diffuse bilateral calcified pleural plaques       CXR 1/9/23  Impression   1. Patchy right basilar airspace disease could represent atelectasis versus   pneumonia. 2.  Multiple bilateral calcified pleural plaques       CXR: 1/5/2023  Impression   Ill-defined opacities bilaterally. GI note 1/10/23  80 y.o. male with a PMH of CAD with coronary stents, HLD, CKD stage IV, BPH who presented on 1/5/2023 with generalized weakness, decreased oral intake diagnosed with COVID-19 and bacterial pneumonia. Has developed worsening anemia during hospital course and came down to 6.9 1/8/23 and received 1 unit of blood. Repeat hgb 1/9/23 the same so recieved a 2nd unit. Then morning on 1/9/23 had explosive diarrhea with clots. Labs this am showed BUN 83 and Cr 4.0 (56/2.6 yesterday). LFT's AST 49, ALT 49, bili 0.3. Hgb 6.9 after 1 unit of blood and then 8.0 after a 2nd unit of blood (10.5 on admission). EGD 1/9/23 showed a normal esophagus without [employer] or reflux changes, 3cm sliding hiatal hernia, normal stomach, 6mm clean-based duodenal ulcer, 15mm clean-based duodenal ulcer in the bulb. In the sweep, there was a 2cm ulcer with a visible vessel in the distal aspect of the ulcer. I was not able to see the very proximal aspect of the ulcer. Epinephrine 1:10,000 was injected in each of 4 quadrants around the ulcer with excellent blanching of tissue. A total of 3.5mL was injected. Next, the vessel was obliterated using gold probe cautery with excellent hemostasis. No bleeding at the end of the procedure. 2nd portion of the duodenum was normal.      Duodenal ulcer with hemorrhage and acute blood loss anemia  Acute renal failure  COVID pneumonia     PLAN :  PPI drip  H. Pylori stool antigen  Avoid NSAIDs  Agree with transfusion. Would watch for now. BUN improving. Hgb down 0.5 points. No other treatable lesion on endoscopy yesterday. Will transfuse and monitor. Continue PPI drip.   I suspect this is old blood passing through rather than active bleeding but will need to watch. GI note 1/16/23-EGD   The esophageal mucosa appeared normal.  The gastroesophageal junction is also normal.  The patient has a small hiatal hernia. Examination of the stomach revealed a normal gastric mucosa. In the duodenum, multiple ulcers were noted. Most ulcers had a clean base. However, an adherent clot was noted on an ulcer in the proximal duodenal bulb. There was no evidence of active bleeding. Epinephrine 1: 10,000 was injected on both sides of the ulcer. Next, the clot was removed. The base of the ulcer was visualized. A small visible vessel was noted. Next, a large Hemoclip was placed. Adequate positioning was confirmed. Multiple flushes were performed . No active bleeding was noted. Recommendations: Resume IV PPI. Start clear liquid diet. Patient has multiple ulcers in the duodenum. He remains at risk for rebleed. Continue to monitor closely. Trend H&H. Subjective:   Current Diet Level: Minced and moist, mildly thick liquids    1/10/2023 Clear liquids ; Thin liquids  (despite recommendation for mildly thick liquids prior to being made NPO)    1/11/23: GI wrote for regular diet this date 1/11/23; nsg concern that pt cannot tolerate solids-recommendation for minced and moist/mildly thick liquids    1/16/23:  GI wrote for regular diet/thin liquids despite patient previously on minced and moist, mildly thick liquids, nursing reports she has been thickening his drinks    1/17/23: RN called therapist after tx reporting patient reports dislike of soft and bite-sized prefers minced and moist, diet changed back to minced and moist. MD ordered CSE for concern for aspiration     Comments regarding tolerating Current Diet:   AM treatment-RN concern for aspiration due to bronchi in RLL  Chest x-ray completed (see above for results, appears unchanged from   Objective:     Pain: Did not state               Vision: [x]WFL []Impaired:  Hearing: []WFL [x]Impaired: [place]    Cognitive/behavioral/communication:   Oriented to [x] self [x] place [x] Date (month and year) [] situation  []alert []lethargic  [x]cooperative []self-limiting   [x]confused at times  []distractible []agitated []impulsive  []verbally responsive []nonverbal [x]limited verbal responses  [x]follows one step commands []does not follow dx []follows complex commands  []aphasic []dysarthric  [] other:     Respiratory Status: [x]Room Air []O2 via nasal cannula []Other:  Dentition: []Adequate []Dentures [x]Missing Most Teeth []Edentulous []Other:  Vocal Quality: [x]Normal []Dysphonic  []Aphonic  []Hoarse []Wet []Weak []Other:  Volitional Cough: [x]Strong []Weak []Wet []Absent []Congested []Other  Volitional Swallow:   []Absent  [x]Delayed []Adequate []Required use of drink   Reflexive cough:   [x]Strong []Weak []Wet []Absent []Congested []Other:    Oral Mechanism Exam:  []WFL []Mild   [x] Moderate  []Severe  []To be assessed  Impaired:   []Left side      []Right side    [x]Labial ROM/Coordination    []Labial Symmetry   [x]Lingual ROM/Coordination   []Lingual Symmetry  []Gag  []Other:     Patient Positioning: Upright in bed ; bed does not got to 90 degrees    PO Trials: reduced appetite this session, only agreeable to a couple bites of minced and moist  Thin Liquids: DNT  Nectar thick liquids via cup (8 oz): suspect at risk for premature loss of bolus, delayed swallow initiation, decreased laryngeal elevation, no overt s/s of aspiration or penetration, increased belching noted  Honey Thick liquids (4 oz): suspect at risk for premature loss of bolus, delayed swallow initiation, decreased laryngeal elevation, no overt s/s of aspiration or penetration, increased belching noted  Puree: DNT  minced and moist: slow oral phase, adequate clearance, suspect at risk for premature loss of bolus, delayed swallow initiation, decreased laryngeal elevation, no overt s/s of aspiration or penetration   Soft solid: DNT    Dysphagia Tx:   Direct Dysphagia tx: PO tolerance as indicated above. Increased belching noted this session with liquids. Suspect new baseline is minced and moist/mildly thick liquids-declining upgrade to soft and bite sized preferring softer foods  Dysphagia ex: declined-reported feeling ill  Training in compensatory strategies: small single sips, reviewed purpose of diet recommendations, sit upright  Pt response to ex/training: verbalized understanding, suspect ongoing education required    Goals:    Pt will functionally tolerate recommended diet with no overt clinical s/s of aspiration ongoing  Pt will demonstrate understanding of aspiration risk and precautions via education/demonstration with occasional prompting ongoing  Pt will advance to least restrictive diet as indicated  ongoing     Assessment:   Impressions:   Accepted and tolerated treatment at bedside  Patient alert, cooperative, pleasant; follows simple dx; verbally responsive, oriented to self, situation, kind of place, month, year and day  Mild-moderate oral dysphagia characterized by decreased lingual coordination, generalized oral weakness, prolonged mastication of bolus improving ability to masticate soft and bite-sized, suspect at risk for premature loss of bolus. Pt endorses being on a soft diet in home environment reporting he eats rice, mashed potatoes, spaghetti, etc due to majority of teeth missing   Moderate pharyngeal stage dysphagia characterized by delayed swallow and decreased laryngeal elevation. No overt s/s of aspiration or penetration with minced and moist or thickened liquids. Ongoing s/s of aspiration/penetration with thin liquids. Increased belching noted this AM with liquids  Recommend cont minced and moist, mildly thick liquids, patient declining upgrade to soft and bite sized textures.    If concern for aspiration, a Modified Barium Swallow Study can be completed to further assess swallow function with MD order  ST to follow for diet tolerance, upgrade readiness, and tx appropriateness monitor    Recommended Diet and Intervention 1/17/2023:  Diet Solids Recommendation:  Minced and moist   Liquid Consistency Recommendation:  Mildly (nectar) thick liquids  Recommended form of Meds: Meds crushed as able in puree      Compensatory Swallowing Strategies:  Upright as possible with all PO intake , Small bites/sips , Remain upright 30-45 min     EDUCATION:   Provided education regarding role of SLP, results of assessment, recommendations and general speech pathology plan of care. [] Pt verbalized understanding and agreement   [x] Pt requires ongoing learning   [] No evidence of comprehension     Dysphagia Prognosis: [] good [x]fair []poor []guarded     Current co-morbidites    Plan:   Continue Dysphagia Therapy: YES    Interventions: Diet Tolerance Monitoring , Patient/Family Education   Duration/Frequency of therapy while on unit: Speech therapy for dysphagia tx 3-5 times per week during acute care stay. Discharge Instructions:   Recommend ongoing SLP for dysphagia therapy upon discharge from hospital     This note serves as a D/C Summary in the event that this patient is discharged prior to the next therapy session.     Coded treatment time: 0  Total treatment time: [address] [place] [address], [facility]  Speech-Language Pathologist  On 01/17/23 at 11:33 AM

## 2023-01-17 NOTE — PLAN OF CARE
Problem: Skin/Tissue Integrity  Goal: Absence of new skin breakdown  Description: 1. Monitor for areas of redness and/or skin breakdown  2. Assess vascular access sites hourly  3. Every 4-6 hours minimum:  Change oxygen saturation probe site  4. Every 4-6 hours:  If on nasal continuous positive airway pressure, respiratory therapy assess nares and determine need for appliance change or resting period.   Outcome: Progressing     Problem: Neurosensory - Adult  Goal: Achieves stable or improved neurological status  Outcome: Progressing  Flowsheets (Taken 1/16/2023 2148 by Jagruti Armenta RN)  Achieves stable or improved neurological status: Assess for and report changes in neurological status  Goal: Achieves maximal functionality and self care  Outcome: Progressing  Flowsheets (Taken 1/16/2023 2148 by Jagruti Armenta RN)  Achieves maximal functionality and self care: Monitor swallowing and airway patency with patient fatigue and changes in neurological status     Problem: Cardiovascular - Adult  Goal: Maintains optimal cardiac output and hemodynamic stability  Outcome: Progressing  Flowsheets (Taken 1/16/2023 2148 by Jagruti Armenta RN)  Maintains optimal cardiac output and hemodynamic stability: Monitor blood pressure and heart rate  Goal: Absence of cardiac dysrhythmias or at baseline  Outcome: Progressing     Problem: Skin/Tissue Integrity - Adult  Goal: Skin integrity remains intact  Outcome: Progressing  Flowsheets  Taken 1/17/2023 1051 by Shirley Lacey RN  Skin Integrity Remains Intact: Monitor for areas of redness and/or skin breakdown  Taken 1/16/2023 2148 by Jagruti Armenta RN  Skin Integrity Remains Intact: Monitor for areas of redness and/or skin breakdown  Goal: Oral mucous membranes remain intact  Outcome: Progressing  Flowsheets (Taken 1/16/2023 2148 by Jagruti Armenta RN)  Oral Mucous Membranes Remain Intact: Assess oral mucosa and hygiene practices     Problem: Musculoskeletal - Adult  Goal: Return mobility to safest level of function  Outcome: Progressing  Goal: Return ADL status to a safe level of function  Outcome: Progressing     Problem: Gastrointestinal - Adult  Goal: Maintains or returns to baseline bowel function  Outcome: Progressing  Flowsheets (Taken 1/16/2023 2148 by Taylor Smyth RN)  Maintains or returns to baseline bowel function: Assess bowel function     Problem: Infection - Adult  Goal: Absence of infection at discharge  Outcome: Progressing  Flowsheets (Taken 1/16/2023 2148 by Taylor Smyth RN)  Absence of infection at discharge: Assess and monitor for signs and symptoms of infection     Problem: Metabolic/Fluid and Electrolytes - Adult  Goal: Electrolytes maintained within normal limits  Outcome: Progressing  Flowsheets (Taken 1/16/2023 2148 by Taylor Smyth RN)  Electrolytes maintained within normal limits: Monitor labs and assess patient for signs and symptoms of electrolyte imbalances  Goal: Hemodynamic stability and optimal renal function maintained  Outcome: Progressing  Flowsheets (Taken 1/16/2023 2148 by Taylor Smyth RN)  Hemodynamic stability and optimal renal function maintained: Monitor labs and assess for signs and symptoms of volume excess or deficit     Problem: ABCDS Injury Assessment  Goal: Absence of physical injury  Outcome: Progressing     Problem: Safety - Adult  Goal: Free from fall injury  Outcome: Progressing     Problem: Pain  Goal: Verbalizes/displays adequate comfort level or baseline comfort level  Outcome: Progressing     Problem: Nutrition Deficit:  Goal: Optimize nutritional status  Outcome: Progressing     Problem: Discharge Planning  Goal: Discharge to home or other facility with appropriate resources  Outcome: Not Progressing  Flowsheets (Taken 1/16/2023 2148 by Taylor Smyth RN)  Discharge to home or other facility with appropriate resources: Identify barriers to discharge with patient and caregiver     Problem: Respiratory - Adult  Goal: Achieves optimal ventilation and oxygenation  Outcome: Not Progressing  Flowsheets  Taken 1/17/2023 1052 by Magali Bhakta RN  Achieves optimal ventilation and oxygenation: Assess for changes in respiratory status  Taken 1/16/2023 2148 by Naina Simon RN  Achieves optimal ventilation and oxygenation: Assess for changes in respiratory status     Problem: Hematologic - Adult  Goal: Maintains hematologic stability  Outcome: Not Progressing  Flowsheets (Taken 1/16/2023 2148 by Naina Simon RN)  Maintains hematologic stability: Assess for signs and symptoms of bleeding or hemorrhage  Note: WBC is elevated this shift. CXR is being completed. RBC- 2.42, HGB-7.2 which is lower than 1/16/23, HCT is 22.5.  Pt had ulcer hemoclipped yesterday

## 2023-01-17 NOTE — PROGRESS NOTES
Hospital Medicine Progress Note     Date:  2023    PCP: Kiersten Marrufo MD (Tel: [phone])    Date of Admission: 2023    Chief complaint:   Chief Complaint   Patient presents with    Fatigue     Pt. normally able to ambulate but not at this time and is extremely weak. Brief admission history: 40-year-old male with history of CKD stage IV, anemia of chronic disease, BPH, CAD, hyperlipidemia, who was admitted with generalized weakness, decreased oral intake, diagnosed with COVID-19 and bacterial pneumonia. Assessment/plan:  Bacterial pneumonia, likely superimposed in the setting of COVID-19 pneumonia. Procalcitonin level was elevated. Completed course of antibiotics. Did not require COVID-specific treatment as patient was not hypoxic. Sore throat. Secondary to COVID-19 infection. As needed lozenges. Acute kidney injury on CKD stage IV, improved with intravenous fluid. Generalized weakness. Likely secondary to acute medical conditions. Treat underlying conditions as noted above. Therapy evaluation in place. Going to SNF at discharge. Asymptomatic bacteriuria. Patient was already on antibiotics for other indication as noted above. There is no indication for treatment of asymptomatic bacteriuria. Acute upper GI bleed. Status post EGD on 2023 with multiple duodenal ulcers (6 mm clean-based duodenal ulcer, 15 mm clean-based duodenal bulb ulcer, 2 cm ulcer with visible vessel in distal aspect of ulcer, treated - see EGD report. He had repeat EGD on 2023 with noted 1 duodenal ulcer with adherent clot, clot removed and visible vessel treated. Remains on PPI infusion. Remains at risk for re-bleed. Avoid NSAIDs. Monitor serial H&H. Acute on chronic anemia with superimposed anemia of chronic disease. Secondary to #1. He has required multiple units of PRBC transfusions this hospital stay. Recommend repeat H&H within 1 week of discharge. At risk for aspiration.  Reportedly with cough during meal intake this morning. Will obtain CXR. Swallow eval.  Other comorbidities: history of CKD stage IV, anemia of chronic disease, BPH, CAD, hyperlipidemia. Disposition. Going to SNF. Discharge date to be determined. Will discuss with daughter today. Diet: ADULT ORAL NUTRITION SUPPLEMENT; Breakfast, Lunch, Dinner; Standard High Calorie/High Protein Oral Supplement  ADULT DIET; Regular    Code status: Full Code   ----------  Subjective  States he is tired of being pocked with needles. No other complaints otherwise. Nurse informs me patient has cough during oral intake. Objective  Physical exam:  Vitals: BP (!) 128/40   Pulse 59   Temp 98.3 °F (36.8 °C) (Oral)   Resp 16   Ht 5' 6\" (1.676 m)   Wt 143 lb 8.3 oz (65.1 kg)   SpO2 95%   BMI 23.16 kg/m²   Gen/overall appearance: Not in acute distress. Alert. Oriented x3. Head: Normocephalic, atraumatic  Eyes: EOMI, good acuity  ENT: Oral mucosa moist  Neck: No JVD, thyromegaly  CVS: Nml S1S2, no MRG, RRR  Pulm: Clear bilaterally. No crackles/wheezes  Gastrointestinal: Soft, NT/ND, +BS  Musculoskeletal: No edema. Warm  Neuro: No focal deficit. Moves extremity spontaneously. Psychiatry: Appropriate affect. Not agitated. Skin: Warm, dry with normal turgor. No rash  Capillary refill: Brisk,< 3 seconds   Peripheral Pulses: +2 palpable, equal bilaterally     24HR INTAKE/OUTPUT:    Intake/Output Summary (Last 24 hours) at 1/17/2023 0832  Last data filed at 1/17/2023 0753  Gross per 24 hour   Intake 300 ml   Output 1100 ml   Net -800 ml       I/O last 3 completed shifts:   In: 378.2 [I.V.:378.2]  Out: 1800 [Urine:1800]  I/O this shift:  In: -   Out: 250 [Urine:250]  Meds:    sodium bicarbonate  650 mg Oral BID    dorzolamide-timolol  1 drop Both Eyes BID    docusate sodium  100 mg Oral BID    sodium chloride flush  10 mL IntraVENous 2 times per day    atorvastatin  40 mg Oral Nightly    escitalopram  10 mg Oral Nightly    polyethylene glycol 17 g Oral Daily    senna  1 tablet Oral BID    tamsulosin  0.4 mg Oral Nightly     Infusions:    pantoprozole (PROTONIX) infusion 8 mg/hr (01/16/23 2010)    sodium chloride      sodium chloride       PRN Meds: phenol, sodium chloride, Benzocaine-Menthol, bisacodyl, sodium chloride flush, sodium chloride, promethazine **OR** ondansetron, acetaminophen **OR** acetaminophen, nitroGLYCERIN, traMADol    Labs/imaging:  CBC:   Recent Labs     01/15/23  0518 01/15/23  1053 01/16/23  0444 01/16/23  0701 01/17/23  0433 01/17/23  0848   WBC 11.1*  --  NA* 10.7 12.1*  --    HGB 7.7*   < > NA* 7.8* 7.2* 7.1*     --   --  289 295  --     < > = values in this interval not displayed. BMP:    Recent Labs     01/15/23  0518 01/16/23 0444 01/17/23  0433    142 143   K 4.4 4.7 4.5   * 112* 113*   CO2 21 19* 21   BUN 49* 45* 47*   CREATININE 3.0* 3.1* 3.1*   GLUCOSE 99 91 106*       Hepatic:   No results for input(s): AST, ALT, ALB, BILITOT, ALKPHOS in the last 72 hours.       Saul Dodge MD  -------------------------------  Rounding hospitalist

## 2023-01-17 NOTE — PROGRESS NOTES
Department of Internal Medicine  Nephrology Progress Note    HPI.    80 y.o. male whom we were asked to see for RONI on CKD. Followed by Dr. Christian Chaidez in the office, last 08/22 with eGFR 18 ml/min. He presents with weakness and dyspnea. He was diagnosed with Covid-19. Renal cosn called for RONI . Events noted , chart reviewed     HPI:  Breathing comfortably. No CP.  ROS:  In bed. [address]:  medications reviewed. Physical Exam:    VITALS:  BP (!) 101/45   Pulse (!) 16   Temp 97.8 °F (36.6 °C) (Oral)   Resp 19   Ht 5' 6\" (1.676 m)   Wt 143 lb 8.3 oz (65.1 kg)   SpO2 92%   BMI 23.16 kg/m²   24HR INTAKE/OUTPUT:    Intake/Output Summary (Last 24 hours) at 1/17/2023 1510  Last data filed at 1/17/2023 0753  Gross per 24 hour   Intake --   Output 1100 ml   Net -1100 ml         Constitutional:  Looks comfortable   Respiratory:  scattered rhonchi   Gastrointestinal No tenderness.   Normal Bowel Sounds  Cardiovascular:  S1, S2 RRR   Edema:   + edema  Skin:  no rash    DATA:    CBC:  Lab Results   Component Value Date/Time    WBC 12.1 01/17/2023 04:33 AM    RBC 2.42 01/17/2023 04:33 AM    HGB 7.1 01/17/2023 08:48 AM    HCT 22.7 01/17/2023 08:48 AM    MCV 92.9 01/17/2023 04:33 AM    MCH 29.6 01/17/2023 04:33 AM    MCHC 31.9 01/17/2023 04:33 AM    RDW 14.7 01/17/2023 04:33 AM     01/17/2023 04:33 AM    MPV 8.5 01/17/2023 04:33 AM     CMP:  Lab Results   Component Value Date/Time     01/17/2023 04:33 AM    K 4.5 01/17/2023 04:33 AM    K 4.2 01/12/2023 04:52 AM     01/17/2023 04:33 AM    CO2 21 01/17/2023 04:33 AM    BUN 47 01/17/2023 04:33 AM    CREATININE 3.1 01/17/2023 04:33 AM    GFRAA 24 11/05/2021 01:23 PM    AGRATIO 1.0 01/09/2023 09:43 AM    LABGLOM 18 01/17/2023 04:33 AM    GLUCOSE 106 01/17/2023 04:33 AM    PROT 5.0 01/10/2023 04:18 AM    CALCIUM 8.0 01/17/2023 04:33 AM    BILITOT <0.2 01/10/2023 04:18 AM    ALKPHOS 60 01/10/2023 04:18 AM    AST 44 01/10/2023 04:18 AM    ALT 40 01/10/2023 04:18 AM      Hepatic Function Panel:   Lab Results   Component Value Date/Time    ALKPHOS 60 01/10/2023 04:18 AM    ALT 40 01/10/2023 04:18 AM    AST 44 01/10/2023 04:18 AM    PROT 5.0 01/10/2023 04:18 AM    BILITOT <0.2 01/10/2023 04:18 AM    BILIDIR <0.2 01/10/2023 04:18 AM    IBILI see below 01/10/2023 04:18 AM      Phosphorus:   Lab Results   Component Value Date/Time    PHOS 2.7 01/17/2023 04:33 AM       ASSESSMENT/PLAN:    1) 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 CKD progression vs. pre-renal  - renal function fluctuating but close to baseline     2) Covid-19  - out of isolation     3) PNA  - off Abx     4) FEN  - metabolic acidosis              - started sodium bicarbonate supplementation      5) anemia  - labs noted   - s/p PRBC 01/10/23    6) UGIB  - GI consulted  - EGD showed duodenal ulcer with adherent clot     7) AMS   Overall better.

## 2023-01-17 NOTE — PROGRESS NOTES
INPATIENT PROGRESS NOTE        IDENTIFYING DATA/REASON FOR CONSULTATION   PATIENT:  Fernandez Ascencio  MRN:  [id number]  ADMIT DATE: 2023  TIME OF EVALUATION: 2023 12:26 PM  HOSPITAL STAY:   LOS: 12 days   CONSULTING PHYSICIAN: Saul Dodge MD   REASON FOR CONSULTATION: Melena, Acute blood loss anemia    Subjective:    Patient seen in follow up. EGD 23 showed 2 cm ulcer with visible vessel in duodenal sweep treated with epi injection and gold probe cautery. Pt completed 72 hrs of PPI gtt. Patient continued to pass dark blood clots. Repeat EGD yesterday showed multiple duodenal ulcers with 1 ulcer with adherent clot treated with epinephrine and Hemoclip    Patient denies abdominal pain nausea vomiting. No bowel movements overnight    MEDICATIONS   SCHEDULED:  sodium bicarbonate, 650 mg, BID  dorzolamide-timolol, 1 drop, BID  docusate sodium, 100 mg, BID  sodium chloride flush, 10 mL, 2 times per day  atorvastatin, 40 mg, Nightly  escitalopram, 10 mg, Nightly  polyethylene glycol, 17 g, Daily  senna, 1 tablet, BID  tamsulosin, 0.4 mg, Nightly    FLUIDS/DRIPS:     pantoprozole (PROTONIX) infusion 8 mg/hr (23)    sodium chloride      sodium chloride       PRNs: phenol, 1 spray, Q2H PRN  sodium chloride, , PRN  Benzocaine-Menthol, 1 lozenge, Q2H PRN  bisacodyl, 10 mg, Daily PRN  sodium chloride flush, 10 mL, PRN  sodium chloride, , PRN  promethazine, 12.5 mg, Q6H PRN   Or  ondansetron, 4 mg, Q6H PRN  acetaminophen, 650 mg, Q6H PRN   Or  acetaminophen, 650 mg, Q6H PRN  nitroGLYCERIN, 0.4 mg, Q5 Min PRN  traMADol, 50 mg, Q8H PRN    ALLERGIES:  No Known Allergies      PHYSICAL EXAM   VITALS:  BP (!) 101/45   Pulse (!) 16   Temp 97.8 °F (36.6 °C) (Oral)   Resp 19   Ht 5' 6\" (1.676 m)   Wt 143 lb 8.3 oz (65.1 kg)   SpO2 92%   BMI 23.16 kg/m²   TEMPERATURE:  Current - Temp: 97.8 °F (36.6 °C);  Max - Temp  Av.1 °F (36.7 °C)  Min: 97.5 °F (36.4 °C)  Max: 99.1 °F (37.3 °C)    Physical Exam:  General appearance: alert, cooperative, no distress  Eyes: Anicteric  Head: Normocephalic, without obvious abnormality  Lungs: clear to auscultation bilaterally, Normal Effort  Heart: regular rate and rhythm, normal S1 and S2, no murmurs or rubs  Abdomen: soft, non-distended,minimally tender epigastric area. Bowel sounds normal.   Extremities: atraumatic, no cyanosis or edema  Skin: warm and dry, no jaundice  Neuro: Grossly intact, A&OX3    LABS AND IMAGING   Laboratory   Recent Labs     01/15/23  0518 01/15/23  1053 01/16/23  0444 01/16/23  0701 01/17/23  0433 01/17/23  0848   WBC 11.1*  --  NA* 10.7 12.1*  --    HGB 7.7*   < > NA* 7.8* 7.2* 7.1*   HCT 24.6*   < > NA* 24.9* 22.5* 22.7*   MCV 95.1  --  NA* 93.7 92.9  --      --   --  289 295  --     < > = values in this interval not displayed. Recent Labs     01/15/23  0518 01/16/23  0444 01/17/23  0433    142 143   K 4.4 4.7 4.5   * 112* 113*   CO2 21 19* 21   PHOS 3.1 3.6 2.7   BUN 49* 45* 47*   CREATININE 3.0* 3.1* 3.1*     No results for input(s): AST, ALT, ALB, BILIDIR, BILITOT, ALKPHOS in the last 72 hours. No results for input(s): LIPASE, AMYLASE in the last 72 hours. No results for input(s): PROTIME, INR in the last 72 hours. Imaging  XR CHEST PORTABLE   Final Result   1. Patchy bibasilar airspace disease which could reflect atelectasis versus   pneumonia. 2.  Stable diffuse bilateral calcified pleural plaques         XR CHEST PORTABLE   Final Result   1. Patchy right basilar airspace disease could represent atelectasis versus   pneumonia. 2.  Multiple bilateral calcified pleural plaques         CT SOFT TISSUE NECK WO CONTRAST   Final Result   No acute findings, soft tissue mass or lymphadenopathy evident. XR CHEST 1 VIEW   Final Result   Ill-defined opacities bilaterally.              Endoscopy  EGD 1/9/23 with Dr. Thomas Christos  Normal esophagus without Mcdonald's or reflux changes, 3cm sliding hiatal hernia, normal stomach, 6mm clean-based duodenal ulcer, 15mm clean-based duodenal ulcer in the bulb. In the sweep, there was a 2cm ulcer with a visible vessel in the distal aspect of the ulcer. I was not able to see the very proximal aspect of the ulcer. Epinephrine 1:10,000 was injected in each of 4 quadrants around the ulcer with excellent blanching of tissue. A total of 3.5mL was injected. Next, the vessel was obliterated using gold probe cautery with excellent hemostasis. No bleeding at the end of the procedure. 2nd portion of the duodenum was normal.       ASSESSMENT AND RECOMMENDATIONS   Ebonie Dial is a 80 y.o. male with PMH of CAD with coronary stents, HLD, CKD stage IV, chronic anemia BPH who presented on 1/5/2023 with generalized weakness, decreased oral intake. Diagnosed with COVID-19, bacterial pneumonia, acute on CKD. We have been consulted regarding bloody stool, acute on  chronic anemia. EGD 1/9 showed 2 cm ulcer with visible vessel in duodenal sweep treated with epi injection and gold probe cautery. Patient continued to pass dark blood clots. Repeat EGD yesterday showed multiple duodenal ulcers with 1 ulcer with adherent clot treated with epinephrine and Hemoclip    Duodenal ulcer with hemorrhage and acute blood loss anemia. No melena overnight. Hemoglobin this morning 7.2 and 7.1. H. pylori stool antigen negative  Acute renal failure  COVID pneumonia    RECOMMENDATIONS:    Continue IV PPI therapy  Monitor h/h and transfuse as needed to maintain hgb >7.0  Avoid NSAIDs  diet as tolerated. Will add Ensure shakes to meals and consult dietitian to discuss high-calorie high-protein foods        If you have any questions or need any further information, please feel free to contact us [phone]. Thank you for allowing us to participate in the care of Ebonie Dial. The note was completed using Dragon voice recognition transcription.  Every effort was made to ensure accuracy; however, inadvertent transcription errors may be present despite my best efforts to edit errors.     Patricia BAILEY

## 2023-01-17 NOTE — PROGRESS NOTES
PALLIATIVE MEDICINE PROGRESS NOTE     Patient name:Flash Spence    GYM:[id number] OMB:4[birth year]28[birth year]41  Room/Bed:Q6I-1549/5127-01    LOS: 12 days        ASSESSMENT/RECOMMENDATIONS     80 y.o. male with debility, dysphagia, anemia, malnutrition, and CKD. Symptom Management:  Weakness - Working with PT/OT, will need SNF at discharge. Is currently agreeable. Dysphagia - ST following. Diet advanced, patient did not tolerate. CXR ordered to evaluate possible aspiration. Anemia -stable. Malnutrition -continues to be a significant concern. Dietary recommendations per ST, supplements with meals. Albumin remains low at 2.2 g/dL. CKD - Nephrology following, not a candidate for dialysis. Creatinine remained stable. GIB -repeat EGD yesterday, noted duodenal ulcer with adherent clot. GI following. Hemoglobin trending down, transfusion per attending. Patient/Family Goals of Care :    1/10/23 - I explained the highly personal nature of deciding how to approach serious illness, and outlined two extremes--continuing disease-focused, morbidity-inducing treatment with the possibility of prolonging life vs. focusing on comfort/quality of life while allowing disease progression and natural death. Emphasis was placed on the absence of a \"right\" answer, as opposed to making good decisions based on personal preferences and circumstances, with ongoing reevaluation and adjustment in treatment goals as the clinical course unfolds. Patient was alert and oriented throughout the conversation in his room. We reviewed his living well at length, he did confirm his decisions regarding CPR and intubation in the aspect of cardiac or respiratory arrest.  He does state that he would like to live and continue medical management up until that point. Reached out to patient's [facility] [place], [facility]. KHALIDA, daughter Ravin Mccray. [place] through the 38-minute conversation, Ravin Mccray did call who is a critical care physician in [facility].   He and Ravin Mccray are not 100% on the same page regarding the patient's current medical care. We discussed goals of care, patient's main goal is to return home where he lives with his son who also has several medical conditions. The 2 of them essentially financially support and help maintain each other's care. Given the patient's overall prognosis, at this point, he will likely not return immediately to his apartment, however will end up in a skilled nursing facility. Real Bee is having a difficult time with the patient's \"waffling\" regarding his desire to live. Per his chart review, the patient has had conversations with his providers about \"why am I still living\". Per his daughters report he is also had those conversations with her and is questioning why he is the last of his siblings to be alive. He has had concerns with malnutrition and failure to thrive for quite some time per chart review, she confirmed. Her frustration now lies that he is essentially changing his mind stating that he has a lot to live for. Per her report, her  was very in depth when having CODE STATUS conversations when the patient was completing his living well and estate planning. At that time he chose not to undergo resuscitative measures in the event of cardiac or respiratory arrest.  He feels as though now he wishes to do so. We discussed that the patient's in goal essentially will not be met by aggressive management of his disease processes. She is under the mindset that if the patient were to undergo cardiac or respiratory arrest, temporary measures to revive the patient would be appropriate so that her siblings would be able to say goodbye. Her  does not agree with this decision, but states that she has the right to make that decision.   Real Bee states that she needs to McPherson markie conversations with her siblings\" so that they understand the magnitude of what is going on with their father and can come to terms with his prognosis therefore taking resuscitative measures off the table. She also states that she is \"trying to prevent my siblings from hating me for the rest of my life\" if I do not allow resuscitative measures. We discussed the risk vs benefit of aggressive measures understanding his desires and co-morbidities. Meghan Ayers and her  feel as though the patient would improve if he moved to [facility], however he has a co dependent relationship with his son and therefore will likely not move. Meghan Ayers is very overwhelmed, asked that the conversation was ended in order eat and take a walk. She would like to have a discussion again tomorrow allowing some time to continue discussions with her siblings and .      1/11/23 -left message with patient's daughter, Meghan Ayers to further discuss. Will await return call. 1/17/23 -patient is up in a chair, had difficulty swallowing his breakfast.  Acknowledges that he is not feeling well today, has made little progress to improve. We again discussed goals of care, patient's main goal is to return to his apartment with his son. We discussed likely discharged to SNF, patient is agreeable, but apprehensive. He states that he would like to continue to try all aggressive measures up to the point where he does not recognize his family, and then he would like to see his care. Daughter is back in [facility], no changes have been made to [address] [place] [address]. Disposition/Discharge Plan :    Pending  Follows with outpatient [facility]. Advance Directives:  Code status:  Full Code  Decision Maker: Sean Salmeron. Case Discussed with:  patient, floor RN, . Thank you for allowing us to participate in the care of this patient. SUBJECTIVE     Chief Complaint: Weakness    Last 24 hours:   Patient was having blood clots out of his rectum, repeat EGD yesterday. He reports feeling ill today.   Per the RN, patient was having difficulty with his breakfast, coughing throughout. ROS:    Review of Systems   Constitutional:  Positive for activity change and fatigue. Negative for appetite change. Respiratory:  Negative for chest tightness and shortness of breath. Cardiovascular:  Negative for chest pain and leg swelling. Gastrointestinal: Negative. Musculoskeletal: Negative. Neurological:  Positive for weakness. Psychiatric/Behavioral: Negative. A complete 10 count ROS was obtained. Pertinent positives mentioned above in HPI/ROS. All others if not mentioned are negative. OBJECTIVE   BP (!) 101/45   Pulse (!) 16   Temp 97.8 °F (36.6 °C) (Oral)   Resp 19   Ht 5' 6\" (1.676 m)   Wt 143 lb 8.3 oz (65.1 kg)   SpO2 92%   BMI 23.16 kg/m²   I/O last 3 completed shifts: In: 378.2 [I.V.:378.2]  Out: 1800 [Urine:1800]  I/O this shift:  In: -   Out: 250 [Urine:250]      Physical Exam  Constitutional:       Appearance: Normal appearance. Cardiovascular:      Rate and Rhythm: Normal rate. Pulses: Normal pulses. Pulmonary:      Effort: Pulmonary effort is normal.   Abdominal:      Palpations: Abdomen is soft. Neurological:      Mental Status: He is alert. Motor: Weakness present. Psychiatric:         Mood and Affect: Mood normal.         Thought Content:  Thought content normal.         Judgment: Judgment normal.        Total time: 25 minutes  >50% of time spent counseling patient at bedside or POA/family member if applicable , reviewing information and discussing care, coordinating with care team       Signed By: Electronically signed by LISSET Ghotra CNP on 1/17/2023 at 12:43 PM   Palliative Medicine   [phone]    January 17, 2023

## 2023-01-18 ENCOUNTER — APPOINTMENT (OUTPATIENT)
Dept: GENERAL RADIOLOGY | Age: 88
DRG: 871 | End: 2023-01-18
Payer: MEDICARE

## 2023-01-18 LAB
ALBUMIN SERPL-MCNC: 2.6 G/DL (ref 3.4–5)
ANION GAP SERPL CALCULATED.3IONS-SCNC: 10 MMOL/L (ref 3–16)
BUN BLDV-MCNC: 43 MG/DL (ref 7–20)
CALCIUM SERPL-MCNC: 8.2 MG/DL (ref 8.3–10.6)
CHLORIDE BLD-SCNC: 113 MMOL/L (ref 99–110)
CO2: 21 MMOL/L (ref 21–32)
CREAT SERPL-MCNC: 2.8 MG/DL (ref 0.8–1.3)
EKG ATRIAL RATE: 70 BPM
EKG DIAGNOSIS: NORMAL
EKG P-R INTERVAL: 110 MS
EKG Q-T INTERVAL: 426 MS
EKG QRS DURATION: 94 MS
EKG QTC CALCULATION (BAZETT): 460 MS
EKG R AXIS: -51 DEGREES
EKG T AXIS: -1 DEGREES
EKG VENTRICULAR RATE: 70 BPM
GFR SERPL CREATININE-BSD FRML MDRD: 21 ML/MIN/{1.73_M2}
GLUCOSE BLD-MCNC: 116 MG/DL (ref 70–99)
HCT VFR BLD CALC: 25.8 % (ref 40.5–52.5)
HCT VFR BLD CALC: 26.9 % (ref 40.5–52.5)
HCT VFR BLD CALC: 27.9 % (ref 40.5–52.5)
HEMOGLOBIN: 8.3 G/DL (ref 13.5–17.5)
HEMOGLOBIN: 8.6 G/DL (ref 13.5–17.5)
HEMOGLOBIN: 8.7 G/DL (ref 13.5–17.5)
MAGNESIUM: 1.7 MG/DL (ref 1.8–2.4)
MCH RBC QN AUTO: 29.6 PG (ref 26–34)
MCHC RBC AUTO-ENTMCNC: 32 G/DL (ref 31–36)
MCV RBC AUTO: 92.5 FL (ref 80–100)
PDW BLD-RTO: 15 % (ref 12.4–15.4)
PHOSPHORUS: 2.4 MG/DL (ref 2.5–4.9)
PLATELET # BLD: 265 K/UL (ref 135–450)
PMV BLD AUTO: 8.7 FL (ref 5–10.5)
POTASSIUM SERPL-SCNC: 4.5 MMOL/L (ref 3.5–5.1)
RBC # BLD: 2.79 M/UL (ref 4.2–5.9)
SODIUM BLD-SCNC: 144 MMOL/L (ref 136–145)
WBC # BLD: 12.1 K/UL (ref 4–11)

## 2023-01-18 PROCEDURE — 83735 ASSAY OF MAGNESIUM: CPT

## 2023-01-18 PROCEDURE — 9990000010 HC NO CHARGE VISIT: Performed by: PHYSICAL THERAPIST

## 2023-01-18 PROCEDURE — 94760 N-INVAS EAR/PLS OXIMETRY 1: CPT

## 2023-01-18 PROCEDURE — C9113 INJ PANTOPRAZOLE SODIUM, VIA: HCPCS | Performed by: INTERNAL MEDICINE

## 2023-01-18 PROCEDURE — 97535 SELF CARE MNGMENT TRAINING: CPT

## 2023-01-18 PROCEDURE — 6360000002 HC RX W HCPCS: Performed by: INTERNAL MEDICINE

## 2023-01-18 PROCEDURE — 93010 ELECTROCARDIOGRAM REPORT: CPT | Performed by: INTERNAL MEDICINE

## 2023-01-18 PROCEDURE — 6370000000 HC RX 637 (ALT 250 FOR IP): Performed by: INTERNAL MEDICINE

## 2023-01-18 PROCEDURE — 85014 HEMATOCRIT: CPT

## 2023-01-18 PROCEDURE — 93005 ELECTROCARDIOGRAM TRACING: CPT | Performed by: INTERNAL MEDICINE

## 2023-01-18 PROCEDURE — 36569 INSJ PICC 5 YR+ W/O IMAGING: CPT

## 2023-01-18 PROCEDURE — 97530 THERAPEUTIC ACTIVITIES: CPT

## 2023-01-18 PROCEDURE — 2060000000 HC ICU INTERMEDIATE R&B

## 2023-01-18 PROCEDURE — 2580000003 HC RX 258: Performed by: INTERNAL MEDICINE

## 2023-01-18 PROCEDURE — 71045 X-RAY EXAM CHEST 1 VIEW: CPT

## 2023-01-18 PROCEDURE — 36415 COLL VENOUS BLD VENIPUNCTURE: CPT

## 2023-01-18 PROCEDURE — 02HV33Z INSERTION OF INFUSION DEVICE INTO SUPERIOR VENA CAVA, PERCUTANEOUS APPROACH: ICD-10-PCS | Performed by: INTERNAL MEDICINE

## 2023-01-18 PROCEDURE — 76937 US GUIDE VASCULAR ACCESS: CPT

## 2023-01-18 PROCEDURE — 85027 COMPLETE CBC AUTOMATED: CPT

## 2023-01-18 PROCEDURE — 80069 RENAL FUNCTION PANEL: CPT

## 2023-01-18 PROCEDURE — 85018 HEMOGLOBIN: CPT

## 2023-01-18 PROCEDURE — C1751 CATH, INF, PER/CENT/MIDLINE: HCPCS

## 2023-01-18 RX ORDER — MAGNESIUM SULFATE 1 G/100ML
1000 INJECTION INTRAVENOUS ONCE
Status: COMPLETED | OUTPATIENT
Start: 2023-01-18 | End: 2023-01-18

## 2023-01-18 RX ORDER — SODIUM CHLORIDE 9 MG/ML
25 INJECTION, SOLUTION INTRAVENOUS PRN
Status: DISCONTINUED | OUTPATIENT
Start: 2023-01-18 | End: 2023-01-18 | Stop reason: SDUPTHER

## 2023-01-18 RX ORDER — SODIUM CHLORIDE 0.9 % (FLUSH) 0.9 %
5-40 SYRINGE (ML) INJECTION PRN
Status: DISCONTINUED | OUTPATIENT
Start: 2023-01-18 | End: 2023-01-18 | Stop reason: SDUPTHER

## 2023-01-18 RX ORDER — LIDOCAINE HYDROCHLORIDE 10 MG/ML
5 INJECTION, SOLUTION EPIDURAL; INFILTRATION; INTRACAUDAL; PERINEURAL ONCE
Status: DISCONTINUED | OUTPATIENT
Start: 2023-01-18 | End: 2023-01-23 | Stop reason: HOSPADM

## 2023-01-18 RX ORDER — SODIUM CHLORIDE 0.9 % (FLUSH) 0.9 %
5-40 SYRINGE (ML) INJECTION EVERY 12 HOURS SCHEDULED
Status: DISCONTINUED | OUTPATIENT
Start: 2023-01-18 | End: 2023-01-18 | Stop reason: SDUPTHER

## 2023-01-18 RX ADMIN — DOCUSATE SODIUM 100 MG: 100 CAPSULE, LIQUID FILLED ORAL at 21:35

## 2023-01-18 RX ADMIN — ACETAMINOPHEN 650 MG: 325 TABLET ORAL at 19:03

## 2023-01-18 RX ADMIN — SODIUM CHLORIDE 8 MG/HR: 9 INJECTION, SOLUTION INTRAVENOUS at 23:51

## 2023-01-18 RX ADMIN — SODIUM CHLORIDE, PRESERVATIVE FREE 10 ML: 5 INJECTION INTRAVENOUS at 08:56

## 2023-01-18 RX ADMIN — ACETAMINOPHEN 650 MG: 325 TABLET ORAL at 04:58

## 2023-01-18 RX ADMIN — MAGNESIUM SULFATE HEPTAHYDRATE 1000 MG: 1 INJECTION, SOLUTION INTRAVENOUS at 16:57

## 2023-01-18 RX ADMIN — POLYETHYLENE GLYCOL 3350 17 G: 17 POWDER, FOR SOLUTION ORAL at 08:54

## 2023-01-18 RX ADMIN — TRAMADOL HYDROCHLORIDE 50 MG: 50 TABLET ORAL at 10:12

## 2023-01-18 RX ADMIN — DORZOLAMIDE HYDROCHLORIDE AND TIMOLOL MALEATE 1 DROP: 20; 5 SOLUTION/ DROPS OPHTHALMIC at 08:56

## 2023-01-18 RX ADMIN — SENNOSIDES 8.6 MG: 8.6 TABLET, FILM COATED ORAL at 08:55

## 2023-01-18 RX ADMIN — ESCITALOPRAM OXALATE 10 MG: 10 TABLET ORAL at 21:35

## 2023-01-18 RX ADMIN — SODIUM CHLORIDE 8 MG/HR: 9 INJECTION, SOLUTION INTRAVENOUS at 02:45

## 2023-01-18 RX ADMIN — TAMSULOSIN HYDROCHLORIDE 0.4 MG: 0.4 CAPSULE ORAL at 21:35

## 2023-01-18 RX ADMIN — SODIUM BICARBONATE 650 MG: 650 TABLET ORAL at 08:55

## 2023-01-18 RX ADMIN — DORZOLAMIDE HYDROCHLORIDE AND TIMOLOL MALEATE 1 DROP: 20; 5 SOLUTION/ DROPS OPHTHALMIC at 22:14

## 2023-01-18 RX ADMIN — DOCUSATE SODIUM 100 MG: 100 CAPSULE, LIQUID FILLED ORAL at 08:55

## 2023-01-18 RX ADMIN — SODIUM CHLORIDE 8 MG/HR: 9 INJECTION, SOLUTION INTRAVENOUS at 13:48

## 2023-01-18 RX ADMIN — SODIUM BICARBONATE 650 MG: 650 TABLET ORAL at 21:35

## 2023-01-18 RX ADMIN — SENNOSIDES 8.6 MG: 8.6 TABLET, FILM COATED ORAL at 21:34

## 2023-01-18 RX ADMIN — ATORVASTATIN CALCIUM 40 MG: 40 TABLET, FILM COATED ORAL at 21:35

## 2023-01-18 ASSESSMENT — PAIN DESCRIPTION - LOCATION
LOCATION: HEAD
LOCATION: HEAD
LOCATION: ARM

## 2023-01-18 ASSESSMENT — PAIN SCALES - GENERAL
PAINLEVEL_OUTOF10: 0
PAINLEVEL_OUTOF10: 5
PAINLEVEL_OUTOF10: 4
PAINLEVEL_OUTOF10: 6

## 2023-01-18 ASSESSMENT — ENCOUNTER SYMPTOMS
CHEST TIGHTNESS: 0
SHORTNESS OF BREATH: 0
GASTROINTESTINAL NEGATIVE: 1

## 2023-01-18 ASSESSMENT — PAIN DESCRIPTION - ORIENTATION: ORIENTATION: RIGHT;LEFT

## 2023-01-18 ASSESSMENT — PAIN DESCRIPTION - DESCRIPTORS
DESCRIPTORS: ACHING
DESCRIPTORS: ACHING;CRAMPING

## 2023-01-18 ASSESSMENT — PAIN SCALES - WONG BAKER: WONGBAKER_NUMERICALRESPONSE: 0

## 2023-01-18 NOTE — PROGRESS NOTES
Discussed with daughter again this afternoon (around 1:42 PM, call lasted 10 minutes). Updated about recent findings from EGD, concern that patient remains high risk for re-bleed.  Inquired about end-goal. Daughter feels we should continue to scope patient if he does have another bleeding episode      SIGNED: Varsha Cardoza MD, MPH. 1/17/2023

## 2023-01-18 NOTE — PROGRESS NOTES
Upon arrival to place PICC line assessed chart for issues related to picc placement, check for consent, and did time out with YONY Tripathi. Pt. Tolerated PICC placement well, no difficulty accessing basilic vein and Xray technology used to verify PICC tip placement.  Reported off to Carrillo

## 2023-01-18 NOTE — PROGRESS NOTES
Department of Internal Medicine  Nephrology Progress Note    HPI.    80 y.o. male whom we were asked to see for RONI on CKD. Followed by Dr. Shira Lutz in the office, last 08/22 with eGFR 18 ml/min. He presents with weakness and dyspnea. He was diagnosed with Covid-19. Renal cosn called for RONI . Events noted , chart reviewed     HPI:  Breathing comfortably. No CP.  ROS:  In bed. [address]:  medications reviewed. Physical Exam:    VITALS:  BP (!) 112/54   Pulse 60   Temp 97.4 °F (36.3 °C) (Oral)   Resp 22   Ht 5' 6\" (1.676 m)   Wt 143 lb 8.3 oz (65.1 kg)   SpO2 94%   BMI 23.16 kg/m²   24HR INTAKE/OUTPUT:    Intake/Output Summary (Last 24 hours) at 1/18/2023 1227  Last data filed at 1/18/2023 1015  Gross per 24 hour   Intake 662 ml   Output 1075 ml   Net -413 ml         Constitutional:  Looks comfortable   Respiratory:  scattered rhonchi   Gastrointestinal No tenderness.   Normal Bowel Sounds  Cardiovascular:  S1, S2 RRR   Edema:   + edema  Skin:  no rash    DATA:    CBC:  Lab Results   Component Value Date/Time    WBC 12.1 01/18/2023 04:29 AM    RBC 2.79 01/18/2023 04:29 AM    HGB 8.7 01/18/2023 09:03 AM    HCT 27.9 01/18/2023 09:03 AM    MCV 92.5 01/18/2023 04:29 AM    MCH 29.6 01/18/2023 04:29 AM    MCHC 32.0 01/18/2023 04:29 AM    RDW 15.0 01/18/2023 04:29 AM     01/18/2023 04:29 AM    MPV 8.7 01/18/2023 04:29 AM     CMP:  Lab Results   Component Value Date/Time     01/18/2023 04:29 AM    K 4.5 01/18/2023 04:29 AM    K 4.2 01/12/2023 04:52 AM     01/18/2023 04:29 AM    CO2 21 01/18/2023 04:29 AM    BUN 43 01/18/2023 04:29 AM    CREATININE 2.8 01/18/2023 04:29 AM    GFRAA 24 11/05/2021 01:23 PM    AGRATIO 1.0 01/09/2023 09:43 AM    LABGLOM 21 01/18/2023 04:29 AM    GLUCOSE 116 01/18/2023 04:29 AM    PROT 5.0 01/10/2023 04:18 AM    CALCIUM 8.2 01/18/2023 04:29 AM    BILITOT <0.2 01/10/2023 04:18 AM    ALKPHOS 60 01/10/2023 04:18 AM    AST 44 01/10/2023 04:18 AM    ALT 40 01/10/2023 04:18 AM      Hepatic Function Panel:   Lab Results   Component Value Date/Time    ALKPHOS 60 01/10/2023 04:18 AM    ALT 40 01/10/2023 04:18 AM    AST 44 01/10/2023 04:18 AM    PROT 5.0 01/10/2023 04:18 AM    BILITOT <0.2 01/10/2023 04:18 AM    BILIDIR <0.2 01/10/2023 04:18 AM    IBILI see below 01/10/2023 04:18 AM      Phosphorus:   Lab Results   Component Value Date/Time    PHOS 2.4 01/18/2023 04:29 AM       ASSESSMENT/PLAN:    1) 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 CKD progression vs. pre-renal  - renal function fluctuating but close to baseline  - midline is fine from renal standpoint for lab draws     2) Covid-19  - out of isolation     3) PNA  - off Abx     4) FEN  - metabolic acidosis              - started sodium bicarbonate supplementation      5) anemia  - labs noted   - s/p PRBC 01/10/23    6) UGIB  - GI consulted  - EGD showed duodenal ulcer with adherent clot  - monitoring Hgb     7) AMS   - overall better. Patient is looking very debilitated and has deteriorated significantly compared to even a couple of months ago. Tried to get ahold of son to discuss case with him as the son always comes with his father to the office. I see we have been talking to the daughter, but I don't know how much she knows about her father's condition.

## 2023-01-18 NOTE — PROGRESS NOTES
Pt able to walk to the bathroom with walker with some assistance. Pt states he does feel dizzy when he gets up. Pt complaining of a headache, being managed by PRN pain meds (refer to eMAR). Pt had soft dark BM. Pt's son was at bedside and requested pt to be tested for Thalassemia gene and stated it runs in his family. Call light within reach. Pt denies further needs.     Electronically signed by Ayana Xie RN on 1/18/2023 at 5:26 AM

## 2023-01-18 NOTE — PROGRESS NOTES
PALLIATIVE MEDICINE PROGRESS NOTE     Patient name:Flash Tello    AUF:[id number] KFX:9/84/7583  Room/Bed:O9Z-5829/5127-01    LOS: 13 days        ASSESSMENT/RECOMMENDATIONS     80 y.o. male with debility, dysphagia, anemia, malnutrition, and CKD. Symptom Management:  Weakness - Working with PT/OT, will need SNF at discharge. Is currently agreeable. Dysphagia - ST following. Diet backed down to soft/pureed. Anemia - Received 1 unit PRBC's yesterday, high risk for reoccurring bleed. Malnutrition -continues to be a significant concern. Dietary recommendations per ST, supplements with meals. Albumin remains low at 2.2 g/dL. CKD - Nephrology following, not a candidate for dialysis. Creatinine remained stable. GIB -repeat EGD yesterday, noted duodenal ulcer with adherent clot. GI following. Transfusion per attending. Daughter aware of high risk, would like to continue all therapy. Patient/Family Goals of Care :    1/10/23 - I explained the highly personal nature of deciding how to approach serious illness, and outlined two extremes--continuing disease-focused, morbidity-inducing treatment with the possibility of prolonging life vs. focusing on comfort/quality of life while allowing disease progression and natural death. Emphasis was placed on the absence of a \"right\" answer, as opposed to making good decisions based on personal preferences and circumstances, with ongoing reevaluation and adjustment in treatment goals as the clinical course unfolds. Patient was alert and oriented throughout the conversation in his room. We reviewed his living well at length, he did confirm his decisions regarding CPR and intubation in the aspect of cardiac or respiratory arrest.  He does state that he would like to live and continue medical management up until that point. Reached out to patient's [facility] ([place], [place]) OF [facility]. KHALIDA, daughter Cathryn Wallace.   [place] through the 38-minute conversation, Cathryn Wallace did call who is a critical care physician in [place].  He and Anahi are not 100% on the same page regarding the patient's current medical care.  We discussed goals of care, patient's main goal is to return home where he lives with his son who also has several medical conditions.  The 2 of them essentially financially support and help maintain each other's care.  Given the patient's overall prognosis, at this point, he will likely not return immediately to his apartment, however will end up in a skilled nursing facility.  Anahi is having a difficult time with the patient's \"waffling\" regarding his desire to live.  Per his chart review, the patient has had conversations with his providers about \"why am I still living\".  Per his daughters report he is also had those conversations with her and is questioning why he is the last of his siblings to be alive.  He has had concerns with malnutrition and failure to thrive for quite some time per chart review, she confirmed.  Her frustration now lies that he is essentially changing his mind stating that he has a lot to live for.  Per her report, her  was very in depth when having CODE STATUS conversations when the patient was completing his living well and estate planning.  At that time he chose not to undergo resuscitative measures in the event of cardiac or respiratory arrest.  He feels as though now he wishes to do so.  We discussed that the patient's in goal essentially will not be met by aggressive management of his disease processes.  She is under the mindset that if the patient were to undergo cardiac or respiratory arrest, temporary measures to revive the patient would be appropriate so that her siblings would be able to say goodbye.  Her  does not agree with this decision, but states that she has the right to make that decision.  Anahi states that she needs to \"have markie conversations with her siblings\" so that they understand the magnitude of what is going on with their father  and can come to terms with his prognosis therefore taking resuscitative measures off the table. She also states that she is \"trying to prevent my siblings from hating me for the rest of my life\" if I do not allow resuscitative measures. We discussed the risk vs benefit of aggressive measures understanding his desires and co-morbidities. Abebe Hodgson and her  feel as though the patient would improve if he moved to [facility], however he has a co dependent relationship with his son and therefore will likely not move. Abebe Hodgson is very overwhelmed, asked that the conversation was ended in order eat and take a walk. She would like to have a discussion again tomorrow allowing some time to continue discussions with her siblings and .      1/11/23 -left message with patient's daughter, Abebe Hodgson to further discuss. Will await return call. 1/17/23 -patient is up in a chair, had difficulty swallowing his breakfast.  Acknowledges that he is not feeling well today, has made little progress to improve. We again discussed goals of care, patient's main goal is to return to his apartment with his son. We discussed likely discharged to SNF, patient is agreeable, but apprehensive. He states that he would like to continue to try all aggressive measures up to the point where he does not recognize his family, and then he would like to see his care. Daughter is back in [facility], no changes have been made to [address] [place] [address]. Disposition/Discharge Plan :    Pending, discharge to SNF. Follows with outpatient [facility]. Advance Directives:  Code status:  Full Code  Decision Maker: Dora Dubois. Case Discussed with:  patient, floor RN, . Thank you for allowing us to participate in the care of this patient. SUBJECTIVE     Chief Complaint: Weakness    Last 24 hours:   Malissa Newton reports not feeling well this morning, unable to verbalize what isn't feeling well. Eating oatmeal, coughing.  States he is ready to go to SNF for rehab. ROS:    Review of Systems   Constitutional:  Positive for activity change and fatigue. Negative for appetite change. Respiratory:  Negative for chest tightness and shortness of breath. Cardiovascular:  Negative for chest pain and leg swelling. Gastrointestinal: Negative. Musculoskeletal: Negative. Neurological:  Positive for weakness. Psychiatric/Behavioral: Negative. A complete 10 count ROS was obtained. Pertinent positives mentioned above in HPI/ROS. All others if not mentioned are negative. OBJECTIVE   BP (!) 112/54   Pulse 60   Temp 97.4 °F (36.3 °C) (Oral)   Resp 22   Ht 5' 6\" (1.676 m)   Wt 143 lb 8.3 oz (65.1 kg)   SpO2 94%   BMI 23.16 kg/m²   I/O last 3 completed shifts: In: 214 [P.O.:240; Blood:182]  Out: 8558 [CYMPS:[id number]]  I/O this shift:  In: 240 [P.O.:240]  Out: 250 [Urine:250]      Physical Exam  Constitutional:       Appearance: Normal appearance. Cardiovascular:      Rate and Rhythm: Normal rate. Pulses: Normal pulses. Pulmonary:      Effort: Pulmonary effort is normal.   Abdominal:      Palpations: Abdomen is soft. Neurological:      Mental Status: He is alert. Motor: Weakness present. Psychiatric:         Mood and Affect: Mood normal.         Thought Content:  Thought content normal.         Judgment: Judgment normal.        Total time: 15 minutes  >50% of time spent counseling patient at bedside or POA/family member if applicable , reviewing information and discussing care, coordinating with care team       Signed By: Electronically signed by LISSTE Ghotra CNP on 1/18/2023 at 12:40 PM   Palliative Medicine   [phone]    January 18, 2023

## 2023-01-18 NOTE — PROGRESS NOTES
Occupational Therapy  Facility/Department: [address] PROGRESSIVE CARE  Occupational Therapy Daily Note  Name: Antwon Zimmerman  : 1931  MRN: [id number]  Date of Service: 2023    Discharge Recommendations:  3-5 sessions per week, Patient would benefit from continued therapy after discharge   Antwon Zimmerman scored a 16/24 on the AM-PAC ADL Inpatient form. Current research shows that an AM-PAC score of 17 or less is typically not associated with a discharge to the patient's home setting. Based on the patient's AM-PAC score and their current ADL deficits, it is recommended that the patient have 3-5 sessions per week of Occupational Therapy at d/c to increase the patient's independence. Please see assessment section for further patient specific details. If patient discharges prior to next session this note will serve as a discharge summary. Please see below for the latest assessment towards goals. Patient Diagnosis(es): The primary encounter diagnosis was Pneumonia of right lung due to infectious organism, unspecified part of lung. Diagnoses of General weakness, Impaired mobility and ADLs, COVID-19, and Acute kidney injury superimposed on CKD [facility]) were also pertinent to this visit. Past Medical History:  has a past medical history of 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 chronic kidney disease ([facility] [address] Back pain, BPH (benign prostatic hyperplasia), CAD (coronary artery disease), CAD (coronary artery disease), Constipated, Hyperlipidemia, MI (myocardial infarction) ([facility] [address] Spinal stenosis, Stented coronary artery, Urinary hesitancy, and Vitamin D deficiency. Past Surgical History:  has a past surgical history that includes Coronary angioplasty with stent (10/18/15); Endoscopy, colon, diagnostic; Upper gastrointestinal endoscopy (10-); Upper gastrointestinal endoscopy (N/A, 2023); Upper gastrointestinal endoscopy (2023);  Upper gastrointestinal endoscopy (N/A, 2023); and Upper gastrointestinal endoscopy (1/16/2023). Treatment Diagnosis: Decreased: ADLs, functional transfers/mobility      Assessment   Performance deficits / Impairments: Decreased functional mobility ; Decreased ADL status; Decreased endurance;Decreased balance;Decreased cognition;Decreased safe awareness;Decreased strength  Assessment: Discussed with OTR am pac score is 16, which indicates need for continued skilled OT to increase IND and decrease caregiver burden. Patient completes bed mob with Min A. Pt continues to require up to Min A transfers, functional mobility with RW, slightly unsteady gait. Pt standing with CG/Min A for ADL's, d/t posterior lean. Pt required up to Mod A for toileting today and anticipate would require overall Mod/Max A for ADL's. Patient is functioning below his baseline, unsafe to return home at this time. Pt would benefit from low to mod therapy to maximize IND and decrease caregiver burden. Will cont with POC. Treatment Diagnosis: Decreased: ADLs, functional transfers/mobility  Prognosis: Fair  History: Pt is a 79 yo M who was admitted with weakness, SOB, found to have covid-19. PTA, pt lives at home w/ his son where he is typically independent in self-care and functional mobility using RW vs SPC. REQUIRES OT FOLLOW-UP: Yes  Activity Tolerance  Activity Tolerance: Patient Tolerated treatment well;Patient limited by fatigue        Plan   Occupational Therapy Plan  Times Per Week: 3-5  Times Per Day:  Once a day  Current Treatment Recommendations: Strengthening, ROM, Balance training, Functional mobility training, Endurance training, Self-Care / ADL, Safety education & training     Restrictions  Restrictions/Precautions  Restrictions/Precautions: Fall Risk, Modified Diet, Swallowing - Thickened Liquids  Position Activity Restriction  Other position/activity restrictions: IV; aldrich    Subjective   General  Chart Reviewed: Yes, Orders, Progress Notes, Labs  Patient assessed for rehabilitation services?: Yes  Additional Pertinent Hx: per H&P: \"Patient is a 27-year-old male with past medical history of CAD, CKD stage IV, hyperlipidemia who presents to the hospital due to patient having generalized weakness. Patient also has poor appetite, fatigue as well as generalized weakness. Patient does not have any recent sick contacts, no reported fevers chills\"  Response to previous treatment: Patient with no complaints from previous session  Family / Caregiver Present: No  Referring Practitioner: Shay  Diagnosis: SOB  Subjective  Subjective: Pt met BS, in bed. Pt agreeable to OT, requesting to use the BR for BM. General Comment  Comments: ok to see per RN     Social/Functional History  Social/Functional History  Lives With: Son  Type of Home: Apartment  Home Layout: One level  Home Access: Stairs to enter with rails  Entrance Stairs - Number of Steps: 13  Bathroom Shower/Tub: Tub/Shower unit  Bathroom Toilet: Standard  Bathroom Equipment: Grab bars in shower  Home Equipment: Keyur Sero, rolling  Has the patient had two or more falls in the past year or any fall with injury in the past year?: Yes  ADL Assistance: Independent  Ambulation Assistance: Independent (RW in apt, SPC in community)  Transfer Assistance: Independent  Active : No  Occupation: Retired  Type of Occupation:        Objective       Safety Devices  Type of Devices: Call light within reach; Chair alarm in place; Left in chair;Nurse notified;Gait belt     Toilet Transfers  Toilet - Technique: Ambulating  Equipment Used: Grab bars  Toilet Transfer: Minimal assistance  Toilet Transfers Comments: RW, cues for safe hand placement  Wheelchair Bed Transfers  Wheelchair/Bed - Technique: Ambulating  Equipment Used: Bed;Other (arm chair and recliner)  Level of Asssistance: Minimal assistance  Wheelchair Transfers Comments: RW, cues for safe hand placement     ADL  Feeding: Setup  Feeding Skilled Clinical Factors: feeds self after set up in recliner, w/assist for positioning of R arm for easier reach  Grooming: Contact guard assistance;Minimal assistance  Grooming Skilled Clinical Factors: stance at sink to wash hands. Pt leaning posteriorly; lets go of sink to dry hands. Toileting: Moderate assistance  Toileting Skilled Clinical Factors: voided BM on commode in BR. Pt attempts to wipe sitting, and needing assist for thoroughness. No pants on. Additional Comments: Anticipate pt would require setup for grooming, min A UB bathing/dressing, max A LB bathing/dressing based on ROM, strength, endurance, cognition this session        Bed mobility  Supine to Sit: Minimal assistance  Sit to Supine: Unable to assess (up to chair)        Cognition  Overall Cognitive Status: Exceptions  Arousal/Alertness: Appropriate responses to stimuli  Following Commands: Follows one step commands with repetition; Follows one step commands with increased time  Attention Span: Difficulty dividing attention  Memory: Decreased recall of recent events;Decreased short term memory  Safety Judgement: Decreased awareness of need for safety  Problem Solving: Decreased awareness of errors  Insights: Decreased awareness of deficits  Initiation: Requires cues for some  Sequencing: Requires cues for some  Orientation  Overall Orientation Status: Within Functional Limits  Orientation Level: Oriented to person;Oriented to place;Oriented to time;Oriented to situation         Functional Mobility: Pt amb 10 ft bed to BR and 20 ft, BR to recliner with RW, up to Min A cues for safety. Static Standing Balance: CG/Min A at sink, walker for ADL's. Pt leaning posteriorly. Education Given To: Patient  Education Provided: Role of Therapy;Transfer Training;ADL Adaptive Strategies; Energy Conservation;Orientation  Education Method: Verbal;Demonstration  Barriers to Learning: Cognition  Education Outcome: Continued education needed;Verbalized understanding       AM-PAC Score        AM-PAC Inpatient Daily Activity Raw Score: 16 (01/18/23 1339)  AM-PAC Inpatient ADL T-Scale Score : 35.96 (01/18/23 1339)  ADL Inpatient CMS 0-100% Score: 53.32 (01/18/23 1339)  ADL Inpatient CMS G-Code Modifier : CK (01/18/23 1339)    Goals  Short Term Goals  Time Frame for Short Term Goals: Prior to d/c.  Status: goals ongoing  Short Term Goal 1: Pt will complete ADL transfers w/ supervision  Short Term Goal 2: Pt will toilet w/ supervision  Short Term Goal 3: Pt will bathe w/ min A  Short Term Goal 4: Pt will groom in stance at sink w/ supervision  Short Term Goal 5: Pt will complete 10 reps UE therex in all planes to increase strength/endurance for ADLs  Long Term Goals  Time Frame for Long Term Goals : LTG=STG  Patient Goals   Patient goals : to go home       Therapy Time   Individual Concurrent Group Co-treatment   Time In 1303         Time Out 1343         Minutes 40               Brynn GREENE/JACQUELINE,515

## 2023-01-18 NOTE — PLAN OF CARE
Problem: Discharge Planning  Goal: Discharge to home or other facility with appropriate resources  Outcome: Progressing     Problem: Skin/Tissue Integrity  Goal: Absence of new skin breakdown  Description: 1. Monitor for areas of redness and/or skin breakdown  2. Assess vascular access sites hourly  3. Every 4-6 hours minimum:  Change oxygen saturation probe site  4. Every 4-6 hours:  If on nasal continuous positive airway pressure, respiratory therapy assess nares and determine need for appliance change or resting period.   Outcome: Progressing     Problem: Neurosensory - Adult  Goal: Achieves stable or improved neurological status  Outcome: Progressing  Goal: Achieves maximal functionality and self care  Outcome: Progressing     Problem: Respiratory - Adult  Goal: Achieves optimal ventilation and oxygenation  Outcome: Progressing     Problem: Cardiovascular - Adult  Goal: Maintains optimal cardiac output and hemodynamic stability  Outcome: Progressing  Goal: Absence of cardiac dysrhythmias or at baseline  Outcome: Progressing     Problem: Skin/Tissue Integrity - Adult  Goal: Skin integrity remains intact  Outcome: Progressing  Goal: Oral mucous membranes remain intact  Outcome: Progressing     Problem: Musculoskeletal - Adult  Goal: Return mobility to safest level of function  Outcome: Progressing  Goal: Return ADL status to a safe level of function  Outcome: Progressing     Problem: Gastrointestinal - Adult  Goal: Maintains or returns to baseline bowel function  Outcome: Progressing     Problem: Infection - Adult  Goal: Absence of infection at discharge  Outcome: Progressing     Problem: Metabolic/Fluid and Electrolytes - Adult  Goal: Electrolytes maintained within normal limits  Outcome: Progressing  Goal: Hemodynamic stability and optimal renal function maintained  Outcome: Progressing     Problem: Hematologic - Adult  Goal: Maintains hematologic stability  Outcome: Progressing     Problem: ABCDS Injury Assessment  Goal: Absence of physical injury  Outcome: Progressing     Problem: Safety - Adult  Goal: Free from fall injury  Outcome: Progressing     Problem: Pain  Goal: Verbalizes/displays adequate comfort level or baseline comfort level  Outcome: Progressing  Flowsheets (Taken 1/18/2023 0221 by Cheo Jackson RN)  Verbalizes/displays adequate comfort level or baseline comfort level: Encourage patient to monitor pain and request assistance     Problem: Nutrition Deficit:  Goal: Optimize nutritional status  Outcome: Progressing

## 2023-01-18 NOTE — PROGRESS NOTES
CXR returned confirming PICC placement. MD aware. Okay to use PICC line. Flushed with brisk blood return noted.

## 2023-01-18 NOTE — PROGRESS NOTES
Physical Therapy  Antwan Blunt  [id number]  [id number]  Attempted to see for PT session however pt declined; attempted to encourage ex while in bed but he also declined; states \"maybe later\".   Will continue to follow  Electronically signed by SHANTEL AGUILAR, PT on 1/18/2023 at 10:41 AM

## 2023-01-18 NOTE — PROGRESS NOTES
PICC nurse reported to nurse seeing abnormal rhythm during PICC placement. Pt is not currently on tele. MD notified and stated to get EKG.

## 2023-01-18 NOTE — PROGRESS NOTES
Lost access to LFA IV this shift. Continues with LAC IV, but has continuous Protonix running. Pt has needed blood in past and now needs Mag. Also, Pt having blood drawn every 6 hrs and states he has been stuck too much and it's causing him great pain. MD and nephro MD were asked for PICC to help relieve Pt's pain and continue with good access. Order for PICC placed.

## 2023-01-18 NOTE — PROGRESS NOTES
Pt consented and signed for PICC line. Daughter Melissa Funes was notified that Pt had signed and she stated okay. States she knows her dad is coherent and can choose for himself.

## 2023-01-18 NOTE — CARE COORDINATION
Discharge Planning:   PT/OT: attempted to see, patient declined. Per chart review, patient nearing discharge. Spoke to iAxa, PT, confirmed she plans to see patient this afternoon, and will try again tomorrow morning if patient declines therapy.    PL[facility]: [facility]   [facility] Castleman, MSW, LACHO, Social Work/Case Management   [phone]  Electronically signed by Watson Castleman, MSW, LACHO on 1/18/2023 at 1:05 PM

## 2023-01-18 NOTE — PROGRESS NOTES
Hospital Medicine Progress Note     Date:  1/18/2023    PCP: Tyree Callaway MD (Tel: [phone])    Date of Admission: 1/5/2023    Chief complaint:   Chief Complaint   Patient presents with    Fatigue     Pt. normally able to ambulate but not at this time and is extremely weak. Brief admission history: 54-year-old male with history of CKD stage IV, anemia of chronic disease, BPH, CAD, hyperlipidemia, who was admitted with generalized weakness, decreased oral intake, diagnosed with COVID-19 and bacterial pneumonia. Assessment/plan:  Bacterial pneumonia, likely superimposed in the setting of COVID-19 pneumonia. Procalcitonin level was elevated. Completed course of antibiotics. Did not require COVID-specific treatment as patient was not hypoxic. Sore throat. Secondary to COVID-19 infection. As needed lozenges. Acute kidney injury on CKD stage IV, improved with intravenous fluid. Generalized weakness. Likely secondary to acute medical conditions. Treat underlying conditions as noted above. Therapy evaluation in place. Going to SNF at discharge. Asymptomatic bacteriuria. Patient was already on antibiotics for other indication as noted above. There is no indication for treatment of asymptomatic bacteriuria. Acute upper GI bleed. Status post EGD on 1/9/2023 with multiple duodenal ulcers (6 mm clean-based duodenal ulcer, 15 mm clean-based duodenal bulb ulcer, 2 cm ulcer with visible vessel in distal aspect of ulcer, treated - see EGD report. He had repeat EGD on 1/16/2023 with noted 1 duodenal ulcer with adherent clot, clot removed and visible vessel treated. Remains on PPI infusion. Remains at risk for re-bleed. Avoid NSAIDs. Monitor serial H&H. Acute on chronic anemia with superimposed anemia of chronic disease. Secondary to #1. He has required multiple units of PRBC transfusions this hospital stay. Recommend repeat H&H within 1 week of discharge.    Per nursing documentation, son had inquired about possibly testing patient for thalassemia. This would not be an inpatient testing; hence, will not be ordered. I will defer this to PCP, if that is deemed necessary in a 80year-old. At risk for aspiration. Reviewed chest x-ray from January 17, 2023, possible infiltrate or atelectasis. However, patient does not have cough or fever or shortness of breath. Suspicion is this is more atelectasis than infiltrate. Incentive spirometer has been ordered. Other comorbidities: history of CKD stage IV, anemia of chronic disease, BPH, CAD, hyperlipidemia. Disposition. Likely discharge to SNF tomorrow, provided no new bleed. Diet: ADULT DIET; Dysphagia - Minced and Moist; Mildly Thick (Nectar)  ADULT ORAL NUTRITION SUPPLEMENT; Breakfast, Lunch, Dinner; Frozen Oral Supplement    Code status: Full Code   ----------  Subjective  No cough or shortness of breath. Denies any discomfort. No bleeding overnight. Hemoglobin is stable. Objective  Physical exam:  Vitals: BP (!) 150/71   Pulse 59   Temp 97.4 °F (36.3 °C) (Oral)   Resp 20   Ht 5' 6\" (1.676 m)   Wt 143 lb 8.3 oz (65.1 kg)   SpO2 94%   BMI 23.16 kg/m²   Gen/overall appearance: Not in acute distress. Alert. Oriented x3. Head: Normocephalic, atraumatic  Eyes: EOMI, good acuity  ENT: Oral mucosa moist  Neck: No JVD, thyromegaly  CVS: Nml S1S2, no MRG, RRR  Pulm: Clear bilaterally. No crackles/wheezes  Gastrointestinal: Soft, NT/ND, +BS  Musculoskeletal: No edema. Warm  Neuro: No focal deficit. Moves extremity spontaneously. Psychiatry: Appropriate affect. Not agitated. Skin: Warm, dry with normal turgor. No rash  Capillary refill: Brisk,< 3 seconds   Peripheral Pulses: +2 palpable, equal bilaterally     24HR INTAKE/OUTPUT:    Intake/Output Summary (Last 24 hours) at 1/18/2023 0955  Last data filed at 1/18/2023 0836  Gross per 24 hour   Intake 422 ml   Output 1075 ml   Net -653 ml       I/O last 3 completed shifts:   In: 174 [P.O.:240; Blood:182]  Out: 1425 [[facility]:0382]  I/O this shift:  In: -   Out: 250 [Urine:250]  Meds:    magnesium sulfate  1,000 mg IntraVENous Once    sodium bicarbonate  650 mg Oral BID    dorzolamide-timolol  1 drop Both Eyes BID    docusate sodium  100 mg Oral BID    sodium chloride flush  10 mL IntraVENous 2 times per day    atorvastatin  40 mg Oral Nightly    escitalopram  10 mg Oral Nightly    polyethylene glycol  17 g Oral Daily    senna  1 tablet Oral BID    tamsulosin  0.4 mg Oral Nightly     Infusions:    sodium chloride      pantoprozole (PROTONIX) infusion 8 mg/hr (01/18/23 0245)    sodium chloride      sodium chloride       PRN Meds: sodium chloride, phenol, sodium chloride, Benzocaine-Menthol, bisacodyl, sodium chloride flush, sodium chloride, promethazine **OR** ondansetron, acetaminophen **OR** acetaminophen, nitroGLYCERIN, traMADol    Labs/imaging:  CBC:   Recent Labs     01/16/23  0701 01/17/23  0433 01/17/23  0848 01/17/23  1421 01/17/23  2148 01/18/23  0429   WBC 10.7 12.1*  --   --   --  12.1*   HGB 7.8* 7.2*   < > 6.2* 8.2* 8.3*    295  --   --   --  265    < > = values in this interval not displayed. BMP:    Recent Labs     01/16/23  0444 01/17/23  0433 01/18/23  0429    143 144   K 4.7 4.5 4.5   * 113* 113*   CO2 19* 21 21   BUN 45* 47* 43*   CREATININE 3.1* 3.1* 2.8*   GLUCOSE 91 106* 116*       Hepatic:   No results for input(s): AST, ALT, ALB, BILITOT, ALKPHOS in the last 72 hours.       Enmanuel Chapin MD  -------------------------------  Rounding hospitalist

## 2023-01-18 NOTE — PROGRESS NOTES
INPATIENT PROGRESS NOTE        IDENTIFYING DATA/REASON FOR CONSULTATION   PATIENT:  Zulma Cruz  MRN:  [id number]  ADMIT DATE: 2023  TIME OF EVALUATION: 2023 2:57 PM  HOSPITAL STAY:   LOS: 13 days   CONSULTING PHYSICIAN: Vicki Ackerman MD   REASON FOR CONSULTATION: Melena, Acute blood loss anemia    Subjective:    Patient seen in follow up. Notes patient complained of dizziness this morning and had a soft dark BM. Gee Keyes Required another unit of blood yesterday. Hemoglobin responded well and has been stable since (6.2->8.2->8.3->8. 7)    MEDICATIONS   SCHEDULED:  magnesium sulfate, 1,000 mg, Once  lidocaine 1 % injection, 5 mL, Once  sodium bicarbonate, 650 mg, BID  dorzolamide-timolol, 1 drop, BID  docusate sodium, 100 mg, BID  sodium chloride flush, 10 mL, 2 times per day  atorvastatin, 40 mg, Nightly  escitalopram, 10 mg, Nightly  polyethylene glycol, 17 g, Daily  senna, 1 tablet, BID  tamsulosin, 0.4 mg, Nightly    FLUIDS/DRIPS:     sodium chloride      pantoprozole (PROTONIX) infusion 8 mg/hr (23 1348)    sodium chloride      sodium chloride       PRNs: sodium chloride, , PRN  phenol, 1 spray, Q2H PRN  sodium chloride, , PRN  Benzocaine-Menthol, 1 lozenge, Q2H PRN  bisacodyl, 10 mg, Daily PRN  sodium chloride flush, 10 mL, PRN  sodium chloride, , PRN  promethazine, 12.5 mg, Q6H PRN   Or  ondansetron, 4 mg, Q6H PRN  acetaminophen, 650 mg, Q6H PRN   Or  acetaminophen, 650 mg, Q6H PRN  nitroGLYCERIN, 0.4 mg, Q5 Min PRN  traMADol, 50 mg, Q8H PRN    ALLERGIES:  No Known Allergies      PHYSICAL EXAM   VITALS:  BP (!) 112/54   Pulse 60   Temp 97.4 °F (36.3 °C) (Oral)   Resp 22   Ht 5' 6\" (1.676 m)   Wt 143 lb 8.3 oz (65.1 kg)   SpO2 94%   BMI 23.16 kg/m²   TEMPERATURE:  Current - Temp: 97.4 °F (36.3 °C);  Max - Temp  Av.7 °F (36.5 °C)  Min: 97.3 °F (36.3 °C)  Max: 98.3 °F (36.8 °C)    Physical Exam:  General appearance: alert, cooperative, no distress  Eyes: Anicteric  Head: Normocephalic, without obvious abnormality  Lungs: clear to auscultation bilaterally, Normal Effort  Heart: regular rate and rhythm, normal S1 and S2, no murmurs or rubs  Abdomen: soft, non-distended,minimally tender epigastric area. Bowel sounds normal.   Extremities: atraumatic, no cyanosis or edema  Skin: warm and dry, no jaundice  Neuro: Grossly intact, A&OX3    LABS AND IMAGING   Laboratory   Recent Labs     01/16/23  0701 01/17/23  0433 01/17/23  0848 01/17/23  2148 01/18/23  0429 01/18/23  0903   WBC 10.7 12.1*  --   --  12.1*  --    HGB 7.8* 7.2*   < > 8.2* 8.3* 8.7*   HCT 24.9* 22.5*   < > 25.8* 25.8* 27.9*   MCV 93.7 92.9  --   --  92.5  --     295  --   --  265  --     < > = values in this interval not displayed. Recent Labs     01/16/23  0444 01/17/23  0433 01/18/23  0429    143 144   K 4.7 4.5 4.5   * 113* 113*   CO2 19* 21 21   PHOS 3.6 2.7 2.4*   BUN 45* 47* 43*   CREATININE 3.1* 3.1* 2.8*     No results for input(s): AST, ALT, ALB, BILIDIR, BILITOT, ALKPHOS in the last 72 hours. No results for input(s): LIPASE, AMYLASE in the last 72 hours. No results for input(s): PROTIME, INR in the last 72 hours. Imaging  XR CHEST PORTABLE   Final Result   1. Patchy bibasilar airspace disease which could reflect atelectasis versus   pneumonia. 2.  Stable diffuse bilateral calcified pleural plaques         XR CHEST PORTABLE   Final Result   1. Patchy right basilar airspace disease could represent atelectasis versus   pneumonia. 2.  Multiple bilateral calcified pleural plaques         CT SOFT TISSUE NECK WO CONTRAST   Final Result   No acute findings, soft tissue mass or lymphadenopathy evident. XR CHEST 1 VIEW   Final Result   Ill-defined opacities bilaterally.          XR CHEST PORTABLE    (Results Pending)       Endoscopy  EGD 1/9/23 with Dr. Que Gracia  Normal esophagus without Mcdonald's or reflux changes, 3cm sliding hiatal hernia, normal stomach, 6mm clean-based duodenal ulcer, 15mm clean-based duodenal ulcer in the bulb. In the sweep, there was a 2cm ulcer with a visible vessel in the distal aspect of the ulcer. I was not able to see the very proximal aspect of the ulcer. Epinephrine 1:10,000 was injected in each of 4 quadrants around the ulcer with excellent blanching of tissue. A total of 3.5mL was injected. Next, the vessel was obliterated using gold probe cautery with excellent hemostasis. No bleeding at the end of the procedure. 2nd portion of the duodenum was normal.       ASSESSMENT AND RECOMMENDATIONS   Mark Merchant is a 80 y.o. male with PMH of CAD with coronary stents, HLD, CKD stage IV, chronic anemia BPH who presented on 1/5/2023 with generalized weakness, decreased oral intake. Diagnosed with COVID-19, bacterial pneumonia, acute on CKD. We have been consulted regarding bloody stool, acute on  chronic anemia. EGD 1/9 showed 2 cm ulcer with visible vessel in duodenal sweep treated with epi injection and gold probe cautery. Patient continued to pass dark blood clots. Repeat EGD yesterday showed multiple duodenal ulcers with 1 ulcer with adherent clot treated with epinephrine and Hemoclip    Duodenal ulcer with hemorrhage. H. pylori stool antigen negative. PPI drip  Acute blood loss anemia 2/2 #1. Patient getting every 6 hour H&H checks. Hemoglobin yesterday 6.2. He received 1 unit of packed red blood cells. Repeat hemoglobin responded well and stable since (8.2-8.3-1.7)  Acute renal failure  COVID pneumonia    RECOMMENDATIONS:    Continue IV PPI therapy  Monitor for signs of rebleeding  Avoid NSAIDs  Encourage good nutrition. Ensure shakes with meals. If you have any questions or need any further information, please feel free to contact us [phone]. Thank you for allowing us to participate in the care of Mark Merchant. The note was completed using Dragon voice recognition transcription.  Every effort was made to ensure accuracy; however, inadvertent transcription errors may be present despite my best efforts to edit errors.     Muna BAILEY

## 2023-01-19 LAB
ALBUMIN SERPL-MCNC: 2.3 G/DL (ref 3.4–5)
ANION GAP SERPL CALCULATED.3IONS-SCNC: 8 MMOL/L (ref 3–16)
BUN BLDV-MCNC: 41 MG/DL (ref 7–20)
CALCIUM SERPL-MCNC: 7.7 MG/DL (ref 8.3–10.6)
CHLORIDE BLD-SCNC: 109 MMOL/L (ref 99–110)
CO2: 22 MMOL/L (ref 21–32)
CREAT SERPL-MCNC: 2.7 MG/DL (ref 0.8–1.3)
GFR SERPL CREATININE-BSD FRML MDRD: 21 ML/MIN/{1.73_M2}
GLUCOSE BLD-MCNC: 112 MG/DL (ref 70–99)
HCT VFR BLD CALC: 25.6 % (ref 40.5–52.5)
HEMOGLOBIN: 8.4 G/DL (ref 13.5–17.5)
MAGNESIUM: 1.8 MG/DL (ref 1.8–2.4)
MCH RBC QN AUTO: 30.3 PG (ref 26–34)
MCHC RBC AUTO-ENTMCNC: 32.6 G/DL (ref 31–36)
MCV RBC AUTO: 93 FL (ref 80–100)
PDW BLD-RTO: 14.7 % (ref 12.4–15.4)
PHOSPHORUS: 2.5 MG/DL (ref 2.5–4.9)
PLATELET # BLD: 248 K/UL (ref 135–450)
PMV BLD AUTO: 8.4 FL (ref 5–10.5)
POTASSIUM SERPL-SCNC: 4.3 MMOL/L (ref 3.5–5.1)
RBC # BLD: 2.76 M/UL (ref 4.2–5.9)
SODIUM BLD-SCNC: 139 MMOL/L (ref 136–145)
WBC # BLD: 10.2 K/UL (ref 4–11)

## 2023-01-19 PROCEDURE — 97110 THERAPEUTIC EXERCISES: CPT

## 2023-01-19 PROCEDURE — 6370000000 HC RX 637 (ALT 250 FOR IP): Performed by: INTERNAL MEDICINE

## 2023-01-19 PROCEDURE — 6360000002 HC RX W HCPCS: Performed by: INTERNAL MEDICINE

## 2023-01-19 PROCEDURE — 80069 RENAL FUNCTION PANEL: CPT

## 2023-01-19 PROCEDURE — 94760 N-INVAS EAR/PLS OXIMETRY 1: CPT

## 2023-01-19 PROCEDURE — 2580000003 HC RX 258: Performed by: INTERNAL MEDICINE

## 2023-01-19 PROCEDURE — 2060000000 HC ICU INTERMEDIATE R&B

## 2023-01-19 PROCEDURE — C9113 INJ PANTOPRAZOLE SODIUM, VIA: HCPCS | Performed by: INTERNAL MEDICINE

## 2023-01-19 PROCEDURE — 83735 ASSAY OF MAGNESIUM: CPT

## 2023-01-19 PROCEDURE — 92526 ORAL FUNCTION THERAPY: CPT

## 2023-01-19 PROCEDURE — 85027 COMPLETE CBC AUTOMATED: CPT

## 2023-01-19 PROCEDURE — 97116 GAIT TRAINING THERAPY: CPT

## 2023-01-19 RX ORDER — SODIUM BICARBONATE 650 MG/1
650 TABLET ORAL 2 TIMES DAILY
Qty: 10 TABLET | Refills: 0 | Status: SHIPPED | OUTPATIENT
Start: 2023-01-19

## 2023-01-19 RX ORDER — PANTOPRAZOLE SODIUM 40 MG/1
40 TABLET, DELAYED RELEASE ORAL 2 TIMES DAILY
Qty: 60 TABLET | Refills: 3 | Status: SHIPPED | OUTPATIENT
Start: 2023-01-19

## 2023-01-19 RX ORDER — BISACODYL 10 MG
10 SUPPOSITORY, RECTAL RECTAL DAILY PRN
Qty: 30 SUPPOSITORY | Refills: 2 | Status: SHIPPED | OUTPATIENT
Start: 2023-01-19 | End: 2023-02-18

## 2023-01-19 RX ORDER — PSEUDOEPHEDRINE HCL 30 MG
100 TABLET ORAL 2 TIMES DAILY
Qty: 60 CAPSULE | Refills: 3 | Status: SHIPPED | OUTPATIENT
Start: 2023-01-19

## 2023-01-19 RX ORDER — POLYETHYLENE GLYCOL 3350 17 G/17G
17 POWDER, FOR SOLUTION ORAL DAILY PRN
Qty: 527 G | Refills: 1 | Status: SHIPPED | OUTPATIENT
Start: 2023-01-19 | End: 2023-02-18

## 2023-01-19 RX ADMIN — SODIUM CHLORIDE, PRESERVATIVE FREE 10 ML: 5 INJECTION INTRAVENOUS at 21:51

## 2023-01-19 RX ADMIN — SENNOSIDES 8.6 MG: 8.6 TABLET, FILM COATED ORAL at 08:42

## 2023-01-19 RX ADMIN — SODIUM BICARBONATE 650 MG: 650 TABLET ORAL at 08:42

## 2023-01-19 RX ADMIN — SODIUM CHLORIDE, PRESERVATIVE FREE 10 ML: 5 INJECTION INTRAVENOUS at 08:42

## 2023-01-19 RX ADMIN — ATORVASTATIN CALCIUM 40 MG: 40 TABLET, FILM COATED ORAL at 20:08

## 2023-01-19 RX ADMIN — ACETAMINOPHEN 650 MG: 325 TABLET ORAL at 14:25

## 2023-01-19 RX ADMIN — SODIUM BICARBONATE 650 MG: 650 TABLET ORAL at 20:08

## 2023-01-19 RX ADMIN — SODIUM CHLORIDE 8 MG/HR: 9 INJECTION, SOLUTION INTRAVENOUS at 12:40

## 2023-01-19 RX ADMIN — SODIUM CHLORIDE 8 MG/HR: 9 INJECTION, SOLUTION INTRAVENOUS at 23:26

## 2023-01-19 RX ADMIN — DOCUSATE SODIUM 100 MG: 100 CAPSULE, LIQUID FILLED ORAL at 20:08

## 2023-01-19 RX ADMIN — TAMSULOSIN HYDROCHLORIDE 0.4 MG: 0.4 CAPSULE ORAL at 20:08

## 2023-01-19 RX ADMIN — DORZOLAMIDE HYDROCHLORIDE AND TIMOLOL MALEATE 1 DROP: 20; 5 SOLUTION/ DROPS OPHTHALMIC at 08:43

## 2023-01-19 RX ADMIN — SENNOSIDES 8.6 MG: 8.6 TABLET, FILM COATED ORAL at 20:08

## 2023-01-19 RX ADMIN — TRAMADOL HYDROCHLORIDE 50 MG: 50 TABLET ORAL at 18:55

## 2023-01-19 RX ADMIN — DOCUSATE SODIUM 100 MG: 100 CAPSULE, LIQUID FILLED ORAL at 08:42

## 2023-01-19 RX ADMIN — DORZOLAMIDE HYDROCHLORIDE AND TIMOLOL MALEATE 1 DROP: 20; 5 SOLUTION/ DROPS OPHTHALMIC at 21:51

## 2023-01-19 RX ADMIN — ESCITALOPRAM OXALATE 10 MG: 10 TABLET ORAL at 20:08

## 2023-01-19 RX ADMIN — POLYETHYLENE GLYCOL 3350 17 G: 17 POWDER, FOR SOLUTION ORAL at 08:42

## 2023-01-19 ASSESSMENT — PAIN DESCRIPTION - DESCRIPTORS
DESCRIPTORS: ACHING
DESCRIPTORS: ACHING

## 2023-01-19 ASSESSMENT — PAIN DESCRIPTION - ORIENTATION: ORIENTATION: MID;UPPER

## 2023-01-19 ASSESSMENT — PAIN SCALES - GENERAL
PAINLEVEL_OUTOF10: 3
PAINLEVEL_OUTOF10: 5
PAINLEVEL_OUTOF10: 1
PAINLEVEL_OUTOF10: 0

## 2023-01-19 ASSESSMENT — PAIN - FUNCTIONAL ASSESSMENT: PAIN_FUNCTIONAL_ASSESSMENT: ACTIVITIES ARE NOT PREVENTED

## 2023-01-19 ASSESSMENT — ENCOUNTER SYMPTOMS
SHORTNESS OF BREATH: 0
CHEST TIGHTNESS: 0
GASTROINTESTINAL NEGATIVE: 1

## 2023-01-19 ASSESSMENT — PAIN DESCRIPTION - LOCATION
LOCATION: BACK
LOCATION: HEAD

## 2023-01-19 NOTE — PROGRESS NOTES
PALLIATIVE MEDICINE PROGRESS NOTE     Patient name:Flash Molina    XVL:[id number] GYD:[birth year]  Room/Bed:C3R-6182/5127-01    LOS: 14 days        ASSESSMENT/RECOMMENDATIONS     80 y.o. male with debility, dysphagia, anemia, malnutrition, and CKD. Symptom Management:  Weakness - Working with PT/OT, will discharge to SNF later today. Dysphagia - ST following. Reports tolerating a soft/puréed diet. Anemia -educated the patient on high risk for reoccurrence. Malnutrition -oral intake continues to be somewhat poor, will likely benefit from being in a facility rather than at home for dietary needs. CKD - Nephrology following, not a candidate for dialysis. Creatinine remains stable. GIB -no current symptoms. Patient/Family Goals of Care :    1/10/23 - I explained the highly personal nature of deciding how to approach serious illness, and outlined two extremes--continuing disease-focused, morbidity-inducing treatment with the possibility of prolonging life vs. focusing on comfort/quality of life while allowing disease progression and natural death. Emphasis was placed on the absence of a \"right\" answer, as opposed to making good decisions based on personal preferences and circumstances, with ongoing reevaluation and adjustment in treatment goals as the clinical course unfolds. Patient was alert and oriented throughout the conversation in his room. We reviewed his living well at length, he did confirm his decisions regarding CPR and intubation in the aspect of cardiac or respiratory arrest.  He does state that he would like to live and continue medical management up until that point. Reached out to patient's [facility]. KHALIDA, daughter Luba Mari. [place] through the 38-minute conversation, Luba Mari did call who is a critical care physician in [facility]. He and Luba Mari are not 100% on the same page regarding the patient's current medical care.   We discussed goals of care, patient's main goal is to return home where he lives with his son who also has several medical conditions. The 2 of them essentially financially support and help maintain each other's care. Given the patient's overall prognosis, at this point, he will likely not return immediately to his apartment, however will end up in a skilled nursing facility. Ashley Leone is having a difficult time with the patient's \"waffling\" regarding his desire to live. Per his chart review, the patient has had conversations with his providers about \"why am I still living\". Per his daughters report he is also had those conversations with her and is questioning why he is the last of his siblings to be alive. He has had concerns with malnutrition and failure to thrive for quite some time per chart review, she confirmed. Her frustration now lies that he is essentially changing his mind stating that he has a lot to live for. Per her report, her  was very in depth when having CODE STATUS conversations when the patient was completing his living well and estate planning. At that time he chose not to undergo resuscitative measures in the event of cardiac or respiratory arrest.  He feels as though now he wishes to do so. We discussed that the patient's in goal essentially will not be met by aggressive management of his disease processes. She is under the mindset that if the patient were to undergo cardiac or respiratory arrest, temporary measures to revive the patient would be appropriate so that her siblings would be able to say goodbye. Her  does not agree with this decision, but states that she has the right to make that decision. Ashley Leone states that she needs to [place] markie conversations with her siblings\" so that they understand the magnitude of what is going on with their father and can come to terms with his prognosis therefore taking resuscitative measures off the table.   She also states that she is \"trying to prevent my siblings from hating me for the rest of my life\" if I do not allow resuscitative measures. We discussed the risk vs benefit of aggressive measures understanding his desires and co-morbidities. Estela Blanton and her  feel as though the patient would improve if he moved to [facility], however he has a co dependent relationship with his son and therefore will likely not move. Estela Blanton is very overwhelmed, asked that the conversation was ended in order eat and take a walk. She would like to have a discussion again tomorrow allowing some time to continue discussions with her siblings and .      1/11/23 -left message with patient's daughter, Estela Blanton to further discuss. Will await return call. 1/17/23 -patient is up in a chair, had difficulty swallowing his breakfast.  Acknowledges that he is not feeling well today, has made little progress to improve. We again discussed goals of care, patient's main goal is to return to his apartment with his son. We discussed likely discharged to SNF, patient is agreeable, but apprehensive. He states that he would like to continue to try all aggressive measures up to the point where he does not recognize his family, and then he would like to see his care. Daughter is back in [facility], no changes have been made to [address] [place] [address]. 1/19/23 -reviewed plan for discharge, patient's ultimate goal is to return home with his son. Encouraged the patient to continue to have conversations regarding his medical goals and desired level of care with his daughter, his [facility]. We discussed that he is high risk for readmission due to several comorbidities, encouraged him to continue conversations with his family regarding his goals of care. Disposition/Discharge Plan :    Pending, discharge to SNF. Follows with outpatient [facility]. Advance Directives:  Code status:  Full Code  Decision Maker: Penny Mccarthy. Case Discussed with:  patient, floor RN, .     Thank you for allowing us to participate in the care of this patient. SUBJECTIVE     Chief Complaint: Weakness    Last 24 hours:   No acute events overnight, plans for discharge later today to SNF. More withdrawn today, is unsure of plan for discharge. ROS:    Review of Systems   Constitutional:  Positive for activity change and fatigue. Negative for appetite change. Respiratory:  Negative for chest tightness and shortness of breath. Cardiovascular:  Negative for chest pain and leg swelling. Gastrointestinal: Negative. Musculoskeletal: Negative. Neurological:  Positive for weakness. Psychiatric/Behavioral: Negative. A complete 10 count ROS was obtained. Pertinent positives mentioned above in HPI/ROS. All others if not mentioned are negative. OBJECTIVE   BP (!) 119/58   Pulse (!) 108   Temp 97.4 °F (36.3 °C)   Resp 16   Ht 5' 6\" (1.676 m)   Wt 143 lb 1.3 oz (64.9 kg)   SpO2 91%   BMI 23.09 kg/m²   I/O last 3 completed shifts: In: 962 [P.O.:780; Blood:182]  Out: 2250 [Urine:2250]  No intake/output data recorded. Physical Exam  Constitutional:       Appearance: Normal appearance. Cardiovascular:      Rate and Rhythm: Normal rate. Pulses: Normal pulses. Pulmonary:      Effort: Pulmonary effort is normal.   Abdominal:      Palpations: Abdomen is soft. Neurological:      Mental Status: He is alert. Motor: Weakness present. Psychiatric:         Mood and Affect: Mood normal.         Thought Content:  Thought content normal.         Judgment: Judgment normal.        Total time: 15 minutes  >50% of time spent counseling patient at bedside or POA/family member if applicable , reviewing information and discussing care, coordinating with care team       Signed By: Electronically signed by LISSET Sweeney CNP on 1/19/2023 at 12:43 PM   Palliative Medicine   [phone]    January 19, 2023

## 2023-01-19 NOTE — PROGRESS NOTES
Pt A&O x4, calm and cooperative throughout the night. Poor intake, good UOP. He has been up to the bathroom with a walker and contact guard assist, tolerated well. 2 BM's this shift. BM's are formed, ribbon-like and dark brown in color. He offers no complaints at this time. Hgb this a.m. 8.4. Pt resting comfortably in bed with call light within reach, bed alarm on. Will continue to monitor.     Electronically signed by Jane Oshea RN on 1/19/2023 at 6:22 AM

## 2023-01-19 NOTE — PROGRESS NOTES
Physical Therapy  Facility/Department: [address] PROGRESSIVE CARE  Physical Therapy Daily Treatment Note    Name: Jordin Ceballos  : 1931  MRN: [id number]  Date of Service: 2023    Discharge Recommendations: Jordin Ceballos scored a 17/24 on the AM-PAC short mobility form. Current research shows that an AM-PAC score of 17 or less is typically not associated with a discharge to the patient's home setting. Based on the patient's AM-PAC score and their current functional mobility deficits, it is recommended that the patient have 3-5 sessions per week of Physical Therapy at d/c to increase the patient's independence. Please see assessment section for further patient specific details. If patient discharges prior to next session this note will serve as a discharge summary. Please see below for the latest assessment towards goals. Patient would benefit from continued therapy after discharge, Therapy recommended at discharge (3-5x/wk)   PT Equipment Recommendations  Equipment Needed:  (Defer to next level of care)      Patient Diagnosis(es): The primary encounter diagnosis was Pneumonia of right lung due to infectious organism, unspecified part of lung. Diagnoses of General weakness, Impaired mobility and ADLs, COVID-19, and Acute kidney injury superimposed on CKD [facility]) were also pertinent to this visit. Past Medical History:  has a past medical history of 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 chronic kidney disease ([address] Back pain, BPH (benign prostatic hyperplasia), CAD (coronary artery disease), CAD (coronary artery disease), Constipated, Hyperlipidemia, MI (myocardial infarction) ([address] Spinal stenosis, Stented coronary artery, Urinary hesitancy, and Vitamin D deficiency. Past Surgical History:  has a past surgical history that includes Coronary angioplasty with stent (10/18/15); Endoscopy, colon, diagnostic; Upper gastrointestinal endoscopy (10-); Upper gastrointestinal endoscopy (N/A, 2023);  Upper gastrointestinal endoscopy (1/9/2023); Upper gastrointestinal endoscopy (N/A, 1/16/2023); and Upper gastrointestinal endoscopy (1/16/2023). Assessment   Body Structures, Functions, Activity Limitations Requiring Skilled Therapeutic Intervention: Decreased functional mobility ; Decreased ADL status; Decreased strength;Decreased balance;Decreased endurance  Assessment: Pt presents today with slight improvement in functional mobility. Pt was able to ambulate slightly increased distances with use of the RW however required seated rest breaks between gait trials and therapeutic exercises due to fatigue. Pt continues to demonstrate impaired endurance and activity tolerance at this time and presents below his baseline level of function. Barriers to discharge to the pts home include 13 steps to enter and pt would be unsafe to perform stair mobility at this time. Pt is unsafe to return home at this time and will benefit from continued skilled PT to facilitate return to PLOF and to promote independence. Recommend continued IP PT upon discharge. Treatment Diagnosis: Impaired functional mobility and impaired endurance. Requires PT Follow-Up: Yes  Activity Tolerance  Activity Tolerance Comments: Pt on RA throughout the treatment session and SpO2 remained >90% throughout all functional mobility tasks and therapeutic exercises. Plan   Physcial Therapy Plan  General Plan: 3-5 times per week  Specific Instructions for Next Treatment: cotx with OT  Current Treatment Recommendations: Strengthening, Balance training, Functional mobility training, Transfer training, Gait training, Endurance training, Neuromuscular re-education, Safety education & training, Equipment evaluation, education, & procurement, Patient/Caregiver education & training, Therapeutic activities  Safety Devices  Type of Devices:  All fall risk precautions in place, Call light within reach, Chair alarm in place, Gait belt, Left in chair  Restraints  Restraints Initially in Place: No     Restrictions  Restrictions/Precautions  Restrictions/Precautions: Modified Diet, Fall Risk, Up as Tolerated (Minced and moist diet, mildly thick (nectar thick) liquids, high fall risk)  Position Activity Restriction  Other position/activity restrictions: IV; aldrich     Subjective   General  Chart Reviewed: Yes  Patient assessed for rehabilitation services?: Yes  Additional Pertinent Hx: Patient is a 40-year-old male with past medical history of CAD, CKD stage IV, hyperlipidemia who presents to the hospital on 1/5/23 due to patient having generalized weakness. Pt found to be COVID+. Response To Previous Treatment: Patient with no complaints from previous session. Family / Caregiver Present: No  Referring Practitioner: Juan Carlos Carlin MD  Referral Date : 01/05/23  Diagnosis: Bacterial pneumonia  Follows Commands: Impaired (Pt follows one step commands with increased time and repetition of instruction)  General Comment  Comments: Pt seated in recliner on arrival, agreeable to participate in PT treatment session. Subjective  Subjective: Pt denies pain. Social/Functional History  Social/Functional History  Lives With: Son  Type of Home: Apartment  Home Layout: One level  Home Access: Stairs to enter with rails  Entrance Stairs - Number of Steps: 13  Bathroom Shower/Tub: Tub/Shower unit  Bathroom Toilet: Standard  Bathroom Equipment: Grab bars in shower  Home Equipment: [address], Walker, rolling  Has the patient had two or more falls in the past year or any fall with injury in the past year?: Yes  ADL Assistance: Independent  Ambulation Assistance: Independent (RW in apt, SPC in community)  Transfer Assistance: Independent  Active : No  Occupation: Retired  Type of Occupation:     Cognition   Cognition  Overall Cognitive Status: Exceptions  Arousal/Alertness: Delayed responses to stimuli  Following Commands: Follows one step commands with repetition; Follows one step commands with increased time  Safety Judgement: Decreased awareness of need for safety  Problem Solving: Decreased awareness of errors  Insights: Decreased awareness of deficits  Initiation: Requires cues for some  Sequencing: Requires cues for some     Objective   Observation/Palpation  Observation: Pt on RA on arrival.      Bed mobility  Bed Mobility Comments: Not completed as pt seated in recliner at the beginning and end of the treatment session. Transfers  Sit to Stand: Minimal Assistance (Up to RW, verbal cues required for hand placement)  Stand to Sit: Minimal Assistance (With RW, verbal cues required for hand placement)  Ambulation  Surface: Level tile  Device: Rolling Walker  Assistance: Contact guard assistance  Quality of Gait: Crouched posture, short, shffling steps, B foot pronation, decreased speed, steady with no losses of balance  Distance: 36' + 30'     Balance  Standing - Static:  (CGA with B UE support on RW)  Standing - Dynamic:  (CGA with B UE support on RW)  Exercise Treatment: Pt participated in seated AROM therapeutic exercises including: marches, LAQ, ankle planarflexion, ankle dorsiflexion (1 x 20 B).       AM-PAC Score  AM-PAC Inpatient Mobility Raw Score : 17 (01/19/23 0911)  AM-PAC Inpatient T-Scale Score : 42.13 (01/19/23 0911)  Mobility Inpatient CMS 0-100% Score: 50.57 (01/19/23 0911)  Mobility Inpatient CMS G-Code Modifier : CK (01/19/23 0911)     Goals  Short Term Goals  Time Frame for Short Term Goals: by acute discharge - all goals ongoing  Short Term Goal 1: bed mobility SBA  Short Term Goal 2: sit<>stand with SBA  Short Term Goal 3: ambulate > 30' with RW and SBA  Patient Goals   Patient Goals : none stated  *No goals met 1/19/23- progressing    Education  Patient Education  Education Given To: Patient  Education Provided: Plan of Care;Role of Therapy  Education Method: Verbal  Barriers to Learning: None  Education Outcome: Verbalized understanding;Continued education needed    Therapy Time Individual Concurrent Group Co-treatment   Time In 0843         Time Out 0910         Minutes 27         Timed Code Treatment Minutes: [address], PT  Kyle Delgado DPT [id number]

## 2023-01-19 NOTE — PROGRESS NOTES
INPATIENT PROGRESS NOTE        IDENTIFYING DATA/REASON FOR CONSULTATION   PATIENT:  Kirsty Bronson  MRN:  [id number]  ADMIT DATE: 2023  TIME OF EVALUATION: 2023 12:13 PM  HOSPITAL STAY:   LOS: 14 days   CONSULTING PHYSICIAN: Spencer Hoffman MD   REASON FOR CONSULTATION: Melena, Acute blood loss anemia    Subjective:    Patient seen in follow up. Patient has no complaints this morning he had 2 bowel movements overnight. Stool was reportedly formed and dark brown. Hemoglobin stable overnight (8.6->8. 4)    MEDICATIONS   SCHEDULED:  lidocaine 1 % injection, 5 mL, Once  sodium bicarbonate, 650 mg, BID  dorzolamide-timolol, 1 drop, BID  docusate sodium, 100 mg, BID  sodium chloride flush, 10 mL, 2 times per day  atorvastatin, 40 mg, Nightly  escitalopram, 10 mg, Nightly  polyethylene glycol, 17 g, Daily  senna, 1 tablet, BID  tamsulosin, 0.4 mg, Nightly    FLUIDS/DRIPS:     sodium chloride      pantoprozole (PROTONIX) infusion 8 mg/hr (23 4480)    sodium chloride      sodium chloride       PRNs: sodium chloride, , PRN  phenol, 1 spray, Q2H PRN  sodium chloride, , PRN  Benzocaine-Menthol, 1 lozenge, Q2H PRN  bisacodyl, 10 mg, Daily PRN  sodium chloride flush, 10 mL, PRN  sodium chloride, , PRN  promethazine, 12.5 mg, Q6H PRN   Or  ondansetron, 4 mg, Q6H PRN  acetaminophen, 650 mg, Q6H PRN   Or  acetaminophen, 650 mg, Q6H PRN  nitroGLYCERIN, 0.4 mg, Q5 Min PRN  traMADol, 50 mg, Q8H PRN    ALLERGIES:  No Known Allergies      PHYSICAL EXAM   VITALS:  BP (!) 119/58   Pulse (!) 108   Temp 97.4 °F (36.3 °C)   Resp 16   Ht 5' 6\" (1.676 m)   Wt 143 lb 1.3 oz (64.9 kg)   SpO2 91%   BMI 23.09 kg/m²   TEMPERATURE:  Current - Temp: 97.4 °F (36.3 °C);  Max - Temp  Av.7 °F (36.5 °C)  Min: 97.4 °F (36.3 °C)  Max: 97.9 °F (36.6 °C)    Physical Exam:  General appearance: alert, cooperative, no distress  Eyes: Anicteric  Head: Normocephalic, without obvious abnormality  Lungs: clear to auscultation bilaterally, Normal Effort  Heart: regular rate and rhythm, normal S1 and S2, no murmurs or rubs  Abdomen: soft, non-distended,minimally tender epigastric area. Bowel sounds normal.   Extremities: atraumatic, no cyanosis or edema  Skin: warm and dry, no jaundice  Neuro: Grossly intact, A&OX3    LABS AND IMAGING   Laboratory   Recent Labs     01/17/23  0433 01/17/23  0848 01/18/23  0429 01/18/23  0903 01/18/23  1503 01/19/23  0500   WBC 12.1*  --  12.1*  --   --  10.2   HGB 7.2*   < > 8.3* 8.7* 8.6* 8.4*   HCT 22.5*   < > 25.8* 27.9* 26.9* 25.6*   MCV 92.9  --  92.5  --   --  93.0     --  265  --   --  248    < > = values in this interval not displayed. Recent Labs     01/17/23  0433 01/18/23  0429 01/19/23  0500    144 139   K 4.5 4.5 4.3   * 113* 109   CO2 21 21 22   PHOS 2.7 2.4* 2.5   BUN 47* 43* 41*   CREATININE 3.1* 2.8* 2.7*     No results for input(s): AST, ALT, ALB, BILIDIR, BILITOT, ALKPHOS in the last 72 hours. No results for input(s): LIPASE, AMYLASE in the last 72 hours. No results for input(s): PROTIME, INR in the last 72 hours. Imaging  XR CHEST PORTABLE   Final Result   1. Right-sided PICC line with the tip in the SVC/atrial junction. 2.  Chronic appearing interstitial changes and calcified pleural plaques. Right basilar airspace disease could represent superimposed pneumonia. XR CHEST PORTABLE   Final Result   1. Patchy bibasilar airspace disease which could reflect atelectasis versus   pneumonia. 2.  Stable diffuse bilateral calcified pleural plaques         XR CHEST PORTABLE   Final Result   1. Patchy right basilar airspace disease could represent atelectasis versus   pneumonia. 2.  Multiple bilateral calcified pleural plaques         CT SOFT TISSUE NECK WO CONTRAST   Final Result   No acute findings, soft tissue mass or lymphadenopathy evident. XR CHEST 1 VIEW   Final Result   Ill-defined opacities bilaterally. Endoscopy  EGD 1/9/23 with Dr. Tena Salcido  Normal esophagus without Mcdonald's or reflux changes, 3cm sliding hiatal hernia, normal stomach, 6mm clean-based duodenal ulcer, 15mm clean-based duodenal ulcer in the bulb. In the sweep, there was a 2cm ulcer with a visible vessel in the distal aspect of the ulcer. I was not able to see the very proximal aspect of the ulcer. Epinephrine 1:10,000 was injected in each of 4 quadrants around the ulcer with excellent blanching of tissue. A total of 3.5mL was injected. Next, the vessel was obliterated using gold probe cautery with excellent hemostasis. No bleeding at the end of the procedure. 2nd portion of the duodenum was normal.       ASSESSMENT AND RECOMMENDATIONS   Mark Merchant is a 80 y.o. male with PMH of CAD with coronary stents, HLD, CKD stage IV, chronic anemia BPH who presented on 1/5/2023 with generalized weakness, decreased oral intake. Diagnosed with COVID-19, bacterial pneumonia, acute on CKD. We have been consulted regarding bloody stool, acute on  chronic anemia. EGD 1/9 showed 2 cm ulcer with visible vessel in duodenal sweep treated with epi injection and gold probe cautery. Patient continued to pass dark blood clots. Repeat EGD 1/16showed multiple duodenal ulcers with 1 ulcer with adherent clot treated with epinephrine and Hemoclip    Duodenal ulcer with hemorrhage requiring endoscopic therapy x2 (1/9 and 1/16). H. pylori stool antigen negative. On PPI drip  Acute blood loss anemia 2/2 #1. Hgb stable past 24 hrs. Acute on CKD  COVID pneumonia  Deconditioning, poor oral intake: appears new since this admission. Pt receiving ensure shakes with meals. Wonder if pt would benefit from a few days of enteral nutrition support via NGT to gain strength back, promote ulcer healing. Will defer to Primary team    RECOMMENDATIONS:    Continue IV PPI therapy  Monitor for signs of rebleeding  Avoid NSAIDs  Encourage good nutrition.   Ensure shakes with meals.        If you have any questions or need any further information, please feel free to contact us [phone]. Thank you for allowing us to participate in the care of Talmarco Lacey. The note was completed using Dragon voice recognition transcription. Every effort was made to ensure accuracy; however, inadvertent transcription errors may be present despite my best efforts to edit errors.     Alee BAILEY

## 2023-01-19 NOTE — PROGRESS NOTES
Hospital Medicine Progress Note     Date:  1/19/2023    PCP: Katja Jones MD (Tel: [phone])    Date of Admission: 1/5/2023    Chief complaint:   Chief Complaint   Patient presents with    Fatigue     Pt. normally able to ambulate but not at this time and is extremely weak. Brief admission history: 61-year-old male with history of CKD stage IV, anemia of chronic disease, BPH, CAD, hyperlipidemia, who was admitted with generalized weakness, decreased oral intake, diagnosed with COVID-19 and bacterial pneumonia. Assessment/plan:  Bacterial pneumonia, likely superimposed in the setting of COVID-19 pneumonia. Procalcitonin level was elevated. Completed course of antibiotics. Did not require COVID-specific treatment as patient was not hypoxic. Sore throat. Secondary to COVID-19 infection. As needed lozenges. Acute kidney injury on CKD stage IV, improved with intravenous fluid. Generalized weakness. Likely secondary to acute medical conditions. Treat underlying conditions as noted above. Therapy evaluation in place. Going to SNF at discharge. Asymptomatic bacteriuria. Patient was already on antibiotics for other indication as noted above. There is no indication for treatment of asymptomatic bacteriuria. Acute upper GI bleed. Status post EGD on 1/9/2023 with multiple duodenal ulcers (6 mm clean-based duodenal ulcer, 15 mm clean-based duodenal bulb ulcer, 2 cm ulcer with visible vessel in distal aspect of ulcer, treated - see EGD report. He had repeat EGD on 1/16/2023 with noted 1 duodenal ulcer with adherent clot, clot removed and visible vessel treated. I spent so far, hemoglobin has remained stable over the last couple of days. Avoid NSAIDs. Per discussion with daughter, she is recommending discontinuation of aspirin, given increased risk of bleed with resumption of aspirin therapy. She also understands patient is at risk of having another cardiovascular event.   Patient will be discharged to SNF on Protonix 40 mg twice daily. Acute on chronic anemia with superimposed anemia of chronic disease. Secondary to #1. He has required multiple units of PRBC transfusions this hospital stay. Recommend repeat H&H within 3 days of discharge. At risk for aspiration. Reviewed chest x-ray from January 17, 2023, possible infiltrate or atelectasis. However, patient does not have cough or fever or shortness of breath. Suspicion is this is more atelectasis than infiltrate. Incentive spirometer has been ordered. Other comorbidities: history of CKD stage IV, anemia of chronic disease, BPH, CAD, hyperlipidemia. Disposition. Patient remained stable at this time. I had extensive discussion with daughter January 19, 2023. She still feels like patient is not ready for discharge due to recent low hemoglobin of 6.2 on January 17, 2023. I have advised daughter that patient also remains at risk of having thrombosis due to prolonged hospital stay, not on anticoagulation for DVT prophylaxis (due to GI bleed). Despite my explanation, daughter remains unhappy about plan for discharge on January 19, 2023. Will plan for discharge to SNF tomorrow morning. Diet: ADULT DIET; Dysphagia - Minced and Moist; Mildly Thick (Nectar)  ADULT ORAL NUTRITION SUPPLEMENT; Breakfast, Lunch, Dinner; Frozen Oral Supplement    Code status: Full Code   ----------  Subjective  Patient has no complaints. He is wondering what he is still doing in the hospital.    Objective  Physical exam:  Vitals: BP (!) 119/58   Pulse (!) 108   Temp 97.4 °F (36.3 °C)   Resp 16   Ht 5' 6\" (1.676 m)   Wt 143 lb 1.3 oz (64.9 kg)   SpO2 91%   BMI 23.09 kg/m²   Gen/overall appearance: Not in acute distress. Alert. Oriented x3. Head: Normocephalic, atraumatic  Eyes: EOMI, good acuity  ENT: Oral mucosa moist  Neck: No JVD, thyromegaly  CVS: Nml S1S2, no MRG, RRR  Pulm: Clear bilaterally.  No crackles/wheezes  Gastrointestinal: Soft, NT/ND, +BS  Musculoskeletal: No edema. Warm  Neuro: No focal deficit. Moves extremity spontaneously. Psychiatry: Appropriate affect. Not agitated. Skin: Warm, dry with normal turgor. No rash  Capillary refill: Brisk,< 3 seconds   Peripheral Pulses: +2 palpable, equal bilaterally     24HR INTAKE/OUTPUT:    Intake/Output Summary (Last 24 hours) at 1/19/2023 1231  Last data filed at 1/19/2023 0332  Gross per 24 hour   Intake 300 ml   Output 1175 ml   Net -875 ml       I/O last 3 completed shifts: In: 962 [P.O.:780; Blood:182]  Out: 2250 [Urine:2250]  No intake/output data recorded. Meds:    lidocaine 1 % injection  5 mL IntraDERmal Once    sodium bicarbonate  650 mg Oral BID    dorzolamide-timolol  1 drop Both Eyes BID    docusate sodium  100 mg Oral BID    sodium chloride flush  10 mL IntraVENous 2 times per day    atorvastatin  40 mg Oral Nightly    escitalopram  10 mg Oral Nightly    polyethylene glycol  17 g Oral Daily    senna  1 tablet Oral BID    tamsulosin  0.4 mg Oral Nightly     Infusions:    sodium chloride      pantoprozole (PROTONIX) infusion 8 mg/hr (01/18/23 5281)    sodium chloride      sodium chloride       PRN Meds: sodium chloride, phenol, sodium chloride, Benzocaine-Menthol, bisacodyl, sodium chloride flush, sodium chloride, promethazine **OR** ondansetron, acetaminophen **OR** acetaminophen, nitroGLYCERIN, traMADol    Labs/imaging:  CBC:   Recent Labs     01/17/23  0433 01/17/23  0848 01/18/23  0429 01/18/23  0903 01/18/23  1503 01/19/23  0500   WBC 12.1*  --  12.1*  --   --  10.2   HGB 7.2*   < > 8.3* 8.7* 8.6* 8.4*     --  265  --   --  248    < > = values in this interval not displayed.        BMP:    Recent Labs     01/17/23  0433 01/18/23  0429 01/19/23  0500    144 139   K 4.5 4.5 4.3   * 113* 109   CO2 21 21 22   BUN 47* 43* 41*   CREATININE 3.1* 2.8* 2.7*   GLUCOSE 106* 116* 112*       Hepatic:   No results for input(s): AST, ALT, ALB, BILITOT, ALKPHOS in the last 72 hours.      Suha Monroy MD  -------------------------------  [facility]

## 2023-01-19 NOTE — DISCHARGE INSTR - COC
Continuity of Care Form    Patient Name: Miguel Angel Jenkins   :  1931  MRN:  [id number]    Admit date:  2023  Discharge date:  2023    Code Status Order: Full Code   Advance Directives:     Admitting Physician:  Ti Garcia MD  PCP: Sarah Rivera MD    Discharging Nurse:  [facility] Unit/Room#: R6M-1853/8594-71  Discharging Unit Phone Number: [phone]    Emergency Contact:   Extended Emergency Contact Information  Primary Emergency Contact: Patricia Greco of [address] [facility] Phone: [phone]  Relation: Child  Secondary Emergency Contact: Thanh Bradford   [facility] of [address] [facility] Phone: [phone]  Relation: Child    Past Surgical History:  Past Surgical History:   Procedure Laterality Date    CORONARY ANGIOPLASTY WITH STENT PLACEMENT  10/18/15    ENDOSCOPY, COLON, DIAGNOSTIC      UPPER GASTROINTESTINAL ENDOSCOPY  10-    UPPER GASTROINTESTINAL ENDOSCOPY N/A 2023    EGD SUBMUCOSAL EPINEPHRINE INJECTION performed by En Carrillo MD at [address] [place] [address]  2023    EGD  ABLATION WITH GOLD PROBE performed by En Carrillo MD at [address] [place] [address] 2023    EGD SUBMUCOSAL EPINEPHRINE INJECTION performed by Miguelangel Smith MD at [address] [place] [address]  2023    EGD CONTROL HEMORRHAGE performed by Miguelangel Smith MD at [facility] ENDOSCOPY       Immunization History:   Immunization History   Administered Date(s) Administered    COVID-19, PFIZER GRAY top, DO NOT Dilute, (age 15 y+), IM, 30 mcg/0.3 mL 2022    COVID-19, PFIZER PURPLE top, DILUTE for use, (age 15 y+), 30mcg/0.3mL 2021, 2021, 2021    Influenza Vaccine, unspecified formulation 2013, 2014    Influenza Virus Vaccine 10/20/2015    Influenza, FLUAD, (age 72 y+), Adjuvanted, 0.5mL 2020, 10/05/2021, 10/06/2022    Influenza, High Dose (Fluzone 65 yrs and older) 12/17/2013, 09/18/2014, 11/18/2016, 12/04/2017, 12/14/2018    Influenza, Triv, inactivated, subunit, adjuvanted, IM (Fluad 65 yrs and older) 09/19/2019    Pneumococcal Conjugate 13-valent ([id number]) 08/17/2017    Pneumococcal Polysaccharide ([id number]) 10/20/2015       Active Problems:  Patient Active Problem List   Diagnosis Code    Back pain M54.9    Benign nodular prostatic hyperplasia with lower urinary tract symptoms N40.1    Spinal stenosis M48.00    Coronary artery disease involving native coronary artery of native heart without angina pectoris I25.10    Stented coronary artery Z95.5    Vitamin D deficiency E55.9    Essential hypertension I10    Hyperlipidemia E78.5    Slow transit constipation K59.01    Psoriasis L40.9    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, I25.9    Chronic constipation K59.09    Former cigarette smoker Z87.891    Epigastric pain R10.13    Ventral hernia K43.9    Anemia in other chronic diseases classified elsewhere D63.8    Current moderate episode of major depressive disorder without prior episode (HCC) F32.1    Fatigue R53.83    Sleep disorder G47.9    Non-English speaking patient Z78.9    Nocturia R35.1    Urinary frequency R35.0    Chronic kidney disease, stage IV (severe) (HCC) N18.4    Ataxia R27.0    Frequent falls R29.6    Frailty syndrome in geriatric patient R54    Chronic pain syndrome G89.4    Bradycardia R00.1    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 chronic kidney disease (HCC) N18.4, D63.1    Hyperkalemia E87.5    Chronic midline low back pain M54.50, G89.29    SOB (shortness of breath) R06.02    Bacterial pneumonia J15.9       Isolation/Infection:   Isolation            No Isolation          Patient Infection Status       Infection Onset Added Last Indicated Last Indicated By Review Planned Expiration Resolved Resolved By    None active    Resolved    COVID-19 01/05/23 01/05/23 01/14/23 COVID-19, Rapid   01/18/23 Tucker Herring  MD d/c isolation, pt has passed 10 day [facility] minimum. COVID-19 (Rule Out) 01/05/23 01/05/23 01/05/23 COVID-19 Rapid (Ordered)   01/05/23 Rule-Out Test Resulted            Nurse Assessment:  Last Vital Signs: /61   Pulse 61   Temp 97.5 °F (36.4 °C) (Oral)   Resp 18   Ht 5' 6\" (1.676 m)   Wt 143 lb 1.3 oz (64.9 kg)   SpO2 94%   BMI 23.09 kg/m²     Last documented pain score (0-10 scale): Pain Level: 4  Last Weight:   Wt Readings from Last 1 Encounters:   01/19/23 143 lb 1.3 oz (64.9 kg)     Mental Status:  oriented and alert    IV Access:  - None    Nursing Mobility/ADLs:  Walking   Assisted  Transfer  Assisted  Bathing  Assisted  Dressing  Assisted  Toileting  Assisted  Feeding  Independent  Med Admin  Assisted  Med Delivery   crushed and prefers mixed with pudding    Wound Care Documentation and Therapy:        Elimination:  Continence: Bowel: Yes  Bladder: F/C in place  Urinary Catheter: Insertion Date: 01/15/2023    Colostomy/Ileostomy/Ileal Conduit: No       Date of Last BM: 01/22/2023    Intake/Output Summary (Last 24 hours) at 1/19/2023 1037  Last data filed at 1/19/2023 0332  Gross per 24 hour   Intake 300 ml   Output 1175 ml   Net -875 ml     I/O last 3 completed shifts: In: 962 [P.O.:780; Blood:182]  Out: 2250 [Urine:2250]    Safety Concerns: At Risk for Falls and Aspiration Risk    Impairments/Disabilities:      None    Nutrition Therapy:  Current Nutrition Therapy:   - Oral Diet:  General and Dental Soft    Routes of Feeding: Oral  Liquids: Nectar Thick Liquids  Daily Fluid Restriction: no  Last Modified Barium Swallow with Video (Video Swallowing Test): not done    Treatments at the Time of Hospital Discharge:   Respiratory Treatments: N/A  Oxygen Therapy:  is not on home oxygen therapy.   Ventilator:    - No ventilator support    Rehab Therapies: Physical Therapy, Occupational Therapy, and Speech/Language Therapy  Weight Bearing Status/Restrictions: No weight bearing restrictions  Other Medical Equipment (for information only, NOT a DME order):  walker  Other Treatments: N/A    Patient's personal belongings (please select all that are sent with patient): ALL BELONGINGS    RN SIGNATURE:  Electronically signed by Darya Young RN on 1/23/23 at 3:13 PM EST    CASE MANAGEMENT/SOCIAL WORK SECTION    Inpatient Status Date: 1/5/2023    Readmission Risk Assessment Score:  Readmission Risk              Risk of Unplanned Readmission:  26           Discharging to Facility/ Agency   Name: [facility]  Address:  [address] [place] [address], [address]   Phone:  [phone]  Fax:  [phone]    Dialysis Facility (if applicable)   Name:  Address:  Dialysis Schedule:  Phone:  Fax:    / signature: Electronically signed by Watson Castleman, MSW, DONALDOW on 1/23/23 at 3:41 PM EST    PHYSICIAN SECTION    Prognosis: Guarded    Condition at Discharge: Stable    Rehab Potential (if transferring to Rehab): Fair    Recommended Labs or Other Treatments After Discharge: H&H within 3 days. BMP within 1 week. Physician Certification: I certify the above information and transfer of Georges Mcfarland  is necessary for the continuing treatment of the diagnosis listed and that he requires [facility] for less 30 days.      Update Admission H&P: No change in H&P    PHYSICIAN SIGNATURE:  Electronically signed by Chele Logan MD on 1/19/23 at 10:37 AM EST

## 2023-01-19 NOTE — PROGRESS NOTES
[facility]   Speech Therapy  Daily Dysphagia Treatment Note    Jairo Mueller  AGE: 80 y.o. 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)    Chronic constipation    Former cigarette smoker    Epigastric pain    Ventral hernia    Anemia in other chronic diseases classified elsewhere    Current moderate episode of major depressive disorder without prior episode (HCC)    Fatigue    Sleep disorder    Non-English speaking patient    Nocturia    Urinary frequency    Chronic kidney disease, stage IV (severe) (HCC)    Ataxia    Frequent falls    Frailty syndrome in geriatric patient    Chronic pain syndrome    Bradycardia    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 chronic kidney disease (HCC)    Hyperkalemia    Chronic midline low back pain    SOB (shortness of breath)    Bacterial pneumonia     No Known Allergies    Treatment Diagnosis: Dysphagia     Chart review: 2023 admitted with c/o generalized weakness, fatigue, poor appetite  MD ADMISSION H&P HPI:  Patient is a 80-year-old male with past medical history of CAD, CKD stage IV, hyperlipidemia who presents to the hospital due to patient having generalized weakness. Patient also has poor appetite, fatigue as well as generalized weakness. Patient does not have any recent sick contacts, no reported fevers chills. No reported nausea vomiting diarrhea. 2023 COVID +     IMAGING:    CXR 23:   Impression   1. Patchy bibasilar airspace disease which could reflect atelectasis versus   pneumonia. 2.  Stable diffuse bilateral calcified pleural plaques       CXR 1/9/23  Impression   1. Patchy right basilar airspace disease could represent atelectasis versus   pneumonia. 2.  Multiple bilateral calcified pleural plaques       CXR: 1/5/2023  Impression   Ill-defined opacities bilaterally. GI note 1/16/23-EGD   The esophageal mucosa appeared normal.  The gastroesophageal junction is also normal.  The patient has a small hiatal hernia. Examination of the stomach revealed a normal gastric mucosa. In the duodenum, multiple ulcers were noted. Most ulcers had a clean base. However, an adherent clot was noted on an ulcer in the proximal duodenal bulb. There was no evidence of active bleeding. Epinephrine 1: 10,000 was injected on both sides of the ulcer. Next, the clot was removed. The base of the ulcer was visualized. A small visible vessel was noted. Next, a large Hemoclip was placed. Adequate positioning was confirmed. Multiple flushes were performed . No active bleeding was noted. Recommendations: Resume IV PPI. Start clear liquid diet. Patient has multiple ulcers in the duodenum. He remains at risk for rebleed. Continue to monitor closely. Trend H&H. Subjective:   Current Diet Level: Minced and moist, mildly thick liquids    1/10/2023 Clear liquids ; Thin liquids  (despite recommendation for mildly thick liquids prior to being made NPO)    1/11/23: GI wrote for regular diet this date 1/11/23; nsg concern that pt cannot tolerate solids-recommendation for minced and moist/mildly thick liquids    1/16/23:  GI wrote for regular diet/thin liquids despite patient previously on minced and moist, mildly thick liquids, nursing reports she has been thickening his drinks    1/17/23: RN called therapist after tx reporting patient reports dislike of soft and bite-sized prefers minced and moist, diet changed back to minced and moist. MD ordered CSE for concern for aspiration     Comments regarding tolerating Current Diet:   Adequate diet tolerance reported  Objective:     Pain: Did not state               Vision: [x]WFL []Impaired:  Hearing: []WFL [x]Impaired: [place]    Cognitive/behavioral/communication:   Oriented to [x] self [x] place [x] Date (month and year) [] situation  [x]alert []lethargic  [x]cooperative []self-limiting   [x]confused at times  []distractible []agitated []impulsive  [x]verbally responsive []nonverbal []limited verbal responses  [x]follows one step commands []does not follow dx []follows complex commands  []aphasic []dysarthric  [] other:     Respiratory Status: [x]Room Air []O2 via nasal cannula []Other:  Dentition: []Adequate []Dentures [x]Missing Most Teeth []Edentulous []Other:  Vocal Quality: [x]Normal []Dysphonic  []Aphonic  []Hoarse []Wet []Weak []Other:  Volitional Cough: [x]Strong []Weak []Wet []Absent []Congested []Other  Volitional Swallow:   []Absent  [x]Delayed []Adequate []Required use of drink   Reflexive cough:   [x]Strong []Weak []Wet []Absent []Congested []Other:    Oral Mechanism Exam:  []WFL []Mild   [x] Moderate  []Severe  []To be assessed  Impaired:   []Left side      []Right side    [x]Labial ROM/Coordination    []Labial Symmetry   [x]Lingual ROM/Coordination   []Lingual Symmetry  []Gag  []Other:     Patient Positioning: Upright in bed ; bed does not go to 90 degrees    PO Trials: reduced appetite persists this session, only agreeable to magic cup  Thin Liquids: suspect at risk for premature loss of bolus, delayed swallow initiation, decreased laryngeal elevation, cough 25% of trials, belching noted  Nectar thick liquids via cup (8 oz): suspect at risk for premature loss of bolus, delayed swallow initiation, decreased laryngeal elevation, no overt s/s of aspiration or penetration, belching noted  Honey Thick liquids (4 oz): suspect at risk for premature loss of bolus, delayed swallow initiation, decreased laryngeal elevation, no overt s/s of aspiration or penetration, belching noted  Puree: slow oral phase, adequate clearance, suspect at risk for premature loss of bolus, delayed swallow initiation, decreased laryngeal elevation, no overt s/s of aspiration or penetration   minced and moist: declined   Soft solid: DNT    Dysphagia Tx:   Direct Dysphagia tx: PO tolerance as indicated above. belching continues to be noted this session with liquids. Suspect new baseline is minced and moist/mildly thick liquids-declining upgrade to soft and bite sized preferring softer foods  Dysphagia ex: declined-reported fatigue  Training in compensatory strategies: small single sips, reviewed purpose of diet recommendations, sit upright  Pt response to ex/training: verbalized understanding, suspect ongoing education required    Goals:    Pt will functionally tolerate recommended diet with no overt clinical s/s of aspiration ongoing  Pt will demonstrate understanding of aspiration risk and precautions via education/demonstration with occasional prompting ongoing  Pt will advance to least restrictive diet as indicated  ongoing     Assessment:   Impressions:   Accepted and tolerated treatment at bedside  Patient alert, cooperative, pleasant; follows simple dx; verbally responsive, oriented to self, situation, kind of place, month, year and day  Mild-moderate oral dysphagia characterized by decreased lingual coordination, generalized oral weakness, prolonged mastication of bolus improving ability to masticate soft and bite-sized, suspect at risk for premature loss of bolus. Pt endorses being on a soft diet in home environment reporting he eats rice, mashed potatoes, spaghetti, etc due to majority of teeth missing   Moderate pharyngeal stage dysphagia characterized by delayed swallow and decreased laryngeal elevation. No overt s/s of aspiration or penetration with thickened liquids. Ongoing s/s of aspiration/penetration with thin liquids.  Belching noted with liquids  Recommend cont minced and moist, mildly thick liquids, patient declining upgrade to soft and bite sized textures. If concern for aspiration, a Modified Barium Swallow Study can be completed to further assess swallow function with MD order  ST to follow for diet tolerance, upgrade readiness, and tx appropriateness monitor    Recommended Diet and Intervention 1/19/2023:  Diet Solids Recommendation:  Minced and moist   Liquid Consistency Recommendation:  Mildly (nectar) thick liquids  Recommended form of Meds: Meds crushed as able in puree      Compensatory Swallowing Strategies:  Upright as possible with all PO intake , Small bites/sips , Remain upright 30-45 min     EDUCATION:   Provided education regarding role of SLP, results of assessment, recommendations and general speech pathology plan of care. [] Pt verbalized understanding and agreement   [x] Pt requires ongoing learning   [] No evidence of comprehension     Dysphagia Prognosis: [] good [x]fair []poor []guarded  Barriers: Current co-morbidities    Plan:   Continue Dysphagia Therapy: YES    Interventions: Diet Tolerance Monitoring , Patient/Family Education   Duration/Frequency of therapy while on unit: Speech therapy for dysphagia tx 3-5 times per week during acute care stay. Discharge Instructions:   Recommend ongoing SLP for dysphagia therapy upon discharge from hospital     This note serves as a D/C Summary in the event that this patient is discharged prior to the next therapy session. Coded treatment time: 0  Total treatment time: [facility], Kati Mcneil, #[id number]  Speech-Language Pathologist  Portable phone: [phone]  On 01/19/23 at 3:15 PM

## 2023-01-19 NOTE — PROGRESS NOTES
Speech Language Pathology    Treatment attempted. Patient unavailable working with PT at this time. ST to re-attempt as schedule permits unless otherwise notified. Thank you. Juan Wright, #[id number]  Speech-Language Pathologist  Portable phone: [phone]

## 2023-01-19 NOTE — PROGRESS NOTES
Department of Internal Medicine  Nephrology Progress Note    HPI.    80 y.o. male whom we were asked to see for RONI on CKD. Followed by Dr. Bryan Guaman in the office, last 08/22 with eGFR 18 ml/min. He presents with weakness and dyspnea. He was diagnosed with Covid-19. Renal cosn called for RONI . Events noted , chart reviewed     HPI:  Breathing comfortably. No CP.  ROS:  In bed. [address]:  medications reviewed. Physical Exam:    VITALS:  BP (!) 119/58   Pulse (!) 108   Temp 97.4 °F (36.3 °C)   Resp 16   Ht 5' 6\" (1.676 m)   Wt 143 lb 1.3 oz (64.9 kg)   SpO2 91%   BMI 23.09 kg/m²   24HR INTAKE/OUTPUT:    Intake/Output Summary (Last 24 hours) at 1/19/2023 1331  Last data filed at 1/19/2023 0332  Gross per 24 hour   Intake 300 ml   Output 1175 ml   Net -875 ml         Constitutional:  Looks comfortable   Respiratory:  scattered rhonchi   Gastrointestinal No tenderness.   Normal Bowel Sounds  Cardiovascular:  S1, S2 RRR   Edema:   + edema  Skin:  no rash    DATA:    CBC:  Lab Results   Component Value Date/Time    WBC 10.2 01/19/2023 05:00 AM    RBC 2.76 01/19/2023 05:00 AM    HGB 8.4 01/19/2023 05:00 AM    HCT 25.6 01/19/2023 05:00 AM    MCV 93.0 01/19/2023 05:00 AM    MCH 30.3 01/19/2023 05:00 AM    MCHC 32.6 01/19/2023 05:00 AM    RDW 14.7 01/19/2023 05:00 AM     01/19/2023 05:00 AM    MPV 8.4 01/19/2023 05:00 AM     CMP:  Lab Results   Component Value Date/Time     01/19/2023 05:00 AM    K 4.3 01/19/2023 05:00 AM    K 4.2 01/12/2023 04:52 AM     01/19/2023 05:00 AM    CO2 22 01/19/2023 05:00 AM    BUN 41 01/19/2023 05:00 AM    CREATININE 2.7 01/19/2023 05:00 AM    GFRAA 24 11/05/2021 01:23 PM    AGRATIO 1.0 01/09/2023 09:43 AM    LABGLOM 21 01/19/2023 05:00 AM    GLUCOSE 112 01/19/2023 05:00 AM    PROT 5.0 01/10/2023 04:18 AM    CALCIUM 7.7 01/19/2023 05:00 AM    BILITOT <0.2 01/10/2023 04:18 AM    ALKPHOS 60 01/10/2023 04:18 AM    AST 44 01/10/2023 04:18 AM    ALT 40 01/10/2023 04:18 AM Hepatic Function Panel:   Lab Results   Component Value Date/Time    ALKPHOS 60 01/10/2023 04:18 AM    ALT 40 01/10/2023 04:18 AM    AST 44 01/10/2023 04:18 AM    PROT 5.0 01/10/2023 04:18 AM    BILITOT <0.2 01/10/2023 04:18 AM    BILIDIR <0.2 01/10/2023 04:18 AM    IBILI see below 01/10/2023 04:18 AM      Phosphorus:   Lab Results   Component Value Date/Time    PHOS 2.5 01/19/2023 05:00 AM       ASSESSMENT/PLAN:    1) 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 CKD progression vs. pre-renal  - renal function is better than baseline  - midline is fine from renal standpoint for lab draws     2) Covid-19  - out of isolation     3) PNA  - off Abx     4) FEN  - metabolic acidosis              - started sodium bicarbonate supplementation      5) anemia  - labs noted   - s/p PRBC 01/10/23    6) UGIB  - GI consulted  - EGD showed duodenal ulcer with adherent clot  - monitoring Hgb     7) AMS   - overall better. Patient is looking very debilitated and has deteriorated significantly compared to even a couple of months ago. Tried to get ahold of son to discuss case with him as the son always comes with his father to the office. Palliative care's note read. and appreciated  I have known the patient for many years, and he and his son are both practical and realistic. I do believe calling their relationship \"co-dependent\" is not accurate. It does seem that the daughter is on a different page from them, so encouraging a family discussion is appropriate to get everyone on the same page. The reality of the situation is that the patient's clinical condition is quite poor.

## 2023-01-19 NOTE — PLAN OF CARE
Problem: Discharge Planning  Goal: Discharge to home or other facility with appropriate resources  Outcome: Progressing  Flowsheets (Taken 1/18/2023 2030)  Discharge to home or other facility with appropriate resources:   Identify barriers to discharge with patient and caregiver   Arrange for needed discharge resources and transportation as appropriate   Identify discharge learning needs (meds, wound care, etc)   Refer to discharge planning if patient needs post-hospital services based on physician order or complex needs related to functional status, cognitive ability or social support system     Problem: Skin/Tissue Integrity  Goal: Absence of new skin breakdown  Description: 1. Monitor for areas of redness and/or skin breakdown  2. Assess vascular access sites hourly  3. Every 4-6 hours minimum:  Change oxygen saturation probe site  4. Every 4-6 hours:  If on nasal continuous positive airway pressure, respiratory therapy assess nares and determine need for appliance change or resting period. Outcome: Progressing     Problem: Neurosensory - Adult  Goal: Achieves stable or improved neurological status  Outcome: Progressing  Flowsheets (Taken 1/19/2023 0454)  Achieves stable or improved neurological status:   Assess for and report changes in neurological status   Initiate measures to prevent increased intracranial pressure   Maintain blood pressure and fluid volume within ordered parameters to optimize cerebral perfusion and minimize risk of hemorrhage   Monitor temperature, glucose, and sodium.  Initiate appropriate interventions as ordered     Problem: Neurosensory - Adult  Goal: Achieves maximal functionality and self care  Outcome: Progressing  Flowsheets (Taken 1/19/2023 0454)  Achieves maximal functionality and self care:   Monitor swallowing and airway patency with patient fatigue and changes in neurological status   Encourage and assist patient to increase activity and self care with guidance from physical therapy/occupational therapy     Problem: Respiratory - Adult  Goal: Achieves optimal ventilation and oxygenation  Outcome: Progressing  Flowsheets (Taken 1/19/2023 0454)  Achieves optimal ventilation and oxygenation:   Assess for changes in respiratory status   Assess for changes in mentation and behavior   Position to facilitate oxygenation and minimize respiratory effort   Oxygen supplementation based on oxygen saturation or arterial blood gases   Encourage broncho-pulmonary hygiene including cough, deep breathe, incentive spirometry     Problem: Cardiovascular - Adult  Goal: Maintains optimal cardiac output and hemodynamic stability  Outcome: Progressing  Flowsheets (Taken 1/18/2023 2030)  Maintains optimal cardiac output and hemodynamic stability:   Monitor blood pressure and heart rate   Monitor urine output and notify Licensed Independent Practitioner for values outside of normal range   Assess for signs of decreased cardiac output     Problem: Skin/Tissue Integrity - Adult  Goal: Skin integrity remains intact  Outcome: Progressing  Flowsheets  Taken 1/19/2023 0454  Skin Integrity Remains Intact:   Monitor for areas of redness and/or skin breakdown   Assess vascular access sites hourly  Taken 1/19/2023 0453  Skin Integrity Remains Intact:   Monitor for areas of redness and/or skin breakdown   Assess vascular access sites hourly  Taken 1/18/2023 2030  Skin Integrity Remains Intact:   Monitor for areas of redness and/or skin breakdown   Assess vascular access sites hourly     Problem: Skin/Tissue Integrity - Adult  Goal: Oral mucous membranes remain intact  Outcome: Progressing  Flowsheets  Taken 1/19/2023 0454  Oral Mucous Membranes Remain Intact:   Assess oral mucosa and hygiene practices   Implement preventative oral hygiene regimen  Taken 1/18/2023 2030  Oral Mucous Membranes Remain Intact: Assess oral mucosa and hygiene practices     Problem: Musculoskeletal - Adult  Goal: Return mobility to safest level of function  Outcome: Progressing  Flowsheets (Taken 1/19/2023 0454)  Return Mobility to Safest Level of Function:   Assess patient stability and activity tolerance for standing, transferring and ambulating with or without assistive devices   Assist with transfers and ambulation using safe patient handling equipment as needed   Ensure adequate protection for wounds/incisions during mobilization     Problem: Musculoskeletal - Adult  Goal: Return ADL status to a safe level of function  Outcome: Progressing  Flowsheets (Taken 1/19/2023 0454)  Return ADL Status to a Safe Level of Function:   Administer medication as ordered   Assess activities of daily living deficits and provide assistive devices as needed     Problem: Gastrointestinal - Adult  Goal: Maintains or returns to baseline bowel function  Outcome: Progressing  Flowsheets  Taken 1/19/2023 0454  Maintains or returns to baseline bowel function:   Assess bowel function   Encourage oral fluids to ensure adequate hydration   Administer ordered medications as needed   Encourage mobilization and activity  Taken 1/18/2023 2030  Maintains or returns to baseline bowel function:   Assess bowel function   Encourage oral fluids to ensure adequate hydration   Administer ordered medications as needed     Problem: Infection - Adult  Goal: Absence of infection at discharge  Outcome: Progressing  Flowsheets (Taken 1/18/2023 2030)  Absence of infection at discharge:   Assess and monitor for signs and symptoms of infection   Monitor lab/diagnostic results   Monitor all insertion sites i.e., indwelling lines, tubes and drains   Administer medications as ordered   [place] appropriate cooling/warming therapies per order   Instruct and encourage patient and family to use good hand hygiene technique     Problem: Metabolic/Fluid and Electrolytes - Adult  Goal: Electrolytes maintained within normal limits  Outcome: Progressing  Flowsheets (Taken 1/18/2023 2030)  Electrolytes maintained within normal limits:   Monitor labs and assess patient for signs and symptoms of electrolyte imbalances   Administer electrolyte replacement as ordered   Monitor response to electrolyte replacements, including repeat lab results as appropriate     Problem: Hematologic - Adult  Goal: Maintains hematologic stability  Outcome: Progressing  Flowsheets (Taken 1/18/2023 2030)  Maintains hematologic stability:   Assess for signs and symptoms of bleeding or hemorrhage   Monitor labs for bleeding or clotting disorders   Administer blood products/factors as ordered     Problem: ABCDS Injury Assessment  Goal: Absence of physical injury  Outcome: Progressing  Flowsheets (Taken 1/19/2023 0454)  Absence of Physical Injury: Implement safety measures based on patient assessment     Problem: Safety - Adult  Goal: Free from fall injury  Outcome: Progressing  Flowsheets (Taken 1/19/2023 0454)  Free From Fall Injury: Instruct family/caregiver on patient safety     Problem: Pain  Goal: Verbalizes/displays adequate comfort level or baseline comfort level  Outcome: Progressing  Flowsheets (Taken 1/19/2023 0454)  Verbalizes/displays adequate comfort level or baseline comfort level:   Encourage patient to monitor pain and request assistance   Assess pain using appropriate pain scale   Administer analgesics based on type and severity of pain and evaluate response   Implement non-pharmacological measures as appropriate and evaluate response   Consider cultural and social influences on pain and pain management   Notify Licensed Independent Practitioner if interventions unsuccessful or patient reports new pain     Problem: Nutrition Deficit:  Goal: Optimize nutritional status  Outcome: Progressing  Flowsheets (Taken 1/19/2023 0454)  Nutrient intake appropriate for improving, restoring, or maintaining nutritional needs:   Assess nutritional status and recommend course of action   Monitor oral intake, labs, and treatment plans   Recommend appropriate diets, oral nutritional supplements, and vitamin/mineral supplements

## 2023-01-19 NOTE — PLAN OF CARE
Problem: Nutrition Deficit:  Goal: Optimize nutritional status  1/19/2023 1621 by Thierno Gaines RN  Outcome: Not Progressing  Flowsheets (Taken 1/19/2023 1621)  Nutrient intake appropriate for improving, restoring, or maintaining nutritional needs:   Assess nutritional status and recommend course of action   Recommend appropriate diets, oral nutritional supplements, and vitamin/mineral supplements  Note: Pt continues to eat poorly. Fluids are being encouraged throughout shift. 1/19/2023 0454 by Jonh Kumar RN  Outcome: Progressing  Flowsheets (Taken 1/19/2023 0454)  Nutrient intake appropriate for improving, restoring, or maintaining nutritional needs:   Assess nutritional status and recommend course of action   Monitor oral intake, labs, and treatment plans   Recommend appropriate diets, oral nutritional supplements, and vitamin/mineral supplements     Problem: Discharge Planning  Goal: Discharge to home or other facility with appropriate resources  1/19/2023 1621 by Thierno Gaines RN  Outcome: Progressing  1/19/2023 0454 by Jonh Kumar RN  Outcome: Progressing  Flowsheets (Taken 1/18/2023 2030)  Discharge to home or other facility with appropriate resources:   Identify barriers to discharge with patient and caregiver   Arrange for needed discharge resources and transportation as appropriate   Identify discharge learning needs (meds, wound care, etc)   Refer to discharge planning if patient needs post-hospital services based on physician order or complex needs related to functional status, cognitive ability or social support system     Problem: Skin/Tissue Integrity  Goal: Absence of new skin breakdown  Description: 1. Monitor for areas of redness and/or skin breakdown  2. Assess vascular access sites hourly  3. Every 4-6 hours minimum:  Change oxygen saturation probe site  4.   Every 4-6 hours:  If on nasal continuous positive airway pressure, respiratory therapy assess nares and determine need for appliance change or resting period. 1/19/2023 1621 by Srinath Bowers RN  Outcome: Progressing  1/19/2023 0454 by Everett Cole RN  Outcome: Progressing     Problem: Neurosensory - Adult  Goal: Achieves stable or improved neurological status  1/19/2023 1621 by Srinath Bowers RN  Outcome: Progressing  1/19/2023 0454 by Everett Cole RN  Outcome: Progressing  Flowsheets (Taken 1/19/2023 0454)  Achieves stable or improved neurological status:   Assess for and report changes in neurological status   Initiate measures to prevent increased intracranial pressure   Maintain blood pressure and fluid volume within ordered parameters to optimize cerebral perfusion and minimize risk of hemorrhage   Monitor temperature, glucose, and sodium. Initiate appropriate interventions as ordered  Goal: Achieves maximal functionality and self care  1/19/2023 1621 by Srinath Bowers RN  Outcome: Progressing  1/19/2023 0454 by Everett Cole RN  Outcome: Progressing  Flowsheets (Taken 1/19/2023 0454)  Achieves maximal functionality and self care:   Monitor swallowing and airway patency with patient fatigue and changes in neurological status   Encourage and assist patient to increase activity and self care with guidance from physical therapy/occupational therapy     Problem: Respiratory - Adult  Goal: Achieves optimal ventilation and oxygenation  1/19/2023 1621 by Srinath Bowers RN  Outcome: Progressing  Flowsheets (Taken 1/19/2023 1621)  Achieves optimal ventilation and oxygenation:   Assess for changes in respiratory status   Assess for changes in mentation and behavior   Position to facilitate oxygenation and minimize respiratory effort   Assess and instruct to report shortness of breath or any respiratory difficulty  Note: Pt has been doing instructed incentive spirometer and has been doing well. Pt has done each time nurse has been in room with verbal cueing. LCTA this shift.  Frequent repositioning encouraged and sitting up in chair encouraged heavily this shift.    1/19/2023 0454 by Stanley Caass RN  Outcome: Progressing  Flowsheets (Taken 1/19/2023 0454)  Achieves optimal ventilation and oxygenation:   Assess for changes in respiratory status   Assess for changes in mentation and behavior   Position to facilitate oxygenation and minimize respiratory effort   Oxygen supplementation based on oxygen saturation or arterial blood gases   Encourage broncho-pulmonary hygiene including cough, deep breathe, incentive spirometry     Problem: Cardiovascular - Adult  Goal: Maintains optimal cardiac output and hemodynamic stability  1/19/2023 1621 by Ivanna Moore RN  Outcome: Progressing  1/19/2023 0454 by Stanley Casas RN  Outcome: Progressing  Flowsheets (Taken 1/18/2023 2030)  Maintains optimal cardiac output and hemodynamic stability:   Monitor blood pressure and heart rate   Monitor urine output and notify Licensed Independent Practitioner for values outside of normal range   Assess for signs of decreased cardiac output  Goal: Absence of cardiac dysrhythmias or at baseline  1/19/2023 1621 by Ivanna Moore RN  Outcome: Progressing  1/19/2023 0454 by Stanley Casas RN  Outcome: Progressing     Problem: Skin/Tissue Integrity - Adult  Goal: Skin integrity remains intact  1/19/2023 1621 by Ivanna Moore RN  Outcome: Progressing  1/19/2023 0454 by Stanley Casas RN  Outcome: Progressing  Flowsheets  Taken 1/19/2023 0454  Skin Integrity Remains Intact:   Monitor for areas of redness and/or skin breakdown   Assess vascular access sites hourly  Taken 1/19/2023 0453  Skin Integrity Remains Intact:   Monitor for areas of redness and/or skin breakdown   Assess vascular access sites hourly  Taken 1/18/2023 2030  Skin Integrity Remains Intact:   Monitor for areas of redness and/or skin breakdown   Assess vascular access sites hourly  Goal: Oral mucous membranes remain intact  1/19/2023 1621 by Ivanna Moore RN  Outcome: Progressing  1/19/2023 0454 by Viktor Webster RN  Outcome: Progressing  Flowsheets  Taken 1/19/2023 0454  Oral Mucous Membranes Remain Intact:   Assess oral mucosa and hygiene practices   Implement preventative oral hygiene regimen  Taken 1/18/2023 2030  Oral Mucous Membranes Remain Intact: Assess oral mucosa and hygiene practices     Problem: Musculoskeletal - Adult  Goal: Return mobility to safest level of function  1/19/2023 1621 by Fariba Cannon RN  Outcome: Progressing  Flowsheets (Taken 1/19/2023 1621)  Return Mobility to Safest Level of Function:   Assess patient stability and activity tolerance for standing, transferring and ambulating with or without assistive devices   Assist with transfers and ambulation using safe patient handling equipment as needed  Note: Pt is contact guard assist with standard walker. Pt has been steadily walking with nursing staff and PT/OT this shift.    1/19/2023 0454 by Viktor Webster RN  Outcome: Progressing  Flowsheets (Taken 1/19/2023 0454)  Return Mobility to Safest Level of Function:   Assess patient stability and activity tolerance for standing, transferring and ambulating with or without assistive devices   Assist with transfers and ambulation using safe patient handling equipment as needed   Ensure adequate protection for wounds/incisions during mobilization  Goal: Return ADL status to a safe level of function  1/19/2023 1621 by Fariba Cannon RN  Outcome: Progressing  1/19/2023 0454 by Viktor Webster RN  Outcome: Progressing  Flowsheets (Taken 1/19/2023 0454)  Return ADL Status to a Safe Level of Function:   Administer medication as ordered   Assess activities of daily living deficits and provide assistive devices as needed     Problem: Gastrointestinal - Adult  Goal: Maintains or returns to baseline bowel function  1/19/2023 1621 by Fariba Cannon RN  Outcome: Progressing  Flowsheets (Taken 1/19/2023 1621)  Maintains or returns to baseline bowel function:   Assess bowel function   Encourage mobilization and activity   Encourage oral fluids to ensure adequate hydration  Note: Pt has had multiple soft formed bowel movements this shift. Blood streaks remain to stool with metallic odor. MD was notified about this when talking about discharge. No watery appearance to stool. No voicing of abdominal pain.    1/19/2023 0454 by En Wang RN  Outcome: Progressing  Flowsheets  Taken 1/19/2023 0454  Maintains or returns to baseline bowel function:   Assess bowel function   Encourage oral fluids to ensure adequate hydration   Administer ordered medications as needed   Encourage mobilization and activity  Taken 1/18/2023 2030  Maintains or returns to baseline bowel function:   Assess bowel function   Encourage oral fluids to ensure adequate hydration   Administer ordered medications as needed     Problem: Infection - Adult  Goal: Absence of infection at discharge  1/19/2023 1621 by Magali Bhakta RN  Outcome: Progressing  1/19/2023 0454 by En Wang RN  Outcome: Progressing  Flowsheets (Taken 1/18/2023 2030)  Absence of infection at discharge:   Assess and monitor for signs and symptoms of infection   Monitor lab/diagnostic results   Monitor all insertion sites i.e., indwelling lines, tubes and drains   Administer medications as ordered   [place] appropriate cooling/warming therapies per order   Instruct and encourage patient and family to use good hand hygiene technique     Problem: Metabolic/Fluid and Electrolytes - Adult  Goal: Electrolytes maintained within normal limits  1/19/2023 1621 by Magali Bhakta RN  Outcome: Progressing  1/19/2023 0454 by En Wang RN  Outcome: Progressing  Flowsheets (Taken 1/18/2023 2030)  Electrolytes maintained within normal limits:   Monitor labs and assess patient for signs and symptoms of electrolyte imbalances   Administer electrolyte replacement as ordered   Monitor response to electrolyte replacements, including repeat lab results as appropriate  Goal: Hemodynamic stability and optimal renal function maintained  1/19/2023 1621 by Joaquin Kenny RN  Outcome: Progressing  1/19/2023 0454 by Alexey Hylton RN  Outcome: Progressing  Flowsheets (Taken 1/18/2023 2030)  Hemodynamic stability and optimal renal function maintained:   Monitor labs and assess for signs and symptoms of volume excess or deficit   Monitor intake, output and patient weight   Encourage oral intake as appropriate     Problem: Hematologic - Adult  Goal: Maintains hematologic stability  1/19/2023 1621 by Joaquin Kenny RN  Outcome: Progressing  Flowsheets (Taken 1/19/2023 1621)  Maintains hematologic stability:   Assess for signs and symptoms of bleeding or hemorrhage   Monitor labs for bleeding or clotting disorders  Note: H&H has remained stable this shift.    1/19/2023 0454 by Alexey Hylton RN  Outcome: Progressing  Flowsheets (Taken 1/18/2023 2030)  Maintains hematologic stability:   Assess for signs and symptoms of bleeding or hemorrhage   Monitor labs for bleeding or clotting disorders   Administer blood products/factors as ordered     Problem: ABCDS Injury Assessment  Goal: Absence of physical injury  1/19/2023 1621 by Joaquin Kenny RN  Outcome: Progressing  1/19/2023 0454 by Alexey Hylton RN  Outcome: Progressing  Flowsheets (Taken 1/19/2023 0454)  Absence of Physical Injury: Implement safety measures based on patient assessment     Problem: Safety - Adult  Goal: Free from fall injury  1/19/2023 1621 by Joaquin Kenny RN  Outcome: Progressing  1/19/2023 0454 by Alexey Hylton RN  Outcome: Progressing  Flowsheets (Taken 1/19/2023 0454)  Free From Fall Injury: Instruct family/caregiver on patient safety     Problem: Pain  Goal: Verbalizes/displays adequate comfort level or baseline comfort level  1/19/2023 1621 by Joaquin Kenny RN  Outcome: Progressing  Flowsheets (Taken 1/19/2023 1621)  Verbalizes/displays adequate comfort level or baseline comfort level:   Encourage patient to monitor pain and request assistance   Assess pain using appropriate pain scale   Administer analgesics based on type and severity of pain and evaluate response   Implement non-pharmacological measures as appropriate and evaluate response  Note: Pt has frequent h/a's. Pt recently infected with COVID and has lingering headaches from infection. Tylenol and low lighting for pain defense. 1/19/2023 0454 by Aidan Govea RN  Outcome: Progressing  Flowsheets (Taken 1/19/2023 0454)  Verbalizes/displays adequate comfort level or baseline comfort level:   Encourage patient to monitor pain and request assistance   Assess pain using appropriate pain scale   Administer analgesics based on type and severity of pain and evaluate response   Implement non-pharmacological measures as appropriate and evaluate response   Consider cultural and social influences on pain and pain management   Notify Licensed Independent Practitioner if interventions unsuccessful or patient reports new pain     Problem: Nutrition Deficit:  Goal: Optimize nutritional status  1/19/2023 1621 by Patty Muse RN  Outcome: Not Progressing  Flowsheets (Taken 1/19/2023 1621)  Nutrient intake appropriate for improving, restoring, or maintaining nutritional needs:   Assess nutritional status and recommend course of action   Recommend appropriate diets, oral nutritional supplements, and vitamin/mineral supplements  Note: Pt continues to eat poorly. Fluids are being encouraged throughout shift.    1/19/2023 0454 by Aidan Govea RN  Outcome: Progressing  Flowsheets (Taken 1/19/2023 0454)  Nutrient intake appropriate for improving, restoring, or maintaining nutritional needs:   Assess nutritional status and recommend course of action   Monitor oral intake, labs, and treatment plans   Recommend appropriate diets, oral nutritional supplements, and vitamin/mineral supplements

## 2023-01-19 NOTE — CARE COORDINATION
Discharge Planning:   PT/OT: recommending SNF still  TRANSPORT: 5:15 PM via      Precert was restarted for insurance authorization at [facility]. Called to [facility] to verify status.  [facility] #[phone], option 8.  Spoke to representative, confirmed patient is able to admit to facility today, and akira period ends at midnight.     PLAN: [facility] today    Notified     NEED: transport form and HENS     Called to patient's daughter, Phyllis. Daughter expressed concerns with discharge due to patient having had wavering Hgb. Await confirmation if daughter plans to appeal discharge.     Notified daughter Patient can go with a PICC line. Transferred daughter to RN for further questions.     YAO Zaman, LSW, Social Work/Case Management   [phone]  Electronically signed by YAO Zaman, DONALDOW on 1/19/2023 at 12:05 PM    Per chart review, patient's discharge order has been removed.  Notified RN, confirmed plan for discharge 1/20.   Notified Mercedes #[phone] at [facility] of canceled discharge.   Transportation canceled.     Electronically signed by YAO Zaman, LACHO on 1/19/2023 at 3:33 PM

## 2023-01-20 LAB
ALBUMIN SERPL-MCNC: 2.2 G/DL (ref 3.4–5)
ANION GAP SERPL CALCULATED.3IONS-SCNC: 9 MMOL/L (ref 3–16)
BUN BLDV-MCNC: 38 MG/DL (ref 7–20)
CALCIUM SERPL-MCNC: 7.6 MG/DL (ref 8.3–10.6)
CHLORIDE BLD-SCNC: 110 MMOL/L (ref 99–110)
CO2: 21 MMOL/L (ref 21–32)
CREAT SERPL-MCNC: 2.7 MG/DL (ref 0.8–1.3)
GFR SERPL CREATININE-BSD FRML MDRD: 21 ML/MIN/{1.73_M2}
GLUCOSE BLD-MCNC: 106 MG/DL (ref 70–99)
HCT VFR BLD CALC: 23.9 % (ref 40.5–52.5)
HCT VFR BLD CALC: 25 % (ref 40.5–52.5)
HEMOGLOBIN: 7.8 G/DL (ref 13.5–17.5)
HEMOGLOBIN: 8.1 G/DL (ref 13.5–17.5)
MAGNESIUM: 1.8 MG/DL (ref 1.8–2.4)
MCH RBC QN AUTO: 30.1 PG (ref 26–34)
MCHC RBC AUTO-ENTMCNC: 32.7 G/DL (ref 31–36)
MCV RBC AUTO: 92.1 FL (ref 80–100)
PDW BLD-RTO: 15.4 % (ref 12.4–15.4)
PHOSPHORUS: 2.9 MG/DL (ref 2.5–4.9)
PLATELET # BLD: 221 K/UL (ref 135–450)
PMV BLD AUTO: 8.5 FL (ref 5–10.5)
POTASSIUM SERPL-SCNC: 4.7 MMOL/L (ref 3.5–5.1)
RBC # BLD: 2.59 M/UL (ref 4.2–5.9)
SODIUM BLD-SCNC: 140 MMOL/L (ref 136–145)
WBC # BLD: 8.3 K/UL (ref 4–11)

## 2023-01-20 PROCEDURE — 6360000002 HC RX W HCPCS: Performed by: PHYSICIAN ASSISTANT

## 2023-01-20 PROCEDURE — 85018 HEMOGLOBIN: CPT

## 2023-01-20 PROCEDURE — 94760 N-INVAS EAR/PLS OXIMETRY 1: CPT

## 2023-01-20 PROCEDURE — 92526 ORAL FUNCTION THERAPY: CPT

## 2023-01-20 PROCEDURE — 6370000000 HC RX 637 (ALT 250 FOR IP): Performed by: INTERNAL MEDICINE

## 2023-01-20 PROCEDURE — 97116 GAIT TRAINING THERAPY: CPT

## 2023-01-20 PROCEDURE — 83735 ASSAY OF MAGNESIUM: CPT

## 2023-01-20 PROCEDURE — 2580000003 HC RX 258: Performed by: PHYSICIAN ASSISTANT

## 2023-01-20 PROCEDURE — 2580000003 HC RX 258: Performed by: INTERNAL MEDICINE

## 2023-01-20 PROCEDURE — 85014 HEMATOCRIT: CPT

## 2023-01-20 PROCEDURE — 85027 COMPLETE CBC AUTOMATED: CPT

## 2023-01-20 PROCEDURE — C9113 INJ PANTOPRAZOLE SODIUM, VIA: HCPCS | Performed by: PHYSICIAN ASSISTANT

## 2023-01-20 PROCEDURE — 80069 RENAL FUNCTION PANEL: CPT

## 2023-01-20 PROCEDURE — 2060000000 HC ICU INTERMEDIATE R&B

## 2023-01-20 RX ADMIN — SODIUM CHLORIDE, PRESERVATIVE FREE 10 ML: 5 INJECTION INTRAVENOUS at 10:45

## 2023-01-20 RX ADMIN — SODIUM CHLORIDE, PRESERVATIVE FREE 10 ML: 5 INJECTION INTRAVENOUS at 20:12

## 2023-01-20 RX ADMIN — SODIUM BICARBONATE 650 MG: 650 TABLET ORAL at 10:42

## 2023-01-20 RX ADMIN — SODIUM BICARBONATE 650 MG: 650 TABLET ORAL at 20:11

## 2023-01-20 RX ADMIN — Medication 40 MG: at 20:12

## 2023-01-20 RX ADMIN — ATORVASTATIN CALCIUM 40 MG: 40 TABLET, FILM COATED ORAL at 20:11

## 2023-01-20 RX ADMIN — Medication 40 MG: at 10:41

## 2023-01-20 RX ADMIN — ACETAMINOPHEN 650 MG: 325 TABLET ORAL at 10:42

## 2023-01-20 RX ADMIN — DORZOLAMIDE HYDROCHLORIDE AND TIMOLOL MALEATE 1 DROP: 20; 5 SOLUTION/ DROPS OPHTHALMIC at 20:19

## 2023-01-20 RX ADMIN — DORZOLAMIDE HYDROCHLORIDE AND TIMOLOL MALEATE 1 DROP: 20; 5 SOLUTION/ DROPS OPHTHALMIC at 09:36

## 2023-01-20 RX ADMIN — ESCITALOPRAM OXALATE 10 MG: 10 TABLET ORAL at 20:11

## 2023-01-20 RX ADMIN — TAMSULOSIN HYDROCHLORIDE 0.4 MG: 0.4 CAPSULE ORAL at 20:11

## 2023-01-20 RX ADMIN — ACETAMINOPHEN 650 MG: 325 TABLET ORAL at 18:45

## 2023-01-20 ASSESSMENT — ENCOUNTER SYMPTOMS
SHORTNESS OF BREATH: 0
CHEST TIGHTNESS: 0
GASTROINTESTINAL NEGATIVE: 1

## 2023-01-20 ASSESSMENT — PAIN - FUNCTIONAL ASSESSMENT: PAIN_FUNCTIONAL_ASSESSMENT: ACTIVITIES ARE NOT PREVENTED

## 2023-01-20 ASSESSMENT — PAIN DESCRIPTION - PAIN TYPE: TYPE: ACUTE PAIN

## 2023-01-20 ASSESSMENT — PAIN SCALES - WONG BAKER
WONGBAKER_NUMERICALRESPONSE: 0
WONGBAKER_NUMERICALRESPONSE: 0

## 2023-01-20 ASSESSMENT — PAIN DESCRIPTION - LOCATION: LOCATION: HEAD

## 2023-01-20 ASSESSMENT — PAIN SCALES - GENERAL
PAINLEVEL_OUTOF10: 0
PAINLEVEL_OUTOF10: 4
PAINLEVEL_OUTOF10: 0
PAINLEVEL_OUTOF10: 0

## 2023-01-20 ASSESSMENT — PAIN DESCRIPTION - ONSET: ONSET: ON-GOING

## 2023-01-20 ASSESSMENT — PAIN DESCRIPTION - FREQUENCY: FREQUENCY: INTERMITTENT

## 2023-01-20 ASSESSMENT — PAIN DESCRIPTION - DESCRIPTORS: DESCRIPTORS: ACHING

## 2023-01-20 NOTE — PROGRESS NOTES
Hospital Medicine Progress Note     Date:  1/20/2023    PCP: Irma Venegas MD (Tel: [phone])    Date of Admission: 1/5/2023    Chief complaint:   Chief Complaint   Patient presents with    Fatigue     Pt. normally able to ambulate but not at this time and is extremely weak. Brief admission history: 80-year-old male with history of CKD stage IV, anemia of chronic disease, BPH, CAD, hyperlipidemia, who was admitted with generalized weakness, decreased oral intake, diagnosed with COVID-19 and bacterial pneumonia. Assessment/plan:  Bacterial pneumonia, likely superimposed in the setting of COVID-19 pneumonia. Procalcitonin level was elevated. Completed course of antibiotics. Did not require COVID-specific treatment as patient was not hypoxic. Sore throat. Secondary to COVID-19 infection. As needed lozenges. Acute kidney injury on CKD stage IV, improved with intravenous fluid. Generalized weakness. Likely secondary to acute medical conditions. Treat underlying conditions as noted above. Therapy evaluation in place. Going to SNF at discharge. Asymptomatic bacteriuria. Patient was already on antibiotics for other indication as noted above. There is no indication for treatment of asymptomatic bacteriuria. Acute upper GI bleed. Status post EGD on 1/9/2023 with multiple duodenal ulcers (6 mm clean-based duodenal ulcer, 15 mm clean-based duodenal bulb ulcer, 2 cm ulcer with visible vessel in distal aspect of ulcer, treated - see EGD report. He had repeat EGD on 1/16/2023 with noted 1 duodenal ulcer with adherent clot, clot removed and visible vessel treated. So far, hemoglobin has remained stable over the last couple of days. Patient continues to have small amount of blood smeared stools, likely expected given extent of duodenal ulcers. Continue to avoid NSAIDs.   Per discussion with daughter, she is recommending discontinuation of aspirin, given increased risk of bleed with resumption of aspirin therapy. She also understands patient is at risk of having another cardiovascular event. Patient will be discharged to SNF on Protonix 40 mg twice daily. Acute on chronic anemia with superimposed anemia of chronic disease. Secondary to #1. He has required multiple units of PRBC transfusions this hospital stay. Recommend repeat H&H within 3 days of discharge. At risk for aspiration. Reviewed chest x-ray from January 17, 2023, possible infiltrate or atelectasis. However, patient does not have cough or fever or shortness of breath. Suspicion is this is more atelectasis than infiltrate. Incentive spirometer has been ordered. Other comorbidities: history of CKD stage IV, anemia of chronic disease, BPH, CAD, hyperlipidemia. Disposition. Had extensive discussion with patient this morning. He feels comfortable with discharge to SNF. Patient is indicating that he has lived a good life and he feels comfortable moving on to therapy at this point. He does understand he remains at risk for ongoing bleed. I have recommended hemoglobin be rechecked in about 3 days. Diet: ADULT DIET; Dysphagia - Minced and Moist; Mildly Thick (Nectar)  ADULT ORAL NUTRITION SUPPLEMENT; Breakfast, Lunch, Dinner; Frozen Oral Supplement    Code status: Full Code   ----------  Subjective  Patient has no complaints. He feels comfortable with discharge to SNF today. We had extensive discussion about his career in [employer], in the [facility], advised children, grandchildren, and how he has lived a good life. Objective  Physical exam:  Vitals: BP (!) 146/65   Pulse 53   Temp 97.9 °F (36.6 °C) (Oral)   Resp 16   Ht 5' 6\" (1.676 m)   Wt 143 lb 1.3 oz (64.9 kg)   SpO2 95%   BMI 23.09 kg/m²   Gen/overall appearance: Not in acute distress. Alert. Oriented x3.   Head: Normocephalic, atraumatic  Eyes: EOMI, good acuity  ENT: Oral mucosa moist  Neck: No JVD, thyromegaly  CVS: Nml S1S2, no MRG, RRR  Pulm: Clear bilaterally. No crackles/wheezes  Gastrointestinal: Soft, NT/ND, +BS  Musculoskeletal: No edema. Warm  Neuro: No focal deficit. Moves extremity spontaneously. Psychiatry: Appropriate affect. Not agitated. Skin: Warm, dry with normal turgor. No rash  Capillary refill: Brisk,< 3 seconds   Peripheral Pulses: +2 palpable, equal bilaterally     24HR INTAKE/OUTPUT:    Intake/Output Summary (Last 24 hours) at 1/20/2023 1130  Last data filed at 1/20/2023 1044  Gross per 24 hour   Intake 1465.49 ml   Output 1400 ml   Net 65.49 ml       I/O last 3 completed shifts: In: 1545.5 [P.O.:780; I.V.:765.5]  Out: 2175 [Urine:2175]  I/O this shift:  In: 220 [P.O.:220]  Out: -   Meds:    pantoprazole (PROTONIX) 40 mg injection  40 mg IntraVENous Q12H    lidocaine 1 % injection  5 mL IntraDERmal Once    sodium bicarbonate  650 mg Oral BID    dorzolamide-timolol  1 drop Both Eyes BID    docusate sodium  100 mg Oral BID    sodium chloride flush  10 mL IntraVENous 2 times per day    atorvastatin  40 mg Oral Nightly    escitalopram  10 mg Oral Nightly    polyethylene glycol  17 g Oral Daily    senna  1 tablet Oral BID    tamsulosin  0.4 mg Oral Nightly     Infusions:    sodium chloride      sodium chloride       PRN Meds: phenol, sodium chloride, Benzocaine-Menthol, bisacodyl, sodium chloride flush, sodium chloride, promethazine **OR** ondansetron, acetaminophen **OR** acetaminophen, nitroGLYCERIN, traMADol    Labs/imaging:  CBC:   Recent Labs     01/18/23  0429 01/18/23  0903 01/19/23  0500 01/20/23  0450 01/20/23  0910   WBC 12.1*  --  10.2 8.3  --    HGB 8.3*   < > 8.4* 7.8* 8.1*     --  248 221  --     < > = values in this interval not displayed.        BMP:    Recent Labs     01/18/23  0429 01/19/23  0500 01/20/23  0450    139 140   K 4.5 4.3 4.7   * 109 110   CO2 21 22 21   BUN 43* 41* 38*   CREATININE 2.8* 2.7* 2.7*   GLUCOSE 116* 112* 106*       Hepatic:   No results for input(s): AST, ALT, ALB, ANTONIO WILLIS in the last 72 hours.       Yamel Negron MD  -------------------------------  [facility]

## 2023-01-20 NOTE — PROGRESS NOTES
This nurse received a call from patient's daughter that lives in [place] unfortunately I did not get her name. She also expressed concern for her dad's nutrition. This nurse explained that her dad ate almost 100% of his breakfast. That he was alert, offered conversation and now he is taking a nap.  Nurse explained that the GI team has been notified of request. Explained that this nurse spoke with the physician assistant with the GI team.

## 2023-01-20 NOTE — PROGRESS NOTES
PALLIATIVE MEDICINE PROGRESS NOTE     Patient name:Flash Francis    TOD:[id number] PKC:1/43/7896  Room/Bed:Y4R-0858/5127-01    LOS: 15 days        ASSESSMENT/RECOMMENDATIONS     80 y.o. male with debility, dysphagia, anemia, malnutrition, and CKD. Symptom Management:  Weakness - Working with PT/OT, will discharge to SNF later today. Dysphagia - ST following. Tolerated current diet. Anemia -educated the patient on high risk for reoccurrence. Malnutrition -oral intake continues to be somewhat poor, however did well with breakfast today. Discussed GI recommendations for NG, patient does not wish to pursue. States if this would keep him admitted for several days, he would prefer to discharge to SNF with the intent to return home. CKD - Nephrology following, not a candidate for dialysis. Creatinine remains stable. GIB - stable. Discussed GI recommendations for NG, patient does not wish to pursue. States if this would keep him admitted for several days, he would prefer to discharge to SNF with the intent to return home. Patient/Family Goals of Care :    1/10/23 - I explained the highly personal nature of deciding how to approach serious illness, and outlined two extremes--continuing disease-focused, morbidity-inducing treatment with the possibility of prolonging life vs. focusing on comfort/quality of life while allowing disease progression and natural death. Emphasis was placed on the absence of a \"right\" answer, as opposed to making good decisions based on personal preferences and circumstances, with ongoing reevaluation and adjustment in treatment goals as the clinical course unfolds. Patient was alert and oriented throughout the conversation in his room. We reviewed his living well at length, he did confirm his decisions regarding CPR and intubation in the aspect of cardiac or respiratory arrest.  He does state that he would like to live and continue medical management up until that point. Reached out to patient's [facility]. [place], daughter Larry Perez. Gunpowder through the 38-minute conversation, Larry Perez did call who is a critical care physician in [facility]. He and Larry Perez are not 100% on the same page regarding the patient's current medical care. We discussed goals of care, patient's main goal is to return home where he lives with his son who also has several medical conditions. The 2 of them essentially financially support and help maintain each other's care. Given the patient's overall prognosis, at this point, he will likely not return immediately to his apartment, however will end up in a skilled nursing facility. Larry Perez is having a difficult time with the patient's \"waffling\" regarding his desire to live. Per his chart review, the patient has had conversations with his providers about \"why am I still living\". Per his daughters report he is also had those conversations with her and is questioning why he is the last of his siblings to be alive. He has had concerns with malnutrition and failure to thrive for quite some time per chart review, she confirmed. Her frustration now lies that he is essentially changing his mind stating that he has a lot to live for. Per her report, her  was very in depth when having CODE STATUS conversations when the patient was completing his living well and estate planning. At that time he chose not to undergo resuscitative measures in the event of cardiac or respiratory arrest.  He feels as though now he wishes to do so. We discussed that the patient's in goal essentially will not be met by aggressive management of his disease processes. She is under the mindset that if the patient were to undergo cardiac or respiratory arrest, temporary measures to revive the patient would be appropriate so that her siblings would be able to say goodbye. Her  does not agree with this decision, but states that she has the right to make that decision.   Larry Perez states that she needs to [facility] markie conversations with her siblings\" so that they understand the magnitude of what is going on with their father and can come to terms with his prognosis therefore taking resuscitative measures off the table. She also states that she is \"trying to prevent my siblings from hating me for the rest of my life\" if I do not allow resuscitative measures. We discussed the risk vs benefit of aggressive measures understanding his desires and co-morbidities. Christopher Araya and her  feel as though the patient would improve if he moved to [facility], however he has a co dependent relationship with his son and therefore will likely not move. Christopher Araya is very overwhelmed, asked that the conversation was ended in order eat and take a walk. She would like to have a discussion again tomorrow allowing some time to continue discussions with her siblings and .      1/11/23 -left message with patient's daughter, Christopher Araya to further discuss. Will await return call. 1/17/23 -patient is up in a chair, had difficulty swallowing his breakfast.  Acknowledges that he is not feeling well today, has made little progress to improve. We again discussed goals of care, patient's main goal is to return to his apartment with his son. We discussed likely discharged to SNF, patient is agreeable, but apprehensive. He states that he would like to continue to try all aggressive measures up to the point where he does not recognize his family, and then he would like to see his care. Daughter is back in [facility], no changes have been made to [address] [place] [address]. 1/19/23 -reviewed plan for discharge, patient's ultimate goal is to return home with his son. Encouraged the patient to continue to have conversations regarding his medical goals and desired level of care with his daughter, his [facility], [facility]. POA.   We discussed that he is high risk for readmission due to several comorbidities, encouraged him to continue conversations with his family regarding his goals of care. 1/20/23 - Attempted to reach out to the patients son, ARIANNE to return call. Spoke with daughter, Natali German, for greater than 30 minutes including but not limited to the patients lab work, consult physicians recommendations, and overall prognosis. Natali German feels as though she will uphold her fathers wishes of full aggressive measures including intubation, defibrillation, and CPR for the purpose of his children saying goodbye. She stated that the patient's care is ultimately up to him, she would prefer him to be part of the discussions for further treatment options. She would also like to discuss his case with his nephrologist as he has been part of his care team for quite some time. She feels as though the patient is aware enough that if he becomes too sick he would [place] peace out\" and be done with the aggressive medical care. She states, \"I am aware of my dad situation, it is up to my dad\". AddendumPderick Sheldon left a message on palliative care voicemail requesting an additional call back for further questions. This provider left message returning call. Addendum: Spoke with Natali German ([phone]), she admits that she was confused regarding the difference between an NG tube and permanent feeding tube ( who is an MD was a part of the initial conversation). She is comfortable knowing that he ate well this morning and there is no need for an NG at this time. She is comfortable with plan for discharge on 1/21/23. Again states it is up to the patient to make decisions at this time. Disposition/Discharge Plan :    Pending, discharge to SNF. Follows with outpatient [place]. Advance Directives:  Code status:  Full Code  Decision Maker: Lyric Mackenzie. Case Discussed with:  patient, floor RN, . Thank you for allowing us to participate in the care of this patient.      SUBJECTIVE     Chief Complaint: Weakness    Last 24 hours:   Patient had several bowel movements overnight, reports they were small and not hard to pass. States he got up to urinate and unintentionally had a bowel movement with each time. States he feels well today, ate well for breakfast.  Is ready for discharge so that he may undergo therapy and return home as quick as possible. ROS:    Review of Systems   Constitutional:  Positive for activity change and fatigue. Negative for appetite change. Respiratory:  Negative for chest tightness and shortness of breath. Cardiovascular:  Negative for chest pain and leg swelling. Gastrointestinal: Negative. Musculoskeletal: Negative. Neurological:  Positive for weakness. Psychiatric/Behavioral: Negative. A complete 10 count ROS was obtained. Pertinent positives mentioned above in HPI/ROS. All others if not mentioned are negative. OBJECTIVE   BP (!) 146/65   Pulse 53   Temp 97.9 °F (36.6 °C) (Oral)   Resp 16   Ht 5' 6\" (1.676 m)   Wt 143 lb 1.3 oz (64.9 kg)   SpO2 95%   BMI 23.09 kg/m²   I/O last 3 completed shifts: In: 1545.5 [P.O.:780; I.V.:765.5]  Out: 2175 [Urine:2175]  No intake/output data recorded. Physical Exam  Constitutional:       Appearance: Normal appearance. Cardiovascular:      Rate and Rhythm: Normal rate. Pulses: Normal pulses. Pulmonary:      Effort: Pulmonary effort is normal.   Abdominal:      Palpations: Abdomen is soft. Neurological:      Mental Status: He is alert. Motor: Weakness present. Psychiatric:         Mood and Affect: Mood normal.         Thought Content:  Thought content normal.         Judgment: Judgment normal.        Total time: 60 minutes  >50% of time spent counseling patient at bedside or POA/family member if applicable , reviewing information and discussing care, coordinating with care team       Signed By: Electronically signed by LISSET Martel CNP on 1/20/2023 at 9:56 AM   Palliative Medicine   [phone]    January 20, 2023

## 2023-01-20 NOTE — PROGRESS NOTES
Canceled discharge order, as patient's daughter has indicated to nursing staff she wants patient to have a feeding tube.   Nursing staff will notify GI.

## 2023-01-20 NOTE — PROGRESS NOTES
Comprehensive Nutrition Assessment    Type and Reason for Visit:  Reassess    Nutrition Recommendations/Plan:   Continue Dysphagia- Minced and Moist. Mildly Thick   Continue Magic Cup TID  Consult RD if nutrition support desired     Malnutrition Assessment:  Malnutrition Status:  Mild malnutrition (01/20/23 1356)    Context:  Acute Illness     Findings of the 6 clinical characteristics of malnutrition:  Energy Intake:  Mild decrease in energy intake (Comment)  Weight Loss:  No significant weight loss     Body Fat Loss:  No significant body fat loss   Muscle Mass Loss: Moderate muscle mass loss Temples (temporalis), Clavicles (pectoralis & deltoids)  Fluid Accumulation:  No significant fluid accumulation    Strength:  Not Performed    Nutrition Assessment:    Follow-up. Pt remains on Dysphagia- Minced and Moist diet with Mildly Thick liquids. Magic Cup is ordered TID. Pt with variable intakes throughout this admission, last night and this morning PO was >50% of meals. Noted family concern for nutrition. Pt would benefit from NG tube to meet estimated nutrient requirements, however would not be able to d/c to SNF with NG tube. Pt is without significant weight loss. Will continue current nutrition interventions at this time. If POC changes and family/pt decide on nutrition support, consult RD for \"TF Ordering and Management\" for recommendations. Nutrition Related Findings:    Labs reviewed, lytes WNL. No edema noted. LBM 1/20 +red streaks Wound Type: None       Current Nutrition Intake & Therapies:    Average Meal Intake: %  Average Supplements Intake: Unable to assess  ADULT DIET; Dysphagia - Minced and Moist; Mildly Thick (Nectar)  ADULT ORAL NUTRITION SUPPLEMENT; Breakfast, Lunch, Dinner; Frozen Oral Supplement    Anthropometric Measures:  Height: 5' 6\" (167.6 cm)  Ideal Body Weight (IBW): 142 lbs (65 kg)    Current Body Weight: 143 lb (64.9 kg), IBW.  Weight Source: Standing Scale  Current BMI (kg/m2): 23.1  BMI Categories: Normal Weight (BMI 22.0 to 24.9) age over 72    Estimated Daily Nutrient Needs:  Energy Requirements Based On: Kcal/kg  Weight Used for Energy Requirements: Current  Energy (kcal/day): 7957-9673 kcal (25-30 kcal/kg)  Weight Used for Protein Requirements: Current  Protein (g/day): 65-78 gm (1.0-1.2 g/kg)  Method Used for Fluid Requirements: 1 ml/kcal    Nutrition Diagnosis:   Inadequate oral intake related to inadequate protein-energy intake as evidenced by variable intakes throughout admission and poor appetite    Nutrition Interventions:   Food and/or Nutrient Delivery: Continue Current Diet, Continue Oral Nutrition Supplement  Nutrition Education/Counseling: No recommendation at this time  Coordination of Nutrition Care: Continue to monitor while inpatient    Goals:  Previous Goal Met: Progressing toward Goal(s)  Goals: PO intake 50% or greater, by next RD assessment    Nutrition Monitoring and Evaluation:   Behavioral-Environmental Outcomes: None Identified  Food/Nutrient Intake Outcomes: Food and Nutrient Intake, Supplement Intake  Physical Signs/Symptoms Outcomes: Biochemical Data, Nutrition Focused Physical Findings, Skin, Weight, Chewing or Swallowing, Meal Time Behavior, GI Status, Nausea or Vomiting    Discharge Planning:     Too soon to determine     Yoon Pettit, [address], [place]  Contact: [phone]

## 2023-01-20 NOTE — PROGRESS NOTES
Patient ambulated to bathroom with stand by assist and walker. Gait is steady. Pt did have a small maroon colored stool. Pt also complaining of headache, medicated for complaints of headache. Pt's son at bedside. Pt back in bed. He is not interested in eating dinner at this time. Dinner tray will be left in room in case he wants it later. Call light within reach. Bed alarm is on.

## 2023-01-20 NOTE — PROGRESS NOTES
Pt A&O. He has had 5 BM's over night, small to medium in size. Stool is black to dark brown with red streaks and formed. Pt up to BR with walker x 1, tolerated fairly well. Pt has used call light appropriately and is resting in bed comfortably with wheels locked and bed alarm on. Call light within reach.      Electronically signed by Venecia Jimenez RN on 1/20/2023 at 7:04 AM

## 2023-01-20 NOTE — PLAN OF CARE
Problem: Discharge Planning  Goal: Discharge to home or other facility with appropriate resources  1/20/2023 0135 by Neri Yang RN  Outcome: Progressing  Flowsheets (Taken 1/19/2023 2015)  Discharge to home or other facility with appropriate resources:   Identify barriers to discharge with patient and caregiver   Arrange for needed discharge resources and transportation as appropriate   Identify discharge learning needs (meds, wound care, etc)   Refer to discharge planning if patient needs post-hospital services based on physician order or complex needs related to functional status, cognitive ability or social support system     Problem: Skin/Tissue Integrity  Goal: Absence of new skin breakdown  Description: 1. Monitor for areas of redness and/or skin breakdown  2. Assess vascular access sites hourly  3. Every 4-6 hours minimum:  Change oxygen saturation probe site  4. Every 4-6 hours:  If on nasal continuous positive airway pressure, respiratory therapy assess nares and determine need for appliance change or resting period. 1/20/2023 0135 by Neri Yang RN  Outcome: Progressing     Problem: Neurosensory - Adult  Goal: Achieves stable or improved neurological status  1/20/2023 0135 by Neri Yang RN  Outcome: Progressing  Flowsheets (Taken 1/19/2023 2015)  Achieves stable or improved neurological status:   Assess for and report changes in neurological status   Initiate measures to prevent increased intracranial pressure   Maintain blood pressure and fluid volume within ordered parameters to optimize cerebral perfusion and minimize risk of hemorrhage   Monitor temperature, glucose, and sodium.  Initiate appropriate interventions as ordered     Problem: Neurosensory - Adult  Goal: Achieves maximal functionality and self care  1/20/2023 0135 by Neri Yang RN  Outcome: Progressing  Flowsheets (Taken 1/19/2023 2015)  Achieves maximal functionality and self care:   Monitor swallowing and airway patency with patient fatigue and changes in neurological status   Encourage and assist patient to increase activity and self care with guidance from physical therapy/occupational therapy     Problem: Respiratory - Adult  Goal: Achieves optimal ventilation and oxygenation  1/20/2023 0135 by Mihir Wyman RN  Outcome: Progressing  Flowsheets (Taken 1/19/2023 2015)  Achieves optimal ventilation and oxygenation:   Assess for changes in respiratory status   Assess for changes in mentation and behavior   Position to facilitate oxygenation and minimize respiratory effort   Oxygen supplementation based on oxygen saturation or arterial blood gases   Encourage broncho-pulmonary hygiene including cough, deep breathe, incentive spirometry   Assess and instruct to report shortness of breath or any respiratory difficulty     Problem: Cardiovascular - Adult  Goal: Maintains optimal cardiac output and hemodynamic stability  1/20/2023 0135 by Mihir Wyman RN  Outcome: Progressing  Flowsheets (Taken 1/19/2023 2015)  Maintains optimal cardiac output and hemodynamic stability:   Monitor blood pressure and heart rate   Monitor urine output and notify Licensed Independent Practitioner for values outside of normal range   Assess for signs of decreased cardiac output   Administer fluid and/or volume expanders as ordered   Administer vasoactive medications as ordered     Problem: Skin/Tissue Integrity - Adult  Goal: Skin integrity remains intact  1/20/2023 0135 by Mihir Wyman RN  Outcome: Progressing  Flowsheets  Taken 1/20/2023 0134  Skin Integrity Remains Intact:   Monitor for areas of redness and/or skin breakdown   Assess vascular access sites hourly  Taken 1/19/2023 2015  Skin Integrity Remains Intact:   Monitor for areas of redness and/or skin breakdown   Assess vascular access sites hourly     Problem: Skin/Tissue Integrity - Adult  Goal: Oral mucous membranes remain intact  1/20/2023 0135 by Mihir Wyman RN  Outcome: Progressing  Flowsheets  Taken 1/20/2023 0134  Oral Mucous Membranes Remain Intact:   Assess oral mucosa and hygiene practices   Implement preventative oral hygiene regimen  Taken 1/19/2023 2015  Oral Mucous Membranes Remain Intact:   Assess oral mucosa and hygiene practices   Implement preventative oral hygiene regimen     Problem: Musculoskeletal - Adult  Goal: Return mobility to safest level of function  1/20/2023 0135 by Opal Arellano RN  Outcome: Progressing  Flowsheets (Taken 1/19/2023 2015)  Return Mobility to Safest Level of Function:   Assess patient stability and activity tolerance for standing, transferring and ambulating with or without assistive devices   Assist with transfers and ambulation using safe patient handling equipment as needed   Ensure adequate protection for wounds/incisions during mobilization   Obtain physical therapy/occupational therapy consults as needed     Problem: Musculoskeletal - Adult  Goal: Return ADL status to a safe level of function  1/20/2023 0135 by Opal Arellano RN  Outcome: Progressing  Flowsheets (Taken 1/19/2023 2015)  Return ADL Status to a Safe Level of Function:   Administer medication as ordered   Assess activities of daily living deficits and provide assistive devices as needed     Problem: Gastrointestinal - Adult  Goal: Maintains or returns to baseline bowel function  1/20/2023 0135 by Opal Arellano RN  Outcome: Progressing  Flowsheets (Taken 1/19/2023 1621 by Fabiola Godwin RN)  Maintains or returns to baseline bowel function:   Assess bowel function   Encourage mobilization and activity   Encourage oral fluids to ensure adequate hydration     Problem: Genitourinary - Adult  Goal: Urinary catheter remains patent  Recent Flowsheet Documentation  Taken 1/19/2023 2015 by Opal Arellano RN  Urinary catheter remains patent: Assess patency of urinary catheter     Problem: Infection - Adult  Goal: Absence of infection at discharge  1/20/2023 0135 by Tisha Greenberg Herber Thomason RN  Outcome: Progressing  Flowsheets (Taken 1/19/2023 2015)  Absence of infection at discharge:   Monitor lab/diagnostic results   Assess and monitor for signs and symptoms of infection   Monitor all insertion sites i.e., indwelling lines, tubes and drains   [place]aly appropriate cooling/warming therapies per order   Administer medications as ordered   Instruct and encourage patient and family to use good hand hygiene technique     Problem: Metabolic/Fluid and Electrolytes - Adult  Goal: Electrolytes maintained within normal limits  1/20/2023 0135 by Venecia Jimenez RN  Outcome: Progressing  Flowsheets (Taken 1/19/2023 2015)  Electrolytes maintained within normal limits:   Monitor labs and assess patient for signs and symptoms of electrolyte imbalances   Administer electrolyte replacement as ordered   Monitor response to electrolyte replacements, including repeat lab results as appropriate     Problem: Metabolic/Fluid and Electrolytes - Adult  Goal: Hemodynamic stability and optimal renal function maintained  1/20/2023 0135 by Venecia Jimenez RN  Outcome: Progressing  Flowsheets (Taken 1/19/2023 2015)  Hemodynamic stability and optimal renal function maintained:   Monitor labs and assess for signs and symptoms of volume excess or deficit   Monitor intake, output and patient weight   Monitor urine specific gravity, serum osmolarity and serum sodium as indicated or ordered   Monitor response to interventions for patient's volume status, including labs, urine output, blood pressure (other measures as available)     Problem: Hematologic - Adult  Goal: Maintains hematologic stability  1/20/2023 0135 by Venecia Jimenez RN  Outcome: Progressing  Flowsheets (Taken 1/19/2023 2015)  Maintains hematologic stability:   Assess for signs and symptoms of bleeding or hemorrhage   Monitor labs for bleeding or clotting disorders   Administer blood products/factors as ordered     Problem: ABCDS Injury Assessment  Goal: Absence of physical injury  1/20/2023 0135 by Anthony Santoro RN  Outcome: Progressing  Flowsheets (Taken 1/19/2023 0454)  Absence of Physical Injury: Implement safety measures based on patient assessment     Problem: Safety - Adult  Goal: Free from fall injury  1/20/2023 0135 by Anthony Santoro RN  Outcome: Progressing  Flowsheets (Taken 1/19/2023 0454)  Free From Fall Injury: Instruct family/caregiver on patient safety     Problem: Pain  Goal: Verbalizes/displays adequate comfort level or baseline comfort level  1/20/2023 0135 by Anthony Santoro RN  Outcome: Progressing  Flowsheets (Taken 1/20/2023 0135)  Verbalizes/displays adequate comfort level or baseline comfort level:   Encourage patient to monitor pain and request assistance   Assess pain using appropriate pain scale   Administer analgesics based on type and severity of pain and evaluate response   Implement non-pharmacological measures as appropriate and evaluate response   Consider cultural and social influences on pain and pain management   Notify Licensed Independent Practitioner if interventions unsuccessful or patient reports new pain     Problem: Nutrition Deficit:  Goal: Optimize nutritional status  1/20/2023 0135 by Anthony Santoro RN  Outcome: Progressing  Flowsheets (Taken 1/20/2023 0135)  Nutrient intake appropriate for improving, restoring, or maintaining nutritional needs:   Assess nutritional status and recommend course of action   Recommend appropriate diets, oral nutritional supplements, and vitamin/mineral supplements     Problem: Nutrition Deficit:  Goal: Optimize nutritional status  1/20/2023 0135 by Anthony Santoro RN  Outcome: Progressing  Flowsheets (Taken 1/20/2023 0135)  Nutrient intake appropriate for improving, restoring, or maintaining nutritional needs:   Assess nutritional status and recommend course of action   Recommend appropriate diets, oral nutritional supplements, and vitamin/mineral supplements  1/19/2023 1621 by Melly Del Cid YONY Richardson  Outcome: Not Progressing  Flowsheets (Taken 1/19/2023 1621)  Nutrient intake appropriate for improving, restoring, or maintaining nutritional needs:   Assess nutritional status and recommend course of action   Recommend appropriate diets, oral nutritional supplements, and vitamin/mineral supplements  Note: Pt continues to eat poorly. Fluids are being encouraged throughout shift.

## 2023-01-20 NOTE — CARE COORDINATION
[facility] PRE-CERT REQUEST SUBMITTED VIA NH ACCESS PORTAL    REQUESTED SETTING:  [facility]     ANTICIPATED ADMIT DATE TO SNF:  1/20/2023    [facility] [place] [facility]  [id number]    YAO Stanley, LACHO, Social Work/Case Management   [phone]  Electronically signed by YAO Stanley, LACHO on 1/20/2023 at 12:12 PM

## 2023-01-20 NOTE — PROGRESS NOTES
This nurse was able to speak to Sharifa Stricklandma for GI. Informed her of conversation that was had with pt's daughter Reji Louie. She said she would speak with Dr. Ruddy Raya and either herself or Dr. Ruddy Raya would call pt's daughter.

## 2023-01-20 NOTE — PROGRESS NOTES
This nurse attempted to call Dr. Tulio Morales office at [phone]. The answering machine says to \"not leave a message and to call back. \" This attempted a second call with same message received. Will call back in about an hour.

## 2023-01-20 NOTE — CARE COORDINATION
Discharge Planning:   Discharge order   Spoke to [facility]   Reference # [id number]  Updated information sent via rightfax to F: [phone]     PLAN: [facility]     YAO Horan, [facility], Social Work/Case Management   [phone]  Electronically signed by YAO Horan, DONALDOW on 1/20/2023 at 11:39 AM    Call received from [facility] stating daughter called reporting patient will now want a feeding tube.    Electronically signed by YAO Horan, LACHO on 1/20/2023 at 12:11 PM

## 2023-01-20 NOTE — PROGRESS NOTES
This nurse received a call from patient's daughter Negrita Green. She said that the GI doctors had recommended and/or mentioned a feeding tube for her dad to help with nutrition. She said she spoke with her dad and that is something they are interested in. This nurse perfect served Dr. Kirby Flores to inform him of her wishes and he ordered for this nurse to inform GI.

## 2023-01-20 NOTE — PROGRESS NOTES
INPATIENT PROGRESS NOTE        IDENTIFYING DATA/REASON FOR CONSULTATION   PATIENT:  Mykel Pacheco  MRN:  [id number]  ADMIT DATE: 2023  TIME OF EVALUATION: 2023 3:16 PM  HOSPITAL STAY:   LOS: 15 days   CONSULTING PHYSICIAN: Tavia Shafer MD   REASON FOR CONSULTATION: Melena, Acute blood loss anemia    Subjective:    Patient seen in follow up. Patient has no complaints this morning. He had had a few dark brown BMs overnight. He ate well this morning, consumed almost 100% of breakfast    MEDICATIONS   SCHEDULED:  pantoprazole (PROTONIX) 40 mg injection, 40 mg, Q12H  lidocaine 1 % injection, 5 mL, Once  sodium bicarbonate, 650 mg, BID  dorzolamide-timolol, 1 drop, BID  docusate sodium, 100 mg, BID  sodium chloride flush, 10 mL, 2 times per day  atorvastatin, 40 mg, Nightly  escitalopram, 10 mg, Nightly  polyethylene glycol, 17 g, Daily  senna, 1 tablet, BID  tamsulosin, 0.4 mg, Nightly    FLUIDS/DRIPS:     sodium chloride      sodium chloride       PRNs: phenol, 1 spray, Q2H PRN  sodium chloride, , PRN  Benzocaine-Menthol, 1 lozenge, Q2H PRN  bisacodyl, 10 mg, Daily PRN  sodium chloride flush, 10 mL, PRN  sodium chloride, , PRN  promethazine, 12.5 mg, Q6H PRN   Or  ondansetron, 4 mg, Q6H PRN  acetaminophen, 650 mg, Q6H PRN   Or  acetaminophen, 650 mg, Q6H PRN  nitroGLYCERIN, 0.4 mg, Q5 Min PRN  traMADol, 50 mg, Q8H PRN    ALLERGIES:  No Known Allergies      PHYSICAL EXAM   VITALS:  BP (!) 101/44   Pulse 54   Temp 97.5 °F (36.4 °C) (Oral)   Resp 16   Ht 5' 6\" (1.676 m)   Wt 143 lb 1.3 oz (64.9 kg)   SpO2 95%   BMI 23.09 kg/m²   TEMPERATURE:  Current - Temp: 97.5 °F (36.4 °C);  Max - Temp  Av.7 °F (36.5 °C)  Min: 97.5 °F (36.4 °C)  Max: 97.9 °F (36.6 °C)    Physical Exam:  General appearance: alert, cooperative, no distress  Eyes: Anicteric  Head: Normocephalic, without obvious abnormality  Lungs: clear to auscultation bilaterally, Normal Effort  Heart: regular rate and rhythm, normal S1 and S2, no murmurs or rubs  Abdomen: soft, non-distended,minimally tender epigastric area. Bowel sounds normal.   Extremities: atraumatic, no cyanosis or edema  Skin: warm and dry, no jaundice  Neuro: Grossly intact, A&OX3    LABS AND IMAGING   Laboratory   Recent Labs     01/18/23  0429 01/18/23  0903 01/19/23  0500 01/20/23  0450 01/20/23  0910   WBC 12.1*  --  10.2 8.3  --    HGB 8.3*   < > 8.4* 7.8* 8.1*   HCT 25.8*   < > 25.6* 23.9* 25.0*   MCV 92.5  --  93.0 92.1  --      --  248 221  --     < > = values in this interval not displayed. Recent Labs     01/18/23  0429 01/19/23  0500 01/20/23  0450    139 140   K 4.5 4.3 4.7   * 109 110   CO2 21 22 21   PHOS 2.4* 2.5 2.9   BUN 43* 41* 38*   CREATININE 2.8* 2.7* 2.7*     No results for input(s): AST, ALT, ALB, BILIDIR, BILITOT, ALKPHOS in the last 72 hours. No results for input(s): LIPASE, AMYLASE in the last 72 hours. No results for input(s): PROTIME, INR in the last 72 hours. Imaging  XR CHEST PORTABLE   Final Result   1. Right-sided PICC line with the tip in the SVC/atrial junction. 2.  Chronic appearing interstitial changes and calcified pleural plaques. Right basilar airspace disease could represent superimposed pneumonia. XR CHEST PORTABLE   Final Result   1. Patchy bibasilar airspace disease which could reflect atelectasis versus   pneumonia. 2.  Stable diffuse bilateral calcified pleural plaques         XR CHEST PORTABLE   Final Result   1. Patchy right basilar airspace disease could represent atelectasis versus   pneumonia. 2.  Multiple bilateral calcified pleural plaques         CT SOFT TISSUE NECK WO CONTRAST   Final Result   No acute findings, soft tissue mass or lymphadenopathy evident. XR CHEST 1 VIEW   Final Result   Ill-defined opacities bilaterally.              Endoscopy  EGD 1/9/23 with Dr. Dolores Roman  Normal esophagus without Mcdonald's or reflux changes, 3cm sliding hiatal hernia, normal stomach, 6mm clean-based duodenal ulcer, 15mm clean-based duodenal ulcer in the bulb. In the sweep, there was a 2cm ulcer with a visible vessel in the distal aspect of the ulcer. I was not able to see the very proximal aspect of the ulcer. Epinephrine 1:10,000 was injected in each of 4 quadrants around the ulcer with excellent blanching of tissue. A total of 3.5mL was injected. Next, the vessel was obliterated using gold probe cautery with excellent hemostasis. No bleeding at the end of the procedure. 2nd portion of the duodenum was normal.       ASSESSMENT AND RECOMMENDATIONS   Ray Pappas is a 80 y.o. male with PMH of CAD with coronary stents, HLD, CKD stage IV, chronic anemia BPH who presented on 1/5/2023 with generalized weakness, decreased oral intake. Diagnosed with COVID-19, bacterial pneumonia, acute on CKD. We have been consulted regarding bloody stool, acute on  chronic anemia. EGD 1/9 showed 2 cm ulcer with visible vessel in duodenal sweep treated with epi injection and gold probe cautery. Patient continued to pass dark blood clots. Repeat EGD 1/16showed multiple duodenal ulcers with 1 ulcer with adherent clot treated with epinephrine and Hemoclip    Duodenal ulcer with hemorrhage requiring endoscopic therapy x2 (1/9 and 1/16). H. pylori stool antigen negative. On PPI drip  Acute blood loss anemia 2/2 #1. Hgb stable past 24 hrs. Acute on CKD  COVID pneumonia  Deconditioning, poor oral intake: appears new since this admission. Pt receiving ensure shakes with meals. Oral intake good this morning    RECOMMENDATIONS:    Continue  PPI BID  Monitor for signs of rebleeding  Avoid NSAIDs  Monitor oral intake. Continue ensure shakes with meals. If you have any questions or need any further information, please feel free to contact us [phone]. Thank you for allowing us to participate in the care of Ray Pappas. The note was completed using Dragon voice recognition transcription.  Every effort was made to ensure accuracy; however, inadvertent transcription errors may be present despite my best efforts to edit errors.     Alee Matthews PA

## 2023-01-20 NOTE — PROGRESS NOTES
[facility]   Speech Therapy  Daily Dysphagia Treatment Note    Maryana Mayers  AGE: 80 y.o. 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)    Chronic constipation    Former cigarette smoker    Epigastric pain    Ventral hernia    Anemia in other chronic diseases classified elsewhere    Current moderate episode of major depressive disorder without prior episode (HCC)    Fatigue    Sleep disorder    Non-English speaking patient    Nocturia    Urinary frequency    Chronic kidney disease, stage IV (severe) (HCC)    Ataxia    Frequent falls    Frailty syndrome in geriatric patient    Chronic pain syndrome    Bradycardia    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 chronic kidney disease (HCC)    Hyperkalemia    Chronic midline low back pain    SOB (shortness of breath)    Bacterial pneumonia     No Known Allergies    Treatment Diagnosis: Dysphagia     Chart review: 2023 admitted with c/o generalized weakness, fatigue, poor appetite  MD ADMISSION H&P HPI:  Patient is a 80-year-old male with past medical history of CAD, CKD stage IV, hyperlipidemia who presents to the hospital due to patient having generalized weakness. Patient also has poor appetite, fatigue as well as generalized weakness. Patient does not have any recent sick contacts, no reported fevers chills. No reported nausea vomiting diarrhea. 2023 COVID +     IMAGING:    CXR 23:   Impression   1. Patchy bibasilar airspace disease which could reflect atelectasis versus   pneumonia. 2.  Stable diffuse bilateral calcified pleural plaques       CXR 1/9/23  Impression   1. Patchy right basilar airspace disease could represent atelectasis versus   pneumonia. 2.  Multiple bilateral calcified pleural plaques       CXR: 1/5/2023  Impression   Ill-defined opacities bilaterally. GI note 1/16/23-EGD   The esophageal mucosa appeared normal.  The gastroesophageal junction is also normal.  The patient has a small hiatal hernia. Examination of the stomach revealed a normal gastric mucosa. In the duodenum, multiple ulcers were noted. Most ulcers had a clean base. However, an adherent clot was noted on an ulcer in the proximal duodenal bulb. There was no evidence of active bleeding. Epinephrine 1: 10,000 was injected on both sides of the ulcer. Next, the clot was removed. The base of the ulcer was visualized. A small visible vessel was noted. Next, a large Hemoclip was placed. Adequate positioning was confirmed. Multiple flushes were performed . No active bleeding was noted. Recommendations: Resume IV PPI. Start clear liquid diet. Patient has multiple ulcers in the duodenum. He remains at risk for rebleed. Continue to monitor closely. Trend H&H. Subjective:   Current Diet Level: Minced and moist, mildly thick liquids    1/10/2023 Clear liquids ; Thin liquids  (despite recommendation for mildly thick liquids prior to being made NPO)    1/11/23: GI wrote for regular diet this date 1/11/23; nsg concern that pt cannot tolerate solids-recommendation for minced and moist/mildly thick liquids    1/16/23:  GI wrote for regular diet/thin liquids despite patient previously on minced and moist, mildly thick liquids, nursing reports she has been thickening his drinks    1/17/23: RN called therapist after tx reporting patient reports dislike of soft and bite-sized prefers minced and moist, diet changed back to minced and moist. MD ordered CSE for concern for aspiration     Comments regarding tolerating Current Diet:   Adequate diet tolerance reported    Objective:     Pain: Did not state               Vision: [x]WFL []Impaired:  Hearing: []WFL [x]Impaired: [place]    Cognitive/behavioral/communication:   Oriented to [x] self [x] place [x] Date (month and year) [] situation  [x]alert []lethargic  [x]cooperative []self-limiting   [x]confused at times  []distractible []agitated []impulsive  [x]verbally responsive []nonverbal []limited verbal responses  [x]follows one step commands []does not follow dx []follows complex commands  []aphasic []dysarthric  [] other:     Respiratory Status: [x]Room Air []O2 via nasal cannula []Other:  Dentition: []Adequate []Dentures [x]Missing Most Teeth []Edentulous []Other:  Vocal Quality: [x]Normal []Dysphonic  []Aphonic  []Hoarse []Wet []Weak []Other:  Volitional Cough: [x]Strong []Weak []Wet []Absent []Congested []Other  Volitional Swallow:   []Absent  [x]Delayed []Adequate []Required use of drink     Oral Mechanism Exam:  []WFL []Mild   [x] Moderate  []Severe  []To be assessed  Impaired:   []Left side      []Right side    [x]Labial ROM/Coordination    []Labial Symmetry   [x]Lingual ROM/Coordination   []Lingual Symmetry  []Gag  []Other:     Patient Positioning: Upright in chair    PO Trials: Thin Liquids: suspect at risk for premature loss of bolus, delayed swallow initiation, decreased laryngeal elevation, cough 50% of trials, belching noted  Nectar thick liquids: suspect at risk for premature loss of bolus, delayed swallow initiation, decreased laryngeal elevation, no overt s/s of aspiration or penetration, belching noted  Honey Thick liquids: DNT  Puree: declined  minced and moist: declined   Soft solid: DNT    Dysphagia Tx:   Direct Dysphagia tx: PO tolerance as indicated above. belching continues to be noted this session with liquids.  Suspect new baseline is minced and moist/mildly thick liquids-declining upgrade to soft and bite sized preferring softer foods  Dysphagia ex: completed lingula ROM; exaggerated yawn, falsetto, effortful swallow, pitch swings, glottals x5 each with mod imp  Training in compensatory strategies: small single sips, reviewed purpose of diet recommendations, sit upright  Pt response to ex/training: verbalized understanding, suspect ongoing education required    Goals:    Pt will functionally tolerate recommended diet with no overt clinical s/s of aspiration ongoing  Pt will demonstrate understanding of aspiration risk and precautions via education/demonstration with occasional prompting ongoing  Pt will advance to least restrictive diet as indicated  ongoing     Assessment:   Impressions:   Accepted and tolerated treatment at bedside  Patient alert, cooperative, pleasant; follows simple dx; verbally responsive, oriented to self, situation, kind of place, month, year and day  Dysphagia status appears stable: Mild-moderate oral dysphagia characterized by decreased lingual coordination, generalized oral weakness, prolonged mastication of bolus improving ability to masticate soft and bite-sized, suspect at risk for premature loss of bolus. Pt endorses being on a soft diet in home environment reporting he eats rice, mashed potatoes, spaghetti, etc due to majority of teeth missing. Moderate pharyngeal stage dysphagia characterized by delayed swallow and decreased laryngeal elevation. No overt s/s of aspiration or penetration with thickened liquids. Ongoing s/s of aspiration/penetration with thin liquids. Belching noted with liquids  Recommend cont minced and moist, mildly thick liquids, patient declining upgrade to soft and bite sized textures.    If concern for aspiration, a Modified Barium Swallow Study can be completed to further assess swallow function with MD order  ST to follow for diet tolerance, upgrade readiness, and tx appropriateness monitor    Recommended Diet and Intervention 1/20/2023:  Diet Solids Recommendation:  Minced and moist Liquid Consistency Recommendation:  Mildly (nectar) thick liquids  Recommended form of Meds: Meds crushed as able in puree      Compensatory Swallowing Strategies:  Upright as possible with all PO intake , Small bites/sips , Remain upright 30-45 min     EDUCATION:   Provided education regarding role of SLP, results of assessment, recommendations and general speech pathology plan of care. [] Pt verbalized understanding and agreement   [x] Pt requires ongoing learning   [] No evidence of comprehension     Dysphagia Prognosis: [] good [x]fair []poor []guarded  Barriers: Current co-morbidities    Plan:   Continue Dysphagia Therapy: YES    Interventions: Diet Tolerance Monitoring , Patient/Family Education   Duration/Frequency of therapy while on unit: Speech therapy for dysphagia tx 3-5 times per week during acute care stay. Discharge Instructions:   Recommend ongoing SLP for dysphagia therapy upon discharge from hospital     This note serves as a D/C Summary in the event that this patient is discharged prior to the next therapy session. Coded treatment time: 0  Total treatment time: [id number]  Speech-Language Pathologist  Portable phone: [phone]  On 01/20/23 at 2:38 PM

## 2023-01-20 NOTE — PROGRESS NOTES
Physical Therapy  Facility/Department: [facility] PRO[facility]ESS[facility]  Daily Treatment Note  NAME: Mykel Pacheco  : 1931  MRN: [id number]    Date of Service: 2023    Discharge Recommendations:Flash Cao scored a 17/24 on the AM-PAC short mobility form. Current research shows that an AM-PAC score of 17 or less is typically not associated with a discharge to the patient's home setting. Based on the patient's AM-PAC score and their current functional mobility deficits, it is recommended that the patient have 3-5 sessions per week of Physical Therapy at d/c to increase the patient's independence. Please see assessment section for further patient specific details. If patient discharges prior to next session this note will serve as a discharge summary. Please see below for the latest assessment towards goals. Patient would benefit from continued therapy after discharge, Therapy recommended at discharge   PT Equipment Recommendations  Other: defer to next level of care    Patient Diagnosis(es): The primary encounter diagnosis was Pneumonia of right lung due to infectious organism, unspecified part of lung. Diagnoses of General weakness, Impaired mobility and ADLs, COVID-19, and Acute kidney injury superimposed on CKD [facility]) were also pertinent to this visit. Assessment   Assessment: The pt demonstrated improved activity tolerance and was able to tolerate two long ambulation bouts with an extended seated rest break in between. He gets slightly SOB with mobility and his walker tends to get out too far in front of him as he fatigues. He is slightly below baseline with B LE weakness, impaired balance, and impaired gait mechanics. He would benefit from further IP PT especially to assist him in improving endurance to manage the 13 steps into his home and to improve his independence and safety with mobility.   Activity Tolerance: Patient limited by endurance  Other: defer to next level of care     Plan    Physcial Therapy Plan  General Plan: 3-5 times per week  Current Treatment Recommendations: Strengthening;Balance training;Functional mobility training;Transfer training;Gait training; Endurance training;Neuromuscular re-education; Safety education & training;Equipment evaluation, education, & procurement;Patient/Caregiver education & training; Therapeutic activities     Restrictions  Restrictions/Precautions  Restrictions/Precautions: Modified Diet, Fall Risk, Up as Tolerated (Minced and moist diet, mildly thick (nectar thick) liquids, high fall risk)  Position Activity Restriction  Other position/activity restrictions: IV; aldrich     Subjective    Subjective  Subjective: \"I can go for a walk. \" The pt presents supine in bed and willing to work with therapist.  Pain: Pt denies pain  Orientation  Overall Orientation Status: Within Functional Limits  Cognition  Overall Cognitive Status: Exceptions  Arousal/Alertness: Delayed responses to stimuli  Following Commands: Follows one step commands with repetition; Follows one step commands with increased time  Attention Span: Difficulty dividing attention  Memory: Decreased recall of recent events;Decreased short term memory  Safety Judgement: Decreased awareness of need for safety  Problem Solving: Decreased awareness of errors  Insights: Decreased awareness of deficits  Initiation: Requires cues for some  Sequencing: Requires cues for some     Objective   Vitals     Bed Mobility Training  Bed Mobility Training: Yes  Interventions: Verbal cues (cueing for sequencing to get OOB)  Supine to Sit: Stand-by assistance (HOB elevated)  Scooting: Stand-by assistance (to EOB)  Balance  Sitting: Intact (SBA EOB and in chair)  Standing: With support (CGA with RW)  Transfer Training  Transfer Training: Yes  Overall Level of Assistance: Contact-guard assistance  Interventions: Verbal cues (cueing for hand placement)  Sit to Stand: Contact-guard assistance  Stand to Sit: Contact-guard assistance  Gait Training  Gait Training: Yes  Gait  Overall Level of Assistance: Contact-guard assistance  Interventions: Verbal cues (cueing to stay close to the walker as he tendsto have it out in front of him. Cueing for upright posture)  Base of Support: Widened  Speed/Donna: Slow  Step Length: Right shortened;Left shortened  Gait Abnormalities: Decreased step clearance  Distance (ft): 150 Feet (+100'. Pt fatigued after ambulation and after second bout stated that he was dizzy. BP taken and WNL)  Assistive Device: Walker, rolling           Safety Devices  Type of Devices: All fall risk precautions in place;Call light within reach; Chair alarm in place;Gait belt;Left in chair  Restraints  Restraints Initially in Place: No       Goals  Short Term Goals  Time Frame for Short Term Goals: by acute discharge - all goals ongoing  Short Term Goal 1: bed mobility SBA  Short Term Goal 2: sit<>stand with SBA  Short Term Goal 3: ambulate > 30' with RW and SBA  Patient Goals   Patient Goals : none stated    Education  Patient Education  Education Given To: Patient  Education Provided: Plan of Care;Role of Therapy;Transfer Training  Education Provided Comments: education on the importance of mobility  Education Method: Verbal  Barriers to Learning: None  Education Outcome: Verbalized understanding;Continued education needed    Therapy Time   Individual Concurrent Group Co-treatment   Time In 1345         Time Out 1411         Minutes 26         Timed Code Treatment Minutes: 26 Minutes   Timed Code Treatment Minutes:  26 Minutes    Total Treatment Minutes:  26 minutes      Blanco Wooten, PT

## 2023-01-20 NOTE — DISCHARGE SUMMARY
Hospital Discharge Summary    Patient's PCP: Angeles Serra MD  Admit Date: 1/5/2023   Discharge Date: 1/23/2023    Admitting Physician: Dr. Antonio Oro MD  Discharge Physician: Dr. Nancie Mayorga MD     Consults:   IP CONSULT TO NEPHROLOGY  IP CONSULT TO NEPHROLOGY  IP CONSULT TO SOCIAL WORK  IP CONSULT TO PALLIATIVE CARE  IP CONSULT TO GI  IP CONSULT TO GI  IP CONSULT TO DIETITIAN    Brief HPI: Patient admitted with bacterial pneumonia    Brief hospital course: 60-year-old male with history of CKD stage IV, anemia of chronic disease, BPH, CAD, hyperlipidemia, who was admitted with generalized weakness, decreased oral intake, diagnosed with COVID-19 and bacterial pneumonia. Assessment/plan:  Bacterial pneumonia, likely superimposed in the setting of COVID-19 pneumonia. Procalcitonin level was elevated. Completed course of antibiotics. Did not require COVID-specific treatment as patient was not hypoxic. Sore throat. Secondary to COVID-19 infection. As needed lozenges. Acute kidney injury on CKD stage IV, improved with intravenous fluid. Generalized weakness. Likely secondary to acute medical conditions. Treat underlying conditions as noted above. Therapy evaluation in place. Going to SNF at discharge. Asymptomatic bacteriuria. Patient was already on antibiotics for other indication as noted above. There is no indication for treatment of asymptomatic bacteriuria. Acute upper GI bleed. Status post EGD on 1/9/2023 with multiple duodenal ulcers (6 mm clean-based duodenal ulcer, 15 mm clean-based duodenal bulb ulcer, 2 cm ulcer with visible vessel in distal aspect of ulcer, treated - see EGD report. He had repeat EGD on 1/16/2023 with noted 1 duodenal ulcer with adherent clot, clot removed and visible vessel treated. So far, hemoglobin has remained stable over the last couple of days.   Patient continues to have small amount of blood smeared stools, likely expected given extent of duodenal ulcers. Continue to avoid NSAIDs. Per discussion with daughter, she is recommending discontinuation of aspirin, given increased risk of bleed with resumption of aspirin therapy. She also understands patient is at risk of having another cardiovascular event. Patient will be discharged to SNF on Protonix 40 mg twice daily. Acute on chronic anemia with superimposed anemia of chronic disease. Secondary to #1. He has required multiple units of PRBC transfusions this hospital stay. Recommend repeat H&H within 3 days of discharge. At risk for aspiration. Reviewed chest x-ray from January 17, 2023, possible infiltrate or atelectasis. However, patient does not have cough or fever or shortness of breath. Suspicion is this is more atelectasis than infiltrate. Incentive spirometer has been ordered. Abdominal pain overnight on 1/21/2023. On evaluation this morning, patient reports no abdominal pain at this time. No acute abdominal findings noted on CT-abd/pelvis. CT-abd/pelvis from 1/21/2023. No clinical evidence of pneumonia despite infiltrates noted on CT. Patient does not have cough, fever or leukocytosis. He also does not have urinary symptoms, despite bladder wall thickening noted. Other comorbidities: history of CKD stage IV, anemia of chronic disease, BPH, CAD, hyperlipidemia. Disposition. Patient is stable for discharge at this time. Recommend repeat CBC in 3 days, transfuse as needed to maintain Hb greater than 7. Invasive procedures:  Multiple EGDs x2. Discharge Diagnoses:   See above    Physical Exam: BP (!) 114/53   Pulse 58   Temp 98 °F (36.7 °C) (Oral)   Resp 18   Ht 5' 6\" (1.676 m)   Wt 145 lb 1 oz (65.8 kg)   SpO2 95%   BMI 23.41 kg/m²   Gen/overall appearance: Not in acute distress. Alert. Oriented x3. Head: Normocephalic, atraumatic  Eyes: EOMI, good acuity  ENT: Oral mucosa moist  Neck: No JVD, thyromegaly  CVS: Nml S1S2, no MRG, RRR  Pulm: Clear bilaterally.  No crackles/wheezes  Gastrointestinal: Soft, NT/ND, +BS  Musculoskeletal: No edema. Warm  Neuro: No focal deficit. Moves extremity spontaneously. Psychiatry: Appropriate affect. Not agitated. Skin: Warm, dry with normal turgor. No rash  Capillary refill: Brisk,< 3 seconds   Peripheral Pulses: +2 palpable, equal bilaterally     Significant diagnostic studies that may require follow up:  CT SOFT TISSUE NECK WO CONTRAST    Result Date: 1/7/2023  EXAMINATION: CT OF THE NECK WITHOUT CONTRAST  1/7/2023 TECHNIQUE: CT of the neck was performed without the administration of intravenous contrast. Multiplanar reformatted images are provided for review. Automated exposure control, iterative reconstruction, and/or weight based adjustment of the mA/kV was utilized to reduce the radiation dose to as low as reasonably achievable. COMPARISON: None. HISTORY: ORDERING SYSTEM PROVIDED HISTORY: reports pain in inframadibular area. TECHNOLOGIST PROVIDED HISTORY: Reason for exam:->reports pain in inframadibular area. Reason for Exam: reports pain in inframadibular area. Additional signs and symptoms: renal insufficiency FINDINGS: PHARYNX/LARYNX:  The nasopharynx, oropharynx and hypopharynx have a normal noncontrast appearance. The epiglottis and aryepiglottic folds are normal. There is no soft tissue mass. The tongue is normal.  There is no fluid collection evident. SALIVARY GLANDS/THYROID:  The parotid glands and submandibular glands are normal.  There is no sialolith evident. The parotid gland is unremarkable. LYMPH NODES:  No cervical or supraclavicular lymphadenopathy is seen. SOFT TISSUES:  No appreciable soft tissue swelling or mass is seen. BRAIN/ORBITS/SINUSES:  Limited evaluation of the brain and orbits demonstrates no acute findings. There is mucosal thickening within the paranasal sinuses. The mastoid air cells are clear. LUNG APICES/SUPERIOR MEDIASTINUM:  The lung apices are clear.   There is no superior mediastinal lymphadenopathy. BONES:  No lytic or blastic osseous lesions are identified. No acute findings, soft tissue mass or lymphadenopathy evident. XR CHEST PORTABLE    Result Date: 1/18/2023  EXAMINATION: ONE XRAY VIEW OF THE CHEST 1/18/2023 2:40 pm COMPARISON: None. HISTORY: ORDERING SYSTEM PROVIDED HISTORY: PICC placement TECHNOLOGIST PROVIDED HISTORY: Reason for exam:->PICC placement FINDINGS: Right-sided PICC line with tip in the SVC/atrial junction. Chronic appearing interstitial changes and calcified pleural plaques. Right basilar airspace disease could represent superimposed pneumonia. No pneumothorax or pleural effusion     1. Right-sided PICC line with the tip in the SVC/atrial junction. 2.  Chronic appearing interstitial changes and calcified pleural plaques. Right basilar airspace disease could represent superimposed pneumonia. XR CHEST PORTABLE    Result Date: 1/17/2023  EXAMINATION: ONE XRAY VIEW OF THE CHEST 1/17/2023 10:32 am COMPARISON: Chest x-ray dated 9 January 2023 HISTORY: ORDERING SYSTEM PROVIDED HISTORY: Cough TECHNOLOGIST PROVIDED HISTORY: Reason for exam:->Cough Reason for Exam: Cough FINDINGS: Stable appearance of diffuse bilateral calcified pleural plaques. Patchy bibasilar airspace disease. No pneumothorax or pleural effusion. Stable cardiomediastinal silhouette     1. Patchy bibasilar airspace disease which could reflect atelectasis versus pneumonia. 2.  Stable diffuse bilateral calcified pleural plaques     XR CHEST PORTABLE    Result Date: 1/9/2023  EXAMINATION: ONE XRAY VIEW OF THE CHEST 1/9/2023 9:54 am COMPARISON: Chest x-ray dated 5 January 2023 HISTORY: ORDERING SYSTEM PROVIDED HISTORY: SOB TECHNOLOGIST PROVIDED HISTORY: Reason for exam:->SOB Reason for Exam: SOB FINDINGS: Bilateral calcified pleural plaques. Patchy right basilar airspace disease. No pneumothorax or pleural effusion. Stable cardiomediastinal silhouette     1.   Patchy right basilar airspace disease could represent atelectasis versus pneumonia. 2.  Multiple bilateral calcified pleural plaques     XR CHEST 1 VIEW    Result Date: 1/5/2023  EXAMINATION: ONE XRAY VIEW OF THE CHEST 1/5/2023 8:25 pm COMPARISON: 09/13/2017 HISTORY: ORDERING SYSTEM PROVIDED HISTORY: cxr TECHNOLOGIST PROVIDED HISTORY: Reason for exam:->cxr Reason for Exam: Fatigue FINDINGS: The probable bilateral pleural plaques. These are extensive. Post at patchy opacities bilaterally were not noted on the previous study and are slightly nodular. No pleural effusions. Heart size within normal limits. Probable old rib fractures. Ill-defined opacities bilaterally. EGD    Result Date: 1/16/2023  No dictation     EGD    Result Date: 1/9/2023  No dictation     Colonoscopy    Result Date: 1/9/2023  No dictation       Treatments: As above. Discharge Medications:     Medication List        START taking these medications      bisacodyl 10 MG suppository  Commonly known as: DULCOLAX  Place 1 suppository rectally daily as needed for Constipation     docusate 100 MG Caps  Commonly known as: COLACE, DULCOLAX  Take 100 mg by mouth 2 times daily     ferrous sulfate 324 (65 Fe) MG EC tablet  Take 1 tablet by mouth daily (with breakfast)     pantoprazole 40 MG tablet  Commonly known as: Protonix  Take 1 tablet by mouth in the morning and at bedtime     polyethylene glycol 17 g packet  Commonly known as: GLYCOLAX  Take 17 g by mouth daily as needed for Constipation Hold for diarrhea. Replaces: polyethylene glycol 17 GM/SCOOP powder     sodium bicarbonate 650 MG tablet  Take 1 tablet by mouth 2 times daily            CHANGE how you take these medications      escitalopram 10 MG tablet  Commonly known as: LEXAPRO  TAKE 1 TABLET BY MOUTH DAILY  What changed: when to take this     tamsulosin 0.4 MG capsule  Commonly known as: FLOMAX  TAKE 1 CAPSULE BY MOUTH EVERY DAY  What changed: See the new instructions.             CONTINUE taking these medications atorvastatin 40 MG tablet  Commonly known as: LIPITOR  TAKE 1 TABLET BY MOUTH DAILY     carvedilol 3.125 MG tablet  Commonly known as: COREG  TAKE 1 TABLET BY MOUTH TWICE DAILY     dorzolamide-timolol 22.3-6.8 MG/ML ophthalmic solution  Commonly known as: COSOPT     ICAPS AREDS 2 PO     nitroGLYCERIN 0.4 MG SL tablet  Commonly known as: Nitrostat  Place 1 tablet under the tongue every 5 minutes as needed for Chest pain     patiromer sorbitex calcium 8.4 g Pack packet  Commonly known as: VELTASSA     senna 8.6 MG tablet  Commonly known as: Senokot  Take 1 tablet by mouth 2 times daily     solifenacin 5 MG tablet  Commonly known as: VESICARE  TAKE 1 TABLET BY MOUTH DAILY     traMADol 50 MG tablet  Commonly known as: ULTRAM            STOP taking these medications      aspirin EC 81 MG EC tablet     omeprazole 40 MG delayed release capsule  Commonly known as: PRILOSEC     polyethylene glycol 17 GM/SCOOP powder  Commonly known as: GLYCOLAX  Replaced by: polyethylene glycol 17 g packet               Where to Get Your Medications        These medications were sent to [facility] [place] [facility] [address] [place] [phone]  [address] [facility] [address]      Hours: 24-hours Phone: [phone]   bisacodyl 10 MG suppository  docusate 100 MG Caps  ferrous sulfate 324 (65 Fe) MG EC tablet  pantoprazole 40 MG tablet  polyethylene glycol 17 g packet  sodium bicarbonate 650 MG tablet         Activity: activity as tolerated  Diet: ADULT DIET; Dysphagia - Minced and Moist; Mildly Thick (Nectar)  ADULT ORAL NUTRITION SUPPLEMENT; Breakfast, Lunch, Dinner; Frozen Oral Supplement      Disposition: SNF  Discharged Condition: Stable  Follow Up:   Lynnann Spurling, MD  [address]  [id number]  [facility]    Schedule an appointment as soon as possible for a visit      [address]  [address] [place] [address]  [phone]    Schedule an appointment as soon as possible for a visit      [place] [id number]  [address] [facility], [address] [place] [address] [address] [place] [address]  [phone]    Schedule an appointment as soon as possible for a visit in 1 week(s)      Blu [place]ushing, [address] [phone]. [phone]          Code status:  Full Code     Total time spent on discharge, finalizing medications, referrals and arranging outpatient follow up was more than 30 minutes      Thank you Dr. Sowmya Calvo MD for the opportunity to be involved in this patients care.

## 2023-01-20 NOTE — PROGRESS NOTES
Department of Internal Medicine  Nephrology Progress Note    HPI.    80 y.o. male whom we were asked to see for RONI on CKD. Followed by Dr. Sandra Patel in the office, last 08/22 with eGFR 18 ml/min. He presents with weakness and dyspnea. He was diagnosed with Covid-19. Renal cosn called for RONI . Events noted , chart reviewed     HPI:  Breathing comfortably. No CP. He did eat breakfast this AM; however, he eats like a bird. ROS:  In bed. [employer]:  medications reviewed. Physical Exam:    VITALS:  BP (!) 101/44   Pulse 54   Temp 97.5 °F (36.4 °C) (Oral)   Resp 16   Ht 5' 6\" (1.676 m)   Wt 143 lb 1.3 oz (64.9 kg)   SpO2 95%   BMI 23.09 kg/m²   24HR INTAKE/OUTPUT:    Intake/Output Summary (Last 24 hours) at 1/20/2023 1606  Last data filed at 1/20/2023 1338  Gross per 24 hour   Intake 1465.49 ml   Output 850 ml   Net 615.49 ml         Constitutional:  Looks comfortable   Respiratory:  CTAB  Gastrointestinal No tenderness.   Normal Bowel Sounds  Cardiovascular:  S1, S2 RRR   Edema:   + edema  Skin:  no rash    DATA:    CBC:  Lab Results   Component Value Date/Time    WBC 8.3 01/20/2023 04:50 AM    RBC 2.59 01/20/2023 04:50 AM    HGB 8.1 01/20/2023 09:10 AM    HCT 25.0 01/20/2023 09:10 AM    MCV 92.1 01/20/2023 04:50 AM    MCH 30.1 01/20/2023 04:50 AM    MCHC 32.7 01/20/2023 04:50 AM    RDW 15.4 01/20/2023 04:50 AM     01/20/2023 04:50 AM    MPV 8.5 01/20/2023 04:50 AM     CMP:  Lab Results   Component Value Date/Time     01/20/2023 04:50 AM    K 4.7 01/20/2023 04:50 AM    K 4.2 01/12/2023 04:52 AM     01/20/2023 04:50 AM    CO2 21 01/20/2023 04:50 AM    BUN 38 01/20/2023 04:50 AM    CREATININE 2.7 01/20/2023 04:50 AM    GFRAA 24 11/05/2021 01:23 PM    AGRATIO 1.0 01/09/2023 09:43 AM    LABGLOM 21 01/20/2023 04:50 AM    GLUCOSE 106 01/20/2023 04:50 AM    PROT 5.0 01/10/2023 04:18 AM    CALCIUM 7.6 01/20/2023 04:50 AM    BILITOT <0.2 01/10/2023 04:18 AM    ALKPHOS 60 01/10/2023 04:18 AM    AST 44 01/10/2023 04:18 AM    ALT 40 01/10/2023 04:18 AM      Hepatic Function Panel:   Lab Results   Component Value Date/Time    ALKPHOS 60 01/10/2023 04:18 AM    ALT 40 01/10/2023 04:18 AM    AST 44 01/10/2023 04:18 AM    PROT 5.0 01/10/2023 04:18 AM    BILITOT <0.2 01/10/2023 04:18 AM    BILIDIR <0.2 01/10/2023 04:18 AM    IBILI see below 01/10/2023 04:18 AM      Phosphorus:   Lab Results   Component Value Date/Time    PHOS 2.9 01/20/2023 04:50 AM       ASSESSMENT/PLAN:    1) 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 CKD progression vs. pre-renal  - renal function is better than baseline, and I suspect the lowe creatinine is due to loss of muscle mass  - midline is fine from renal standpoint for lab draws     2) Covid-19  - out of isolation     3) PNA  - off Abx     4) FEN  - metabolic acidosis              - started sodium bicarbonate supplementation      5) anemia  - labs noted   - s/p PRBC 01/10/23    6) UGIB  - GI consulted  - EGD showed duodenal ulcer with adherent clot  - monitoring Hgb     7) AMS   - overall better. Patient is looking very debilitated and has deteriorated significantly compared to even a couple of months ago. Tried to get ahold of son to discuss case with him as the son always comes with his father to the office. Tried to call son again but still no answer. Still a full code. I believe they have an upcoming appointment with me and then we will have a discussion about his father's status and also include his daughter in the visit via phone.

## 2023-01-21 ENCOUNTER — APPOINTMENT (OUTPATIENT)
Dept: CT IMAGING | Age: 88
DRG: 871 | End: 2023-01-21
Payer: MEDICARE

## 2023-01-21 LAB
ALBUMIN SERPL-MCNC: 2.4 G/DL (ref 3.4–5)
ANION GAP SERPL CALCULATED.3IONS-SCNC: 10 MMOL/L (ref 3–16)
BUN BLDV-MCNC: 41 MG/DL (ref 7–20)
CALCIUM SERPL-MCNC: 7.6 MG/DL (ref 8.3–10.6)
CHLORIDE BLD-SCNC: 112 MMOL/L (ref 99–110)
CO2: 21 MMOL/L (ref 21–32)
CREAT SERPL-MCNC: 2.8 MG/DL (ref 0.8–1.3)
GFR SERPL CREATININE-BSD FRML MDRD: 21 ML/MIN/{1.73_M2}
GLUCOSE BLD-MCNC: 103 MG/DL (ref 70–99)
HCT VFR BLD CALC: 23.8 % (ref 40.5–52.5)
HCT VFR BLD CALC: 25 % (ref 40.5–52.5)
HEMOGLOBIN: 7.8 G/DL (ref 13.5–17.5)
HEMOGLOBIN: 7.9 G/DL (ref 13.5–17.5)
MCH RBC QN AUTO: 30.4 PG (ref 26–34)
MCHC RBC AUTO-ENTMCNC: 32.5 G/DL (ref 31–36)
MCV RBC AUTO: 93.3 FL (ref 80–100)
PDW BLD-RTO: 15.4 % (ref 12.4–15.4)
PHOSPHORUS: 2.9 MG/DL (ref 2.5–4.9)
PLATELET # BLD: 211 K/UL (ref 135–450)
PMV BLD AUTO: 8.8 FL (ref 5–10.5)
POTASSIUM SERPL-SCNC: 4.6 MMOL/L (ref 3.5–5.1)
RBC # BLD: 2.55 M/UL (ref 4.2–5.9)
SODIUM BLD-SCNC: 143 MMOL/L (ref 136–145)
WBC # BLD: 7 K/UL (ref 4–11)

## 2023-01-21 PROCEDURE — 6370000000 HC RX 637 (ALT 250 FOR IP): Performed by: INTERNAL MEDICINE

## 2023-01-21 PROCEDURE — 85014 HEMATOCRIT: CPT

## 2023-01-21 PROCEDURE — 74176 CT ABD & PELVIS W/O CONTRAST: CPT

## 2023-01-21 PROCEDURE — 80069 RENAL FUNCTION PANEL: CPT

## 2023-01-21 PROCEDURE — 2580000003 HC RX 258: Performed by: PHYSICIAN ASSISTANT

## 2023-01-21 PROCEDURE — 85027 COMPLETE CBC AUTOMATED: CPT

## 2023-01-21 PROCEDURE — 85018 HEMOGLOBIN: CPT

## 2023-01-21 PROCEDURE — 6360000002 HC RX W HCPCS: Performed by: PHYSICIAN ASSISTANT

## 2023-01-21 PROCEDURE — 51702 INSERT TEMP BLADDER CATH: CPT

## 2023-01-21 PROCEDURE — 2580000003 HC RX 258: Performed by: INTERNAL MEDICINE

## 2023-01-21 PROCEDURE — 94760 N-INVAS EAR/PLS OXIMETRY 1: CPT

## 2023-01-21 PROCEDURE — C9113 INJ PANTOPRAZOLE SODIUM, VIA: HCPCS | Performed by: PHYSICIAN ASSISTANT

## 2023-01-21 PROCEDURE — 2060000000 HC ICU INTERMEDIATE R&B

## 2023-01-21 RX ADMIN — SODIUM CHLORIDE, PRESERVATIVE FREE 10 ML: 5 INJECTION INTRAVENOUS at 08:59

## 2023-01-21 RX ADMIN — SODIUM CHLORIDE, PRESERVATIVE FREE 10 ML: 5 INJECTION INTRAVENOUS at 21:22

## 2023-01-21 RX ADMIN — Medication 40 MG: at 08:58

## 2023-01-21 RX ADMIN — SODIUM BICARBONATE 650 MG: 650 TABLET ORAL at 08:58

## 2023-01-21 RX ADMIN — ACETAMINOPHEN 650 MG: 325 TABLET ORAL at 15:22

## 2023-01-21 RX ADMIN — TRAMADOL HYDROCHLORIDE 50 MG: 50 TABLET ORAL at 03:12

## 2023-01-21 RX ADMIN — SODIUM BICARBONATE 650 MG: 650 TABLET ORAL at 20:53

## 2023-01-21 RX ADMIN — ATORVASTATIN CALCIUM 40 MG: 40 TABLET, FILM COATED ORAL at 20:53

## 2023-01-21 RX ADMIN — Medication 40 MG: at 20:53

## 2023-01-21 RX ADMIN — POLYETHYLENE GLYCOL 3350 17 G: 17 POWDER, FOR SOLUTION ORAL at 08:58

## 2023-01-21 RX ADMIN — DORZOLAMIDE HYDROCHLORIDE AND TIMOLOL MALEATE 1 DROP: 20; 5 SOLUTION/ DROPS OPHTHALMIC at 09:15

## 2023-01-21 RX ADMIN — ESCITALOPRAM OXALATE 10 MG: 10 TABLET ORAL at 20:53

## 2023-01-21 RX ADMIN — SENNOSIDES 8.6 MG: 8.6 TABLET, FILM COATED ORAL at 20:53

## 2023-01-21 RX ADMIN — DORZOLAMIDE HYDROCHLORIDE AND TIMOLOL MALEATE 1 DROP: 20; 5 SOLUTION/ DROPS OPHTHALMIC at 20:53

## 2023-01-21 RX ADMIN — DOCUSATE SODIUM 100 MG: 100 CAPSULE, LIQUID FILLED ORAL at 20:53

## 2023-01-21 RX ADMIN — SENNOSIDES 8.6 MG: 8.6 TABLET, FILM COATED ORAL at 08:58

## 2023-01-21 RX ADMIN — TAMSULOSIN HYDROCHLORIDE 0.4 MG: 0.4 CAPSULE ORAL at 20:53

## 2023-01-21 ASSESSMENT — PAIN SCALES - GENERAL
PAINLEVEL_OUTOF10: 3
PAINLEVEL_OUTOF10: 7

## 2023-01-21 ASSESSMENT — PAIN DESCRIPTION - FREQUENCY: FREQUENCY: INTERMITTENT

## 2023-01-21 ASSESSMENT — PAIN DESCRIPTION - ORIENTATION
ORIENTATION: RIGHT;LOWER
ORIENTATION: MID

## 2023-01-21 ASSESSMENT — PAIN DESCRIPTION - DESCRIPTORS
DESCRIPTORS: DISCOMFORT;CRAMPING
DESCRIPTORS: ACHING

## 2023-01-21 ASSESSMENT — PAIN - FUNCTIONAL ASSESSMENT: PAIN_FUNCTIONAL_ASSESSMENT: ACTIVITIES ARE NOT PREVENTED

## 2023-01-21 ASSESSMENT — PAIN DESCRIPTION - LOCATION
LOCATION: HEAD
LOCATION: ABDOMEN

## 2023-01-21 ASSESSMENT — PAIN DESCRIPTION - ONSET: ONSET: ON-GOING

## 2023-01-21 ASSESSMENT — PAIN SCALES - WONG BAKER
WONGBAKER_NUMERICALRESPONSE: 8
WONGBAKER_NUMERICALRESPONSE: 0

## 2023-01-21 ASSESSMENT — PAIN DESCRIPTION - PAIN TYPE: TYPE: ACUTE PAIN

## 2023-01-21 NOTE — PROGRESS NOTES
Progress Note    Patient Larisa Hernández  MRN: [id number]  YOB: 1931 Age: 80 y.o. Sex: male  Room: [place] [address]       Admitting Physician: Abbey Aldana MD   Date of Admission: 1/5/2023 12:30 PM   Primary Care Physician: Emy Mccoy MD     Subjective:  Larisa Hernández was seen and examined. We are following for GI bleed due to duodenal ulcers. .  -- Denies abdominal pain. He had an episode of right-sided abdominal pain that resolved spontaneously. Patient had a couple bowel movements. He is in bed eating breakfast.  He states that he has a good appetite. ROS:  Constitutional: Denies fever, no change in appetite  Respiratory: Denies cough or shortness of breath  Cardiovascular: Denies chest pain or edema    Objective:  Vital Signs:   Vitals:    01/21/23 1237   BP: (!) 115/52   Pulse: 52   Resp: 19   Temp: 98 °F (36.7 °C)   SpO2: 95%         Physical Exam:  Constitutional: Alert and oriented x 4. No acute distress. Respiratory: Respirations nonlabored, no crepitus  GI: Abdomen nondistended, soft, and nontender. Neurological: No focal deficits noted. No asterixis.     Intake/Output:    Intake/Output Summary (Last 24 hours) at 1/21/2023 1355  Last data filed at 1/21/2023 1242  Gross per 24 hour   Intake 0 ml   Output 1025 ml   Net -1025 ml        Current Medications:  Current Facility-Administered Medications   Medication Dose Route Frequency Provider Last Rate Last Admin    pantoprazole (PROTONIX) 40 mg in sodium chloride (PF) 0.9 % 10 mL injection  40 mg IntraVENous Q12H LYNN Slade   40 mg at 01/21/23 0858    lidocaine PF 1 % injection 5 mL  5 mL IntraDERmal Once Jasmine Cosby MD        sodium bicarbonate tablet 650 mg  650 mg Oral BID George Chambers MD   650 mg at 01/21/23 0858    phenol 1.4 % mouth spray 1 spray  1 spray Mouth/Throat Q2H PRN George Chambers MD        dorzolamide-timolol (COSOPT) 22.3-6.8 MG/ML ophthalmic solution 1 drop (Patient Supplied)  1 drop Both Eyes BID Loretta Nichols MD   1 drop at 01/21/23 0915    0.9 % sodium chloride infusion   IntraVENous PRN Loretta Nichols MD        Benzocaine-Menthol (CEPACOL SORE THROAT) lozenge 1 lozenge  1 lozenge Oral Q2H PRN Loretta Nichols MD   1 lozenge at 01/09/23 1151    docusate sodium (COLACE) capsule 100 mg  100 mg Oral BID Loretta Nichols MD   100 mg at 01/19/23 2008    bisacodyl (DULCOLAX) suppository 10 mg  10 mg Rectal Daily PRN Loretta Nichols MD        sodium chloride flush 0.9 % injection 10 mL  10 mL IntraVENous 2 times per day Loretta Nichols MD   10 mL at 01/21/23 0859    sodium chloride flush 0.9 % injection 10 mL  10 mL IntraVENous PRN Loretta Nichols MD        0.9 % sodium chloride infusion   IntraVENous PRN Loretta Nichols MD        promethazine (PHENERGAN) tablet 12.5 mg  12.5 mg Oral Q6H PRN Loretta Nichols MD        Or    ondansetron [facility]) injection 4 mg  4 mg IntraVENous Q6H PRN Loretta Nichols MD        acetaminophen (TYLENOL) tablet 650 mg  650 mg Oral Q6H PRN Loretta Nichols MD   650 mg at 01/20/23 1845    Or    acetaminophen (TYLENOL) suppository 650 mg  650 mg Rectal Q6H PRN Loretta Nichols MD        atorvastatin (LIPITOR) tablet 40 mg  40 mg Oral Nightly Loretta Nichols MD   40 mg at 01/20/23 2011    escitalopram (LEXAPRO) tablet 10 mg  10 mg Oral Nightly Loretta Nichols MD   10 mg at 01/20/23 2011    nitroGLYCERIN (NITROSTAT) SL tablet 0.4 mg  0.4 mg SubLINGual Q5 Min PRN Loretta Nichols MD        polyethylene glycol (GLYCOLAX) packet 17 g  17 g Oral Daily Loretta Nichols MD   17 g at 01/21/23 0858    senna (SENOKOT) tablet 8.6 mg  1 tablet Oral BID Loretta Nichols MD   8.6 mg at 01/21/23 0858    tamsulosin (FLOMAX) capsule 0.4 mg  0.4 mg Oral Nightly Loretta Nichols MD   0.4 mg at 01/20/23 2011    traMADol (ULTRAM) tablet 50 mg  50 mg Oral Q8H PRN Loretta Nichols MD   50 mg at 01/21/23 8112         Recent Imaging:   CT ABDOMEN PELVIS WO CONTRAST Additional Contrast? None  Narrative: EXAMINATION:  CT OF THE ABDOMEN AND PELVIS WITHOUT CONTRAST 1/21/2023 11:06 am    TECHNIQUE:  CT of the abdomen and pelvis was performed without the administration of  intravenous contrast. Multiplanar reformatted images are provided for review. Automated exposure control, iterative reconstruction, and/or weight based  adjustment of the mA/kV was utilized to reduce the radiation dose to as low  as reasonably achievable. COMPARISON:  08/03/2017    HISTORY:  ORDERING SYSTEM PROVIDED HISTORY: RLQ abd pain  TECHNOLOGIST PROVIDED HISTORY:  Reason for exam:->RLQ abd pain  Additional Contrast?->None  Reason for Exam: rlq pain,    FINDINGS:  Lower Chest: Heterogeneous and nodular bilateral lower lobe opacity is seen,  new as compared to prior. Extensive bilateral calcified pleural plaques. Organs: Lack of IV and oral contrast limits sensitivity for visceral organ  pathology. Within the kidneys bilaterally, no markie calculi. No hydronephrosis. 2 cm  cyst along the lateral margin of the right kidney and 1.2 cm cyst with thin  peripheral calcification along the posterior margin of the left kidney. Portions of the ureters are obscured though the proximal ureters are not  dilated. Noncontrast views of the liver and spleen are grossly unremarkable. Stomach  is distended. Heterogeneous intraluminal gastric content is seen. 1.1 cm  bilobed linear metallic density is seen at the proximal duodenum. No  pancreatic ductal dilatation or stranding. Adrenal glands are within normal  limits. Calcification of the abdominal aorta and iliac arteries. GI/Bowel: Moderate stool throughout the colon. Small bowel is not dilated. Appendix is not visualized. Pelvis: Bladder circumferentially thick wall. Air is seen within a large  bladder diverticulum along the dome of the bladder. Chauhan catheter is  present. Prostate is enlarged, measuring 6.0 x 5.3 cm.   Reservoir for penile  prosthesis is seen within the anterior left hemipelvis. No inguinal  adenopathy. Peritoneum/Retroperitoneum: No free air. Bones/Soft Tissues: Age-indeterminate fractures of left L3 and L4 transverse  processes. Remote appearing posterolateral lower left rib fractures. Degenerative change of the lumbar spine. Impression: Heterogeneous and nodular bilateral lower lobe infiltrate; correlate for  pneumonia or aspiration. [place] prior asbestos exposure. Prostatomegaly. Correlate with urologic history. Circumferential mural thickening the bladder, which could reflect cystitis or  hypertrophy. Correlate with urinalysis. Large bladder diverticulum. Air  within the lumen of the bladder, presumably secondary to catheterization. Age-indeterminate fractures of left L3 and L4 transverse processes. 1.1 cm linear metallic foreign body within the proximal duodenum; correlate  with any history of intervention or ingestion. Labs:   Recent Labs     01/18/23  1503 01/19/23  0500 01/20/23  0450 01/20/23  0910 01/21/23  0500   HGB 8.6* 8.4* 7.8* 8.1* 7.8*   WBC  --  10.2 8.3  --  7.0   LABALBU  --  2.3* 2.2*  --  2.4*          Assessment:    80-year-old male evaluated for GI bleed. Found to have multiple duodenal ulcers. post endoscopic therapy x2. In the past few days, patient has remained stable. Hemoglobin has remained stable. P.o. intake has improved significantly. Impression: GI bleed due to duodenal ulcers. At this point no evidence of significant GI bleed. Patient stated that he is eating significantly better. He has a good appetite. I called the daughter yesterday and explained that at this point there is no indication to place a feeding tube or PEG. MD Vini Bustillos    [phone].  Also available via [employer]

## 2023-01-21 NOTE — PLAN OF CARE
Problem: Discharge Planning  Goal: Discharge to home or other facility with appropriate resources  1/21/2023 1045 by Santiago Hickey RN  Outcome: Progressing  1/21/2023 0242 by Yuly Hernandez RN  Outcome: Progressing  Flowsheets (Taken 1/20/2023 2000)  Discharge to home or other facility with appropriate resources:   Identify barriers to discharge with patient and caregiver   Arrange for needed discharge resources and transportation as appropriate   Identify discharge learning needs (meds, wound care, etc)   Refer to discharge planning if patient needs post-hospital services based on physician order or complex needs related to functional status, cognitive ability or social support system     Problem: Skin/Tissue Integrity  Goal: Absence of new skin breakdown  Description: 1. Monitor for areas of redness and/or skin breakdown  2. Assess vascular access sites hourly  3. Every 4-6 hours minimum:  Change oxygen saturation probe site  4. Every 4-6 hours:  If on nasal continuous positive airway pressure, respiratory therapy assess nares and determine need for appliance change or resting period. 1/21/2023 1045 by Santiago Hickey RN  Outcome: Progressing  1/21/2023 0242 by Yuly Hernandez RN  Outcome: Progressing     Problem: Neurosensory - Adult  Goal: Achieves stable or improved neurological status  1/21/2023 1045 by Santiago Hickey RN  Outcome: Progressing  1/21/2023 0242 by Yuly Hernandez RN  Outcome: Progressing  Flowsheets (Taken 1/20/2023 2000)  Achieves stable or improved neurological status:   Initiate measures to prevent increased intracranial pressure   Assess for and report changes in neurological status   Maintain blood pressure and fluid volume within ordered parameters to optimize cerebral perfusion and minimize risk of hemorrhage   Monitor temperature, glucose, and sodium.  Initiate appropriate interventions as ordered  Goal: Achieves maximal functionality and self care  1/21/2023 1045 by Bronwyn German RN  Outcome: Progressing  1/21/2023 0242 by Dontae Mcgill RN  Outcome: Progressing  Flowsheets (Taken 1/20/2023 2000)  Achieves maximal functionality and self care:   Monitor swallowing and airway patency with patient fatigue and changes in neurological status   Encourage and assist patient to increase activity and self care with guidance from physical therapy/occupational therapy   Encourage visually impaired, hearing impaired and aphasic patients to use assistive/communication devices     Problem: Respiratory - Adult  Goal: Achieves optimal ventilation and oxygenation  1/21/2023 1045 by Bronwyn German RN  Outcome: Progressing  1/21/2023 0242 by Dontae Mcgill RN  Outcome: Progressing  Flowsheets (Taken 1/19/2023 2015)  Achieves optimal ventilation and oxygenation:   Assess for changes in respiratory status   Assess for changes in mentation and behavior   Position to facilitate oxygenation and minimize respiratory effort   Oxygen supplementation based on oxygen saturation or arterial blood gases   Encourage broncho-pulmonary hygiene including cough, deep breathe, incentive spirometry   Assess and instruct to report shortness of breath or any respiratory difficulty     Problem: Cardiovascular - Adult  Goal: Maintains optimal cardiac output and hemodynamic stability  1/21/2023 1045 by Bronwyn German RN  Outcome: Progressing  1/21/2023 0242 by Dontae Mcgill RN  Outcome: Progressing  Flowsheets (Taken 1/19/2023 2015)  Maintains optimal cardiac output and hemodynamic stability:   Monitor blood pressure and heart rate   Monitor urine output and notify Licensed Independent Practitioner for values outside of normal range   Assess for signs of decreased cardiac output   Administer fluid and/or volume expanders as ordered   Administer vasoactive medications as ordered  Goal: Absence of cardiac dysrhythmias or at baseline  1/21/2023 1045 by Yakelin Dave Vickie Beard RN  Outcome: Progressing  1/21/2023 0242 by En Wang RN  Outcome: Progressing  Flowsheets (Taken 1/14/2023 1932 by Yvonne Bearden RN)  Absence of cardiac dysrhythmias or at baseline:   Monitor cardiac rate and rhythm   Assess for signs of decreased cardiac output     Problem: Skin/Tissue Integrity - Adult  Goal: Skin integrity remains intact  1/21/2023 1045 by Maxine Galo RN  Outcome: Progressing  1/21/2023 0242 by En Wang RN  Outcome: Progressing  Flowsheets  Taken 1/21/2023 0241  Skin Integrity Remains Intact:   Monitor for areas of redness and/or skin breakdown   Assess vascular access sites hourly  Taken 1/20/2023 2000  Skin Integrity Remains Intact:   Monitor for areas of redness and/or skin breakdown   Assess vascular access sites hourly  Goal: Oral mucous membranes remain intact  1/21/2023 1045 by Maxine Galo RN  Outcome: Progressing  1/21/2023 0242 by En Wang RN  Outcome: Progressing  Flowsheets  Taken 1/21/2023 0242  Oral Mucous Membranes Remain Intact:   Assess oral mucosa and hygiene practices   Implement preventative oral hygiene regimen  Taken 1/21/2023 0241  Oral Mucous Membranes Remain Intact: Assess oral mucosa and hygiene practices  Taken 1/20/2023 2000  Oral Mucous Membranes Remain Intact:   Assess oral mucosa and hygiene practices   Implement preventative oral hygiene regimen     Problem: Musculoskeletal - Adult  Goal: Return mobility to safest level of function  1/21/2023 1045 by Maxine Galo RN  Outcome: Progressing  1/21/2023 0242 by En Wang RN  Outcome: Progressing  Flowsheets (Taken 1/20/2023 2000)  Return Mobility to Safest Level of Function:   Assess patient stability and activity tolerance for standing, transferring and ambulating with or without assistive devices   Assist with transfers and ambulation using safe patient handling equipment as needed  Goal: Return ADL status to a safe level of function  1/21/2023 1045 by Jeannie Barragan RN  Outcome: Progressing  1/21/2023 0242 by Venecia Jimenez RN  Outcome: Progressing  Flowsheets (Taken 1/20/2023 2000)  Return ADL Status to a Safe Level of Function:   Administer medication as ordered   Assess activities of daily living deficits and provide assistive devices as needed   Obtain physical therapy/occupational therapy consults as needed   Assist and instruct patient to increase activity and self care as tolerated     Problem: Gastrointestinal - Adult  Goal: Maintains or returns to baseline bowel function  1/21/2023 1045 by Jeannie Barragan RN  Outcome: Progressing  1/21/2023 0242 by Venecia Jimenez RN  Outcome: Progressing  Flowsheets (Taken 1/20/2023 2000)  Maintains or returns to baseline bowel function:   Assess bowel function   Encourage oral fluids to ensure adequate hydration   Administer ordered medications as needed   Encourage mobilization and activity     Problem: Infection - Adult  Goal: Absence of infection at discharge  1/21/2023 1045 by Jeannie Barragan RN  Outcome: Progressing  1/21/2023 0242 by Venecia Jimenez RN  Outcome: Progressing  Flowsheets (Taken 1/20/2023 2000)  Absence of infection at discharge:   Assess and monitor for signs and symptoms of infection   Monitor lab/diagnostic results   Monitor all insertion sites i.e., indwelling lines, tubes and drains   [place] appropriate cooling/warming therapies per order   Administer medications as ordered   Instruct and encourage patient and family to use good hand hygiene technique   Identify and instruct in appropriate isolation precautions for identified infection/condition     Problem: Metabolic/Fluid and Electrolytes - Adult  Goal: Electrolytes maintained within normal limits  1/21/2023 1045 by Jeannie Barragan RN  Outcome: Progressing  1/21/2023 0242 by Venecia Jimenez RN  Outcome: Progressing  Flowsheets (Taken 1/20/2023 2000)  Electrolytes maintained within normal limits: Monitor labs and assess patient for signs and symptoms of electrolyte imbalances   Administer electrolyte replacement as ordered   Monitor response to electrolyte replacements, including repeat lab results as appropriate  Goal: Hemodynamic stability and optimal renal function maintained  1/21/2023 1045 by Maxine Galo RN  Outcome: Progressing  1/21/2023 0242 by En Wang RN  Outcome: Progressing  Flowsheets (Taken 1/20/2023 2000)  Hemodynamic stability and optimal renal function maintained:   Monitor labs and assess for signs and symptoms of volume excess or deficit   Monitor intake, output and patient weight   Monitor response to interventions for patient's volume status, including labs, urine output, blood pressure (other measures as available)   Encourage oral intake as appropriate     Problem: Hematologic - Adult  Goal: Maintains hematologic stability  1/21/2023 1045 by Maxine Galo RN  Outcome: Progressing  1/21/2023 0242 by En Wang RN  Outcome: Progressing  Flowsheets (Taken 1/20/2023 2000)  Maintains hematologic stability:   Assess for signs and symptoms of bleeding or hemorrhage   Monitor labs for bleeding or clotting disorders   Administer blood products/factors as ordered     Problem: ABCDS Injury Assessment  Goal: Absence of physical injury  1/21/2023 1045 by Maxine Galo RN  Outcome: Progressing  1/21/2023 0242 by En Wang RN  Outcome: Progressing  Flowsheets (Taken 1/19/2023 0454)  Absence of Physical Injury: Implement safety measures based on patient assessment     Problem: Safety - Adult  Goal: Free from fall injury  1/21/2023 1045 by Maxine Galo RN  Outcome: Progressing  1/21/2023 0242 by En Wang RN  Outcome: Progressing  Flowsheets (Taken 1/19/2023 0454)  Free From Fall Injury: Instruct family/caregiver on patient safety     Problem: Pain  Goal: Verbalizes/displays adequate comfort level or baseline comfort level  1/21/2023 1045 by Bronwyn German, RN  Outcome: Progressing  Flowsheets (Taken 1/21/2023 0342 by Dontae Mcgill RN)  Verbalizes/displays adequate comfort level or baseline comfort level:   Encourage patient to monitor pain and request assistance   Assess pain using appropriate pain scale   Administer analgesics based on type and severity of pain and evaluate response   Implement non-pharmacological measures as appropriate and evaluate response   Consider cultural and social influences on pain and pain management   Notify Licensed Independent Practitioner if interventions unsuccessful or patient reports new pain  1/21/2023 0242 by Dontae Mcgill RN  Outcome: Progressing  Flowsheets (Taken 1/20/2023 0135)  Verbalizes/displays adequate comfort level or baseline comfort level:   Encourage patient to monitor pain and request assistance   Assess pain using appropriate pain scale   Administer analgesics based on type and severity of pain and evaluate response   Implement non-pharmacological measures as appropriate and evaluate response   Consider cultural and social influences on pain and pain management   Notify Licensed Independent Practitioner if interventions unsuccessful or patient reports new pain     Problem: Nutrition Deficit:  Goal: Optimize nutritional status  1/21/2023 1045 by Bronwyn German RN  Outcome: Progressing  1/21/2023 0242 by Dontae Mcgill RN  Outcome: Progressing  Flowsheets (Taken 1/20/2023 1038 by Nano Moreira, RD, LD)  Nutrient intake appropriate for improving, restoring, or maintaining nutritional needs:   Assess nutritional status and recommend course of action   Monitor oral intake, labs, and treatment plans   Recommend appropriate diets, oral nutritional supplements, and vitamin/mineral supplements

## 2023-01-21 NOTE — PROGRESS NOTES
Pt called RN to room at 0140 with c/o increasing pain to RLQ. He states that pain has been increasing for a couple of hours and awoke pt from his sleep. R side of abdomen tender to the touch, no guarding or rigidity. Bowel sounds active x4. He has had 2 BM's this shift, both dark brown with red streaking, some bloody mucus on toilet tissue. NP on-call notified of increasing pain, no new orders at this time. PRN for pain administered (see MAR). Pt reassessed and appears to be more comfortable, resting in bed. Will continue to monitor.        Electronically signed by Neri Yang RN on 1/21/2023 at 3:38 AM

## 2023-01-21 NOTE — PROGRESS NOTES
Hospital Medicine Progress Note     Date:  1/21/2023    PCP: Sarah Melo MD (Tel: [phone])    Date of Admission: 1/5/2023    Chief complaint:   Chief Complaint   Patient presents with    Fatigue     Pt. normally able to ambulate but not at this time and is extremely weak. Brief admission history: 19-year-old male with history of CKD stage IV, anemia of chronic disease, BPH, CAD, hyperlipidemia, who was admitted with generalized weakness, decreased oral intake, diagnosed with COVID-19 and bacterial pneumonia. Assessment/plan:  Bacterial pneumonia, likely superimposed in the setting of COVID-19 pneumonia. Procalcitonin level was elevated. Completed course of antibiotics. Did not require COVID-specific treatment as patient was not hypoxic. Sore throat. Secondary to COVID-19 infection. As needed lozenges. Acute kidney injury on CKD stage IV, improved with intravenous fluid. Generalized weakness. Likely secondary to acute medical conditions. Treat underlying conditions as noted above. Therapy evaluation in place. Going to SNF at discharge. Asymptomatic bacteriuria. Patient was already on antibiotics for other indication as noted above. There is no indication for treatment of asymptomatic bacteriuria. Acute upper GI bleed. Status post EGD on 1/9/2023 with multiple duodenal ulcers (6 mm clean-based duodenal ulcer, 15 mm clean-based duodenal bulb ulcer, 2 cm ulcer with visible vessel in distal aspect of ulcer, treated - see EGD report. He had repeat EGD on 1/16/2023 with noted 1 duodenal ulcer with adherent clot, clot removed and visible vessel treated. So far, hemoglobin has remained stable over the last couple of days. Patient continues to have small amount of blood smeared stools, likely expected given extent of duodenal ulcers. Continue to avoid NSAIDs.   Per discussion with daughter, she is recommending discontinuation of aspirin, given increased risk of bleed with resumption of aspirin therapy. She also understands patient is at risk of having another cardiovascular event. Patient will be discharged to SNF on Protonix 40 mg twice daily. Acute on chronic anemia with superimposed anemia of chronic disease. Secondary to #1. He has required multiple units of PRBC transfusions this hospital stay. Recommend repeat H&H within 3 days of discharge. At risk for aspiration. Reviewed chest x-ray from January 17, 2023, possible infiltrate or atelectasis. However, patient does not have cough or fever or shortness of breath. Suspicion is this is more atelectasis than infiltrate. Incentive spirometer has been ordered. Abdominal pain overnight on 1/21/2023. On evaluation this morning, patient reports no abdominal pain at this time. Abdominal exam was benign. I was able to convince him that we obtain CT-abd/pelvis to ensure no abnormality. Other comorbidities: history of CKD stage IV, anemia of chronic disease, BPH, CAD, hyperlipidemia. Disposition. Patient remains stable for discharge. I am concerned prolonged hospitalization may lead to unwanted complications such as thrombosis (he has SCDs in place but no chemical DVT prophylaxis due to recent bleed). He remains hospitalized per daughter's request to have patient evaluated for PEG placement, although he has been having increased oral intake at this time. I did discuss with daughter a few days ago about patient's risk for thrombosis with prolonged hospitalization. Diet: ADULT DIET; Dysphagia - Minced and Moist; Mildly Thick (Nectar)  ADULT ORAL NUTRITION SUPPLEMENT; Breakfast, Lunch, Dinner; Frozen Oral Supplement    Code status: Full Code   ----------  Subjective  No abdominal pain. He thinks he ate too much of his breakfast and wanted to get some rest.  Advised about doing imaging of his abdomen for abdominal pain from this morning - patient felt it may be unnecessary because he does not have pain currently.  He denies any discomfort. Still awaiting opportunity to get to rehab so he can be back to his baseline. Objective  Physical exam:  Vitals: /64   Pulse 60   Temp 97.7 °F (36.5 °C) (Oral)   Resp 20   Ht 5' 6\" (1.676 m)   Wt 143 lb 4.8 oz (65 kg)   SpO2 95%   BMI 23.13 kg/m²   Gen/overall appearance: Not in acute distress. Alert. Oriented x3. Head: Normocephalic, atraumatic  Eyes: EOMI, good acuity  ENT: Oral mucosa moist  Neck: No JVD, thyromegaly  CVS: Nml S1S2, no MRG, RRR  Pulm: Clear bilaterally. No crackles/wheezes  Gastrointestinal: Soft, NT/ND, +BS  Musculoskeletal: No edema. Warm  Neuro: No focal deficit. Moves extremity spontaneously. Psychiatry: Appropriate affect. Not agitated. Skin: Warm, dry with normal turgor. No rash  Capillary refill: Brisk,< 3 seconds   Peripheral Pulses: +2 palpable, equal bilaterally     24HR INTAKE/OUTPUT:    Intake/Output Summary (Last 24 hours) at 1/21/2023 1051  Last data filed at 1/21/2023 2820  Gross per 24 hour   Intake 0 ml   Output 725 ml   Net -725 ml       I/O last 3 completed shifts: In: 985.5 [P.O.:220; I.V.:765.5]  Out: 1325 [Urine:1325]  No intake/output data recorded.   Meds:    pantoprazole (PROTONIX) 40 mg injection  40 mg IntraVENous Q12H    lidocaine 1 % injection  5 mL IntraDERmal Once    sodium bicarbonate  650 mg Oral BID    dorzolamide-timolol  1 drop Both Eyes BID    docusate sodium  100 mg Oral BID    sodium chloride flush  10 mL IntraVENous 2 times per day    atorvastatin  40 mg Oral Nightly    escitalopram  10 mg Oral Nightly    polyethylene glycol  17 g Oral Daily    senna  1 tablet Oral BID    tamsulosin  0.4 mg Oral Nightly     Infusions:    sodium chloride      sodium chloride       PRN Meds: phenol, sodium chloride, Benzocaine-Menthol, bisacodyl, sodium chloride flush, sodium chloride, promethazine **OR** ondansetron, acetaminophen **OR** acetaminophen, nitroGLYCERIN, traMADol    Labs/imaging:  CBC:   Recent Labs     01/19/23  0500 01/20/23  0450 01/20/23  0910 01/21/23  0500   WBC 10.2 8.3  --  7.0   HGB 8.4* 7.8* 8.1* 7.8*    221  --  211       BMP:    Recent Labs     01/19/23  0500 01/20/23  0450 01/21/23  0500    140 143   K 4.3 4.7 4.6    110 112*   CO2 22 21 21   BUN 41* 38* 41*   CREATININE 2.7* 2.7* 2.8*   GLUCOSE 112* 106* 103*       Hepatic:   No results for input(s): AST, ALT, ALB, BILITOT, ALKPHOS in the last 72 hours.       Alexis Rojas MD  -------------------------------  Rounding hospitalist

## 2023-01-21 NOTE — PLAN OF CARE
Problem: Discharge Planning  Goal: Discharge to home or other facility with appropriate resources  Outcome: Progressing  Flowsheets (Taken 1/20/2023 2000)  Discharge to home or other facility with appropriate resources:   Identify barriers to discharge with patient and caregiver   Arrange for needed discharge resources and transportation as appropriate   Identify discharge learning needs (meds, wound care, etc)   Refer to discharge planning if patient needs post-hospital services based on physician order or complex needs related to functional status, cognitive ability or social support system     Problem: Skin/Tissue Integrity  Goal: Absence of new skin breakdown  Description: 1. Monitor for areas of redness and/or skin breakdown  2. Assess vascular access sites hourly  3. Every 4-6 hours minimum:  Change oxygen saturation probe site  4. Every 4-6 hours:  If on nasal continuous positive airway pressure, respiratory therapy assess nares and determine need for appliance change or resting period. Outcome: Progressing     Problem: Neurosensory - Adult  Goal: Achieves stable or improved neurological status  Outcome: Progressing  Flowsheets (Taken 1/20/2023 2000)  Achieves stable or improved neurological status:   Initiate measures to prevent increased intracranial pressure   Assess for and report changes in neurological status   Maintain blood pressure and fluid volume within ordered parameters to optimize cerebral perfusion and minimize risk of hemorrhage   Monitor temperature, glucose, and sodium.  Initiate appropriate interventions as ordered     Problem: Neurosensory - Adult  Goal: Achieves maximal functionality and self care  Outcome: Progressing  Flowsheets (Taken 1/20/2023 2000)  Achieves maximal functionality and self care:   Monitor swallowing and airway patency with patient fatigue and changes in neurological status   Encourage and assist patient to increase activity and self care with guidance from physical therapy/occupational therapy   Encourage visually impaired, hearing impaired and aphasic patients to use assistive/communication devices     Problem: Respiratory - Adult  Goal: Achieves optimal ventilation and oxygenation  Outcome: Progressing  Flowsheets (Taken 1/19/2023 2015)  Achieves optimal ventilation and oxygenation:   Assess for changes in respiratory status   Assess for changes in mentation and behavior   Position to facilitate oxygenation and minimize respiratory effort   Oxygen supplementation based on oxygen saturation or arterial blood gases   Encourage broncho-pulmonary hygiene including cough, deep breathe, incentive spirometry   Assess and instruct to report shortness of breath or any respiratory difficulty     Problem: Cardiovascular - Adult  Goal: Maintains optimal cardiac output and hemodynamic stability  Outcome: Progressing  Flowsheets (Taken 1/19/2023 2015)  Maintains optimal cardiac output and hemodynamic stability:   Monitor blood pressure and heart rate   Monitor urine output and notify Licensed Independent Practitioner for values outside of normal range   Assess for signs of decreased cardiac output   Administer fluid and/or volume expanders as ordered   Administer vasoactive medications as ordered     Problem: Cardiovascular - Adult  Goal: Absence of cardiac dysrhythmias or at baseline  Outcome: Progressing  Flowsheets (Taken 1/14/2023 1932 by Jenn Gardner RN)  Absence of cardiac dysrhythmias or at baseline:   Monitor cardiac rate and rhythm   Assess for signs of decreased cardiac output     Problem: Skin/Tissue Integrity - Adult  Goal: Skin integrity remains intact  Outcome: Progressing  Flowsheets  Taken 1/21/2023 0241  Skin Integrity Remains Intact:   Monitor for areas of redness and/or skin breakdown   Assess vascular access sites hourly  Taken 1/20/2023 2000  Skin Integrity Remains Intact:   Monitor for areas of redness and/or skin breakdown   Assess vascular access sites hourly     Problem: Skin/Tissue Integrity - Adult  Goal: Oral mucous membranes remain intact  Outcome: Progressing  Flowsheets  Taken 1/21/2023 0242  Oral Mucous Membranes Remain Intact:   Assess oral mucosa and hygiene practices   Implement preventative oral hygiene regimen  Taken 1/21/2023 0241  Oral Mucous Membranes Remain Intact: Assess oral mucosa and hygiene practices  Taken 1/20/2023 2000  Oral Mucous Membranes Remain Intact:   Assess oral mucosa and hygiene practices   Implement preventative oral hygiene regimen     Problem: Musculoskeletal - Adult  Goal: Return mobility to safest level of function  Outcome: Progressing  Flowsheets (Taken 1/20/2023 2000)  Return Mobility to Safest Level of Function:   Assess patient stability and activity tolerance for standing, transferring and ambulating with or without assistive devices   Assist with transfers and ambulation using safe patient handling equipment as needed     Problem: Musculoskeletal - Adult  Goal: Return ADL status to a safe level of function  Outcome: Progressing  Flowsheets (Taken 1/20/2023 2000)  Return ADL Status to a Safe Level of Function:   Administer medication as ordered   Assess activities of daily living deficits and provide assistive devices as needed   Obtain physical therapy/occupational therapy consults as needed   Assist and instruct patient to increase activity and self care as tolerated     Problem: Gastrointestinal - Adult  Goal: Maintains or returns to baseline bowel function  Outcome: Progressing  Flowsheets (Taken 1/20/2023 2000)  Maintains or returns to baseline bowel function:   Assess bowel function   Encourage oral fluids to ensure adequate hydration   Administer ordered medications as needed   Encourage mobilization and activity     Problem: Genitourinary - Adult  Goal: Urinary catheter remains patent  Recent Flowsheet Documentation  Taken 1/20/2023 2000 by Dontae Mcgill RN  Urinary catheter remains patent: Assess patency of urinary catheter     Problem: Infection - Adult  Goal: Absence of infection at discharge  Outcome: Progressing  Flowsheets (Taken 1/20/2023 2000)  Absence of infection at discharge:   Assess and monitor for signs and symptoms of infection   Monitor lab/diagnostic results   Monitor all insertion sites i.e., indwelling lines, tubes and drains   [place] appropriate cooling/warming therapies per order   Administer medications as ordered   Instruct and encourage patient and family to use good hand hygiene technique   Identify and instruct in appropriate isolation precautions for identified infection/condition     Problem: Metabolic/Fluid and Electrolytes - Adult  Goal: Electrolytes maintained within normal limits  Outcome: Progressing  Flowsheets (Taken 1/20/2023 2000)  Electrolytes maintained within normal limits:   Monitor labs and assess patient for signs and symptoms of electrolyte imbalances   Administer electrolyte replacement as ordered   Monitor response to electrolyte replacements, including repeat lab results as appropriate     Problem: Metabolic/Fluid and Electrolytes - Adult  Goal: Hemodynamic stability and optimal renal function maintained  Outcome: Progressing  Flowsheets (Taken 1/20/2023 2000)  Hemodynamic stability and optimal renal function maintained:   Monitor labs and assess for signs and symptoms of volume excess or deficit   Monitor intake, output and patient weight   Monitor response to interventions for patient's volume status, including labs, urine output, blood pressure (other measures as available)   Encourage oral intake as appropriate     Problem: Hematologic - Adult  Goal: Maintains hematologic stability  Outcome: Progressing  Flowsheets (Taken 1/20/2023 2000)  Maintains hematologic stability:   Assess for signs and symptoms of bleeding or hemorrhage   Monitor labs for bleeding or clotting disorders   Administer blood products/factors as ordered     Problem: ABCDS Injury Assessment  Goal: Absence of physical injury  Outcome: Progressing  Flowsheets (Taken 1/19/2023 0454)  Absence of Physical Injury: Implement safety measures based on patient assessment     Problem: Safety - Adult  Goal: Free from fall injury  Outcome: Progressing  Flowsheets (Taken 1/19/2023 0454)  Free From Fall Injury: Instruct family/caregiver on patient safety     Problem: Pain  Goal: Verbalizes/displays adequate comfort level or baseline comfort level  Outcome: Progressing  Flowsheets (Taken 1/20/2023 0135)  Verbalizes/displays adequate comfort level or baseline comfort level:   Encourage patient to monitor pain and request assistance   Assess pain using appropriate pain scale   Administer analgesics based on type and severity of pain and evaluate response   Implement non-pharmacological measures as appropriate and evaluate response   Consider cultural and social influences on pain and pain management   Notify Licensed Independent Practitioner if interventions unsuccessful or patient reports new pain     Problem: Nutrition Deficit:  Goal: Optimize nutritional status  Outcome: Progressing  Flowsheets (Taken 1/20/2023 1038 by Luis Agudelo RD, LD)  Nutrient intake appropriate for improving, restoring, or maintaining nutritional needs:   Assess nutritional status and recommend course of action   Monitor oral intake, labs, and treatment plans   Recommend appropriate diets, oral nutritional supplements, and vitamin/mineral supplements

## 2023-01-21 NOTE — PROGRESS NOTES
Department of Internal Medicine  Nephrology Progress Note    HPI.    80 y.o. male whom we were asked to see for RONI on CKD. Followed by Dr. Abigail Flor in the office, last 08/22 with eGFR 18 ml/min. He presents with weakness and dyspnea. He was diagnosed with Covid-19. Renal cosn called for RONI . Events noted , chart reviewed     HPI:  Breathing comfortably. No CP. Reports eating better. ROS:  In bed. [address]:  medications reviewed. Physical Exam:    VITALS:  BP (!) 106/54   Pulse 60   Temp 98.1 °F (36.7 °C) (Oral)   Resp 22   Ht 5' 6\" (1.676 m)   Wt 143 lb 4.8 oz (65 kg)   SpO2 94%   BMI 23.13 kg/m²   24HR INTAKE/OUTPUT:    Intake/Output Summary (Last 24 hours) at 1/21/2023 1652  Last data filed at 1/21/2023 1603  Gross per 24 hour   Intake 20 ml   Output 1025 ml   Net -1005 ml         Constitutional:  Looks comfortable   Respiratory:  CTAB  Gastrointestinal No tenderness.   Normal Bowel Sounds  Cardiovascular:  S1, S2 RRR   Edema:   + edema  Skin:  no rash    DATA:    CBC:  Lab Results   Component Value Date/Time    WBC 7.0 01/21/2023 05:00 AM    RBC 2.55 01/21/2023 05:00 AM    HGB 7.9 01/21/2023 02:16 PM    HCT 25.0 01/21/2023 02:16 PM    MCV 93.3 01/21/2023 05:00 AM    MCH 30.4 01/21/2023 05:00 AM    MCHC 32.5 01/21/2023 05:00 AM    RDW 15.4 01/21/2023 05:00 AM     01/21/2023 05:00 AM    MPV 8.8 01/21/2023 05:00 AM     CMP:  Lab Results   Component Value Date/Time     01/21/2023 05:00 AM    K 4.6 01/21/2023 05:00 AM    K 4.2 01/12/2023 04:52 AM     01/21/2023 05:00 AM    CO2 21 01/21/2023 05:00 AM    BUN 41 01/21/2023 05:00 AM    CREATININE 2.8 01/21/2023 05:00 AM    GFRAA 24 11/05/2021 01:23 PM    AGRATIO 1.0 01/09/2023 09:43 AM    LABGLOM 21 01/21/2023 05:00 AM    GLUCOSE 103 01/21/2023 05:00 AM    PROT 5.0 01/10/2023 04:18 AM    CALCIUM 7.6 01/21/2023 05:00 AM    BILITOT <0.2 01/10/2023 04:18 AM    ALKPHOS 60 01/10/2023 04:18 AM    AST 44 01/10/2023 04:18 AM    ALT 40 01/10/2023 04:18 AM      Hepatic Function Panel:   Lab Results   Component Value Date/Time    ALKPHOS 60 01/10/2023 04:18 AM    ALT 40 01/10/2023 04:18 AM    AST 44 01/10/2023 04:18 AM    PROT 5.0 01/10/2023 04:18 AM    BILITOT <0.2 01/10/2023 04:18 AM    BILIDIR <0.2 01/10/2023 04:18 AM    IBILI see below 01/10/2023 04:18 AM      Phosphorus:   Lab Results   Component Value Date/Time    PHOS 2.9 01/21/2023 05:00 AM       ASSESSMENT/PLAN:    1) 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 CKD progression vs. pre-renal  - renal function is better than baseline, and I suspect the lowe creatinine is due to loss of muscle mass  - midline is fine from renal standpoint for lab draws     2) Covid-19  - out of isolation     3) PNA  - off Abx     4) FEN  - metabolic acidosis              - on PO sodium bicarbonate supplementation      5) anemia  - labs noted   - s/p PRBC 01/10/23    6) UGIB  - GI consulted  - EGD showed duodenal ulcer with adherent clot  - monitoring Hgb     7) AMS   - overall better. Patient is looking very debilitated and has deteriorated significantly compared to even a couple of months ago. Tried to get ahold of son at the end of last week to discuss case with him as the son always comes with his father to the office without success. Still a full code. As he is eating better, I do not believe we can justify a PEG tube, specially since he is capable of eating. .  I believe they have an upcoming appointment with me and then we will have a discussion about his father's status and also include his daughter in the visit via phone.

## 2023-01-22 LAB
ALBUMIN SERPL-MCNC: 2.5 G/DL (ref 3.4–5)
ANION GAP SERPL CALCULATED.3IONS-SCNC: 9 MMOL/L (ref 3–16)
BUN BLDV-MCNC: 41 MG/DL (ref 7–20)
CALCIUM SERPL-MCNC: 8.3 MG/DL (ref 8.3–10.6)
CHLORIDE BLD-SCNC: 108 MMOL/L (ref 99–110)
CO2: 22 MMOL/L (ref 21–32)
CREAT SERPL-MCNC: 2.6 MG/DL (ref 0.8–1.3)
GFR SERPL CREATININE-BSD FRML MDRD: 22 ML/MIN/{1.73_M2}
GLUCOSE BLD-MCNC: 115 MG/DL (ref 70–99)
HCT VFR BLD CALC: 23.8 % (ref 40.5–52.5)
HEMOGLOBIN: 7.6 G/DL (ref 13.5–17.5)
MCH RBC QN AUTO: 29.7 PG (ref 26–34)
MCHC RBC AUTO-ENTMCNC: 32 G/DL (ref 31–36)
MCV RBC AUTO: 92.7 FL (ref 80–100)
PDW BLD-RTO: 15.3 % (ref 12.4–15.4)
PHOSPHORUS: 3.1 MG/DL (ref 2.5–4.9)
PLATELET # BLD: 206 K/UL (ref 135–450)
PMV BLD AUTO: 8.6 FL (ref 5–10.5)
POTASSIUM SERPL-SCNC: 4.7 MMOL/L (ref 3.5–5.1)
RBC # BLD: 2.57 M/UL (ref 4.2–5.9)
REASON FOR REJECTION: NORMAL
REJECTED TEST: NORMAL
SODIUM BLD-SCNC: 139 MMOL/L (ref 136–145)
WBC # BLD: 7.3 K/UL (ref 4–11)

## 2023-01-22 PROCEDURE — C9113 INJ PANTOPRAZOLE SODIUM, VIA: HCPCS | Performed by: PHYSICIAN ASSISTANT

## 2023-01-22 PROCEDURE — 85027 COMPLETE CBC AUTOMATED: CPT

## 2023-01-22 PROCEDURE — 6370000000 HC RX 637 (ALT 250 FOR IP): Performed by: INTERNAL MEDICINE

## 2023-01-22 PROCEDURE — 2580000003 HC RX 258: Performed by: INTERNAL MEDICINE

## 2023-01-22 PROCEDURE — 6360000002 HC RX W HCPCS: Performed by: PHYSICIAN ASSISTANT

## 2023-01-22 PROCEDURE — 6360000002 HC RX W HCPCS: Performed by: INTERNAL MEDICINE

## 2023-01-22 PROCEDURE — 80069 RENAL FUNCTION PANEL: CPT

## 2023-01-22 PROCEDURE — 2580000003 HC RX 258: Performed by: PHYSICIAN ASSISTANT

## 2023-01-22 PROCEDURE — 36415 COLL VENOUS BLD VENIPUNCTURE: CPT

## 2023-01-22 PROCEDURE — 2060000000 HC ICU INTERMEDIATE R&B

## 2023-01-22 RX ADMIN — POLYETHYLENE GLYCOL 3350 17 G: 17 POWDER, FOR SOLUTION ORAL at 10:29

## 2023-01-22 RX ADMIN — ESCITALOPRAM OXALATE 10 MG: 10 TABLET ORAL at 20:09

## 2023-01-22 RX ADMIN — ATORVASTATIN CALCIUM 40 MG: 40 TABLET, FILM COATED ORAL at 20:09

## 2023-01-22 RX ADMIN — Medication 40 MG: at 20:09

## 2023-01-22 RX ADMIN — ACETAMINOPHEN 650 MG: 325 TABLET ORAL at 20:08

## 2023-01-22 RX ADMIN — DOCUSATE SODIUM 100 MG: 100 CAPSULE, LIQUID FILLED ORAL at 10:28

## 2023-01-22 RX ADMIN — SENNOSIDES 8.6 MG: 8.6 TABLET, FILM COATED ORAL at 20:09

## 2023-01-22 RX ADMIN — Medication 40 MG: at 10:28

## 2023-01-22 RX ADMIN — SODIUM CHLORIDE, PRESERVATIVE FREE 10 ML: 5 INJECTION INTRAVENOUS at 20:10

## 2023-01-22 RX ADMIN — DOCUSATE SODIUM 100 MG: 100 CAPSULE, LIQUID FILLED ORAL at 20:09

## 2023-01-22 RX ADMIN — SODIUM CHLORIDE, PRESERVATIVE FREE 10 ML: 5 INJECTION INTRAVENOUS at 10:29

## 2023-01-22 RX ADMIN — TAMSULOSIN HYDROCHLORIDE 0.4 MG: 0.4 CAPSULE ORAL at 20:09

## 2023-01-22 RX ADMIN — EPOETIN ALFA-EPBX 10000 UNITS: 10000 INJECTION, SOLUTION INTRAVENOUS; SUBCUTANEOUS at 23:15

## 2023-01-22 RX ADMIN — DORZOLAMIDE HYDROCHLORIDE AND TIMOLOL MALEATE 1 DROP: 20; 5 SOLUTION/ DROPS OPHTHALMIC at 10:32

## 2023-01-22 RX ADMIN — SODIUM BICARBONATE 650 MG: 650 TABLET ORAL at 10:28

## 2023-01-22 RX ADMIN — DORZOLAMIDE HYDROCHLORIDE AND TIMOLOL MALEATE 1 DROP: 20; 5 SOLUTION/ DROPS OPHTHALMIC at 20:10

## 2023-01-22 RX ADMIN — SODIUM BICARBONATE 650 MG: 650 TABLET ORAL at 20:09

## 2023-01-22 RX ADMIN — SENNOSIDES 8.6 MG: 8.6 TABLET, FILM COATED ORAL at 10:29

## 2023-01-22 ASSESSMENT — PAIN DESCRIPTION - LOCATION: LOCATION: HEAD

## 2023-01-22 ASSESSMENT — PAIN SCALES - GENERAL: PAINLEVEL_OUTOF10: 3

## 2023-01-22 ASSESSMENT — PAIN DESCRIPTION - DESCRIPTORS: DESCRIPTORS: ACHING

## 2023-01-22 ASSESSMENT — PAIN - FUNCTIONAL ASSESSMENT: PAIN_FUNCTIONAL_ASSESSMENT: ACTIVITIES ARE NOT PREVENTED

## 2023-01-22 NOTE — PROGRESS NOTES
Progress Note    Patient Richie Jimenez  MRN: [id number]  YOB: 1931 Age: 80 y.o. Sex: male  Room: [place] [address]       Admitting Physician: Nawaf Haq MD   Date of Admission: 1/5/2023 12:30 PM   Primary Care Physician: Analisa Kilpatrick MD     Subjective:  Richie Jimenez was seen and examined. We are following for multiple duodenal ulcers, GI bleed. .  -- Patient had a good breakfast.  He denies abdominal pain. He had a bowel movement. There was no blood in the stools. He denies nausea or vomiting. ROS:  Constitutional: Denies fever, no change in appetite  Respiratory: Denies cough or shortness of breath  Cardiovascular: Denies chest pain or edema    Objective:  Vital Signs:   Vitals:    01/22/23 1240   BP: (!) 102/48   Pulse: 62   Resp: 20   Temp: 97.7 °F (36.5 °C)   SpO2: 94%         Physical Exam:  Constitutional: Alert and oriented x 4. No acute distress. Respiratory: Respirations nonlabored, no crepitus  GI: Abdomen nondistended, soft, and nontender. Neurological: No focal deficits noted. No asterixis.     Intake/Output:    Intake/Output Summary (Last 24 hours) at 1/22/2023 1350  Last data filed at 1/22/2023 1240  Gross per 24 hour   Intake 220 ml   Output 600 ml   Net -380 ml        Current Medications:  Current Facility-Administered Medications   Medication Dose Route Frequency Provider Last Rate Last Admin    pantoprazole (PROTONIX) 40 mg in sodium chloride (PF) 0.9 % 10 mL injection  40 mg IntraVENous Q12H LYNN Slade   40 mg at 01/22/23 1028    lidocaine PF 1 % injection 5 mL  5 mL IntraDERmal Once Bredna Coley MD        sodium bicarbonate tablet 650 mg  650 mg Oral BID Alla Farfan MD   650 mg at 01/22/23 1028    phenol 1.4 % mouth spray 1 spray  1 spray Mouth/Throat Q2H PRN Alla Farfan MD        dorzolamide-timolol (COSOPT) 22.3-6.8 MG/ML ophthalmic solution 1 drop (Patient Supplied)  1 drop Both Eyes BID Alla Farfan MD   1 drop at 01/22/23 1032 0.9 % sodium chloride infusion   IntraVENous PRN Hans Jones MD        Benzocaine-Menthol (CEPACOL SORE THROAT) lozenge 1 lozenge  1 lozenge Oral Q2H PRN Hans Jones MD   1 lozenge at 01/09/23 2696    docusate sodium (COLACE) capsule 100 mg  100 mg Oral BID Hans Jones MD   100 mg at 01/22/23 1028    bisacodyl (DULCOLAX) suppository 10 mg  10 mg Rectal Daily PRN Hans Jones MD        sodium chloride flush 0.9 % injection 10 mL  10 mL IntraVENous 2 times per day Hans Jones MD   10 mL at 01/22/23 1029    sodium chloride flush 0.9 % injection 10 mL  10 mL IntraVENous PRN Hans Jones MD        0.9 % sodium chloride infusion   IntraVENous PRN Hans Jones MD        promethazine (PHENERGAN) tablet 12.5 mg  12.5 mg Oral Q6H PRN Hans Jones MD        Or    ondansetron [facility]) injection 4 mg  4 mg IntraVENous Q6H PRN Hans Jones MD        acetaminophen (TYLENOL) tablet 650 mg  650 mg Oral Q6H PRN Hans Jones MD   650 mg at 01/21/23 1522    Or    acetaminophen (TYLENOL) suppository 650 mg  650 mg Rectal Q6H PRN Hans Jones MD        atorvastatin (LIPITOR) tablet 40 mg  40 mg Oral Nightly Hans Jones MD   40 mg at 01/21/23 2053    escitalopram (LEXAPRO) tablet 10 mg  10 mg Oral Nightly Hans Jones MD   10 mg at 01/21/23 2053    nitroGLYCERIN (NITROSTAT) SL tablet 0.4 mg  0.4 mg SubLINGual Q5 Min PRN Hans Jones MD        polyethylene glycol (GLYCOLAX) packet 17 g  17 g Oral Daily Hans Jones MD   17 g at 01/22/23 1029    senna (SENOKOT) tablet 8.6 mg  1 tablet Oral BID Hans Jones MD   8.6 mg at 01/22/23 1029    tamsulosin (FLOMAX) capsule 0.4 mg  0.4 mg Oral Nightly Hans Jones MD   0.4 mg at 01/21/23 2053    traMADol (ULTRAM) tablet 50 mg  50 mg Oral Q8H PRN Hans Jones MD   50 mg at 01/21/23 3574         Recent Imaging:   CT ABDOMEN PELVIS WO CONTRAST Additional Contrast? None  Narrative: EXAMINATION:  CT OF THE ABDOMEN AND PELVIS WITHOUT CONTRAST 1/21/2023 11:06 am    TECHNIQUE:  CT of the abdomen and pelvis was performed without the administration of  intravenous contrast. Multiplanar reformatted images are provided for review. Automated exposure control, iterative reconstruction, and/or weight based  adjustment of the mA/kV was utilized to reduce the radiation dose to as low  as reasonably achievable. COMPARISON:  08/03/2017    HISTORY:  ORDERING SYSTEM PROVIDED HISTORY: RLQ abd pain  TECHNOLOGIST PROVIDED HISTORY:  Reason for exam:->RLQ abd pain  Additional Contrast?->None  Reason for Exam: rlq pain,    FINDINGS:  Lower Chest: Heterogeneous and nodular bilateral lower lobe opacity is seen,  new as compared to prior. Extensive bilateral calcified pleural plaques. Organs: Lack of IV and oral contrast limits sensitivity for visceral organ  pathology. Within the kidneys bilaterally, no markie calculi. No hydronephrosis. 2 cm  cyst along the lateral margin of the right kidney and 1.2 cm cyst with thin  peripheral calcification along the posterior margin of the left kidney. Portions of the ureters are obscured though the proximal ureters are not  dilated. Noncontrast views of the liver and spleen are grossly unremarkable. Stomach  is distended. Heterogeneous intraluminal gastric content is seen. 1.1 cm  bilobed linear metallic density is seen at the proximal duodenum. No  pancreatic ductal dilatation or stranding. Adrenal glands are within normal  limits. Calcification of the abdominal aorta and iliac arteries. GI/Bowel: Moderate stool throughout the colon. Small bowel is not dilated. Appendix is not visualized. Pelvis: Bladder circumferentially thick wall. Air is seen within a large  bladder diverticulum along the dome of the bladder. Chauhan catheter is  present. Prostate is enlarged, measuring 6.0 x 5.3 cm.   Reservoir for penile  prosthesis is seen within the anterior left hemipelvis. No inguinal  adenopathy. Peritoneum/Retroperitoneum: No free air. Bones/Soft Tissues: Age-indeterminate fractures of left L3 and L4 transverse  processes. Remote appearing posterolateral lower left rib fractures. Degenerative change of the lumbar spine. Impression: Heterogeneous and nodular bilateral lower lobe infiltrate; correlate for  pneumonia or aspiration. [place] prior asbestos exposure. Prostatomegaly. Correlate with urologic history. Circumferential mural thickening the bladder, which could reflect cystitis or  hypertrophy. Correlate with urinalysis. Large bladder diverticulum. Air  within the lumen of the bladder, presumably secondary to catheterization. Age-indeterminate fractures of left L3 and L4 transverse processes. 1.1 cm linear metallic foreign body within the proximal duodenum; correlate  with any history of intervention or ingestion. Labs:   Recent Labs     01/20/23  0450 01/20/23  0910 01/21/23  0500 01/21/23  1416 01/22/23  1030 01/22/23  1208   HGB 7.8* 8.1* 7.8* 7.9*  --  7.6*   WBC 8.3  --  7.0  --   --  7.3   LABALBU 2.2*  --  2.4*  --  2.5*  --           Assessment:  24-year-old male evaluated for GI bleed. Found to have multiple duodenal ulcers. post endoscopic therapy x2. In the past few days, patient has remained stable. Hemoglobin has remained stable. P.o. intake has improved significantly. Patient continues to eat adequately. No signs of ongoing GI bleed. Impression: GI bleed due to duodenal ulcers. At this point no evidence of significant GI bleed. Patient stated that he is eating significantly better. He has a good appetite. I called the daughter on Friday and explained that at this point there is no indication to place a feeding tube or PEG. Brandon Meek MD    [facility]    [phone].  Also available via [employer]

## 2023-01-22 NOTE — PROGRESS NOTES
Hospital Medicine Progress Note     Date:  1/22/2023    PCP: Frances Fox MD (Tel: [phone])    Date of Admission: 1/5/2023    Chief complaint:   Chief Complaint   Patient presents with    Fatigue     Pt. normally able to ambulate but not at this time and is extremely weak. Brief admission history: 70-year-old male with history of CKD stage IV, anemia of chronic disease, BPH, CAD, hyperlipidemia, who was admitted with generalized weakness, decreased oral intake, diagnosed with COVID-19 and bacterial pneumonia. Assessment/plan:  Bacterial pneumonia, likely superimposed in the setting of COVID-19 pneumonia. Procalcitonin level was elevated. Completed course of antibiotics. Did not require COVID-specific treatment as patient was not hypoxic. Sore throat. Secondary to COVID-19 infection. As needed lozenges. Acute kidney injury on CKD stage IV, improved with intravenous fluid. Generalized weakness. Likely secondary to acute medical conditions. Treat underlying conditions as noted above. Therapy evaluation in place. Going to SNF at discharge. Asymptomatic bacteriuria. Patient was already on antibiotics for other indication as noted above. There is no indication for treatment of asymptomatic bacteriuria. Acute upper GI bleed. Status post EGD on 1/9/2023 with multiple duodenal ulcers (6 mm clean-based duodenal ulcer, 15 mm clean-based duodenal bulb ulcer, 2 cm ulcer with visible vessel in distal aspect of ulcer, treated - see EGD report. He had repeat EGD on 1/16/2023 with noted 1 duodenal ulcer with adherent clot, clot removed and visible vessel treated. So far, hemoglobin has remained stable over the last couple of days. Patient continues to have small amount of blood smeared stools, likely expected given extent of duodenal ulcers. Continue to avoid NSAIDs.   Per discussion with daughter, she is recommending discontinuation of aspirin, given increased risk of bleed with resumption of aspirin therapy. She also understands patient is at risk of having another cardiovascular event. Patient will be discharged to SNF on Protonix 40 mg twice daily. Acute on chronic anemia with superimposed anemia of chronic disease. Secondary to #1. He has required multiple units of PRBC transfusions this hospital stay. Recommend repeat H&H within 3 days of discharge. At risk for aspiration. Reviewed chest x-ray from January 17, 2023, possible infiltrate or atelectasis. However, patient does not have cough or fever or shortness of breath. Suspicion is this is more atelectasis than infiltrate. Incentive spirometer has been ordered. Abdominal pain overnight on 1/21/2023. On evaluation this morning, patient reports no abdominal pain at this time. No acute abdominal findings noted on CT-abd/pelvis. CT-abd/pelvis from 1/21/2023. No clinical evidence of pneumonia despite infiltrates noted on CT. Patient does not have cough, fever or leukocytosis. He also does not have urinary symptoms, despite bladder wall thickening noted. Other comorbidities: history of CKD stage IV, anemia of chronic disease, BPH, CAD, hyperlipidemia. Disposition. Patient remains stable for discharge. I am concerned prolonged hospitalization may lead to unwanted complications such as thrombosis (he has SCDs in place but no chemical DVT prophylaxis due to recent bleed). He remains hospitalized per daughter's request to have patient evaluated for PEG placement, although he has been having increased oral intake at this time. I did discuss with daughter a few days ago about patient's risk for thrombosis with prolonged hospitalization. Diet: ADULT DIET; Dysphagia - Minced and Moist; Mildly Thick (Nectar)  ADULT ORAL NUTRITION SUPPLEMENT; Breakfast, Lunch, Dinner; Frozen Oral Supplement    Code status: Full Code   ----------  Subjective  He reports sleeping on his left neck. No cough or SOB or urinary symptoms.  Currently has aldrich catheter in place for retention. Objective  Physical exam:  Vitals: /70   Pulse 67   Temp 98.4 °F (36.9 °C) (Axillary)   Resp 19   Ht 5' 6\" (1.676 m)   Wt 144 lb 6.4 oz (65.5 kg)   SpO2 94%   BMI 23.31 kg/m²   Gen/overall appearance: Not in acute distress. Alert. Oriented x3. Head: Normocephalic, atraumatic  Eyes: EOMI, good acuity  ENT: Oral mucosa moist  Neck: No JVD, thyromegaly  CVS: Nml S1S2, no MRG, RRR  Pulm: Clear bilaterally. No crackles/wheezes  Gastrointestinal: Soft, NT/ND, +BS  Musculoskeletal: No edema. Warm  Neuro: No focal deficit. Moves extremity spontaneously. Psychiatry: Appropriate affect. Not agitated. Skin: Warm, dry with normal turgor. No rash  Capillary refill: Brisk,< 3 seconds   Peripheral Pulses: +2 palpable, equal bilaterally     24HR INTAKE/OUTPUT:    Intake/Output Summary (Last 24 hours) at 1/22/2023 1026  Last data filed at 1/22/2023 2783  Gross per 24 hour   Intake 120 ml   Output 1150 ml   Net -1030 ml       I/O last 3 completed shifts: In: 120 [P.O.:120]  Out: 1625 [Urine:1625]  No intake/output data recorded.   Meds:    pantoprazole (PROTONIX) 40 mg injection  40 mg IntraVENous Q12H    lidocaine 1 % injection  5 mL IntraDERmal Once    sodium bicarbonate  650 mg Oral BID    dorzolamide-timolol  1 drop Both Eyes BID    docusate sodium  100 mg Oral BID    sodium chloride flush  10 mL IntraVENous 2 times per day    atorvastatin  40 mg Oral Nightly    escitalopram  10 mg Oral Nightly    polyethylene glycol  17 g Oral Daily    senna  1 tablet Oral BID    tamsulosin  0.4 mg Oral Nightly     Infusions:    sodium chloride      sodium chloride       PRN Meds: phenol, sodium chloride, Benzocaine-Menthol, bisacodyl, sodium chloride flush, sodium chloride, promethazine **OR** ondansetron, acetaminophen **OR** acetaminophen, nitroGLYCERIN, traMADol    Labs/imaging:  CBC:   Recent Labs     01/20/23  0450 01/20/23  0910 01/21/23  0500 01/21/23  1416   WBC 8.3  --  7.0  -- HGB 7.8* 8.1* 7.8* 7.9*     --  211  --        BMP:    Recent Labs     01/20/23  0450 01/21/23  0500    143   K 4.7 4.6    112*   CO2 21 21   BUN 38* 41*   CREATININE 2.7* 2.8*   GLUCOSE 106* 103*       Hepatic:   No results for input(s): AST, ALT, ALB, BILITOT, ALKPHOS in the last 72 hours.       Gutierrez Zavaleta MD  -------------------------------  [facility] hospitalist

## 2023-01-23 VITALS
TEMPERATURE: 98.2 F | HEIGHT: 66 IN | DIASTOLIC BLOOD PRESSURE: 71 MMHG | SYSTOLIC BLOOD PRESSURE: 123 MMHG | HEART RATE: 60 BPM | OXYGEN SATURATION: 95 % | RESPIRATION RATE: 20 BRPM | WEIGHT: 145.06 LBS | BODY MASS INDEX: 23.31 KG/M2

## 2023-01-23 LAB
ALBUMIN SERPL-MCNC: 2.3 G/DL (ref 3.4–5)
ANION GAP SERPL CALCULATED.3IONS-SCNC: 11 MMOL/L (ref 3–16)
BUN BLDV-MCNC: 41 MG/DL (ref 7–20)
CALCIUM SERPL-MCNC: 7.9 MG/DL (ref 8.3–10.6)
CHLORIDE BLD-SCNC: 109 MMOL/L (ref 99–110)
CO2: 22 MMOL/L (ref 21–32)
CREAT SERPL-MCNC: 2.7 MG/DL (ref 0.8–1.3)
GFR SERPL CREATININE-BSD FRML MDRD: 21 ML/MIN/{1.73_M2}
GLUCOSE BLD-MCNC: 94 MG/DL (ref 70–99)
HCT VFR BLD CALC: 24 % (ref 40.5–52.5)
HEMOGLOBIN: 7.6 G/DL (ref 13.5–17.5)
MCH RBC QN AUTO: 29.5 PG (ref 26–34)
MCHC RBC AUTO-ENTMCNC: 31.6 G/DL (ref 31–36)
MCV RBC AUTO: 93.5 FL (ref 80–100)
PDW BLD-RTO: 15.3 % (ref 12.4–15.4)
PHOSPHORUS: 3.4 MG/DL (ref 2.5–4.9)
PLATELET # BLD: 186 K/UL (ref 135–450)
PMV BLD AUTO: 8.4 FL (ref 5–10.5)
POTASSIUM SERPL-SCNC: 4.6 MMOL/L (ref 3.5–5.1)
RBC # BLD: 2.57 M/UL (ref 4.2–5.9)
SODIUM BLD-SCNC: 142 MMOL/L (ref 136–145)
WBC # BLD: 6.5 K/UL (ref 4–11)

## 2023-01-23 PROCEDURE — 94760 N-INVAS EAR/PLS OXIMETRY 1: CPT

## 2023-01-23 PROCEDURE — 2580000003 HC RX 258: Performed by: PHYSICIAN ASSISTANT

## 2023-01-23 PROCEDURE — 80069 RENAL FUNCTION PANEL: CPT

## 2023-01-23 PROCEDURE — 6370000000 HC RX 637 (ALT 250 FOR IP): Performed by: INTERNAL MEDICINE

## 2023-01-23 PROCEDURE — 92526 ORAL FUNCTION THERAPY: CPT

## 2023-01-23 PROCEDURE — 97116 GAIT TRAINING THERAPY: CPT | Performed by: PHYSICAL THERAPIST

## 2023-01-23 PROCEDURE — C9113 INJ PANTOPRAZOLE SODIUM, VIA: HCPCS | Performed by: PHYSICIAN ASSISTANT

## 2023-01-23 PROCEDURE — 6360000002 HC RX W HCPCS: Performed by: PHYSICIAN ASSISTANT

## 2023-01-23 PROCEDURE — 85027 COMPLETE CBC AUTOMATED: CPT

## 2023-01-23 PROCEDURE — 2580000003 HC RX 258: Performed by: INTERNAL MEDICINE

## 2023-01-23 PROCEDURE — 97530 THERAPEUTIC ACTIVITIES: CPT | Performed by: PHYSICAL THERAPIST

## 2023-01-23 RX ORDER — FERROUS SULFATE TAB EC 324 MG (65 MG FE EQUIVALENT) 324 (65 FE) MG
324 TABLET DELAYED RESPONSE ORAL
Status: DISCONTINUED | OUTPATIENT
Start: 2023-01-23 | End: 2023-01-23 | Stop reason: HOSPADM

## 2023-01-23 RX ORDER — TRAMADOL HYDROCHLORIDE 50 MG/1
50 TABLET ORAL EVERY 8 HOURS PRN
Qty: 5 TABLET | Refills: 0 | Status: SHIPPED | OUTPATIENT
Start: 2023-01-23 | End: 2023-01-28

## 2023-01-23 RX ORDER — FERROUS SULFATE TAB EC 324 MG (65 MG FE EQUIVALENT) 324 (65 FE) MG
324 TABLET DELAYED RESPONSE ORAL
Qty: 30 TABLET | Refills: 3 | Status: SHIPPED | OUTPATIENT
Start: 2023-01-23

## 2023-01-23 RX ADMIN — SODIUM CHLORIDE, PRESERVATIVE FREE 10 ML: 5 INJECTION INTRAVENOUS at 09:30

## 2023-01-23 RX ADMIN — Medication 40 MG: at 09:29

## 2023-01-23 RX ADMIN — DORZOLAMIDE HYDROCHLORIDE AND TIMOLOL MALEATE 1 DROP: 20; 5 SOLUTION/ DROPS OPHTHALMIC at 09:30

## 2023-01-23 RX ADMIN — TRAMADOL HYDROCHLORIDE 50 MG: 50 TABLET ORAL at 16:44

## 2023-01-23 RX ADMIN — SODIUM BICARBONATE 650 MG: 650 TABLET ORAL at 09:29

## 2023-01-23 RX ADMIN — FERROUS SULFATE TAB EC 324 MG (65 MG FE EQUIVALENT) 324 MG: 324 (65 FE) TABLET DELAYED RESPONSE at 12:31

## 2023-01-23 RX ADMIN — DOCUSATE SODIUM 100 MG: 100 CAPSULE, LIQUID FILLED ORAL at 09:29

## 2023-01-23 RX ADMIN — POLYETHYLENE GLYCOL 3350 17 G: 17 POWDER, FOR SOLUTION ORAL at 09:29

## 2023-01-23 RX ADMIN — SENNOSIDES 8.6 MG: 8.6 TABLET, FILM COATED ORAL at 09:29

## 2023-01-23 ASSESSMENT — PAIN SCALES - GENERAL: PAINLEVEL_OUTOF10: 0

## 2023-01-23 NOTE — PROGRESS NOTES
Department of Internal Medicine  Nephrology Progress Note    HPI.    80 y.o. male whom we were asked to see for RONI on CKD. Followed by Dr. Jordan White in the office, last 08/22 with eGFR 18 ml/min. He presents with weakness and dyspnea. He was diagnosed with Covid-19. Renal cosn called for RONI . Events noted , chart reviewed     HPI:  Breathing comfortably. No CP. Reports eating better. ROS:  In chair  [address]:  medications reviewed. Physical Exam:    VITALS:  BP (!) 114/53   Pulse 58   Temp 98 °F (36.7 °C) (Oral)   Resp 18   Ht 5' 6\" (1.676 m)   Wt 145 lb 1 oz (65.8 kg)   SpO2 95%   BMI 23.41 kg/m²   24HR INTAKE/OUTPUT:    Intake/Output Summary (Last 24 hours) at 1/23/2023 1246  Last data filed at 1/23/2023 7791  Gross per 24 hour   Intake --   Output 1250 ml   Net -1250 ml       Constitutional:  Looks comfortable   Respiratory:  CTAB  Gastrointestinal No tenderness.   Normal Bowel Sounds  Cardiovascular:  S1, S2 RRR   Edema:   trace edema  Skin:  no rash    DATA:    CBC:  Lab Results   Component Value Date/Time    WBC 6.5 01/23/2023 05:23 AM    RBC 2.57 01/23/2023 05:23 AM    HGB 7.6 01/23/2023 05:23 AM    HCT 24.0 01/23/2023 05:23 AM    MCV 93.5 01/23/2023 05:23 AM    MCH 29.5 01/23/2023 05:23 AM    MCHC 31.6 01/23/2023 05:23 AM    RDW 15.3 01/23/2023 05:23 AM     01/23/2023 05:23 AM    MPV 8.4 01/23/2023 05:23 AM     CMP:  Lab Results   Component Value Date/Time     01/23/2023 05:23 AM    K 4.6 01/23/2023 05:23 AM    K 4.2 01/12/2023 04:52 AM     01/23/2023 05:23 AM    CO2 22 01/23/2023 05:23 AM    BUN 41 01/23/2023 05:23 AM    CREATININE 2.7 01/23/2023 05:23 AM    GFRAA 24 11/05/2021 01:23 PM    AGRATIO 1.0 01/09/2023 09:43 AM    LABGLOM 21 01/23/2023 05:23 AM    GLUCOSE 94 01/23/2023 05:23 AM    PROT 5.0 01/10/2023 04:18 AM    CALCIUM 7.9 01/23/2023 05:23 AM    BILITOT <0.2 01/10/2023 04:18 AM    ALKPHOS 60 01/10/2023 04:18 AM    AST 44 01/10/2023 04:18 AM    ALT 40 01/10/2023 04:18 AM      Hepatic Function Panel:   Lab Results   Component Value Date/Time    ALKPHOS 60 01/10/2023 04:18 AM    ALT 40 01/10/2023 04:18 AM    AST 44 01/10/2023 04:18 AM    PROT 5.0 01/10/2023 04:18 AM    BILITOT <0.2 01/10/2023 04:18 AM    BILIDIR <0.2 01/10/2023 04:18 AM    IBILI see below 01/10/2023 04:18 AM      Phosphorus:   Lab Results   Component Value Date/Time    PHOS 3.4 01/23/2023 05:23 AM       ASSESSMENT/PLAN:    1) 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 CKD progression vs. pre-renal  - renal function is better than baseline, Cr 2.7 mg   OK for discharge from renal SP Follow with Dr Anthony Lund     2) Covid-19  - out of isolation     3) PNA  - off Abx     4) FEN  - metabolic acidosis              - on PO sodium bicarbonate supplementation      5) anemia  - labs noted   - s/p PRBC 01/10/23 HGB 7.6 gm  - on retacrit continue weekly    6) UGIB  - GI consulted  - EGD showed duodenal ulcer with adherent clot  - monitoring Hgb     7) AMS   - overall better.

## 2023-01-23 NOTE — PROGRESS NOTES
Hospital Medicine Progress Note     Date:  1/23/2023    PCP: Billy Hall MD (Tel: [phone])    Date of Admission: 1/5/2023    Chief complaint:   Chief Complaint   Patient presents with    Fatigue     Pt. normally able to ambulate but not at this time and is extremely weak. Brief admission history: 80-year-old male with history of CKD stage IV, anemia of chronic disease, BPH, CAD, hyperlipidemia, who was admitted with generalized weakness, decreased oral intake, diagnosed with COVID-19 and bacterial pneumonia. Assessment/plan:  Bacterial pneumonia, likely superimposed in the setting of COVID-19 pneumonia. Procalcitonin level was elevated. Completed course of antibiotics. Did not require COVID-specific treatment as patient was not hypoxic. Sore throat. Secondary to COVID-19 infection. As needed lozenges. Acute kidney injury on CKD stage IV, improved with intravenous fluid. Generalized weakness. Likely secondary to acute medical conditions. Treat underlying conditions as noted above. Therapy evaluation in place. Going to SNF at discharge. Asymptomatic bacteriuria. Patient was already on antibiotics for other indication as noted above. There is no indication for treatment of asymptomatic bacteriuria. Acute upper GI bleed. Status post EGD on 1/9/2023 with multiple duodenal ulcers (6 mm clean-based duodenal ulcer, 15 mm clean-based duodenal bulb ulcer, 2 cm ulcer with visible vessel in distal aspect of ulcer, treated - see EGD report. He had repeat EGD on 1/16/2023 with noted 1 duodenal ulcer with adherent clot, clot removed and visible vessel treated. So far, hemoglobin has remained stable over the last couple of days. Patient continues to have small amount of blood smeared stools, likely expected given extent of duodenal ulcers. Continue to avoid NSAIDs.   Per discussion with daughter, she is recommending discontinuation of aspirin, given increased risk of bleed with resumption of aspirin therapy. She also understands patient is at risk of having another cardiovascular event. Patient will be discharged to SNF on Protonix 40 mg twice daily. Acute on chronic anemia with superimposed anemia of chronic disease. Secondary to #1. He has required multiple units of PRBC transfusions this hospital stay. Recommend repeat H&H within 3 days of discharge. At risk for aspiration. Reviewed chest x-ray from January 17, 2023, possible infiltrate or atelectasis. However, patient does not have cough or fever or shortness of breath. Suspicion is this is more atelectasis than infiltrate. Incentive spirometer has been ordered. Abdominal pain overnight on 1/21/2023. On evaluation this morning, patient reports no abdominal pain at this time. No acute abdominal findings noted on CT-abd/pelvis. CT-abd/pelvis from 1/21/2023. No clinical evidence of pneumonia despite infiltrates noted on CT. Patient does not have cough, fever or leukocytosis. He also does not have urinary symptoms, despite bladder wall thickening noted. Other comorbidities: history of CKD stage IV, anemia of chronic disease, BPH, CAD, hyperlipidemia. Disposition. Patient is stable for discharge at this time. Recommend repeat CBC in 3 days, transfuse as needed to maintain Hb greater than 7. Diet: ADULT DIET; Dysphagia - Minced and Moist; Mildly Thick (Nectar)  ADULT ORAL NUTRITION SUPPLEMENT; Breakfast, Lunch, Dinner; Frozen Oral Supplement    Code status: Full Code   ----------  Subjective  No complaints. Denies any needs. Objective  Physical exam:  Vitals: BP (!) 114/53   Pulse 58   Temp 98 °F (36.7 °C) (Oral)   Resp 18   Ht 5' 6\" (1.676 m)   Wt 145 lb 1 oz (65.8 kg)   SpO2 95%   BMI 23.41 kg/m²   Gen/overall appearance: Not in acute distress. Alert. Oriented x3.   Head: Normocephalic, atraumatic  Eyes: EOMI, good acuity  ENT: Oral mucosa moist  Neck: No JVD, thyromegaly  CVS: Nml S1S2, no MRG, RRR  Pulm: Clear bilaterally. No crackles/wheezes  Gastrointestinal: Soft, NT/ND, +BS  Musculoskeletal: No edema. Warm  Neuro: No focal deficit. Moves extremity spontaneously. Psychiatry: Appropriate affect. Not agitated. Skin: Warm, dry with normal turgor. No rash  Capillary refill: Brisk,< 3 seconds   Peripheral Pulses: +2 palpable, equal bilaterally     24HR INTAKE/OUTPUT:    Intake/Output Summary (Last 24 hours) at 1/23/2023 1124  Last data filed at 1/23/2023 0333  Gross per 24 hour   Intake 100 ml   Output 1250 ml   Net -1150 ml       I/O last 3 completed shifts: In: 200 [P.O.:200]  Out: 1850 [Urine:1850]  No intake/output data recorded.   Meds:    ferrous sulfate  324 mg Oral Daily with breakfast    epoetin julian-epbx  10,000 Units SubCUTAneous Weekly    pantoprazole (PROTONIX) 40 mg injection  40 mg IntraVENous Q12H    lidocaine 1 % injection  5 mL IntraDERmal Once    sodium bicarbonate  650 mg Oral BID    dorzolamide-timolol  1 drop Both Eyes BID    docusate sodium  100 mg Oral BID    sodium chloride flush  10 mL IntraVENous 2 times per day    atorvastatin  40 mg Oral Nightly    escitalopram  10 mg Oral Nightly    polyethylene glycol  17 g Oral Daily    senna  1 tablet Oral BID    tamsulosin  0.4 mg Oral Nightly     Infusions:    sodium chloride      sodium chloride       PRN Meds: phenol, sodium chloride, Benzocaine-Menthol, bisacodyl, sodium chloride flush, sodium chloride, promethazine **OR** ondansetron, acetaminophen **OR** acetaminophen, nitroGLYCERIN, traMADol    Labs/imaging:  CBC:   Recent Labs     01/21/23  0500 01/21/23  1416 01/22/23  1208 01/23/23  0523   WBC 7.0  --  7.3 6.5   HGB 7.8* 7.9* 7.6* 7.6*     --  206 186       BMP:    Recent Labs     01/21/23  0500 01/22/23  1030 01/23/23  0523    139 142   K 4.6 4.7 4.6   * 108 109   CO2 21 22 22   BUN 41* 41* 41*   CREATININE 2.8* 2.6* 2.7*   GLUCOSE 103* 115* 94       Hepatic:   No results for input(s): AST, ALT, ALB, BILITOT, ALKPHOS in the last 72 hours.       Laquita Oconnor MD  -------------------------------  Rounding hospitalist

## 2023-01-23 NOTE — CARE COORDINATION
Discharge order acknowledged. Checked status of precert: Precert remains pending. Need: medical transport, Precert and HENS     YAO Esquivel, LACHO, Social Work/Case Management   [phone]  Electronically signed by YAO Esquivel LSW on 1/23/2023 at 12:03 PM    Transport: 4/430 with [address] completed. Precert remains pending. Electronically signed by YAO Esquivel LSW on 1/23/2023 at 12:25 PM    Insurance Authorization remains pending. Electronically signed by YAO Esquivel LSW on 1/23/2023 at 2:48 PM    Called to [facility] #[phone],   Spoke to , Inez Bingham. Confirmed patient's  is working on the case and I should obtain confirmation this afternoon.        TRANSPORT PUSHED BACK  PM  Electronically signed by YAO Esquivel LSW on 1/23/2023 at 3:46 PM

## 2023-01-23 NOTE — PROGRESS NOTES
Physical Therapy  Facility/Department: [address] PROGRESSIVE CARE  Physical Therapy Initial Assessment    Name: Maryana Mayers  : 1931  MRN: [id number]  Date of Service: 2023    Discharge Recommendations:  Patient would benefit from continued therapy after discharge (3-5x/wk)   PT Equipment Recommendations  Other: defer to next level of care    Maryana Mayers scored a 17/24 on the AM-PAC short mobility form. Current research shows that an AM-PAC score of 17 or less is typically not associated with a discharge to the patient's home setting. Based on the patient's AM-PAC score and their current functional mobility deficits, it is recommended that the patient have 3-5 sessions per week of Physical Therapy at d/c to increase the patient's independence. Please see assessment section for further patient specific details. If patient discharges prior to next session this note will serve as a discharge summary. Please see below for the latest assessment towards goals. Patient Diagnosis(es): The primary encounter diagnosis was Pneumonia of right lung due to infectious organism, unspecified part of lung. Diagnoses of General weakness, Impaired mobility and ADLs, COVID-19, and Acute kidney injury superimposed on CKD [facility]) were also pertinent to this visit. Past Medical History:  has a past medical history of 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 chronic kidney disease ([facility] [address] Back pain, BPH (benign prostatic hyperplasia), CAD (coronary artery disease), CAD (coronary artery disease), Constipated, Hyperlipidemia, MI (myocardial infarction) ([facility] [address] Spinal stenosis, Stented coronary artery, Urinary hesitancy, and Vitamin D deficiency. Past Surgical History:  has a past surgical history that includes Coronary angioplasty with stent (10/18/15); Endoscopy, colon, diagnostic; Upper gastrointestinal endoscopy (10-); Upper gastrointestinal endoscopy (N/A, 2023); Upper gastrointestinal endoscopy (2023);  Upper gastrointestinal endoscopy (N/A, 1/16/2023); and Upper gastrointestinal endoscopy (1/16/2023). Assessment   Body Structures, Functions, Activity Limitations Requiring Skilled Therapeutic Intervention: Decreased functional mobility ; Decreased ADL status; Decreased strength;Decreased balance;Decreased endurance  Assessment: pt continues to make slow progress with therapy however continues to be well below his baseline for mobility tasks; pt is an elevated fall risk and continues to need CGA for all mobility tasks due to early fatigue and being mildly unsteady; pt is not safe to discharge directly home at discharge therefore recommend continued therapy 3-5x/wk to address deficits to allow pt to regain his Ind with functional tasks  Treatment Diagnosis: Impaired functional mobility and impaired endurance. Therapy Prognosis: Good  Decision Making: Medium Complexity  Clinical Presentation: evolving  Barriers to Learning: [place]  Requires PT Follow-Up: Yes  Activity Tolerance  Activity Tolerance: Patient limited by endurance  Activity Tolerance Comments: pt breathing heavily throughout session but no c/o being SOB     Plan   Physcial Therapy Plan  General Plan: 3-5 times per week  Specific Instructions for Next Treatment: cotx with OT  Current Treatment Recommendations: Strengthening, Balance training, Functional mobility training, Transfer training, Gait training, Endurance training, Neuromuscular re-education, Safety education & training, Equipment evaluation, education, & procurement, Patient/Caregiver education & training, Therapeutic activities  Safety Devices  Type of Devices:  All fall risk precautions in place, Call light within reach, Chair alarm in place, Gait belt, Left in chair  Restraints  Restraints Initially in Place: No     Restrictions  Restrictions/Precautions  Restrictions/Precautions: Modified Diet, Fall Risk, Up as Tolerated (Minced and moist diet, mildly thick (nectar thick) liquids, high fall risk)  Position Activity Restriction  Other position/activity restrictions: [place]     Subjective   General  Chart Reviewed: Yes  Additional Pertinent Hx: Patient is a 60-year-old male with past medical history of CAD, CKD stage IV, hyperlipidemia who presents to the hospital on 1/5/23 due to patient having generalized weakness. Pt found to be COVID+. Response To Previous Treatment: Patient with no complaints from previous session. Family / Caregiver Present: No  Referring Practitioner: Francisco Anguiano MD  Referral Date : 01/05/23  Diagnosis: Bacterial pneumonia  Follows Commands: Within Functional Limits  Subjective  Subjective: pt reports he is hungry and wants his breakfast; pt agreeable to working with therapy then getting up to chair to await breakfast tray         Social/Functional History  Social/Functional History  Lives With: Son  Type of Home: Apartment  Home Layout: One level  Home Access: Stairs to enter with rails  Entrance Stairs - Number of Steps: 13  Bathroom Shower/Tub: Tub/Shower unit  Bathroom Toilet: Standard  Bathroom Equipment: Grab bars in [place]: [employer], Walker, rolling  Has the patient had two or more falls in the past year or any fall with injury in the past year?: Yes  ADL Assistance: Independent  Ambulation Assistance: Independent (RW in apt, SPC in community)  Transfer Assistance: Independent  Active : No  Occupation: Retired  Type of Occupation:   Vision/Hearing  Vision  Vision: Impaired (Reporting poor vision but does not wear glasses)  Vision Exceptions:  (kept eyes closed most of PT sessioin this date.)  Hearing  Hearing: Exceptions to [facility]  Hearing Exceptions: Hard of hearing/hearing concerns    Cognition   Orientation  Overall Orientation Status: Within Functional Limits  Cognition  Arousal/Alertness: Appropriate responses to stimuli  Following Commands: Follows one step commands with repetition; Follows one step commands with increased time  Attention Span: Attends with cues to redirect  Memory: Decreased recall of recent events;Decreased short term memory  Safety Judgement: Decreased awareness of need for safety  Insights: Decreased awareness of deficits  Initiation: Requires cues for some     Objective       Bed mobility  Supine to Sit: Minimal assistance  Bed Mobility Comments: pt up in recliner at end of session  Transfers  Sit to Stand: Contact guard assistance  Stand to Sit: Contact guard assistance  Ambulation  Surface: Level tile  Device: Rolling Walker  Assistance: Contact guard assistance  Quality of Gait: flexed posture, short, shffling steps; no LOB but mildly unsteady  Gait Deviations: Slow Donna;Decreased step length;Decreased step height  Distance: 10', 3' and 50'x2 with standing rest break  Comments: pt initially feeling dizzy but no c/o after up and moving; pt attempted to use commode but only passing gas; pt stood at sink to wash his hands; assisted with positioning at end of session waiting breakfast; new nectar thickened water obtained for pt per his request     Balance  Comments: SBA for sitting EOB and CGA for standing with RW           OutComes Score                                                  AM-PAC Score  AM-PAC Inpatient Mobility Raw Score : 17 (01/13/23 [facility])  -PAC Inpatient T-Scale Score : 42.13 (01/13/23 [facility])  Mobility Inpatient CMS 0-100% Score: 50.57 (01/13/23 [facility])  Mobility Inpatient CMS G-Code Modifier : CK (01/13/23 [facility])          Tinneti Score       Goals  Short Term Goals  Time Frame for Short Term Goals: by acute discharge - all goals ongoing  Short Term Goal 1: bed mobility SBA  Short Term Goal 2: sit<>stand with SBA  Short Term Goal 3: ambulate > 30' with RW and SBA  Patient Goals   Patient Goals : none stated       Education  Patient Education  Education Given To: Patient  Education Provided Comments: reviewed call light and not getting up without assist  Education Method: Verbal  Education Outcome: Verbalized understanding;Continued education needed      Therapy Time   Individual Concurrent Group Co-treatment   Time In 0744         Time Out 0822         Minutes 45                 SHANTEL AGUILAR PT   Electronically signed by SHANTEL AGUILAR PT on 1/23/2023 at 8:22 AM

## 2023-01-23 NOTE — PROGRESS NOTES
[facility]   Speech Therapy  Daily Dysphagia Treatment Note    Vonnie Wagner  AGE: 80 y.o. 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)    Chronic constipation    Former cigarette smoker    Epigastric pain    Ventral hernia    Anemia in other chronic diseases classified elsewhere    Current moderate episode of major depressive disorder without prior episode (HCC)    Fatigue    Sleep disorder    Non-English speaking patient    Nocturia    Urinary frequency    Chronic kidney disease, stage IV (severe) ([facility])    Ataxia    Frequent falls    Frailty syndrome in geriatric patient    Chronic pain syndrome    Bradycardia    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 chronic kidney disease (HCC)    Hyperkalemia    Chronic midline low back pain    SOB (shortness of breath)    Bacterial pneumonia     No Known Allergies    Treatment Diagnosis: Dysphagia     Chart review:   2023 admitted with c/o generalized weakness, fatigue, poor appetite  MD ADMISSION H&P HPI:  Patient is a 27-year-old male with past medical history of CAD, CKD stage IV, hyperlipidemia who presents to the hospital due to patient having generalized weakness. Patient also has poor appetite, fatigue as well as generalized weakness. Patient does not have any recent sick contacts, no reported fevers chills. No reported nausea vomiting diarrhea. 2023 COVID +     IMAGING:    CXR 23:   Impression   1. Patchy bibasilar airspace disease which could reflect atelectasis versus   pneumonia. 2.  Stable diffuse bilateral calcified pleural plaques       CXR 1/9/23  Impression   1. Patchy right basilar airspace disease could represent atelectasis versus   pneumonia. 2.  Multiple bilateral calcified pleural plaques       CXR: 1/5/2023  Impression   Ill-defined opacities bilaterally. GI note 1/16/23-EGD   The esophageal mucosa appeared normal.  The gastroesophageal junction is also normal.  The patient has a small hiatal hernia. Examination of the stomach revealed a normal gastric mucosa. In the duodenum, multiple ulcers were noted. Most ulcers had a clean base. However, an adherent clot was noted on an ulcer in the proximal duodenal bulb. There was no evidence of active bleeding. Epinephrine 1: 10,000 was injected on both sides of the ulcer. Next, the clot was removed. The base of the ulcer was visualized. A small visible vessel was noted. Next, a large Hemoclip was placed. Adequate positioning was confirmed. Multiple flushes were performed . No active bleeding was noted. Recommendations: Resume IV PPI. Start clear liquid diet. Patient has multiple ulcers in the duodenum. He remains at risk for rebleed. Continue to monitor closely. Trend H&H. Subjective:   Current Diet Level: Minced and moist, mildly thick liquids    1/10/2023 Clear liquids ; Thin liquids  (despite recommendation for mildly thick liquids prior to being made NPO)    1/11/23: GI wrote for regular diet this date 1/11/23; nsg concern that pt cannot tolerate solids-recommendation for minced and moist/mildly thick liquids    1/16/23:  GI wrote for regular diet/thin liquids despite patient previously on minced and moist, mildly thick liquids, nursing reports she has been thickening his drinks    1/17/23: RN called therapist after tx reporting patient reports dislike of soft and bite-sized prefers minced and moist, diet changed back to minced and moist. MD ordered CSE for concern for aspiration     Comments regarding tolerating Current Diet:   Adequate diet tolerance reported    Objective:     Pain: Did not state               Vision: [x]WFL []Impaired:  Hearing: []WFL [x]Impaired: [place]    Cognitive/behavioral/communication:   Oriented to [x] self [x] place [x] Date (month and year) [] situation  [x]alert []lethargic  [x]cooperative []self-limiting   [x]confused at times  []distractible []agitated []impulsive  [x]verbally responsive []nonverbal []limited verbal responses  [x]follows one step commands []does not follow dx []follows complex commands  []aphasic []dysarthric  [] other:     Respiratory Status: [x]Room Air []O2 via nasal cannula []Other:  Dentition: []Adequate []Dentures [x]Missing Most Teeth []Edentulous []Other:  Vocal Quality: [x]Normal []Dysphonic  []Aphonic  []Hoarse []Wet []Weak []Other:  Volitional Cough: [x]Strong []Weak []Wet []Absent []Congested []Other  Volitional Swallow:   []Absent  [x]Delayed []Adequate []Required use of drink     Oral Mechanism Exam:  []WFL []Mild   [x] Moderate  []Severe  []To be assessed  Impaired:   []Left side      []Right side    [x]Labial ROM/Coordination    []Labial Symmetry   [x]Lingual ROM/Coordination   []Lingual Symmetry  []Gag  []Other:     Patient Positioning: Upright in chair    PO Trials: Thin Liquids: suspect at risk for premature loss of bolus, delayed swallow initiation, decreased laryngeal elevation, cough 80% of trials, belching noted  Nectar thick liquids: suspect at risk for premature loss of bolus, delayed swallow initiation, decreased laryngeal elevation, no overt s/s of aspiration or penetration, belching noted  Honey Thick liquids: DNT  Puree: suspect at risk for premature loss of bolus, delayed swallow initiation, decreased laryngeal elevation, no overt s/s of aspiration or penetration  minced and moist: declined   Soft solid: DNT    Dysphagia Tx:   Direct Dysphagia tx: PO tolerance as indicated above.  belching continues to be noted this session with liquids. Suspect potential new baseline of minced and moist/mildly thick liquids  Dysphagia ex: completed lingula ROM; exaggerated yawn, falsetto, effortful swallow, pitch swings, glottals x10 each with mod imp and rest breaks d/t fatigue  Training in compensatory strategies: small single sips, reviewed purpose of diet recommendations, sit upright  Pt response to ex/training: verbalized understanding, suspect ongoing education required    Goals:    Pt will functionally tolerate recommended diet with no overt clinical s/s of aspiration ongoing  Pt will demonstrate understanding of aspiration risk and precautions via education/demonstration with occasional prompting ongoing  Pt will advance to least restrictive diet as indicated  ongoing     Assessment:   Impressions:   Accepted and tolerated treatment at bedside  Patient alert, cooperative, pleasant; follows simple dx; verbally responsive, oriented to self, situation, kind of place, month, year and day  Dysphagia status appears stable: Mild-moderate oral dysphagia characterized by decreased lingual coordination, generalized oral weakness, prolonged mastication of bolus improving ability to masticate soft and bite-sized, suspect at risk for premature loss of bolus. Pt endorses being on a soft diet in home environment reporting he eats rice, mashed potatoes, spaghetti, etc due to majority of teeth missing. Moderate pharyngeal stage dysphagia characterized by delayed swallow and decreased laryngeal elevation. No overt s/s of aspiration or penetration with thickened liquids. Ongoing s/s of aspiration/penetration with thin liquids. Belching noted with liquids  Recommend cont minced and moist, mildly thick liquids, patient declined upgrade to soft and bite sized textures.    If concern for aspiration, a Modified Barium Swallow Study can be completed to further assess swallow function with MD order  ST to follow for diet tolerance, upgrade readiness, and tx appropriateness monitor    Recommended Diet and Intervention 1/23/2023:  Diet Solids Recommendation:  Minced and moist   Liquid Consistency Recommendation:  Mildly (nectar) thick liquids  Recommended form of Meds: Meds crushed as able in puree      Compensatory Swallowing Strategies:  Upright as possible with all PO intake , Small bites/sips , Remain upright 30-45 min     EDUCATION:   Provided education regarding role of SLP, results of assessment, recommendations and general speech pathology plan of care. [] Pt verbalized understanding and agreement   [x] Pt requires ongoing learning   [] No evidence of comprehension     Dysphagia Prognosis: [] good [x]fair []poor []guarded  Barriers: Current co-morbidities    Plan:   Continue Dysphagia Therapy: YES    Interventions: Diet Tolerance Monitoring , Patient/Family Education   Duration/Frequency of therapy while on unit: Speech therapy for dysphagia tx 3-5 times per week during acute care stay. Discharge Instructions:   Recommend ongoing SLP for dysphagia therapy upon discharge from hospital     This note serves as a D/C Summary in the event that this patient is discharged prior to the next therapy session. Coded treatment time: 0  Total treatment time: [address] [facility]  Betzy Higgins, [id number]  Speech-Language Pathologist  Portable phone: [phone]  On 01/23/23 at 11:00 AM

## 2023-01-23 NOTE — PROGRESS NOTES
1812- PICC line removed from RUE at this time, pressure dressing applied. Tip is intact. No complications, pt tolerated well.   Electronically signed by Clementine Estevez RN on 1/23/2023 at 6:13 PM

## 2023-01-23 NOTE — CARE COORDINATION
[facility] Authorization Received via [employer] Portal:    Plan Auth ID:    Sera Arreaga ID:  [id number]  Service:  SNF  Approval Dates:  01/23/2023-01/25/2023  Next Review Date:  01/25/2023    YAO Borges, LACHO, Social Work/Case Management   [phone]  Electronically signed by YAO Borges, LACHO on 1/23/2023 at 4:49 PM

## 2023-01-23 NOTE — PROGRESS NOTES
18- RN called [place] 3 times, attempting to give nursing report on pt. Reached answering machine. RN left messing with phone number, awaiting return call back at this time.   Electronically signed by Fartun Elmore RN on 1/23/2023 at 6:18 PM

## 2023-01-23 NOTE — PROGRESS NOTES
Department of Internal Medicine  Nephrology Progress Note    HPI.    89 y.o. male whom we were asked to see for RONI on CKD.  Followed by Dr. Zapata in the office, last 08/22 with eGFR 18 ml/min.  He presents with weakness and dyspnea.  He was diagnosed with Covid-19. Renal cosn called for RONI .      Events noted , chart reviewed     HPI:  Breathing comfortably.  No CP.  Reports eating better.  ROS:  In bed.  PMFSH:  medications reviewed.    Physical Exam:    VITALS:  BP (!) 120/56   Pulse 56   Temp 97.6 °F (36.4 °C) (Oral)   Resp 22   Ht 5' 6\" (1.676 m)   Wt 144 lb 6.4 oz (65.5 kg)   SpO2 94%   BMI 23.31 kg/m²   24HR INTAKE/OUTPUT:    Intake/Output Summary (Last 24 hours) at 1/22/2023 2005  Last data filed at 1/22/2023 1501  Gross per 24 hour   Intake 100 ml   Output 1200 ml   Net -1100 ml         Constitutional:  Looks comfortable   Respiratory:  CTAB  Gastrointestinal No tenderness.  Normal Bowel Sounds  Cardiovascular:  S1, S2 RRR   Edema:   + edema  Skin:  no rash    DATA:    CBC:  Lab Results   Component Value Date/Time    WBC 7.3 01/22/2023 12:08 PM    RBC 2.57 01/22/2023 12:08 PM    HGB 7.6 01/22/2023 12:08 PM    HCT 23.8 01/22/2023 12:08 PM    MCV 92.7 01/22/2023 12:08 PM    MCH 29.7 01/22/2023 12:08 PM    MCHC 32.0 01/22/2023 12:08 PM    RDW 15.3 01/22/2023 12:08 PM     01/22/2023 12:08 PM    MPV 8.6 01/22/2023 12:08 PM     CMP:  Lab Results   Component Value Date/Time     01/22/2023 10:30 AM    K 4.7 01/22/2023 10:30 AM    K 4.2 01/12/2023 04:52 AM     01/22/2023 10:30 AM    CO2 22 01/22/2023 10:30 AM    BUN 41 01/22/2023 10:30 AM    CREATININE 2.6 01/22/2023 10:30 AM    GFRAA 24 11/05/2021 01:23 PM    AGRATIO 1.0 01/09/2023 09:43 AM    LABGLOM 22 01/22/2023 10:30 AM    GLUCOSE 115 01/22/2023 10:30 AM    PROT 5.0 01/10/2023 04:18 AM    CALCIUM 8.3 01/22/2023 10:30 AM    BILITOT <0.2 01/10/2023 04:18 AM    ALKPHOS 60 01/10/2023 04:18 AM    AST 44 01/10/2023 04:18 AM    ALT 40  01/10/2023 04:18 AM      Hepatic Function Panel:   Lab Results   Component Value Date/Time    ALKPHOS 60 01/10/2023 04:18 AM    ALT 40 01/10/2023 04:18 AM    AST 44 01/10/2023 04:18 AM    PROT 5.0 01/10/2023 04:18 AM    BILITOT <0.2 01/10/2023 04:18 AM    BILIDIR <0.2 01/10/2023 04:18 AM    IBILI see below 01/10/2023 04:18 AM      Phosphorus:   Lab Results   Component Value Date/Time    PHOS 3.1 01/22/2023 10:30 AM       ASSESSMENT/PLAN:    1) 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 CKD progression vs. pre-renal  - renal function is better than baseline, and I suspect the lowe creatinine is due to loss of muscle mass  - midline is fine from renal standpoint for lab draws     2) Covid-19  - out of isolation     3) PNA  - off Abx     4) FEN  - metabolic acidosis              - on PO sodium bicarbonate supplementation      5) anemia  - labs noted   - s/p PRBC 01/10/23  - redose retacrit today and continue weekly    6) UGIB  - GI consulted  - EGD showed duodenal ulcer with adherent clot  - monitoring Hgb     7) AMS   - overall better. Patient is looking very debilitated and has deteriorated significantly compared to even a couple of months ago. Tried to get ahold of son at the end of last week to discuss case with him as the son always comes with his father to the office without success. Still a full code. As he is eating better, I do not believe we can justify a PEG tube, specially since he is capable of eating. .  I believe they have an upcoming appointment with me and then we will have a discussion about his father's status and also include his daughter in the visit via phone.

## 2023-01-23 NOTE — PROGRESS NOTES
INPATIENT PROGRESS NOTE        IDENTIFYING DATA/REASON FOR CONSULTATION   PATIENT:  Zulma Cruz  MRN:  [id number]  ADMIT DATE: 2023  TIME OF EVALUATION: 2023 9:13 AM  HOSPITAL STAY:   LOS: 18 days   CONSULTING PHYSICIAN: Vicki Ackerman MD   REASON FOR CONSULTATION: Melena, Acute blood loss anemia    Subjective:    Patient seen in follow up. Patient has no complaints this morning. Denies abdominal pain, nausea or vomiting. He reports he didn't eat much but attributes it to not liking the food. Appetite good    MEDICATIONS   SCHEDULED:  epoetin julian-epbx, 10,000 Units, Weekly  pantoprazole (PROTONIX) 40 mg injection, 40 mg, Q12H  lidocaine 1 % injection, 5 mL, Once  sodium bicarbonate, 650 mg, BID  dorzolamide-timolol, 1 drop, BID  docusate sodium, 100 mg, BID  sodium chloride flush, 10 mL, 2 times per day  atorvastatin, 40 mg, Nightly  escitalopram, 10 mg, Nightly  polyethylene glycol, 17 g, Daily  senna, 1 tablet, BID  tamsulosin, 0.4 mg, Nightly    FLUIDS/DRIPS:     sodium chloride      sodium chloride       PRNs: phenol, 1 spray, Q2H PRN  sodium chloride, , PRN  Benzocaine-Menthol, 1 lozenge, Q2H PRN  bisacodyl, 10 mg, Daily PRN  sodium chloride flush, 10 mL, PRN  sodium chloride, , PRN  promethazine, 12.5 mg, Q6H PRN   Or  ondansetron, 4 mg, Q6H PRN  acetaminophen, 650 mg, Q6H PRN   Or  acetaminophen, 650 mg, Q6H PRN  nitroGLYCERIN, 0.4 mg, Q5 Min PRN  traMADol, 50 mg, Q8H PRN    ALLERGIES:  No Known Allergies      PHYSICAL EXAM   VITALS:  BP (!) 114/53   Pulse 58   Temp 98 °F (36.7 °C) (Oral)   Resp 18   Ht 5' 6\" (1.676 m)   Wt 145 lb 1 oz (65.8 kg)   SpO2 95%   BMI 23.41 kg/m²   TEMPERATURE:  Current - Temp: 98 °F (36.7 °C);  Max - Temp  Av.7 °F (36.5 °C)  Min: 97.5 °F (36.4 °C)  Max: 98 °F (36.7 °C)    Physical Exam:  General appearance: alert, cooperative, no distress  Eyes: Anicteric  Head: Normocephalic, without obvious abnormality  Lungs: clear to auscultation bilaterally, Normal Effort  Heart: regular rate and rhythm, normal S1 and S2, no murmurs or rubs  Abdomen: soft, non-distended,minimally tender epigastric area. Bowel sounds normal.   Extremities: atraumatic, no cyanosis or edema  Skin: warm and dry, no jaundice  Neuro: Grossly intact, A&OX3    LABS AND IMAGING   Laboratory   Recent Labs     01/21/23  0500 01/21/23  1416 01/22/23  1208 01/23/23  0523   WBC 7.0  --  7.3 6.5   HGB 7.8* 7.9* 7.6* 7.6*   HCT 23.8* 25.0* 23.8* 24.0*   MCV 93.3  --  92.7 93.5     --  206 186     Recent Labs     01/21/23  0500 01/22/23  1030 01/23/23  0523    139 142   K 4.6 4.7 4.6   * 108 109   CO2 21 22 22   PHOS 2.9 3.1 3.4   BUN 41* 41* 41*   CREATININE 2.8* 2.6* 2.7*     No results for input(s): AST, ALT, ALB, BILIDIR, BILITOT, ALKPHOS in the last 72 hours. No results for input(s): LIPASE, AMYLASE in the last 72 hours. No results for input(s): PROTIME, INR in the last 72 hours. Imaging  CT ABDOMEN PELVIS WO CONTRAST Additional Contrast? None   Final Result   Heterogeneous and nodular bilateral lower lobe infiltrate; correlate for   pneumonia or aspiration. Sequela prior asbestos exposure. Prostatomegaly. Correlate with urologic history. Circumferential mural thickening the bladder, which could reflect cystitis or   hypertrophy. Correlate with urinalysis. Large bladder diverticulum. Air   within the lumen of the bladder, presumably secondary to catheterization. Age-indeterminate fractures of left L3 and L4 transverse processes. 1.1 cm linear metallic foreign body within the proximal duodenum; correlate   with any history of intervention or ingestion. XR CHEST PORTABLE   Final Result   1. Right-sided PICC line with the tip in the SVC/atrial junction. 2.  Chronic appearing interstitial changes and calcified pleural plaques. Right basilar airspace disease could represent superimposed pneumonia.          XR CHEST PORTABLE   Final Result 1.  Patchy bibasilar airspace disease which could reflect atelectasis versus   pneumonia. 2.  Stable diffuse bilateral calcified pleural plaques         XR CHEST PORTABLE   Final Result   1. Patchy right basilar airspace disease could represent atelectasis versus   pneumonia. 2.  Multiple bilateral calcified pleural plaques         CT SOFT TISSUE NECK WO CONTRAST   Final Result   No acute findings, soft tissue mass or lymphadenopathy evident. XR CHEST 1 VIEW   Final Result   Ill-defined opacities bilaterally. Endoscopy  EGD 1/9/23 with Dr. Kenny Paez  Normal esophagus without Mcdonald's or reflux changes, 3cm sliding hiatal hernia, normal stomach, 6mm clean-based duodenal ulcer, 15mm clean-based duodenal ulcer in the bulb. In the sweep, there was a 2cm ulcer with a visible vessel in the distal aspect of the ulcer. I was not able to see the very proximal aspect of the ulcer. Epinephrine 1:10,000 was injected in each of 4 quadrants around the ulcer with excellent blanching of tissue. A total of 3.5mL was injected. Next, the vessel was obliterated using gold probe cautery with excellent hemostasis. No bleeding at the end of the procedure. 2nd portion of the duodenum was normal.       ASSESSMENT AND RECOMMENDATIONS   Alem Cordero is a 80 y.o. male with PMH of CAD with coronary stents, HLD, CKD stage IV, chronic anemia BPH who presented on 1/5/2023 with generalized weakness, decreased oral intake. Diagnosed with COVID-19, bacterial pneumonia, acute on CKD. We have been consulted regarding bloody stool, acute on  chronic anemia. EGD 1/9 showed 2 cm ulcer with visible vessel in duodenal sweep treated with epi injection and gold probe cautery. Patient continued to pass dark blood clots. Repeat EGD 1/16showed multiple duodenal ulcers with 1 ulcer with adherent clot treated with epinephrine and Hemoclip    Duodenal ulcer with hemorrhage requiring endoscopic therapy x2 (1/9 and 1/16). H. pylori stool antigen negative. Completed 72 hrs PPI drip. On BID PPI  Acute blood loss anemia 2/2 #1. Hgb stable. Acute on CKD  COVID pneumonia    RECOMMENDATIONS:    Continue  PPI BID  Monitor for signs of rebleeding  Avoid NSAIDs  Monitor oral intake. Continue ensure shakes with meals. If you have any questions or need any further information, please feel free to contact us [phone]. Thank you for allowing us to participate in the care of Maryana Mayers. The note was completed using Dragon voice recognition transcription. Every effort was made to ensure accuracy; however, inadvertent transcription errors may be present despite my best efforts to edit errors. Liliana BAILEY    Attending physician addendum:    I have personally seen and examined the patient, reviewed the patient's medical record and pertinent labs and clinical imaging. I have personally staffed the case with Liliana BAILEY. I agree with her consultation note, exam findings, assessment and plans  as written above. I have made appropriate modifications and edited her assessment and plan where needed to reflect my impression and plans for this patient. Maryana Mayers is a 77-year-old male who presents with GI bleeding. EGD showed multiple duodenal ulcers with visible vessel treated with epinephrine/clipping. H/H trending down. H. pylori stool antigen negative. Recommend BID PPI x 8 weeks. Avoid NSAIDs. PO intake has improved. We will hold off on PEG placement. Outpatient follow-up with LYNN Block. Will sign off. Thank you for allowing me to participate in this patient's care. If there are any questions or concerns regarding this patient, or the plan we have set in place, please feel free to contact me at [phone].      Lazara Olmstead MD

## 2023-01-23 NOTE — CARE COORDINATION
CASE MANAGEMENT DISCHARGE SUMMARY:    DISCHARGE DATE: 1/23/2023    DISCHARGED TO:  [facility]  Address:  [address] [place] [address], [address]   Phone:  [phone]  Fax:  [phone]    TRANSPORTATION: [facility]              TIME: 7:30/7:45 PM     INSURANCE PRECERT OBTAINED: obtained via Edward [employer]: completed     Spoke to daughter, Bang Du. Notified of discharge and transport time. Confirmed e-mail is - [email or web address], resources sent via e-mail. Spoke to patient, patient agreeable to discharge plan. Spoke to Curt at [facility], confirmed they can accept patient today. Notified of precert obtained.      YAO Atkinson, DONALDOW, Social Work/Case Management   [phone]  Electronically signed by YAO Atkinson, LACHO on 1/23/2023 at 4:59 PM

## 2023-01-24 NOTE — PROGRESS NOTES
Pt left per squad to [facility] with bag of belongings, blanket, robe, coat and plant.

## 2023-01-31 ENCOUNTER — APPOINTMENT (OUTPATIENT)
Dept: GENERAL RADIOLOGY | Age: 88
End: 2023-01-31
Payer: MEDICARE

## 2023-01-31 ENCOUNTER — HOSPITAL ENCOUNTER (EMERGENCY)
Age: 88
Discharge: HOME | End: 2023-01-31
Attending: EMERGENCY MEDICINE
Payer: MEDICARE

## 2023-01-31 VITALS
RESPIRATION RATE: 22 BRPM | HEIGHT: 66 IN | TEMPERATURE: 97.6 F | OXYGEN SATURATION: 98 % | WEIGHT: 150.79 LBS | DIASTOLIC BLOOD PRESSURE: 62 MMHG | BODY MASS INDEX: 24.23 KG/M2 | HEART RATE: 56 BPM | SYSTOLIC BLOOD PRESSURE: 112 MMHG

## 2023-01-31 DIAGNOSIS — N18.9 CHRONIC KIDNEY DISEASE, UNSPECIFIED CKD STAGE: ICD-10-CM

## 2023-01-31 DIAGNOSIS — E86.0 MILD DEHYDRATION: Primary | ICD-10-CM

## 2023-01-31 LAB
A/G RATIO: 0.8 (ref 1.1–2.2)
ALBUMIN SERPL-MCNC: 2.8 G/DL (ref 3.4–5)
ALP BLD-CCNC: 97 U/L (ref 40–129)
ALT SERPL-CCNC: 22 U/L (ref 10–40)
ANION GAP SERPL CALCULATED.3IONS-SCNC: 10 MMOL/L (ref 3–16)
AST SERPL-CCNC: 23 U/L (ref 15–37)
BACTERIA: ABNORMAL /HPF
BASOPHILS ABSOLUTE: 0.1 K/UL (ref 0–0.2)
BASOPHILS RELATIVE PERCENT: 0.8 %
BILIRUB SERPL-MCNC: <0.2 MG/DL (ref 0–1)
BILIRUBIN URINE: NEGATIVE
BLOOD, URINE: ABNORMAL
BUN BLDV-MCNC: 41 MG/DL (ref 7–20)
CALCIUM SERPL-MCNC: 8.8 MG/DL (ref 8.3–10.6)
CHLORIDE BLD-SCNC: 108 MMOL/L (ref 99–110)
CLARITY: CLEAR
CO2: 21 MMOL/L (ref 21–32)
COLOR: YELLOW
CREAT SERPL-MCNC: 3.3 MG/DL (ref 0.8–1.3)
EOSINOPHILS ABSOLUTE: 0.2 K/UL (ref 0–0.6)
EOSINOPHILS RELATIVE PERCENT: 3.2 %
EPITHELIAL CELLS, UA: 1 /HPF (ref 0–5)
GFR SERPL CREATININE-BSD FRML MDRD: 17 ML/MIN/{1.73_M2}
GLUCOSE BLD-MCNC: 112 MG/DL (ref 70–99)
GLUCOSE URINE: NEGATIVE MG/DL
HCT VFR BLD CALC: 27.5 % (ref 40.5–52.5)
HEMOGLOBIN: 8.6 G/DL (ref 13.5–17.5)
HYALINE CASTS: 4 /LPF (ref 0–8)
KETONES, URINE: NEGATIVE MG/DL
LACTIC ACID, SEPSIS: 1.7 MMOL/L (ref 0.4–1.9)
LEUKOCYTE ESTERASE, URINE: ABNORMAL
LYMPHOCYTES ABSOLUTE: 1 K/UL (ref 1–5.1)
LYMPHOCYTES RELATIVE PERCENT: 14.1 %
MCH RBC QN AUTO: 29.4 PG (ref 26–34)
MCHC RBC AUTO-ENTMCNC: 31.2 G/DL (ref 31–36)
MCV RBC AUTO: 94.1 FL (ref 80–100)
MICROSCOPIC EXAMINATION: YES
MONOCYTES ABSOLUTE: 0.6 K/UL (ref 0–1.3)
MONOCYTES RELATIVE PERCENT: 7.6 %
NEUTROPHILS ABSOLUTE: 5.5 K/UL (ref 1.7–7.7)
NEUTROPHILS RELATIVE PERCENT: 74.3 %
NITRITE, URINE: NEGATIVE
PDW BLD-RTO: 16.6 % (ref 12.4–15.4)
PH UA: 5.5 (ref 5–8)
PLATELET # BLD: 233 K/UL (ref 135–450)
PMV BLD AUTO: 8.4 FL (ref 5–10.5)
POTASSIUM REFLEX MAGNESIUM: 4 MMOL/L (ref 3.5–5.1)
PROTEIN UA: 100 MG/DL
RBC # BLD: 2.92 M/UL (ref 4.2–5.9)
RBC UA: 9 /HPF (ref 0–4)
SODIUM BLD-SCNC: 139 MMOL/L (ref 136–145)
SPECIFIC GRAVITY UA: 1.01 (ref 1–1.03)
TOTAL PROTEIN: 6.3 G/DL (ref 6.4–8.2)
URINE REFLEX TO CULTURE: ABNORMAL
URINE TYPE: ABNORMAL
UROBILINOGEN, URINE: 0.2 E.U./DL
WBC # BLD: 7.4 K/UL (ref 4–11)
WBC UA: 3 /HPF (ref 0–5)

## 2023-01-31 PROCEDURE — 85025 COMPLETE CBC W/AUTO DIFF WBC: CPT

## 2023-01-31 PROCEDURE — 80053 COMPREHEN METABOLIC PANEL: CPT

## 2023-01-31 PROCEDURE — 83605 ASSAY OF LACTIC ACID: CPT

## 2023-01-31 PROCEDURE — 96360 HYDRATION IV INFUSION INIT: CPT

## 2023-01-31 PROCEDURE — 99284 EMERGENCY DEPT VISIT MOD MDM: CPT

## 2023-01-31 PROCEDURE — 2580000003 HC RX 258: Performed by: PHYSICIAN ASSISTANT

## 2023-01-31 PROCEDURE — 71045 X-RAY EXAM CHEST 1 VIEW: CPT

## 2023-01-31 PROCEDURE — 81001 URINALYSIS AUTO W/SCOPE: CPT

## 2023-01-31 PROCEDURE — 36415 COLL VENOUS BLD VENIPUNCTURE: CPT

## 2023-01-31 PROCEDURE — 96361 HYDRATE IV INFUSION ADD-ON: CPT

## 2023-01-31 RX ORDER — 0.9 % SODIUM CHLORIDE 0.9 %
1000 INTRAVENOUS SOLUTION INTRAVENOUS ONCE
Status: COMPLETED | OUTPATIENT
Start: 2023-01-31 | End: 2023-01-31

## 2023-01-31 RX ADMIN — SODIUM CHLORIDE 1000 ML: 9 INJECTION, SOLUTION INTRAVENOUS at 15:28

## 2023-01-31 ASSESSMENT — PAIN - FUNCTIONAL ASSESSMENT: PAIN_FUNCTIONAL_ASSESSMENT: NONE - DENIES PAIN

## 2023-01-31 NOTE — ED PROVIDER NOTES
[address] [facility]        Pt Name: José Jacobs  MRN: [id number]  [place] 6/16/1931  Date of evaluation: 1/31/2023  Provider: Natacha Bowser PA-C  PCP: Mesfin Palomino MD  Note Started: 4:18 PM EST 1/31/23      TRACE. I have evaluated this patient. My supervising physician was available for consultation. Dr. Ruben Copeland also consulted and evaluated and treated this patient as the supervising ED physician. CHIEF COMPLAINT       Chief Complaint   Patient presents with    Abnormal Lab     Pt brought in by EMS from [address] [place] [address] home for hemoglobin of 8.4       HISTORY OF PRESENT ILLNESS: 1 or more Elements     History From: Patient and EMS      José Jacobs is a 80 y.o. male who presents stating that he has been feeling pretty well recently. He has been walking as well as usual.  No breathing difficulty. No acute abdominal issue. He states he is not certain why the nursing home sent him in to be evaluated. Reportedly nursing home did some blood work and found that his hemoglobin was in the low eights region and decided to send him in to be evaluated and treated. Patient denies any particular fatigue compared to usual and states that he is eating and drinking okay, passing urine and stool okay. Nursing Notes were all reviewed and agreed with or any disagreements were addressed in the HPI. REVIEW OF SYSTEMS :      Review of Systems  Patient denies any recent fevers or chills or cough or congestion. Usual headache vision change neck pain or stiffness shortness of breath or chest pain or palpitations. No syncope or near syncope or abdominal bloating or difficulty passing urine or stool or extremity acute weakness or rash  Positives and Pertinent negatives as per HPI.      SURGICAL HISTORY     Past Surgical History:   Procedure Laterality Date    CORONARY ANGIOPLASTY WITH STENT PLACEMENT  10/18/15    ENDOSCOPY, COLON, DIAGNOSTIC UPPER GASTROINTESTINAL ENDOSCOPY  10-    UPPER GASTROINTESTINAL ENDOSCOPY N/A 1/9/2023    EGD SUBMUCOSAL EPINEPHRINE INJECTION performed by Georgette Costa MD at [address] [facility]  1/9/2023    EGD  ABLATION WITH GOLD PROBE performed by Georgette Costa MD at [address] [facility] 1/16/2023    EGD SUBMUCOSAL EPINEPHRINE INJECTION performed by Katy Cruz MD at [address] [facility]  1/16/2023    [facility]       Previous Medications    ATORVASTATIN (LIPITOR) 40 MG TABLET    TAKE 1 TABLET BY MOUTH DAILY    BISACODYL (DULCOLAX) 10 MG SUPPOSITORY    Place 1 suppository rectally daily as needed for Constipation    CARVEDILOL (COREG) 3.125 MG TABLET    TAKE 1 TABLET BY MOUTH TWICE DAILY    DOCUSATE SODIUM (COLACE, DULCOLAX) 100 MG CAPS    Take 100 mg by mouth 2 times daily    DORZOLAMIDE-TIMOLOL (COSOPT) 22.3-6.8 MG/ML OPHTHALMIC SOLUTION    INSTILL 1 DROP IN BOTH EYES TWICE DAILY    ESCITALOPRAM (LEXAPRO) 10 MG TABLET    TAKE 1 TABLET BY MOUTH DAILY    FERROUS SULFATE 324 (65 FE) MG EC TABLET    Take 1 tablet by mouth daily (with breakfast)    MULTIPLE VITAMINS-MINERALS (ICAPS AREDS 2 PO)    Take 1 tablet by mouth daily    NITROGLYCERIN (NITROSTAT) 0.4 MG SL TABLET    Place 1 tablet under the tongue every 5 minutes as needed for Chest pain    PANTOPRAZOLE (PROTONIX) 40 MG TABLET    Take 1 tablet by mouth in the morning and at bedtime    PATIROMER SORBITEX CALCIUM (VELTASSA) 8.4 G PACK PACKET    Take 8.4 g by mouth daily     POLYETHYLENE GLYCOL (GLYCOLAX) 17 G PACKET    Take 17 g by mouth daily as needed for Constipation Hold for diarrhea.     SENNA (SENOKOT) 8.6 MG TABLET    Take 1 tablet by mouth 2 times daily    SODIUM BICARBONATE 650 MG TABLET    Take 1 tablet by mouth 2 times daily    SOLIFENACIN (VESICARE) 5 MG TABLET TAKE 1 TABLET BY MOUTH DAILY    TAMSULOSIN (FLOMAX) 0.4 MG CAPSULE    TAKE 1 CAPSULE BY MOUTH EVERY DAY       ALLERGIES     Patient has no known allergies. FAMILYHISTORY       Family History   Problem Relation Age of Onset    Heart Disease Mother         SOCIAL HISTORY       Social History     Tobacco Use    Smoking status: Former     Packs/day: 0.50     Types: Cigarettes     Quit date: 2013     Years since quittin.1    Smokeless tobacco: Never   Substance Use Topics    Alcohol use: No    Drug use: No       SCREENINGS                         CIWA Assessment  BP: (!) 92/51  Heart Rate: 62           PHYSICAL EXAM  1 or more Elements     ED Triage Vitals [23 1455]   BP Temp Temp Source Heart Rate Resp SpO2 Height Weight   (!) 92/51 97.6 °F (36.4 °C) Oral 62 18 96 % 5' 6\" (1.676 m) 150 lb 12.7 oz (68.4 kg)       Physical Exam  Vitals and nursing note reviewed. Constitutional:       Appearance: Normal appearance. He is not toxic-appearing or diaphoretic. HENT:      Head: Normocephalic and atraumatic. Right Ear: External ear normal.      Left Ear: External ear normal.      Nose: Nose normal.      Mouth/Throat:      Mouth: Mucous membranes are moist.      Comments: Gag reflex intact  Eyes:      General:         Right eye: No discharge. Left eye: No discharge. Conjunctiva/sclera: Conjunctivae normal.   Cardiovascular:      Rate and Rhythm: Normal rate and regular rhythm. Pulses: Normal pulses. Heart sounds: Normal heart sounds. No murmur heard. No gallop. Pulmonary:      Effort: Pulmonary effort is normal. No respiratory distress. Breath sounds: Normal breath sounds. No wheezing, rhonchi or rales. Abdominal:      General: Bowel sounds are normal.      Palpations: Abdomen is soft. Tenderness: There is no abdominal tenderness. There is no guarding or rebound.       Comments: Mild increased tympany in the left abdomen with no pain or tenderness or rebound or guarding. Musculoskeletal:         General: Normal range of motion. Cervical back: Normal range of motion and neck supple. No rigidity. Right lower leg: No edema. Left lower leg: No edema. Lymphadenopathy:      Cervical: No cervical adenopathy. Skin:     General: Skin is warm and dry. Capillary Refill: Capillary refill takes less than 2 seconds. Neurological:      Mental Status: He is alert and oriented to person, place, and time. Mental status is at baseline. Psychiatric:         Mood and Affect: Mood normal.         Behavior: Behavior normal.       DIAGNOSTIC RESULTS   LABS:    Labs Reviewed   CBC WITH AUTO DIFFERENTIAL - Abnormal; Notable for the following components:       Result Value    RBC 2.92 (*)     Hemoglobin 8.6 (*)     Hematocrit 27.5 (*)     RDW 16.6 (*)     All other components within normal limits   COMPREHENSIVE METABOLIC PANEL W/ REFLEX TO MG FOR LOW K - Abnormal; Notable for the following components:    Glucose 112 (*)     BUN 41 (*)     Creatinine 3.3 (*)     Est, Glom Filt Rate 17 (*)     Total Protein 6.3 (*)     Albumin 2.8 (*)     Albumin/Globulin Ratio 0.8 (*)     All other components within normal limits   LACTATE, SEPSIS   URINALYSIS WITH REFLEX TO CULTURE   LACTATE, SEPSIS       When ordered only abnormal lab results are displayed. All other labs were within normal range or not returned as of this dictation. EKG: When ordered, EKG's are interpreted by the Emergency Department Physician in the absence of a cardiologist.  Please see their note for interpretation of EKG.     RADIOLOGY:   Non-plain film images such as CT, Ultrasound and MRI are read by the radiologist. Plain radiographic images are visualized and preliminarily interpreted by the ED Provider with the below findings:    1 view chest x-ray shows stable and subacute chronic changes per myself in absence of radiologist.    Interpretation per the Radiologist below, if available at the time of this note:    XR CHEST PORTABLE   Final Result   Stable appearance of chronic interstitial changes and calcified pleural   plaques. No acute findings           XR CHEST PORTABLE    Result Date: 1/31/2023  EXAMINATION: ONE XRAY VIEW OF THE CHEST 1/31/2023 3:13 pm COMPARISON: Chest x-ray dated 18 January 2023 HISTORY: ORDERING SYSTEM PROVIDED HISTORY: low blood pressure TECHNOLOGIST PROVIDED HISTORY: Reason for exam:->low blood pressure Reason for Exam: low blood pressure FINDINGS: Stable cardiomediastinal silhouette. No pneumothorax or pleural effusion. Stable appearance of chronic interstitial changes and calcified pleural plaques. Stable appearance of chronic interstitial changes and calcified pleural plaques. No acute findings       No results found. 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 EMERGENCY DEPARTMENT COURSE and DIFFERENTIAL DIAGNOSIS/MDM:   Vitals:    Vitals:    01/31/23 1455   BP: (!) 92/51   Pulse: 62   Resp: 18   Temp: 97.6 °F (36.4 °C)   TempSrc: Oral   SpO2: 96%   Weight: 150 lb 12.7 oz (68.4 kg)   Height: 5' 6\" (1.676 m)       Patient was given the following medications:  Medications   0.9 % sodium chloride bolus (1,000 mLs IntraVENous New Bag 1/31/23 1528)             Is this patient to be included in the SEP-1 Core Measure due to severe sepsis or septic shock?    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 CONSULTS: (Who and What was discussed)  None          Records Reviewed (Source): recent lab values    CC/HPI Summary, DDx, ED Course, and Reassessment: This patient presents as above and evaluation and treatment is begun here. It is noted patient has lower than normal blood pressure here and some IV fluids are ordered. Also general work-up. CBC comes back showing a hemoglobin 8.6 and hematocrit 27.5 which are relatively robust compared to many of patient's recent lab values over the course of recent months in the electronic medical record. CMP does show a mild increase in creatinine at 3.3 but BUN at patient's baseline of 41.  1 L normal saline IV bolus ordered. Lactic acid not elevated. 1 view chest x-ray shows stable and subacute chronic changes per myself in absence of radiologist.  Urinalysis still pending. Patient does have a mild dehydration and chronic kidney disease. However it is anticipated that after urinalysis returns and IV fluids are finished that he will likely be able to be discharged home back to the nursing facility. Dr. Hand Forward is monitoring and treating the patient in the meantime. Patient is in fair condition. I am the Primary Clinician of Record. FINAL IMPRESSION      1. Mild dehydration    2.  Chronic kidney disease, unspecified CKD stage          DISPOSITION/PLAN     DISPOSITION        PATIENT REFERRED TO:  No follow-up provider specified.     DISCHARGE MEDICATIONS:  New Prescriptions    No medications on file       DISCONTINUED MEDICATIONS:  Discontinued Medications    No medications on file              (Please note that portions of this note were completed with a voice recognition program.  Efforts were made to edit the dictations but occasionally words are mis-transcribed.)    Aaron Amaya PA-C (electronically signed)        Aaron Amaya PA-C  01/31/23 0865

## 2023-01-31 NOTE — ED PROVIDER NOTES
I have personally performed a face to face diagnostic evaluation on this patient. I have fully participated in the care of this patient I personally saw the patient and performed a substantive portion of the visit including all aspects of the medical decision making. I have reviewed and agree with all pertinent clinical information including history, physical exam, diagnostic tests, and the plan. HPI: Chele De La Torre presented with concern for low hemoglobin sent from nursing home. However patient has history of low hemoglobin and recently has had hemoglobins as low as 7. Patient has extensive past medical history. Otherwise been feeling well. No difficulty breathing. No abdominal pain. No fatigue. I took over for TRACE to evaluate patient after IV fluid administration as patient had slightly low normal blood pressure. See TRACE note for further details. Chief Complaint   Patient presents with    Abnormal Lab     Pt brought in by EMS from [address] [place] [address] home for hemoglobin of 8.4      Review of Systems: See TRACE note  Vital Signs: BP (!) 123/51   Pulse 62   Temp 97.6 °F (36.4 °C) (Oral)   Resp 18   Ht 5' 6\" (1.676 m)   Wt 150 lb 12.7 oz (68.4 kg)   SpO2 96%   BMI 24.34 kg/m²     Alert 80 y.o. male who does not appear toxic or acutely ill  HENT: Atraumatic, oral mucosa moist  Neck: Grossly normal ROM  Chest/Lungs: respiratory effort normal   Abdomen: Soft nontender  Musculoskeletal: Grossly normal ROM  Skin: No palor or diaphoresis    Medical Decision Making and Plan:  Pertinent Labs & Imaging studies reviewed. (See TRACE chart for details)  I agree with TRACE assessment and plan. Patient has slightly abnormal blood pressure gave IV fluids and significantly improved. Hemoglobin is 8.3. Very improved from previous hemoglobins patient has no obvious signs of bleeding. Patient has slightly elevated creatinine but not significantly elevated above his baseline. Patient has a history of CKD.   Likely mild dehydration. Patient has urinalysis does not show signs of infection. Will discharge patient back to nursing home as he is otherwise normal vital signs and is asymptomatic. Patient has known history of anemia and known history of CKD no other acute findings. The patient is at low risk for mortality based on demographic, history and clinical factors. Given the best available information and clinical assessment, I estimate the risk of hospitalization to be greater than risk of treatment at home. I have explained to the patient that the risk could rapidly change, given precautions for return and instructions. Explained to patient that the risk for mortality is low based on demographic, history and clinical factors. I discussed with patient the results of evaluation in the ED, diagnosis, care, and prognosis. The plan is to discharge to home. Patient is in agreement with plan and questions have been answered. I also discussed with patient the reasons which may require a return visit and the importance of follow-up care. The patient is well-appearing, nontoxic, and improved at the time of discharge. Patient agrees to call to arrange follow-up care as directed. Patient understands to return immediately for worsening/change in symptoms.        I personally saw the patient and independently provided 0 minutes of nonconcurrent critical care out of the total shared critical care time provided     Charu Brown MD  01/31/23 0030

## 2023-02-01 ENCOUNTER — CARE COORDINATION (OUTPATIENT)
Dept: CARE COORDINATION | Age: 88
End: 2023-02-01

## 2023-02-01 NOTE — ED NOTES
Discharge and education instructions reviewed. Patient verbalized understanding, teach-back successful. Patient denied questions at this time. No acute distress noted. Patient instructed to follow-up as noted - return to emergency department if symptoms worsen. Patient verbalized understanding. Discharged per EDMD with discharged instructions.      Willie Douglas RN  01/31/23 1949

## 2023-02-01 NOTE — CARE COORDINATION
RN, ACM attempted telephone outreach this date. Left HIPPA compliant voice message with ACM contact information for call back.

## 2023-02-03 ENCOUNTER — CARE COORDINATION (OUTPATIENT)
Dept: CARE COORDINATION | Age: 88
End: 2023-02-03

## 2023-02-06 ENCOUNTER — TELEPHONE (OUTPATIENT)
Dept: INTERNAL MEDICINE CLINIC | Age: 88
End: 2023-02-06

## 2023-02-06 NOTE — TELEPHONE ENCOUNTER
Nabila Veronica from [facility] just to confirm Dr Gina Mejia will follow up with the pt home health care orders?   [phone]

## 2023-02-08 ENCOUNTER — CARE COORDINATION (OUTPATIENT)
Dept: CARE COORDINATION | Age: 88
End: 2023-02-08

## 2023-02-08 DIAGNOSIS — N18.4 ANEMIA IN STAGE 4 CHRONIC KIDNEY DISEASE (HCC): ICD-10-CM

## 2023-02-08 DIAGNOSIS — D63.1 ANEMIA IN STAGE 4 CHRONIC KIDNEY DISEASE (HCC): ICD-10-CM

## 2023-02-08 DIAGNOSIS — I10 ESSENTIAL HYPERTENSION: Primary | ICD-10-CM

## 2023-02-08 SDOH — SOCIAL STABILITY: SOCIAL NETWORK: HOW OFTEN DO YOU GET TOGETHER WITH FRIENDS OR RELATIVES?: MORE THAN THREE TIMES A WEEK

## 2023-02-08 SDOH — HEALTH STABILITY: MENTAL HEALTH: HOW OFTEN DO YOU HAVE A DRINK CONTAINING ALCOHOL?: NEVER

## 2023-02-08 SDOH — ECONOMIC STABILITY: FOOD INSECURITY: WITHIN THE PAST 12 MONTHS, THE FOOD YOU BOUGHT JUST DIDN'T LAST AND YOU DIDN'T HAVE MONEY TO GET MORE.: NEVER TRUE

## 2023-02-08 SDOH — HEALTH STABILITY: PHYSICAL HEALTH: ON AVERAGE, HOW MANY MINUTES DO YOU ENGAGE IN EXERCISE AT THIS LEVEL?: 10 MIN

## 2023-02-08 SDOH — SOCIAL STABILITY: SOCIAL NETWORK
IN A TYPICAL WEEK, HOW MANY TIMES DO YOU TALK ON THE PHONE WITH FAMILY, FRIENDS, OR NEIGHBORS?: MORE THAN THREE TIMES A WEEK

## 2023-02-08 SDOH — SOCIAL STABILITY: SOCIAL NETWORK: ARE YOU MARRIED, WIDOWED, DIVORCED, SEPARATED, NEVER MARRIED, OR LIVING WITH A PARTNER?: WIDOWED

## 2023-02-08 SDOH — SOCIAL STABILITY: SOCIAL NETWORK
DO YOU BELONG TO ANY CLUBS OR ORGANIZATIONS SUCH AS CHURCH GROUPS UNIONS, FRATERNAL OR ATHLETIC GROUPS, OR SCHOOL GROUPS?: NO

## 2023-02-08 SDOH — HEALTH STABILITY: MENTAL HEALTH: HOW MANY STANDARD DRINKS CONTAINING ALCOHOL DO YOU HAVE ON A TYPICAL DAY?: PATIENT DOES NOT DRINK

## 2023-02-08 SDOH — SOCIAL STABILITY: SOCIAL NETWORK: HOW OFTEN DO YOU ATTEND CHURCH OR RELIGIOUS SERVICES?: NEVER

## 2023-02-08 SDOH — ECONOMIC STABILITY: INCOME INSECURITY: IN THE LAST 12 MONTHS, WAS THERE A TIME WHEN YOU WERE NOT ABLE TO PAY THE MORTGAGE OR RENT ON TIME?: NO

## 2023-02-08 SDOH — ECONOMIC STABILITY: TRANSPORTATION INSECURITY
IN THE PAST 12 MONTHS, HAS LACK OF TRANSPORTATION KEPT YOU FROM MEETINGS, WORK, OR FROM GETTING THINGS NEEDED FOR DAILY LIVING?: NO

## 2023-02-08 SDOH — ECONOMIC STABILITY: FOOD INSECURITY: WITHIN THE PAST 12 MONTHS, YOU WORRIED THAT YOUR FOOD WOULD RUN OUT BEFORE YOU GOT MONEY TO BUY MORE.: NEVER TRUE

## 2023-02-08 SDOH — HEALTH STABILITY: PHYSICAL HEALTH: ON AVERAGE, HOW MANY DAYS PER WEEK DO YOU ENGAGE IN MODERATE TO STRENUOUS EXERCISE (LIKE A BRISK WALK)?: 3 DAYS

## 2023-02-08 SDOH — ECONOMIC STABILITY: HOUSING INSECURITY
IN THE LAST 12 MONTHS, WAS THERE A TIME WHEN YOU DID NOT HAVE A STEADY PLACE TO SLEEP OR SLEPT IN A SHELTER (INCLUDING NOW)?: NO

## 2023-02-08 SDOH — ECONOMIC STABILITY: TRANSPORTATION INSECURITY
IN THE PAST 12 MONTHS, HAS THE LACK OF TRANSPORTATION KEPT YOU FROM MEDICAL APPOINTMENTS OR FROM GETTING MEDICATIONS?: NO

## 2023-02-08 SDOH — SOCIAL STABILITY: SOCIAL NETWORK: HOW OFTEN DO YOU ATTENT MEETINGS OF THE CLUB OR ORGANIZATION YOU BELONG TO?: NEVER

## 2023-02-08 SDOH — HEALTH STABILITY: MENTAL HEALTH
STRESS IS WHEN SOMEONE FEELS TENSE, NERVOUS, ANXIOUS, OR CAN'T SLEEP AT NIGHT BECAUSE THEIR MIND IS TROUBLED. HOW STRESSED ARE YOU?: ONLY A LITTLE

## 2023-02-08 SDOH — ECONOMIC STABILITY: INCOME INSECURITY: HOW HARD IS IT FOR YOU TO PAY FOR THE VERY BASICS LIKE FOOD, HOUSING, MEDICAL CARE, AND HEATING?: NOT VERY HARD

## 2023-02-08 SDOH — ECONOMIC STABILITY: HOUSING INSECURITY: IN THE LAST 12 MONTHS, HOW MANY PLACES HAVE YOU LIVED?: 1

## 2023-02-08 NOTE — CARE COORDINATION
Ambulatory Care Coordination Note  2023    Patient Current Location:  Home: [id number]    ACM contacted the patient by telephone. Verified name and  with patient as identifiers. Provided introduction to self, and explanation of the ACM role. ACM: Raymundo Holstein, RN    Challenges to be reviewed by the provider   Additional needs identified to be addressed with provider: No  Ongoing assessment of needs during care coordination episode, plan of care sent to PCP                Method of communication with provider: chart routing. Summary Note: 72-year-old male patient of Jodi Dukes MD with PMH of HTN, kidney disease. RN, ACM spoke with patient/patients son via telephone session this date. Patient/patients son provided to RN, ACM 2 patient identifiers. Patient has ACP documents on file. RN, ACM introduced patient/patients son to Care Coordination and RPM services and educated that CC and RPM provides additional support, resources and health education to improve patients' health and wellness while teaching self-management skills. Patient verbalized understanding and in agreement with CC and RPM enrollment and follow up. Patient has no acute changes to his health status a time of ACM outreach, he has all medications in the home and is taking as prescribed. Patient lives with his son, Patients son provides transportation and assists patient with ADL's , meal prep, shopping when needed. Patient has all his medications in the home and is taking as prescribed. Patient does not have any difficulty affording medications at this time. RN, ACM competes disease specific assessments, sent patient disease specific education, completed SDOH, created care coordination goals. PLAN:   Continue to work towards goals, address patient needs, provide CC education, support and care  Call back in 1 week  Assess and execute referrals PRN   Send Plan of Care to PCP . route   Did patient review education sent to him       Offered patient enrollment in the Remote Patient Monitoring (RPM) program for in-home monitoring: Yes, patient enrolled:     Remote Patient Monitoring Enrollment Note      Date/Time: 2/8/2023 1:06 PM    Offered patient enrollment in the [facility] Remote Patient Monitoring (RPM) program for in home monitoring for Kidney Disease and HTN. Patient accepted RPM services. Patient will be monitoring the following daily:  blood pressure heart rate, pulse ox reading, and weight    ACM reviewed the information below with patient:    Emergency Contact (name and contact number): Fan Jaime [phone]    [x] A member from the care coordination team will reach out to notify the patient once the RPM kit is ordered. [x] Once the kit is delivered, the [facility] team will contact the patient after UPS deliver to assist with set up. [x] Determined BP cuff size: regular (9.05\"-15.74\")      [x] Determined weight scale: regular (<330lbs)                                                [x] Hours of ACM monitoring - Monday-Friday [id number]                     All questions about RPM program answered at this time. .    Ambulatory Care Coordination Assessment    Care Coordination Protocol  Referral from Primary Care Provider: No  Week 1 - Initial Assessment     Do you have all of your prescriptions and are they filled?: Yes  Barriers to medication adherence: None  Are you able to afford your medications?: Yes  How often do you have trouble taking your medications the way you have been told to take them?: I always take them as prescribed. Do you have Home O2 Therapy?: No      Ability to seek help/take action for Emergent Urgent situations i.e. fire, crime, inclement weather or health crisis.: Needs Assistance  Ability to ambulate to restroom: Independent  Ability handle personal hygeine needs (bathing/dressing/grooming):  Independent  Ability to manage Medications: Needs Assistance  Ability to prepare Food Preparation: Needs Assistance  Ability to maintain home (clean home, laundry): Needs Assistance  Ability to drive and/or has transportation: Needs Assistance  Ability to do shopping: Needs Assistance  Ability to manage finances: Independent  Is patient able to live independently?: No     Current Housing: Private Residence        Per the Fall Risk Screening, did the patient have 2 or more falls or 1 fall with injury in the past year?: No     Frequent urination at night?: No  Do you use rails/bars?: Yes  Do you have a non-slip tub mat?: Yes     Are you experiencing loss of meaning?: No  Are you experiencing loss of hope and peace?: No     Thinking about your patient's physical health needs, are there any symptoms or problems (risk indicators) you are unsure about that require further investigation?: Mild vague physical symptoms or problems; but do not impact on daily life or are not of concern to patient   Are the patients physical health problems impacting on their mental well-being?: Mild impact on mental well-being e.g. \"\"feeling fed-up\"\", \"\"reduced enjoyment\"\"   Are there any problems with your patients lifestyle behaviors (alcohol, drugs, diet, exercise) that are impacting on physical or mental well-being?: Some mild concern of potential negative impact on well-being   Do you have any other concerns about your patients mental well-being?  How would you rate their severity and impact on the patient?: No identified areas of concern   How would you rate their home environment in terms of safety and stability (including domestic violence, insecure housing, neighbor harassment)?: Consistently safe, supportive, stable, no identified problems   How do daily activities impact on the patient's well-being? (include current or anticipated unemployment, work, caregiving, access to transportation or other): Some general dissatisfaction but no concern   How would you rate their social network (family, work, friends)?: Good participation with social networks   How would you rate their financial resources (including ability to afford all required medical care)?: Financially secure, resources adequate, no identified problems   How wells does the patient now understand their health and well-being (symptoms, signs or risk factors) and what they need to do to manage their health?: Reasonable to good understanding and already engages in managing health or is willing to undertake better management   How well do you think your patient can engage in healthcare discussions? (Barriers include language, deafness, aphasia, alcohol or drug problems, learning difficulties, concentration): Adequate communication, with or without minor barriers   Do other services need to be involved to help this patient?: Other care/services not required at this time   Are current services involved with this patient well-coordinated? (Include coordination with other services you are now recommendation):  All required care/services in place and well-coordinated   Suggested Interventions and Community Resources  Fall Risk Prevention: Completed Meals on Wheels: Declined   Medi Set or Pill Pack: Completed   Transportation Services: Completed         Set up/Review an Education Plan, Set up/Review Goals              Future Appointments   Date Time Provider Carlos Leon   2/17/2023 12:45 PM Dari Spann MD [facility]

## 2023-02-08 NOTE — CARE COORDINATION
Remote Patient Kit Ordering Note      Date/Time:  2/8/2023 2:55 PM      [] CCSS confirmed patient shipping address  [x] Patient will receive package over the next 2-4 business days. Someone 21 years or older must be present to sign for UPS delivery. [x] Patient to contact virtual installation-specific phone number listed in the patient instructions. [x] If the patient does not contact HRS within 24 hours, an [employer] [facility] will call the patient directly: If the patient does not answer, HRS will follow up with the clinical team notifying them about the unsuccessful attempt to contact the patient. HRS will make three call attempts to the patient. [x] LPN will contact patient once equipment is active to welcome them to the program.                                                         [x] Hours of RPM monitoring - Monday-Friday [id number]                     All questions answered at this time. ACM made aware the RPM kit has been ordered. CCSS notified patient of RPM equipment order.

## 2023-02-08 NOTE — TELEPHONE ENCOUNTER
[facility]. and told them Dr. Michael Ballard will follow [facility]..

## 2023-02-08 NOTE — PROGRESS NOTES
2/8/23 1:47 PM       Remote Patient Monitoring Treatment Plan    Received request from [employer] [employer] RN to order remote patient monitoring for in home monitoring of Kidney Disease  and HTN and order completed. Patient will be monitoring blood pressure   pulse ox   weight. Pt has documented hx of HF in EMR. Please provide RPM weight monitoring as per our protocol for all patients with HF. Patient will engage in Remote Patient Monitoring each day to develop the skills necessary for self management. RPM Care Team Responsibilities:   Alerts will be reviewed daily and addressed within 2-4 hours during operational hours (Monday -Friday 9 am-4 pm)  Alert response and intervention documented in patient medical record  Alert response escalated to PCP per protocol and documented in patient medical record  Patient monitored over approximately  days  Discharge from program based on self-management readiness    See care coordination encounters for additional details.      Kamille Blabuena DNP, FNP-C, Remote Patient Monitoring NP, () [phone]

## 2023-02-14 ENCOUNTER — HOSPITAL ENCOUNTER (EMERGENCY)
Age: 88
Discharge: HOME | End: 2023-02-14
Payer: MEDICARE

## 2023-02-14 VITALS
TEMPERATURE: 98.1 F | SYSTOLIC BLOOD PRESSURE: 123 MMHG | RESPIRATION RATE: 14 BRPM | DIASTOLIC BLOOD PRESSURE: 59 MMHG | HEART RATE: 60 BPM | OXYGEN SATURATION: 98 %

## 2023-02-14 DIAGNOSIS — R33.9 URINARY RETENTION: Primary | ICD-10-CM

## 2023-02-14 DIAGNOSIS — N30.00 ACUTE CYSTITIS WITHOUT HEMATURIA: ICD-10-CM

## 2023-02-14 LAB
BACTERIA: ABNORMAL /HPF
BILIRUBIN URINE: NEGATIVE
BLOOD, URINE: ABNORMAL
CLARITY: CLEAR
COLOR: YELLOW
EPITHELIAL CELLS, UA: 1 /HPF (ref 0–5)
GLUCOSE URINE: NEGATIVE MG/DL
HYALINE CASTS: 4 /LPF (ref 0–8)
KETONES, URINE: NEGATIVE MG/DL
LEUKOCYTE ESTERASE, URINE: ABNORMAL
MICROSCOPIC EXAMINATION: YES
NITRITE, URINE: NEGATIVE
PH UA: 5.5 (ref 5–8)
PROTEIN UA: 30 MG/DL
RBC UA: 5 /HPF (ref 0–4)
SPECIFIC GRAVITY UA: 1.01 (ref 1–1.03)
URINE REFLEX TO CULTURE: YES
URINE TYPE: ABNORMAL
UROBILINOGEN, URINE: 0.2 E.U./DL
WBC UA: 95 /HPF (ref 0–5)

## 2023-02-14 PROCEDURE — 87186 SC STD MICRODIL/AGAR DIL: CPT

## 2023-02-14 PROCEDURE — 87086 URINE CULTURE/COLONY COUNT: CPT

## 2023-02-14 PROCEDURE — 51798 US URINE CAPACITY MEASURE: CPT

## 2023-02-14 PROCEDURE — 87077 CULTURE AEROBIC IDENTIFY: CPT

## 2023-02-14 PROCEDURE — 81001 URINALYSIS AUTO W/SCOPE: CPT

## 2023-02-14 PROCEDURE — 99283 EMERGENCY DEPT VISIT LOW MDM: CPT

## 2023-02-14 RX ORDER — CEFUROXIME AXETIL 250 MG/1
250 TABLET ORAL 2 TIMES DAILY
Qty: 14 TABLET | Refills: 0 | Status: SHIPPED | OUTPATIENT
Start: 2023-02-14 | End: 2023-02-21

## 2023-02-14 ASSESSMENT — PAIN - FUNCTIONAL ASSESSMENT
PAIN_FUNCTIONAL_ASSESSMENT: 0-10
PAIN_FUNCTIONAL_ASSESSMENT: NONE - DENIES PAIN

## 2023-02-14 ASSESSMENT — PAIN SCALES - GENERAL: PAINLEVEL_OUTOF10: 6

## 2023-02-14 NOTE — ED NOTES
Discharge and education instructions reviewed. Patient verbalized understanding, teach-back successful. Patient denied questions at this time. No acute distress noted. Patient instructed to follow-up as noted - return to emergency department if symptoms worsen. Patient verbalized understanding. Discharged per EDMD with discharged instructions.      Hiro Garza RN  02/14/23 3661

## 2023-02-14 NOTE — DISCHARGE INSTRUCTIONS
Call Dr. Swartz Gone today to let him know that you had to come to the emergency department to have a catheter placed. He may not want to see you over the next couple of days, but it is important to allow him to know that you failed your voiding trial.  Return if your symptoms worsen or new concerning symptoms present.

## 2023-02-14 NOTE — ED NOTES
[place]han cath performed per sterile protocol. Urine specimen obtained and sent to lab. Call light within reach. Will continue to monitor patient.       Ellen Mobley RN  02/14/23 3408

## 2023-02-15 ENCOUNTER — CARE COORDINATION (OUTPATIENT)
Dept: CARE COORDINATION | Age: 88
End: 2023-02-15

## 2023-02-15 DIAGNOSIS — I25.10 CORONARY ARTERY DISEASE INVOLVING NATIVE CORONARY ARTERY OF NATIVE HEART WITHOUT ANGINA PECTORIS: ICD-10-CM

## 2023-02-15 RX ORDER — ATORVASTATIN CALCIUM 40 MG/1
40 TABLET, FILM COATED ORAL DAILY
Qty: 90 TABLET | Refills: 0 | Status: SHIPPED | OUTPATIENT
Start: 2023-02-15

## 2023-02-15 NOTE — TELEPHONE ENCOUNTER
Patient requesting a medication refill.     Pharmacy: [facility]  Next office visit: 2/17/2023    Last regular office visit: 10/6/2022

## 2023-02-16 ENCOUNTER — CARE COORDINATION (OUTPATIENT)
Dept: CARE COORDINATION | Age: 88
End: 2023-02-16

## 2023-02-16 LAB
ORGANISM: ABNORMAL
URINE CULTURE, ROUTINE: ABNORMAL

## 2023-02-17 ENCOUNTER — CARE COORDINATION (OUTPATIENT)
Dept: CARE COORDINATION | Age: 88
End: 2023-02-17

## 2023-02-17 NOTE — CARE COORDINATION
Received notification from [facility] that patient has missed x3 install calls. SS call to patient to see if still interested in program, no answer. HIPAA compliant VML. Provided HRS hours of operation and phone number. Will route to [facility] for knowledge and f/u.

## 2023-02-17 NOTE — CARE COORDINATION
Attempted to reach pt to complete welcome call, no answer and VM box is full   Remote Patient Monitoring Note      Date/Time:  2/17/2023 3:04 PM    LPN reviewed patients reported daily Remote Patient Monitoring metrics. All reported metrics are within alert parameters. Plan/Follow Up:  Will continue to review, monitor and address alerts with follow up based on severity of symptoms and risk factors   -- Current Patient Metrics ---- Blood Pressure: 123/61, 54bpm Pulseox: 98%, 55bpm Survey: C Weight: 118.2lbs Note Created at: 02/17/2023 03:04 PM ET ---- Time-Spent: 3 minutes 0 seconds

## 2023-02-19 ASSESSMENT — ENCOUNTER SYMPTOMS
BACK PAIN: 0
COUGH: 0
VOMITING: 0
NAUSEA: 0
SORE THROAT: 0
DIARRHEA: 0
ABDOMINAL PAIN: 0
SHORTNESS OF BREATH: 0
EYE PAIN: 0
CONSTIPATION: 0

## 2023-02-19 NOTE — ED PROVIDER NOTES
[facility]        Pt Name: Richie Jimenez  MRN: [id number]  [place] 6/16/1931  Date of evaluation: 2/14/2023  Provider: Tarik Alvarado PA-C  PCP: Analisa Kilpatrick MD  Note Started: 3:44 PM EST 2/19/23      TRACE. I have evaluated this patient. My supervising physician was available for consultation. CHIEF COMPLAINT       Chief Complaint   Patient presents with    Urinary Retention     Pt arrives with c/o urinary retention. Per pt's son, pt had a catheter removed yesterday around 1030am and has not urinated since. HISTORY OF PRESENT ILLNESS: 1 or more Elements             Richie Jimenez is a 80 y.o. male who presents to the emergency department with complaint of inability to urinate since he had his catheter removed about a day and a half ago. He actually really denies any abdominal pain, however his son noticed that he has not been able to urinate and feels as if he needs a catheter replaced. Denies any fevers, change in mentation, abdominal pain, change in bowel movements. Nursing Notes were all reviewed and agreed with or any disagreements were addressed in the HPI. REVIEW OF SYSTEMS :      Review of Systems   Constitutional:  Negative for chills and fever. HENT:  Negative for ear pain and sore throat. Eyes:  Negative for pain and visual disturbance. Respiratory:  Negative for cough and shortness of breath. Cardiovascular:  Negative for chest pain and leg swelling. Gastrointestinal:  Negative for abdominal pain, constipation, diarrhea, nausea and vomiting. Genitourinary:  Positive for decreased urine volume. Negative for dysuria and hematuria. Musculoskeletal:  Negative for back pain and neck pain. Skin:  Negative for rash and wound. Neurological:  Negative for light-headedness and headaches. Positives and Pertinent negatives as per HPI.      SURGICAL HISTORY     Past Surgical History: Procedure Laterality Date    CORONARY ANGIOPLASTY WITH STENT PLACEMENT  10/18/15    ENDOSCOPY, COLON, DIAGNOSTIC      UPPER GASTROINTESTINAL ENDOSCOPY  10-    UPPER GASTROINTESTINAL ENDOSCOPY N/A 1/9/2023    EGD SUBMUCOSAL EPINEPHRINE INJECTION performed by Jigar Low MD at [facility]  1/9/2023    EGD  ABLATION WITH GOLD PROBE performed by Jigar Low MD at [facility] 1/16/2023    EGD SUBMUCOSAL EPINEPHRINE INJECTION performed by Tyler Bhakta MD at [address] [place] [address]  1/16/2023    EGD CONTROL HEMORRHAGE performed by Tyler Bhakta MD at [facility]. [address]       Discharge Medication List as of 2/14/2023  2:40 PM        CONTINUE these medications which have NOT CHANGED    Details   ferrous sulfate 324 (65 Fe) MG EC tablet Take 1 tablet by mouth daily (with breakfast), Disp-30 tablet, R-3Normal      sodium bicarbonate 650 MG tablet Take 1 tablet by mouth 2 times daily, Disp-10 tablet, R-0Normal      bisacodyl (DULCOLAX) 10 MG suppository Place 1 suppository rectally daily as needed for Constipation, Disp-30 suppository, R-2Normal      docusate sodium (COLACE, DULCOLAX) 100 MG CAPS Take 100 mg by mouth 2 times daily, Disp-60 capsule, R-3Normal      polyethylene glycol (GLYCOLAX) 17 g packet Take 17 g by mouth daily as needed for Constipation Hold for diarrhea., Disp-527 g, R-1Normal      pantoprazole (PROTONIX) 40 MG tablet Take 1 tablet by mouth in the morning and at bedtime, Disp-60 tablet, R-3Normal      solifenacin (VESICARE) 5 MG tablet TAKE 1 TABLET BY MOUTH DAILY, Disp-90 tablet, R-3**Patient requests 90 days supply**Normal      atorvastatin (LIPITOR) 40 MG tablet TAKE 1 TABLET BY MOUTH DAILY, Disp-90 tablet, R-0Normal      tamsulosin (FLOMAX) 0.4 MG capsule TAKE 1 CAPSULE BY MOUTH EVERY DAY, Disp-90 capsule, R-3Normal      carvedilol (COREG) 3.125 MG tablet TAKE 1 TABLET BY MOUTH TWICE DAILY, Disp-180 tablet, R-3Normal      escitalopram (LEXAPRO) 10 MG tablet TAKE 1 TABLET BY MOUTH DAILY, Disp-90 tablet, R-3Normal      senna (SENOKOT) 8.6 MG tablet Take 1 tablet by mouth 2 times daily, Disp-60 tablet, R-11Normal      dorzolamide-timolol (COSOPT) 22.3-6.8 MG/ML ophthalmic solution INSTILL 1 DROP IN BOTH EYES TWICE DAILYHistorical Med      nitroGLYCERIN (NITROSTAT) 0.4 MG SL tablet Place 1 tablet under the tongue every 5 minutes as needed for Chest pain, Disp-25 tablet,R-3Normal      Multiple Vitamins-Minerals (ICAPS AREDS 2 PO) Take 1 tablet by mouth dailyHistorical Med      patiromer sorbitex calcium (VELTASSA) 8.4 g PACK packet Take 8.4 g by mouth daily Historical Med             ALLERGIES     Patient has no known allergies. FAMILYHISTORY       Family History   Problem Relation Age of Onset    Heart Disease Mother         SOCIAL HISTORY       Social History     Tobacco Use    Smoking status: Former     Packs/day: 0.50     Types: Cigarettes     Quit date: 2013     Years since quittin.1    Smokeless tobacco: Never   Vaping Use    Vaping Use: Never used   Substance Use Topics    Alcohol use: No    Drug use: No       SCREENINGS        [place] Coma Scale  Eye Opening: Spontaneous  Best Verbal Response: Oriented  Best Motor Response: Obeys commands  [place] Coma Scale Score: 15                CIWA Assessment  BP: (!) 123/59  Heart Rate: 60           PHYSICAL EXAM  1 or more Elements     ED Triage Vitals [23 1321]   BP Temp Temp Source Heart Rate Resp SpO2 Height Weight   (!) 125/56 98.1 °F (36.7 °C) Temporal 52 20 100 % -- --       Physical Exam  Constitutional:       General: He is not in acute distress. Appearance: Normal appearance. He is not ill-appearing, toxic-appearing or diaphoretic. HENT:      Head: Normocephalic and atraumatic.       Right Ear: External ear normal.      Left Ear: External ear normal.      Nose: Nose normal. Eyes:      General:         Right eye: No discharge. Left eye: No discharge. Pulmonary:      Effort: Pulmonary effort is normal. No respiratory distress. Abdominal:      General: Bowel sounds are normal. There is distension (sp). Palpations: Abdomen is soft. Tenderness: There is no abdominal tenderness. There is no right CVA tenderness, left CVA tenderness or rebound. Musculoskeletal:         General: Normal range of motion. Cervical back: Normal range of motion. Skin:     General: Skin is warm and dry. Neurological:      General: No focal deficit present. Mental Status: He is alert and oriented to person, place, and time. Psychiatric:         Mood and Affect: Mood normal.         Behavior: Behavior normal.           DIAGNOSTIC RESULTS   LABS:    Labs Reviewed   CULTURE, URINE - Abnormal; Notable for the following components:       Result Value    Organism Enterococcus faecalis (*)     All other components within normal limits    Narrative:     ORDER#: [id number]                          ORDERED BY: Tracee Dodd  SOURCE: Urine Clean Catch                  COLLECTED:  02/14/23 13:45  ANTIBIOTICS AT [facility]                      RECEIVED :  02/14/23 14:22   URINALYSIS WITH REFLEX TO CULTURE - Abnormal; Notable for the following components:    Blood, Urine SMALL (*)     Protein, UA 30 (*)     Leukocyte Esterase, Urine LARGE (*)     All other components within normal limits   MICROSCOPIC URINALYSIS - Abnormal; Notable for the following components:    Bacteria, UA Rare (*)     WBC, UA 95 (*)     RBC, UA 5 (*)     All other components within normal limits       When ordered only abnormal lab results are displayed. All other labs were within normal range or not returned as of this dictation. EKG: When ordered, EKG's are interpreted by the Emergency Department Physician in the absence of a cardiologist.  Please see their note for interpretation of EKG.     RADIOLOGY:   Non-plain film images such as CT, Ultrasound and MRI are read by the radiologist. Plain radiographic images are visualized and preliminarily interpreted by the ED Provider with the below findings:        Interpretation per the Radiologist below, if available at the time of this note:    No orders to display     No results found. No results found. 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 Chronic Conditions affecting Care:     EMERGENCY DEPARTMENT COURSE and DIFFERENTIAL DIAGNOSIS/MDM:   Vitals:    Vitals:    02/14/23 1321 02/14/23 1447   BP: (!) 125/56 (!) 123/59   Pulse: 52 60   Resp: 20 14   Temp: 98.1 °F (36.7 °C)    TempSrc: Temporal    SpO2: 100% 98%       Patient was given the following medications:  Medications - No data to display          Is this patient to be included in the SEP-1 Core Measure due to severe sepsis or septic shock? No   Exclusion criteria - the patient is NOT to be included for SEP-1 Core Measure due to: Infection is not suspected    CONSULTS: (Who and What was discussed)  None              CC/HPI Summary, DDx, ED Course, and Reassessment:  This is a 80y.o. 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 who presents to the ED with complaint of urinary retention that began after catheter removal 1-1/2 days ago. Vitals upon arrival show within normal limits. Physical Exam shows as above. Bladder scan 600+  Chauhan catheter was placed without difficulty. He had 600+ return of urine. Urinalysis was sent. Indicative of infection. He was put on antibiotics. Ddx includes: Retention, urinary tract infection, other    Management Given:  -Ceftin p.o., Chauhan catheter indwelling, symptoms improved    Disposition: Discharge  Patient was informed to return to the Emergency Department if any new worsening or more concerning symptoms occur, in agreement with plan. Shared decision making was practiced, and patient discharged in stable condition. Informed to follow up with urologist for further evaluation. Medications were prescribed as below. Son will call tomorrow. All questions were answered. Disposition Considerations (include 1 Tests not done, Shared Decision Making, Pt Expectation of Test or Tx.): Discussed with son doing blood work and a scan of his abdomen due to his age. However with his recent catheter removal I have a lower suspicion of any other issues at this time. Believe that he is just probably going to be a chronic urinary retention and may need the catheter for the rest of his life. He agrees. He will follow-up with urology for further evaluation. Antibiotics for possible urinary tract infection. Culture was sent. We will follow-up with this with PCP versus urologist.        I am the Primary Clinician of Record. FINAL IMPRESSION      1. Urinary retention    2.  Acute cystitis without hematuria          DISPOSITION/PLAN     DISPOSITION Decision To Discharge 02/14/2023 02:24:03 PM      PATIENT REFERRED TO:  Darral Dance, MD  [address] [place] [address]  [phone]    Schedule an appointment as soon as possible for a visit in 1 day  for reevaluation    [facility] Emergency Department  [id number]  [phone]  Go to   As needed, If symptoms worsen      DISCHARGE MEDICATIONS:  Discharge Medication List as of 2/14/2023  2:40 PM        START taking these medications    Details   cefUROXime (CEFTIN) 250 MG tablet Take 1 tablet by mouth 2 times daily for 7 days, Disp-14 tablet, R-0Normal             DISCONTINUED MEDICATIONS:  Discharge Medication List as of 2/14/2023  2:40 PM                 (Please note that portions of this note were completed with a voice recognition program.  Efforts were made to edit the dictations but occasionally words are mis-transcribed.)    Alfredo Ramirez PA-C (electronically signed)            Alfredo Ramirez PA-C  02/19/23 1545

## 2023-02-20 ENCOUNTER — CARE COORDINATION (OUTPATIENT)
Dept: CARE COORDINATION | Age: 88
End: 2023-02-20

## 2023-02-20 NOTE — CARE COORDINATION
Remote Patient Monitoring Note      Date/Time:  2/20/2023 1:54 PM    LPN reviewed patients reported daily Remote Patient Monitoring metrics. All reported metrics are within alert parameters. Plan/Follow Up: Will continue to review, monitor and address alerts with follow up based on severity of symptoms and risk factors     Tried to call for a welcome call, no answer and VM box is full, unable to leave message.      -- Current Patient Metrics ---- Blood Pressure: 102/55, 51bpm Pulseox: 97%, 51bpm Survey: C Weight: 140.4lbs Note Created at: 02/20/2023 01:54 PM ET ---- Time-Spent: 3 minutes 0 seconds

## 2023-02-21 ENCOUNTER — TELEPHONE (OUTPATIENT)
Dept: INTERNAL MEDICINE CLINIC | Age: 88
End: 2023-02-21

## 2023-02-21 ENCOUNTER — CARE COORDINATION (OUTPATIENT)
Dept: CARE COORDINATION | Age: 88
End: 2023-02-21

## 2023-02-21 NOTE — CARE COORDINATION
Remote Patient Monitoring Note      Date/Time:  2/21/2023 4:30 PM    LPN reviewed patients reported daily Remote Patient Monitoring metrics. Patient has not updated daily metrics at this time. Plan/Follow Up: Will continue to review, monitor and address alerts with follow up based on severity of symptoms and risk factors     Attempted to contact pt again for welcome call, no answer and VM box is full, unable to leave VM.  Will notify ACM     -- Current Patient Metrics ---- Blood Pressure: -/-, -bpm Pulseox: -%, -bpm Survey: - Weight: -lbs Note Created at: 02/21/2023 04:31 PM ET ---- Time-Spent: 3 minutes 0 seconds

## 2023-02-21 NOTE — TELEPHONE ENCOUNTER
Gonzalez Garcia for [facility];s speech therapists. needs a verbal order for pt  swallow.      Ph [phone]

## 2023-02-22 ENCOUNTER — CARE COORDINATION (OUTPATIENT)
Dept: CARE COORDINATION | Age: 88
End: 2023-02-22

## 2023-02-22 NOTE — TELEPHONE ENCOUNTER
He is drinking thin liquids. Sometimes choking on them. He is drinking to fast.  Work on to Slower the reflux to be able to drink his thin liquids. He also would like to start eating regular food. Speech therapist would like to work on starting to help patient eat.

## 2023-02-22 NOTE — CARE COORDINATION
Tried to reach pt to complete welcome call, no answer, left HIPAA compliant VM on mobile phone (attempt #4)  Will pause patient tomorrow if writer is unsuccessful in reaching pt, ACM is aware   -- Current Patient Metrics ---- Blood Pressure: -/-, -bpm Pulseox: 96%, 58bpm Survey: - Weight: 141.0lbs Note Created at: 02/22/2023 03:16 PM ET ---- Time-Spent: 3 minutes 0 seconds

## 2023-02-23 DIAGNOSIS — R09.89 CHOKING IN ADULT: Primary | ICD-10-CM

## 2023-02-24 ENCOUNTER — CARE COORDINATION (OUTPATIENT)
Dept: CARE COORDINATION | Age: 88
End: 2023-02-24

## 2023-02-24 DIAGNOSIS — D63.1 ANEMIA IN STAGE 4 CHRONIC KIDNEY DISEASE (HCC): ICD-10-CM

## 2023-02-24 DIAGNOSIS — I10 ESSENTIAL HYPERTENSION: Primary | ICD-10-CM

## 2023-02-24 DIAGNOSIS — N18.4 ANEMIA IN STAGE 4 CHRONIC KIDNEY DISEASE (HCC): ICD-10-CM

## 2023-02-24 NOTE — PROGRESS NOTES
2/24/23 6:13 PM     Remote Patient Order Discontinued    Received request from Eneida Sims RN to discontinue order for remote patient monitoring of Kidney Disease  and HTN and order completed.      Shailesh Escobar DNP, FNP-C, Remote Patient Monitoring NP, (ph) [phone]

## 2023-02-24 NOTE — CARE COORDINATION
Patient care coordination episode ended this date. ACM unable to make successful outreach to patient for last 3 consecutive outreach attempts. ACM to send letter to patient via My Chart to make patient aware that ACM ended episode and provide ACM contact information for any future patient care coordination needs.

## 2023-02-24 NOTE — CARE COORDINATION
CCSS placed call to patient to arrange RPM kit  through [facility]. Reviewed with patient how to pack equipment in original packing. LVM    Verified patient's availability to schedule UPS  time. N/A LVM    UPS  time requested. Anticipated  date range 4-7 business days!

## 2023-03-07 ENCOUNTER — OFFICE VISIT (OUTPATIENT)
Dept: INTERNAL MEDICINE CLINIC | Age: 88
End: 2023-03-07
Payer: MEDICARE

## 2023-03-07 VITALS
WEIGHT: 143 LBS | OXYGEN SATURATION: 98 % | HEART RATE: 57 BPM | DIASTOLIC BLOOD PRESSURE: 60 MMHG | BODY MASS INDEX: 23.08 KG/M2 | SYSTOLIC BLOOD PRESSURE: 126 MMHG | RESPIRATION RATE: 12 BRPM

## 2023-03-07 DIAGNOSIS — Z96.0 URINARY CATHETER IN PLACE: ICD-10-CM

## 2023-03-07 DIAGNOSIS — R68.89 ABNORMAL FINDINGS ON AUSCULTATION: ICD-10-CM

## 2023-03-07 DIAGNOSIS — R05.8 NOCTURNAL COUGH: Primary | ICD-10-CM

## 2023-03-07 DIAGNOSIS — N18.4 CHRONIC KIDNEY DISEASE, STAGE IV (SEVERE) (HCC): ICD-10-CM

## 2023-03-07 DIAGNOSIS — R53.83 FATIGUE, UNSPECIFIED TYPE: ICD-10-CM

## 2023-03-07 DIAGNOSIS — I10 ESSENTIAL HYPERTENSION: ICD-10-CM

## 2023-03-07 DIAGNOSIS — K59.01 SLOW TRANSIT CONSTIPATION: ICD-10-CM

## 2023-03-07 DIAGNOSIS — N40.1 BENIGN PROSTATIC HYPERPLASIA WITH URINARY HESITANCY: ICD-10-CM

## 2023-03-07 DIAGNOSIS — G89.4 CHRONIC PAIN SYNDROME: ICD-10-CM

## 2023-03-07 DIAGNOSIS — Z09 ENCOUNTER FOR EXAMINATION FOLLOWING TREATMENT AT HOSPITAL: ICD-10-CM

## 2023-03-07 DIAGNOSIS — E78.2 MIXED HYPERLIPIDEMIA: ICD-10-CM

## 2023-03-07 DIAGNOSIS — R39.11 BENIGN PROSTATIC HYPERPLASIA WITH URINARY HESITANCY: ICD-10-CM

## 2023-03-07 DIAGNOSIS — R33.9 URINARY RETENTION: ICD-10-CM

## 2023-03-07 DIAGNOSIS — F32.1 CURRENT MODERATE EPISODE OF MAJOR DEPRESSIVE DISORDER WITHOUT PRIOR EPISODE (HCC): ICD-10-CM

## 2023-03-07 PROBLEM — J15.9 BACTERIAL PNEUMONIA: Status: RESOLVED | Noted: 2023-01-17 | Resolved: 2023-03-07

## 2023-03-07 PROCEDURE — 1123F ACP DISCUSS/DSCN MKR DOCD: CPT | Performed by: INTERNAL MEDICINE

## 2023-03-07 PROCEDURE — 99215 OFFICE O/P EST HI 40 MIN: CPT | Performed by: INTERNAL MEDICINE

## 2023-03-07 RX ORDER — SENNA PLUS 8.6 MG/1
1 TABLET ORAL 2 TIMES DAILY
Qty: 60 TABLET | Refills: 11 | Status: SHIPPED | OUTPATIENT
Start: 2023-03-07 | End: 2024-03-06

## 2023-03-07 ASSESSMENT — PATIENT HEALTH QUESTIONNAIRE - PHQ9
2. FEELING DOWN, DEPRESSED OR HOPELESS: 1
SUM OF ALL RESPONSES TO PHQ QUESTIONS 1-9: 6
4. FEELING TIRED OR HAVING LITTLE ENERGY: 1
SUM OF ALL RESPONSES TO PHQ9 QUESTIONS 1 & 2: 2
9. THOUGHTS THAT YOU WOULD BE BETTER OFF DEAD, OR OF HURTING YOURSELF: 0
SUM OF ALL RESPONSES TO PHQ QUESTIONS 1-9: 6
10. IF YOU CHECKED OFF ANY PROBLEMS, HOW DIFFICULT HAVE THESE PROBLEMS MADE IT FOR YOU TO DO YOUR WORK, TAKE CARE OF THINGS AT HOME, OR GET ALONG WITH OTHER PEOPLE: 1
8. MOVING OR SPEAKING SO SLOWLY THAT OTHER PEOPLE COULD HAVE NOTICED. OR THE OPPOSITE, BEING SO FIGETY OR RESTLESS THAT YOU HAVE BEEN MOVING AROUND A LOT MORE THAN USUAL: 0
7. TROUBLE CONCENTRATING ON THINGS, SUCH AS READING THE NEWSPAPER OR WATCHING TELEVISION: 0
1. LITTLE INTEREST OR PLEASURE IN DOING THINGS: 1
6. FEELING BAD ABOUT YOURSELF - OR THAT YOU ARE A FAILURE OR HAVE LET YOURSELF OR YOUR FAMILY DOWN: 1
3. TROUBLE FALLING OR STAYING ASLEEP: 1
5. POOR APPETITE OR OVEREATING: 1

## 2023-03-10 ENCOUNTER — TELEPHONE (OUTPATIENT)
Dept: INTERNAL MEDICINE CLINIC | Age: 88
End: 2023-03-10

## 2023-03-10 PROBLEM — R33.9 URINARY RETENTION: Status: ACTIVE | Noted: 2023-03-10

## 2023-03-10 PROBLEM — Z96.0 URINARY CATHETER IN PLACE: Status: ACTIVE | Noted: 2023-03-10

## 2023-03-10 NOTE — PROGRESS NOTES
Chief Complaint   Patient presents with    Follow-Up from Hospital       HPI: Here with son for htn followup and management of multiple chronic conditions as per the active problems list on chart, which I reviewed and updated with the patient today. States doing well with no new concerns except if noted below. ER 2/14 for urinary retention, now has catheter, and appt in urology following my visit today. Son notes nocturnal coughing persisting over a month. No leg swelling or orthopnea. I have reviewed the chart notes available from myself and other providers. I have reviewed and addressed all active problems and created or updated the problems list in detail, as needed. No problem-specific Assessment & Plan notes found for this encounter.       I have extensively reviewed and reconciled the medication list, discontinued medications not taking or no longer appropriate, and updated the active meds list      Lab Results   Component Value Date    LABMICR YES 02/14/2023    LABMICR YES 01/31/2023    LABMICR YES 01/05/2023       Lab Results   Component Value Date     01/31/2023     01/23/2023     01/22/2023    K 4.0 01/31/2023    K 4.6 01/23/2023    K 4.7 01/22/2023     01/31/2023     01/23/2023     01/22/2023    CO2 21 01/31/2023    CO2 22 01/23/2023    CO2 22 01/22/2023    BUN 41 (H) 01/31/2023    BUN 41 (H) 01/23/2023    BUN 41 (H) 01/22/2023    CREATININE 3.3 (H) 01/31/2023    CREATININE 2.7 (H) 01/23/2023    CREATININE 2.6 (H) 01/22/2023    GLUCOSE 112 (H) 01/31/2023    GLUCOSE 94 01/23/2023    GLUCOSE 115 (H) 01/22/2023    CALCIUM 8.8 01/31/2023    CALCIUM 7.9 (L) 01/23/2023    CALCIUM 8.3 01/22/2023       Lab Results   Component Value Date    CHOL 117 11/05/2021    CHOL 120 10/05/2020    CHOL 116 07/20/2018    TRIG 75 11/05/2021    TRIG 130 10/05/2020    TRIG 153 (H) 07/20/2018    HDL 58 11/05/2021    HDL 55 10/05/2020    HDL 55 09/25/2018    LDLCALC 44 11/05/2021    LDLCALC 39 10/05/2020    LDLCALC 46 09/25/2018       Lab Results   Component Value Date    ALT 22 01/31/2023    ALT 40 01/10/2023    ALT 45 (H) 01/09/2023    AST 23 01/31/2023    AST 44 (H) 01/10/2023    AST 49 (H) 01/09/2023       Lab Results   Component Value Date    TSH 2.36 08/21/2015    TSH 1.91 03/18/2015    TSH 1.88 09/18/2014       Lab Results   Component Value Date    WBC 7.4 01/31/2023    WBC 6.5 01/23/2023    WBC 7.3 01/22/2023    HGB 8.6 (L) 01/31/2023    HGB 7.6 (L) 01/23/2023    HGB 7.6 (L) 01/22/2023    HCT 27.5 (L) 01/31/2023    HCT 24.0 (L) 01/23/2023    HCT 23.8 (L) 01/22/2023    MCV 94.1 01/31/2023    MCV 93.5 01/23/2023    MCV 92.7 01/22/2023     01/31/2023     01/23/2023     01/22/2023       Lab Results   Component Value Date    INR 1.23 (H) 01/09/2023    INR 0.99 10/20/2015    INR 1.00 10/19/2015        Lab Results   Component Value Date    PSA 7.10 (H) 12/17/2013        No results found for: LABURIC          /60   Pulse 57   Resp 12   Wt 143 lb (64.9 kg)   SpO2 98%   BMI 23.08 kg/m²   Body mass index is 23.08 kg/m². Physical Exam  Constitutional:       General: He is not in acute distress. Appearance: He is not diaphoretic. Comments: Frail elderly male in wheelchair with catheter in place   HENT:      Head: Normocephalic and atraumatic. Nose:      Comments: masked  Eyes:      General: No scleral icterus. Right eye: No discharge. Left eye: No discharge. Conjunctiva/sclera: Conjunctivae normal.      Pupils: Pupils are equal, round, and reactive to light. Neck:      Thyroid: No thyromegaly. Vascular: No JVD. Trachea: No tracheal deviation. Cardiovascular:      Rate and Rhythm: Normal rate and regular rhythm. Heart sounds: Normal heart sounds. No murmur heard. No friction rub. No gallop. Pulmonary:      Effort: Pulmonary effort is normal. No respiratory distress. Breath sounds: No stridor. Rhonchi (coarse rhonchi at RUL posteriorly) present. No wheezing. Chest:      Chest wall: No tenderness. Abdominal:      General: Bowel sounds are normal. There is no distension. Palpations: Abdomen is soft. There is no mass. Tenderness: There is no abdominal tenderness. There is no right CVA tenderness, left CVA tenderness, guarding or rebound. Musculoskeletal:         General: No tenderness. Normal range of motion. Cervical back: Normal range of motion and neck supple. Lymphadenopathy:      Cervical: No cervical adenopathy. Skin:     General: Skin is warm and dry. Coloration: Skin is not pale. Findings: No erythema or rash. Neurological:      General: No focal deficit present. Mental Status: He is alert and oriented to person, place, and time. Cranial Nerves: No cranial nerve deficit. Motor: No abnormal muscle tone. Coordination: Coordination normal.      Deep Tendon Reflexes: Reflexes are normal and symmetric. Reflexes normal.   Psychiatric:         Mood and Affect: Mood normal.         Behavior: Behavior normal.         Thought Content: Thought content normal.         Judgment: Judgment normal.       ASSESSMENT AND PLANS:      Except as noted below, all chronic problems have been reviewed and are stable to continue medications or other therapy as previously documented in the patient's chart, with changes per orders or documentation below:        Assessment and Plan: Patient received counseling and, if relevant,printed instructions for all symptoms listed in CC and HPI, as well as for all diagnoses brought onto today's visit note below. Typical counseling includes, but is not limited to, non pharmacologic measures to manage listed symptoms and conditions; appropriate use, risks and benefits for all prescribed medications; potential interactions between medications both prescribed and OTC; diet; exercise; healthy behaviors; and goalsetting to improve health. Pt.or responsible party was involved in shared decision making and had opportunity to have all questions answered. 1. Nocturnal cough  - XR CHEST STANDARD (2 VW); Future    2. Abnormal findings on auscultation  - XR CHEST STANDARD (2 VW); Future    3. 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 Slow transit constipation  - senna (SENOKOT) 8.6 MG tablet; Take 1 tablet by mouth 2 times daily  Dispense: 60 tablet; Refill: 11    5. Fatigue, unspecified type    6. Chronic pain syndrome    7. Essential hypertension    8. Mixed hyperlipidemia    9. Chronic kidney disease, stage IV (severe) ([facility]    10. Benign prostatic hyperplasia with urinary hesitancy     11. Urinary catheter in place    12. Urinary retention    13.  Encounter for examination following treatment at hospital            Problem List       Urinary retention    Urinary catheter in place    Benign prostatic hyperplasia with urinary hesitancy    Relevant Medications    tamsulosin (FLOMAX) 0.4 MG capsule    Essential hypertension    Relevant Medications    carvedilol (COREG) 3.125 MG tablet    Hyperlipidemia    Relevant Medications    nitroGLYCERIN (NITROSTAT) 0.4 MG SL tablet    carvedilol (COREG) 3.125 MG tablet    atorvastatin (LIPITOR) 40 MG tablet    Slow transit constipation    Relevant Medications    senna (SENOKOT) 8.6 MG tablet    Current moderate episode 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 Patient/caregiver understands purpose and side effects. A complete  list of medications was provided in their after-visit summary. Return in about 3 months (around 6/7/2023) for f/u mult med extended. Time basedbilling: I spent over 40 minutes with this patient, and as is the nature of primary care and typical for my extended visits, over 50 percent of this visit was spent on counseling and coordination ofcare.

## 2023-03-10 NOTE — TELEPHONE ENCOUNTER
Guy Lindsey the speck THERAPIST form [facility] called.    Guy Lindsey was to see pt  this week but pt never  got back to betzaida the speak therepi for pt appointment

## 2023-03-21 ENCOUNTER — TELEPHONE (OUTPATIENT)
Dept: INTERNAL MEDICINE CLINIC | Age: 88
End: 2023-03-21

## 2023-03-21 NOTE — TELEPHONE ENCOUNTER
Patient's son is calling to request a prescription for a light weight wheel chair be sent to [facility] on [address]. He said it was discussed at his 3/10/23 appointment.

## 2023-04-05 ENCOUNTER — TELEPHONE (OUTPATIENT)
Dept: CARDIOLOGY CLINIC | Age: 88
End: 2023-04-05

## 2023-04-05 NOTE — TELEPHONE ENCOUNTER
Dr Mary Ann Gordon please review and advise for a pre-operative risk assessment prior to a prostate biopsy with Dr. No Pascual scheduled 5/3/23. Requesting to hold the ASA for 7 days prior. Patient was last seen 6/2022 for management of CAD. Patient was admitted 1/5/23 - 1/23/23.

## 2023-04-05 NOTE — TELEPHONE ENCOUNTER
CARDIAC CLEARANCE     What type of procedure are you having? RESECTION OF THE PROSTATE    Which physician is performing your procedure? DR. Beronica Rubio    When is your procedure scheduled for?  5/3/23    Where are you having this [facility]? [facility]    Are you taking Blood Thinners? If so what? (Name/dose/frequesncy)  YES, BABY ASPIRIN   Does the surgeon want you to stop your blood thinner? If so for how long?   YES, 7 DAYS PRIOR    Phone Number and Contact Name for Physicians office:  [phone], [address]    Fax number to send information:    [phone]

## 2023-04-25 ENCOUNTER — OFFICE VISIT (OUTPATIENT)
Dept: INTERNAL MEDICINE CLINIC | Age: 88
End: 2023-04-25

## 2023-04-25 VITALS — HEIGHT: 66 IN | WEIGHT: 145.5 LBS | BODY MASS INDEX: 23.38 KG/M2

## 2023-04-25 VITALS — SYSTOLIC BLOOD PRESSURE: 132 MMHG | DIASTOLIC BLOOD PRESSURE: 52 MMHG

## 2023-04-25 DIAGNOSIS — I20.9 ANGINA PECTORIS, UNSPECIFIED (HCC): ICD-10-CM

## 2023-04-25 DIAGNOSIS — I10 ESSENTIAL HYPERTENSION: ICD-10-CM

## 2023-04-25 DIAGNOSIS — Z00.00 MEDICARE ANNUAL WELLNESS VISIT, SUBSEQUENT: Primary | ICD-10-CM

## 2023-04-25 DIAGNOSIS — N40.1 BENIGN PROSTATIC HYPERPLASIA WITH URINARY HESITANCY: ICD-10-CM

## 2023-04-25 DIAGNOSIS — R33.9 URINARY RETENTION: ICD-10-CM

## 2023-04-25 DIAGNOSIS — R39.11 BENIGN PROSTATIC HYPERPLASIA WITH URINARY HESITANCY: ICD-10-CM

## 2023-04-25 DIAGNOSIS — I25.119 ATHEROSCLEROSIS OF NATIVE CORONARY ARTERY OF NATIVE HEART WITH ANGINA PECTORIS (HCC): ICD-10-CM

## 2023-04-25 DIAGNOSIS — R09.89 RESPIRATORY CRACKLES AT LEFT LUNG BASE: ICD-10-CM

## 2023-04-25 DIAGNOSIS — K59.09 CHRONIC CONSTIPATION: ICD-10-CM

## 2023-04-25 DIAGNOSIS — N18.4 CHRONIC KIDNEY DISEASE, STAGE IV (SEVERE) (HCC): ICD-10-CM

## 2023-04-25 DIAGNOSIS — Z01.818 PRE-OP EVALUATION: Primary | ICD-10-CM

## 2023-04-25 DIAGNOSIS — R14.0 ABDOMINAL DISTENTION: ICD-10-CM

## 2023-04-25 ASSESSMENT — PATIENT HEALTH QUESTIONNAIRE - PHQ9
SUM OF ALL RESPONSES TO PHQ QUESTIONS 1-9: 1
SUM OF ALL RESPONSES TO PHQ QUESTIONS 1-9: 1
8. MOVING OR SPEAKING SO SLOWLY THAT OTHER PEOPLE COULD HAVE NOTICED. OR THE OPPOSITE, BEING SO FIGETY OR RESTLESS THAT YOU HAVE BEEN MOVING AROUND A LOT MORE THAN USUAL: 0
2. FEELING DOWN, DEPRESSED OR HOPELESS: 1
9. THOUGHTS THAT YOU WOULD BE BETTER OFF DEAD, OR OF HURTING YOURSELF: 0
SUM OF ALL RESPONSES TO PHQ QUESTIONS 1-9: 1
5. POOR APPETITE OR OVEREATING: 0
7. TROUBLE CONCENTRATING ON THINGS, SUCH AS READING THE NEWSPAPER OR WATCHING TELEVISION: 0
SUM OF ALL RESPONSES TO PHQ QUESTIONS 1-9: 1
4. FEELING TIRED OR HAVING LITTLE ENERGY: 0
6. FEELING BAD ABOUT YOURSELF - OR THAT YOU ARE A FAILURE OR HAVE LET YOURSELF OR YOUR FAMILY DOWN: 0
10. IF YOU CHECKED OFF ANY PROBLEMS, HOW DIFFICULT HAVE THESE PROBLEMS MADE IT FOR YOU TO DO YOUR WORK, TAKE CARE OF THINGS AT HOME, OR GET ALONG WITH OTHER PEOPLE: 0
3. TROUBLE FALLING OR STAYING ASLEEP: 0

## 2023-04-25 ASSESSMENT — LIFESTYLE VARIABLES
HOW MANY STANDARD DRINKS CONTAINING ALCOHOL DO YOU HAVE ON A TYPICAL DAY: PATIENT DOES NOT DRINK
HOW OFTEN DO YOU HAVE A DRINK CONTAINING ALCOHOL: NEVER

## 2023-04-25 NOTE — PROGRESS NOTES
Urine Culture, Routine 02/14/2023 >100,000 CFU/ml            Assessment:       80 y.o. patient with planned surgery as above. Known risk factors for perioperative complications: Anemia, Coronary artery disease, Hypertension, Renal dysfunction, general frailty  Current medications which may produce withdrawal symptoms if withheld perioperatively: none      Plan:     1. Preoperative workup as follows: none  2. Change in medication regimen before surgery: Take carvedilol on morning of surgery with sip of water, and hold all other medications until after surgery  3. Prophylaxis for cardiac events with perioperative beta-blockers: Not indicated, Currently taking  carvedilol  ACC/AHA indications for pre-operative beta-blocker use:    Vascular surgery with history of postitive stress test  Intermediate or high risk surgery with history of CAD   Intermediate or high risk surgery with multiple clinical predictors of CAD- 2 of the following: history of compensated or prior heart failure, history of cerebrovascular disease, DM, or renal insufficiency    Routine administration of higher-dose, long-acting metoprolol in beta-blocker-naïve patients on the day of surgery, and in the absence of dose titration is associated with an overall increase in mortality. Beta-blockers should be started days to weeks prior to surgery and titrated to pulse < 70.  4. Deep vein thrombosis prophylaxis: regimen to be chosen by surgical team  5.  No contraindications to planned surgery

## 2023-04-26 DIAGNOSIS — M48.061 SPINAL STENOSIS OF LUMBAR REGION WITHOUT NEUROGENIC CLAUDICATION: ICD-10-CM

## 2023-04-26 DIAGNOSIS — G89.4 CHRONIC PAIN SYNDROME: ICD-10-CM

## 2023-04-26 NOTE — PATIENT INSTRUCTIONS
Personalized Preventive Plan for Sergei Doty - 4/25/2023  Medicare offers a range of preventive health benefits. Some of the tests and screenings are paid in full while other may be subject to a deductible, co-insurance, and/or copay. Some of these benefits include a comprehensive review of your medical history including lifestyle, illnesses that may run in your family, and various assessments and screenings as appropriate. After reviewing your medical record and screening and assessments performed today your provider may have ordered immunizations, labs, imaging, and/or referrals for you. A list of these orders (if applicable) as well as your Preventive Care list are included within your After Visit Summary for your review. Other Preventive Recommendations:    A preventive eye exam performed by an eye specialist is recommended every 1-2 years to screen for glaucoma; cataracts, macular degeneration, and other eye disorders. A preventive dental visit is recommended every 6 months. Try to get at least 150 minutes of exercise per week or 10,000 steps per day on a pedometer . Order or download the FREE \"Exercise & Physical Activity: Your Everyday Guide\" from [employer]. Call 1-482.935.9876 or search [employer] online. You need 4193-6377 mg of calcium and 1605-5908 IU of vitamin D per day. It is possible to meet your calcium requirement with diet alone, but a vitamin D supplement is usually necessary to meet this goal.  When exposed to the sun, use a sunscreen that protects against both UVA and UVB radiation with an SPF of 30 or greater. Reapply every 2 to 3 hours or after sweating, drying off with a towel, or swimming. Always wear a seat belt when traveling in a car. Always wear a helmet when riding a bicycle or motorcycle.

## 2023-04-26 NOTE — PROGRESS NOTES
Provider   senna (SENOKOT) 8.6 MG tablet Take 1 tablet by mouth 2 times daily Yes Arpit Burgess MD   atorvastatin (LIPITOR) 40 MG tablet TAKE 1 TABLET BY MOUTH DAILY Yes Lola Stovall DO   ferrous sulfate 324 (65 Fe) MG EC tablet Take 1 tablet by mouth daily (with breakfast) Yes Raúl Fang MD   sodium bicarbonate 650 MG tablet Take 1 tablet by mouth 2 times daily Yes Raúl Fang MD   docusate sodium (COLACE, DULCOLAX) 100 MG CAPS Take 100 mg by mouth 2 times daily Yes Raúl Fang MD   pantoprazole (PROTONIX) 40 MG tablet Take 1 tablet by mouth in the morning and at bedtime Yes Raúl Fang MD   solifenacin (VESICARE) 5 MG tablet TAKE 1 TABLET BY MOUTH DAILY Yes Arpit Burgess MD   tamsulosin (FLOMAX) 0.4 MG capsule TAKE 1 CAPSULE BY MOUTH EVERY DAY  Patient taking differently: Take 1 capsule by mouth at bedtime Yes Arpit Burgess MD   carvedilol (COREG) 3.125 MG tablet TAKE 1 TABLET BY MOUTH TWICE DAILY Yes Arpit Burgess MD   escitalopram (LEXAPRO) 10 MG tablet TAKE 1 TABLET BY MOUTH DAILY  Patient taking differently: Take 1 tablet by mouth at bedtime Yes Arpit Burgess MD   dorzolamide-timolol (COSOPT) 22.3-6.8 MG/ML ophthalmic solution INSTILL 1 DROP IN BOTH EYES TWICE DAILY Yes Historical Provider, MD   nitroGLYCERIN (NITROSTAT) 0.4 MG SL tablet Place 1 tablet under the tongue every 5 minutes as needed for Chest pain Yes Cecy Bhakta MD   Multiple Vitamins-Minerals (ICAPS AREDS 2 PO) Take 1 tablet by mouth daily Yes Historical Provider, MD   patiromer sorbitex calcium (VELTASSA) 8.4 g PACK packet Take 1 packet by mouth daily Yes Historical Provider, MD Farah (Including outside providers/suppliers regularly involved in providing care):   Patient Care Team:  Arpit Burgess MD as PCP - General (Internal Medicine)  Arpit Burgess MD as PCP - Empaneled Provider     Reviewed and updated this visit:  Tobacco  Allergies  Meds  Med Hx  Surg Hx  Soc

## 2023-04-26 NOTE — TELEPHONE ENCOUNTER
Requested Prescriptions     Pending Prescriptions Disp Refills    traMADol (ULTRAM) 50 MG tablet [Pharmacy Med Name: TRAMADOL 50MG TABLETS] 60 tablet      Sig: TAKE 1 TABLET BY MOUTH TWICE DAILY AS NEEDED FOR PAIN     Patient requesting a medication refill.     Next office visit: 6/7/2023    Last regular office visit: 4/25/2023

## 2023-04-27 ENCOUNTER — HOSPITAL ENCOUNTER (OUTPATIENT)
Dept: GENERAL RADIOLOGY | Age: 88
Discharge: HOME | End: 2023-04-27
Payer: MEDICARE

## 2023-04-27 ENCOUNTER — HOSPITAL ENCOUNTER (OUTPATIENT)
Age: 88
Discharge: HOME | End: 2023-04-27
Payer: MEDICARE

## 2023-04-27 DIAGNOSIS — R09.89 RESPIRATORY CRACKLES AT LEFT LUNG BASE: ICD-10-CM

## 2023-04-27 DIAGNOSIS — R14.0 ABDOMINAL DISTENTION: ICD-10-CM

## 2023-04-27 DIAGNOSIS — K59.09 CHRONIC CONSTIPATION: ICD-10-CM

## 2023-04-27 PROBLEM — J61 ASBESTOSIS (HCC): Status: ACTIVE | Noted: 2023-04-27

## 2023-04-27 PROCEDURE — 71046 X-RAY EXAM CHEST 2 VIEWS: CPT

## 2023-04-27 PROCEDURE — 74019 RADEX ABDOMEN 2 VIEWS: CPT

## 2023-04-28 ENCOUNTER — TELEPHONE (OUTPATIENT)
Dept: INTERNAL MEDICINE CLINIC | Age: 88
End: 2023-04-28

## 2023-04-28 ENCOUNTER — HOSPITAL ENCOUNTER (OUTPATIENT)
Dept: PREADMISSION TESTING | Age: 88
Discharge: HOME | End: 2023-05-02

## 2023-04-28 ENCOUNTER — HOSPITAL ENCOUNTER (OUTPATIENT)
Age: 88
Discharge: HOME | End: 2023-04-28
Payer: MEDICARE

## 2023-04-28 DIAGNOSIS — Z01.818 ENCOUNTER FOR PREADMISSION TESTING: ICD-10-CM

## 2023-04-28 DIAGNOSIS — I10 ESSENTIAL HYPERTENSION: ICD-10-CM

## 2023-04-28 DIAGNOSIS — I25.119 ATHEROSCLEROSIS OF NATIVE CORONARY ARTERY OF NATIVE HEART WITH ANGINA PECTORIS (HCC): ICD-10-CM

## 2023-04-28 DIAGNOSIS — Z01.818 PRE-OP EVALUATION: ICD-10-CM

## 2023-04-28 DIAGNOSIS — N18.4 CHRONIC KIDNEY DISEASE, STAGE IV (SEVERE) (HCC): ICD-10-CM

## 2023-04-28 DIAGNOSIS — R33.9 URINARY RETENTION: ICD-10-CM

## 2023-04-28 DIAGNOSIS — R39.11 BENIGN PROSTATIC HYPERPLASIA WITH URINARY HESITANCY: ICD-10-CM

## 2023-04-28 DIAGNOSIS — N40.1 BENIGN PROSTATIC HYPERPLASIA WITH URINARY HESITANCY: ICD-10-CM

## 2023-04-28 LAB
ABO + RH BLD: NORMAL
ALBUMIN SERPL-MCNC: 4 G/DL (ref 3.4–5)
ALBUMIN/GLOB SERPL: 1.4 {RATIO} (ref 1.1–2.2)
ALP SERPL-CCNC: 100 U/L (ref 40–129)
ALT SERPL-CCNC: 17 U/L (ref 10–40)
ANION GAP SERPL CALCULATED.3IONS-SCNC: 10 MMOL/L (ref 3–16)
APTT BLD: 28.2 SEC (ref 22.7–35.9)
AST SERPL-CCNC: 18 U/L (ref 15–37)
BASOPHILS # BLD: 0 K/UL (ref 0–0.2)
BASOPHILS NFR BLD: 0.8 %
BILIRUB SERPL-MCNC: 0.3 MG/DL (ref 0–1)
BLD GP AB SCN SERPL QL: NORMAL
BUN SERPL-MCNC: 42 MG/DL (ref 7–20)
CALCIUM SERPL-MCNC: 8.9 MG/DL (ref 8.3–10.6)
CHLORIDE SERPL-SCNC: 113 MMOL/L (ref 99–110)
CHOLEST SERPL-MCNC: 119 MG/DL (ref 0–199)
CO2 SERPL-SCNC: 18 MMOL/L (ref 21–32)
CREAT SERPL-MCNC: 2.9 MG/DL (ref 0.8–1.3)
DEPRECATED RDW RBC AUTO: 15.4 % (ref 12.4–15.4)
EOSINOPHIL # BLD: 0.2 K/UL (ref 0–0.6)
EOSINOPHIL NFR BLD: 3.2 %
GFR SERPLBLD CREATININE-BSD FMLA CKD-EPI: 20 ML/MIN/{1.73_M2}
GLUCOSE SERPL-MCNC: 103 MG/DL (ref 70–99)
HCT VFR BLD AUTO: 29.1 % (ref 40.5–52.5)
HDLC SERPL-MCNC: 75 MG/DL (ref 40–60)
HGB BLD-MCNC: 9.3 G/DL (ref 13.5–17.5)
INR PPP: 1.13 (ref 0.84–1.16)
LDLC SERPL CALC-MCNC: 28 MG/DL
LYMPHOCYTES # BLD: 1 K/UL (ref 1–5.1)
LYMPHOCYTES NFR BLD: 18.6 %
MCH RBC QN AUTO: 29.9 PG (ref 26–34)
MCHC RBC AUTO-ENTMCNC: 32.1 G/DL (ref 31–36)
MCV RBC AUTO: 93 FL (ref 80–100)
MONOCYTES # BLD: 0.4 K/UL (ref 0–1.3)
MONOCYTES NFR BLD: 7.2 %
NEUTROPHILS # BLD: 3.9 K/UL (ref 1.7–7.7)
NEUTROPHILS NFR BLD: 70.2 %
PLATELET # BLD AUTO: 138 K/UL (ref 135–450)
PMV BLD AUTO: 9.9 FL (ref 5–10.5)
POTASSIUM SERPL-SCNC: 5.7 MMOL/L (ref 3.5–5.1)
PROT SERPL-MCNC: 6.8 G/DL (ref 6.4–8.2)
PROTHROMBIN TIME: 14.6 SEC (ref 11.5–14.8)
PSA SERPL DL<=0.01 NG/ML-MCNC: 8.53 NG/ML (ref 0–4)
RBC # BLD AUTO: 3.12 M/UL (ref 4.2–5.9)
SODIUM SERPL-SCNC: 141 MMOL/L (ref 136–145)
TRIGL SERPL-MCNC: 78 MG/DL (ref 0–150)
VLDLC SERPL CALC-MCNC: 16 MG/DL
WBC # BLD AUTO: 5.6 K/UL (ref 4–11)

## 2023-04-28 PROCEDURE — 85610 PROTHROMBIN TIME: CPT

## 2023-04-28 PROCEDURE — 36415 COLL VENOUS BLD VENIPUNCTURE: CPT

## 2023-04-28 PROCEDURE — 80061 LIPID PANEL: CPT

## 2023-04-28 PROCEDURE — 85025 COMPLETE CBC W/AUTO DIFF WBC: CPT

## 2023-04-28 PROCEDURE — 85730 THROMBOPLASTIN TIME PARTIAL: CPT

## 2023-04-28 PROCEDURE — 86900 BLOOD TYPING SEROLOGIC ABO: CPT

## 2023-04-28 PROCEDURE — 86850 RBC ANTIBODY SCREEN: CPT

## 2023-04-28 PROCEDURE — 84153 ASSAY OF PSA TOTAL: CPT

## 2023-04-28 PROCEDURE — 80053 COMPREHEN METABOLIC PANEL: CPT

## 2023-04-28 PROCEDURE — 86901 BLOOD TYPING SEROLOGIC RH(D): CPT

## 2023-04-28 RX ORDER — ASPIRIN 81 MG/1
81 TABLET ORAL DAILY
Status: ON HOLD | COMMUNITY
End: 2023-05-04 | Stop reason: HOSPADM

## 2023-04-28 RX ORDER — TRAMADOL HYDROCHLORIDE 50 MG/1
TABLET ORAL
Qty: 60 TABLET | Refills: 0 | Status: SHIPPED | OUTPATIENT
Start: 2023-04-28 | End: 2023-05-28

## 2023-04-30 DIAGNOSIS — E87.5 HYPERKALEMIA: Primary | ICD-10-CM

## 2023-05-01 ENCOUNTER — TELEPHONE (OUTPATIENT)
Dept: INTERNAL MEDICINE CLINIC | Age: 88
End: 2023-05-01

## 2023-05-01 NOTE — TELEPHONE ENCOUNTER
Spoke with patient's son.   Told him the directions should be on the AVS.  If not, office note states: Change in medication regimen before surgery: Take carvedilol on morning of surgery with sip of water, and hold all other medications until after surgery

## 2023-05-02 ENCOUNTER — ANESTHESIA EVENT (OUTPATIENT)
Dept: OPERATING ROOM | Age: 88
End: 2023-05-02
Payer: MEDICARE

## 2023-05-03 ENCOUNTER — HOSPITAL ENCOUNTER (OUTPATIENT)
Age: 88
Discharge: HOME | End: 2023-05-04
Attending: UROLOGY | Admitting: UROLOGY
Payer: MEDICARE

## 2023-05-03 ENCOUNTER — ANESTHESIA (OUTPATIENT)
Dept: OPERATING ROOM | Age: 88
End: 2023-05-03
Payer: MEDICARE

## 2023-05-03 DIAGNOSIS — R33.9 RETENTION OF URINE: ICD-10-CM

## 2023-05-03 DIAGNOSIS — Z01.818 ENCOUNTER FOR PREADMISSION TESTING: Primary | ICD-10-CM

## 2023-05-03 LAB
ABO + RH BLD: NORMAL
ANION GAP SERPL CALCULATED.3IONS-SCNC: 10 MMOL/L (ref 3–16)
BLD GP AB SCN SERPL QL: NORMAL
BUN SERPL-MCNC: 39 MG/DL (ref 7–20)
CALCIUM SERPL-MCNC: 9.1 MG/DL (ref 8.3–10.6)
CHLORIDE SERPL-SCNC: 112 MMOL/L (ref 99–110)
CO2 SERPL-SCNC: 17 MMOL/L (ref 21–32)
CREAT SERPL-MCNC: 3.2 MG/DL (ref 0.8–1.3)
EKG ATRIAL RATE: 56 BPM
EKG DIAGNOSIS: NORMAL
EKG P AXIS: 72 DEGREES
EKG P-R INTERVAL: 180 MS
EKG Q-T INTERVAL: 446 MS
EKG QRS DURATION: 110 MS
EKG QTC CALCULATION (BAZETT): 430 MS
EKG R AXIS: -54 DEGREES
EKG T AXIS: 42 DEGREES
EKG VENTRICULAR RATE: 56 BPM
GFR SERPLBLD CREATININE-BSD FMLA CKD-EPI: 17 ML/MIN/{1.73_M2}
GLUCOSE SERPL-MCNC: 108 MG/DL (ref 70–99)
POTASSIUM SERPL-SCNC: 5 MMOL/L (ref 3.5–5.1)
SODIUM SERPL-SCNC: 139 MMOL/L (ref 136–145)

## 2023-05-03 PROCEDURE — 3600000004 HC SURGERY LEVEL 4 BASE: Performed by: UROLOGY

## 2023-05-03 PROCEDURE — 3600000014 HC SURGERY LEVEL 4 ADDTL 15MIN: Performed by: UROLOGY

## 2023-05-03 PROCEDURE — 6360000002 HC RX W HCPCS: Performed by: NURSE ANESTHETIST, CERTIFIED REGISTERED

## 2023-05-03 PROCEDURE — 86900 BLOOD TYPING SEROLOGIC ABO: CPT

## 2023-05-03 PROCEDURE — 88305 TISSUE EXAM BY PATHOLOGIST: CPT

## 2023-05-03 PROCEDURE — 94760 N-INVAS EAR/PLS OXIMETRY 1: CPT

## 2023-05-03 PROCEDURE — 2580000003 HC RX 258: Performed by: ANESTHESIOLOGY

## 2023-05-03 PROCEDURE — A4217 STERILE WATER/SALINE, 500 ML: HCPCS | Performed by: UROLOGY

## 2023-05-03 PROCEDURE — 3700000001 HC ADD 15 MINUTES (ANESTHESIA): Performed by: UROLOGY

## 2023-05-03 PROCEDURE — 2580000003 HC RX 258: Performed by: UROLOGY

## 2023-05-03 PROCEDURE — 2500000003 HC RX 250 WO HCPCS: Performed by: NURSE ANESTHETIST, CERTIFIED REGISTERED

## 2023-05-03 PROCEDURE — 86901 BLOOD TYPING SEROLOGIC RH(D): CPT

## 2023-05-03 PROCEDURE — 2500000003 HC RX 250 WO HCPCS

## 2023-05-03 PROCEDURE — 3700000000 HC ANESTHESIA ATTENDED CARE: Performed by: UROLOGY

## 2023-05-03 PROCEDURE — 6370000000 HC RX 637 (ALT 250 FOR IP): Performed by: UROLOGY

## 2023-05-03 PROCEDURE — 2709999900 HC NON-CHARGEABLE SUPPLY: Performed by: UROLOGY

## 2023-05-03 PROCEDURE — 7100000000 HC PACU RECOVERY - FIRST 15 MIN: Performed by: UROLOGY

## 2023-05-03 PROCEDURE — 80048 BASIC METABOLIC PNL TOTAL CA: CPT

## 2023-05-03 PROCEDURE — 86850 RBC ANTIBODY SCREEN: CPT

## 2023-05-03 PROCEDURE — 93005 ELECTROCARDIOGRAM TRACING: CPT | Performed by: UROLOGY

## 2023-05-03 PROCEDURE — 7100000001 HC PACU RECOVERY - ADDTL 15 MIN: Performed by: UROLOGY

## 2023-05-03 PROCEDURE — 6360000002 HC RX W HCPCS: Performed by: UROLOGY

## 2023-05-03 PROCEDURE — C1713 ANCHOR/SCREW BN/BN,TIS/BN: HCPCS | Performed by: UROLOGY

## 2023-05-03 RX ORDER — SODIUM CHLORIDE 9 MG/ML
INJECTION, SOLUTION INTRAVENOUS PRN
Status: DISCONTINUED | OUTPATIENT
Start: 2023-05-03 | End: 2023-05-03 | Stop reason: HOSPADM

## 2023-05-03 RX ORDER — NITROGLYCERIN 0.4 MG/1
0.4 TABLET SUBLINGUAL EVERY 5 MIN PRN
Status: DISCONTINUED | OUTPATIENT
Start: 2023-05-03 | End: 2023-05-04 | Stop reason: HOSPADM

## 2023-05-03 RX ORDER — SODIUM CHLORIDE 0.9 % (FLUSH) 0.9 %
5-40 SYRINGE (ML) INJECTION PRN
Status: DISCONTINUED | OUTPATIENT
Start: 2023-05-03 | End: 2023-05-03 | Stop reason: HOSPADM

## 2023-05-03 RX ORDER — MAGNESIUM HYDROXIDE 1200 MG/15ML
LIQUID ORAL
Status: COMPLETED | OUTPATIENT
Start: 2023-05-03 | End: 2023-05-03

## 2023-05-03 RX ORDER — ONDANSETRON 2 MG/ML
4 INJECTION INTRAMUSCULAR; INTRAVENOUS EVERY 6 HOURS PRN
Status: DISCONTINUED | OUTPATIENT
Start: 2023-05-03 | End: 2023-05-04 | Stop reason: HOSPADM

## 2023-05-03 RX ORDER — ATORVASTATIN CALCIUM 40 MG/1
40 TABLET, FILM COATED ORAL NIGHTLY
Status: DISCONTINUED | OUTPATIENT
Start: 2023-05-03 | End: 2023-05-04 | Stop reason: HOSPADM

## 2023-05-03 RX ORDER — EPHEDRINE SULFATE/0.9% NACL/PF 50 MG/5 ML
SYRINGE (ML) INTRAVENOUS PRN
Status: DISCONTINUED | OUTPATIENT
Start: 2023-05-03 | End: 2023-05-03 | Stop reason: SDUPTHER

## 2023-05-03 RX ORDER — ONDANSETRON 2 MG/ML
INJECTION INTRAMUSCULAR; INTRAVENOUS PRN
Status: DISCONTINUED | OUTPATIENT
Start: 2023-05-03 | End: 2023-05-03 | Stop reason: SDUPTHER

## 2023-05-03 RX ORDER — SODIUM CHLORIDE 0.9 % (FLUSH) 0.9 %
5-40 SYRINGE (ML) INJECTION EVERY 12 HOURS SCHEDULED
Status: DISCONTINUED | OUTPATIENT
Start: 2023-05-03 | End: 2023-05-03 | Stop reason: HOSPADM

## 2023-05-03 RX ORDER — CARVEDILOL 3.12 MG/1
3.12 TABLET ORAL 2 TIMES DAILY
Status: DISCONTINUED | OUTPATIENT
Start: 2023-05-03 | End: 2023-05-04 | Stop reason: HOSPADM

## 2023-05-03 RX ORDER — SENNA PLUS 8.6 MG/1
1 TABLET ORAL 2 TIMES DAILY
Status: DISCONTINUED | OUTPATIENT
Start: 2023-05-03 | End: 2023-05-03 | Stop reason: SDUPTHER

## 2023-05-03 RX ORDER — MAGNESIUM HYDROXIDE 1200 MG/15ML
LIQUID ORAL CONTINUOUS PRN
Status: DISCONTINUED | OUTPATIENT
Start: 2023-05-03 | End: 2023-05-03 | Stop reason: HOSPADM

## 2023-05-03 RX ORDER — SODIUM CHLORIDE 0.9 % (FLUSH) 0.9 %
5-40 SYRINGE (ML) INJECTION PRN
Status: DISCONTINUED | OUTPATIENT
Start: 2023-05-03 | End: 2023-05-04 | Stop reason: HOSPADM

## 2023-05-03 RX ORDER — OXYCODONE HYDROCHLORIDE 10 MG/1
10 TABLET ORAL EVERY 4 HOURS PRN
Status: DISCONTINUED | OUTPATIENT
Start: 2023-05-03 | End: 2023-05-04 | Stop reason: HOSPADM

## 2023-05-03 RX ORDER — FENTANYL CITRATE 50 UG/ML
25 INJECTION, SOLUTION INTRAMUSCULAR; INTRAVENOUS EVERY 5 MIN PRN
Status: DISCONTINUED | OUTPATIENT
Start: 2023-05-03 | End: 2023-05-03 | Stop reason: HOSPADM

## 2023-05-03 RX ORDER — SODIUM CHLORIDE 9 MG/ML
INJECTION, SOLUTION INTRAVENOUS CONTINUOUS
Status: DISCONTINUED | OUTPATIENT
Start: 2023-05-03 | End: 2023-05-04 | Stop reason: HOSPADM

## 2023-05-03 RX ORDER — OXYCODONE HYDROCHLORIDE 5 MG/1
5 TABLET ORAL EVERY 4 HOURS PRN
Status: DISCONTINUED | OUTPATIENT
Start: 2023-05-03 | End: 2023-05-04 | Stop reason: HOSPADM

## 2023-05-03 RX ORDER — FENTANYL CITRATE 50 UG/ML
50 INJECTION, SOLUTION INTRAMUSCULAR; INTRAVENOUS EVERY 5 MIN PRN
Status: DISCONTINUED | OUTPATIENT
Start: 2023-05-03 | End: 2023-05-03 | Stop reason: HOSPADM

## 2023-05-03 RX ORDER — ONDANSETRON 4 MG/1
4 TABLET, ORALLY DISINTEGRATING ORAL EVERY 8 HOURS PRN
Status: DISCONTINUED | OUTPATIENT
Start: 2023-05-03 | End: 2023-05-04 | Stop reason: HOSPADM

## 2023-05-03 RX ORDER — ONDANSETRON 2 MG/ML
4 INJECTION INTRAMUSCULAR; INTRAVENOUS
Status: DISCONTINUED | OUTPATIENT
Start: 2023-05-03 | End: 2023-05-03 | Stop reason: HOSPADM

## 2023-05-03 RX ORDER — FERROUS SULFATE TAB EC 324 MG (65 MG FE EQUIVALENT) 324 (65 FE) MG
324 TABLET DELAYED RESPONSE ORAL
Status: DISCONTINUED | OUTPATIENT
Start: 2023-05-04 | End: 2023-05-04 | Stop reason: HOSPADM

## 2023-05-03 RX ORDER — PHENYLEPHRINE HCL IN 0.9% NACL 1 MG/10 ML
SYRINGE (ML) INTRAVENOUS PRN
Status: DISCONTINUED | OUTPATIENT
Start: 2023-05-03 | End: 2023-05-03 | Stop reason: SDUPTHER

## 2023-05-03 RX ORDER — PROPOFOL 10 MG/ML
INJECTION, EMULSION INTRAVENOUS PRN
Status: DISCONTINUED | OUTPATIENT
Start: 2023-05-03 | End: 2023-05-03 | Stop reason: SDUPTHER

## 2023-05-03 RX ORDER — SENNA AND DOCUSATE SODIUM 50; 8.6 MG/1; MG/1
1 TABLET, FILM COATED ORAL 2 TIMES DAILY
Status: DISCONTINUED | OUTPATIENT
Start: 2023-05-03 | End: 2023-05-04 | Stop reason: HOSPADM

## 2023-05-03 RX ORDER — PANTOPRAZOLE SODIUM 40 MG/1
40 TABLET, DELAYED RELEASE ORAL 2 TIMES DAILY
Status: DISCONTINUED | OUTPATIENT
Start: 2023-05-03 | End: 2023-05-04 | Stop reason: HOSPADM

## 2023-05-03 RX ORDER — LIDOCAINE HYDROCHLORIDE 20 MG/ML
INJECTION, SOLUTION EPIDURAL; INFILTRATION; INTRACAUDAL; PERINEURAL PRN
Status: DISCONTINUED | OUTPATIENT
Start: 2023-05-03 | End: 2023-05-03 | Stop reason: SDUPTHER

## 2023-05-03 RX ORDER — DEXAMETHASONE SODIUM PHOSPHATE 4 MG/ML
INJECTION, SOLUTION INTRA-ARTICULAR; INTRALESIONAL; INTRAMUSCULAR; INTRAVENOUS; SOFT TISSUE PRN
Status: DISCONTINUED | OUTPATIENT
Start: 2023-05-03 | End: 2023-05-03 | Stop reason: SDUPTHER

## 2023-05-03 RX ORDER — ESCITALOPRAM OXALATE 10 MG/1
10 TABLET ORAL NIGHTLY
Status: DISCONTINUED | OUTPATIENT
Start: 2023-05-03 | End: 2023-05-04 | Stop reason: HOSPADM

## 2023-05-03 RX ORDER — ACETAMINOPHEN 325 MG/1
650 TABLET ORAL EVERY 4 HOURS PRN
Status: DISCONTINUED | OUTPATIENT
Start: 2023-05-03 | End: 2023-05-04 | Stop reason: HOSPADM

## 2023-05-03 RX ORDER — SODIUM CHLORIDE 0.9 % (FLUSH) 0.9 %
5-40 SYRINGE (ML) INJECTION EVERY 12 HOURS SCHEDULED
Status: DISCONTINUED | OUTPATIENT
Start: 2023-05-03 | End: 2023-05-04 | Stop reason: HOSPADM

## 2023-05-03 RX ORDER — FENTANYL CITRATE 50 UG/ML
INJECTION, SOLUTION INTRAMUSCULAR; INTRAVENOUS PRN
Status: DISCONTINUED | OUTPATIENT
Start: 2023-05-03 | End: 2023-05-03 | Stop reason: SDUPTHER

## 2023-05-03 RX ORDER — DOCUSATE SODIUM 100 MG/1
100 CAPSULE, LIQUID FILLED ORAL 2 TIMES DAILY
Status: DISCONTINUED | OUTPATIENT
Start: 2023-05-03 | End: 2023-05-04 | Stop reason: HOSPADM

## 2023-05-03 RX ORDER — ROCURONIUM BROMIDE 10 MG/ML
INJECTION, SOLUTION INTRAVENOUS PRN
Status: DISCONTINUED | OUTPATIENT
Start: 2023-05-03 | End: 2023-05-03 | Stop reason: SDUPTHER

## 2023-05-03 RX ORDER — TAMSULOSIN HYDROCHLORIDE 0.4 MG/1
0.4 CAPSULE ORAL NIGHTLY
Status: DISCONTINUED | OUTPATIENT
Start: 2023-05-03 | End: 2023-05-04 | Stop reason: HOSPADM

## 2023-05-03 RX ORDER — POLYETHYLENE GLYCOL 3350 17 G/17G
17 POWDER, FOR SOLUTION ORAL DAILY
Status: DISCONTINUED | OUTPATIENT
Start: 2023-05-03 | End: 2023-05-04 | Stop reason: HOSPADM

## 2023-05-03 RX ORDER — SODIUM CHLORIDE 9 MG/ML
INJECTION, SOLUTION INTRAVENOUS PRN
Status: DISCONTINUED | OUTPATIENT
Start: 2023-05-03 | End: 2023-05-04 | Stop reason: HOSPADM

## 2023-05-03 RX ADMIN — DEXAMETHASONE SODIUM PHOSPHATE 4 MG: 4 INJECTION, SOLUTION INTRAMUSCULAR; INTRAVENOUS at 13:22

## 2023-05-03 RX ADMIN — Medication 50 MCG: at 13:12

## 2023-05-03 RX ADMIN — FENTANYL CITRATE 50 MCG: 50 INJECTION INTRAMUSCULAR; INTRAVENOUS at 13:06

## 2023-05-03 RX ADMIN — FENTANYL CITRATE 25 MCG: 50 INJECTION INTRAMUSCULAR; INTRAVENOUS at 13:40

## 2023-05-03 RX ADMIN — ESCITALOPRAM OXALATE 10 MG: 10 TABLET ORAL at 20:12

## 2023-05-03 RX ADMIN — Medication 10 MG: at 14:05

## 2023-05-03 RX ADMIN — PROPOFOL 70 MG: 10 INJECTION, EMULSION INTRAVENOUS at 13:09

## 2023-05-03 RX ADMIN — Medication 5 MG: at 13:13

## 2023-05-03 RX ADMIN — ONDANSETRON 4 MG: 2 INJECTION INTRAMUSCULAR; INTRAVENOUS at 13:58

## 2023-05-03 RX ADMIN — LIDOCAINE HYDROCHLORIDE 60 MG: 20 INJECTION, SOLUTION EPIDURAL; INFILTRATION; INTRACAUDAL; PERINEURAL at 13:09

## 2023-05-03 RX ADMIN — Medication 5 MG: at 13:23

## 2023-05-03 RX ADMIN — SODIUM CHLORIDE: 9 INJECTION, SOLUTION INTRAVENOUS at 12:19

## 2023-05-03 RX ADMIN — POLYETHYLENE GLYCOL 3350 17 G: 17 POWDER, FOR SOLUTION ORAL at 18:44

## 2023-05-03 RX ADMIN — Medication 5 MG: at 13:16

## 2023-05-03 RX ADMIN — Medication 50 MCG: at 13:09

## 2023-05-03 RX ADMIN — SENNOSIDES AND DOCUSATE SODIUM 1 TABLET: 50; 8.6 TABLET ORAL at 20:13

## 2023-05-03 RX ADMIN — CEFTRIAXONE 1000 MG: 1 INJECTION, POWDER, FOR SOLUTION INTRAMUSCULAR; INTRAVENOUS at 13:04

## 2023-05-03 RX ADMIN — ROCURONIUM BROMIDE 40 MG: 10 INJECTION INTRAVENOUS at 13:09

## 2023-05-03 RX ADMIN — SODIUM CHLORIDE: 9 INJECTION, SOLUTION INTRAVENOUS at 20:12

## 2023-05-03 RX ADMIN — DOCUSATE SODIUM 100 MG: 100 CAPSULE, LIQUID FILLED ORAL at 20:13

## 2023-05-03 RX ADMIN — SUGAMMADEX 150 MG: 100 INJECTION, SOLUTION INTRAVENOUS at 14:13

## 2023-05-03 RX ADMIN — PANTOPRAZOLE SODIUM 40 MG: 40 TABLET, DELAYED RELEASE ORAL at 20:13

## 2023-05-03 RX ADMIN — TAMSULOSIN HYDROCHLORIDE 0.4 MG: 0.4 CAPSULE ORAL at 20:12

## 2023-05-03 RX ADMIN — ATORVASTATIN CALCIUM 40 MG: 40 TABLET, FILM COATED ORAL at 20:12

## 2023-05-03 RX ADMIN — PROPOFOL 30 MG: 10 INJECTION, EMULSION INTRAVENOUS at 13:14

## 2023-05-03 ASSESSMENT — LIFESTYLE VARIABLES
HOW OFTEN DO YOU HAVE A DRINK CONTAINING ALCOHOL: NEVER
HOW MANY STANDARD DRINKS CONTAINING ALCOHOL DO YOU HAVE ON A TYPICAL DAY: PATIENT DOES NOT DRINK
SMOKING_STATUS: 0

## 2023-05-03 ASSESSMENT — ENCOUNTER SYMPTOMS: SHORTNESS OF BREATH: 1

## 2023-05-03 ASSESSMENT — PAIN - FUNCTIONAL ASSESSMENT: PAIN_FUNCTIONAL_ASSESSMENT: 0-10

## 2023-05-03 NOTE — ANESTHESIA PRE PROCEDURE
Department of Anesthesiology  Preprocedure Note       Name:  Ruben Santoro   Age:  80 y.o.  :  1931                                          MRN:  [id number]         Date:  5/3/2023      Surgeon: Tena López):  Marycruz Cook MD    Procedure: Procedure(s):  CYSTOSCOPY TRANSURETHRAL RESECTION OF PROSTATE    Medications prior to admission:   Prior to Admission medications    Medication Sig Start Date End Date Taking?  Authorizing Provider   traMADol (ULTRAM) 50 MG tablet TAKE 1 TABLET BY MOUTH TWICE DAILY AS NEEDED FOR PAIN 23  Annalise Smith MD   aspirin 81 MG EC tablet Take 1 tablet by mouth daily Stopped for surgery last dose on  23    Historical Provider, MD   senna (SENOKOT) 8.6 MG tablet Take 1 tablet by mouth 2 times daily 3/7/23 3/6/24  Annalise Smith MD   atorvastatin (LIPITOR) 40 MG tablet TAKE 1 TABLET BY MOUTH DAILY 2/15/23   Edgar Cast DO   ferrous sulfate 324 (65 Fe) MG EC tablet Take 1 tablet by mouth daily (with breakfast) 23   Vu Chery MD   docusate sodium (COLACE, DULCOLAX) 100 MG CAPS Take 100 mg by mouth 2 times daily 23   Vu Chery MD   pantoprazole (PROTONIX) 40 MG tablet Take 1 tablet by mouth in the morning and at bedtime 23   Vu Chery MD   solifenacin (VESICARE) 5 MG tablet TAKE 1 TABLET BY MOUTH DAILY 22   Annalise Smith MD   tamsulosin (FLOMAX) 0.4 MG capsule TAKE 1 CAPSULE BY MOUTH EVERY DAY  Patient taking differently: Take 1 capsule by mouth at bedtime 22   Annalise Smith MD   carvedilol (COREG) 3.125 MG tablet TAKE 1 TABLET BY MOUTH TWICE DAILY 22   Annalise Smith MD   escitalopram (LEXAPRO) 10 MG tablet TAKE 1 TABLET BY MOUTH DAILY  Patient taking differently: Take 1 tablet by mouth at bedtime 22   Annalise Smith MD   dorzolamide-timolol (COSOPT) 22.3-6.8 MG/ML ophthalmic solution INSTILL 1 DROP IN BOTH EYES TWICE DAILY 12/20/21   Historical Provider, MD   nitroGLYCERIN (NITROSTAT)

## 2023-05-03 NOTE — BRIEF OP NOTE
Brief Postoperative Note      Patient: Saritha Goetz  YOB: 1931  MRN: [id number]    Date of Procedure: 5/3/2023    Pre-Op Diagnosis Codes:     * Retention of urine [R33.9]    Post-Op Diagnosis: Same       Procedure(s):  CYSTOSCOPY TRANSURETHRAL RESECTION OF PROSTATE    Surgeon(s):  Janina Bro MD    Assistant:  Surgical Assistant: Winsome Pulido    Anesthesia: General    Estimated Blood Loss (mL): Minimal    Complications: None    Specimens:   ID Type Source Tests Collected by Time Destination   A : A) prostate chips Tissue Tissue SURGICAL PATHOLOGY Janina Bro MD 5/3/2023 1147        Implants:  * No implants in log *      Drains:   Urinary Catheter 05/03/23 3 Way (Active)       Findings: trilobar hyperplasia      Electronically signed by Janina Bro MD on 5/3/2023 at 2:25 PM

## 2023-05-03 NOTE — ANESTHESIA POSTPROCEDURE EVALUATION
Department of Anesthesiology  Postprocedure Note    Patient: Luis Antonio Stark  MRN: [id number]  YOB: 1931  Date of evaluation: 5/3/2023      Procedure Summary     Date: 05/03/23 Room / Location: [address] [place] [address]    Anesthesia Start: 1300 Anesthesia Stop: 1430    Procedure: Zackary Weber (Urethra) Diagnosis:       Retention of urine      (RETENTION OF URINE)    Surgeons: Nicho Keller MD Responsible Provider: Laina Bruno MD    Anesthesia Type: general ASA Status: 4          Anesthesia Type: No value filed.     Pat Phase I: Pat Score: 8    Pat Phase II:        Anesthesia Post Evaluation    Patient location during evaluation: PACU  Level of consciousness: awake and alert  Airway patency: patent  Nausea & Vomiting: no nausea and no vomiting  Complications: no  Cardiovascular status: blood pressure returned to baseline  Respiratory status: acceptable  Hydration status: euvolemic  Comments: Postoperative Anesthesia Note    Name:    Luis Antonio Stark  MRN:      [id number]    Patient Vitals in the past 12 hrs:  05/03/23 1643, Resp:16, SpO2:99 %  05/03/23 1631, BP:(!) 151/57, Temp:97.5 °F (36.4 °C), Temp src:Oral, Pulse:63, Resp:16, SpO2:99 %  05/03/23 1600, BP:(!) 141/64, Pulse:63, Resp:19, SpO2:99 %  05/03/23 1545, BP:(!) 133/53, Pulse:63, Resp:18  05/03/23 1530, BP:(!) 145/52, Pulse:61, Resp:15  05/03/23 1515, BP:(!) 140/79, Pulse:60, Resp:22, SpO2:98 %  05/03/23 1500, BP:(!) 159/65, Pulse:60, Resp:16, SpO2:97 %  05/03/23 1445, BP:(!) 155/74, Pulse:63, Resp:18, SpO2:100 %  05/03/23 1440, BP:(!) 159/62, Pulse:63, Resp:17, SpO2:100 %  05/03/23 1435, BP:(!) 147/61, Pulse:61, Resp:23, SpO2:99 %  05/03/23 1430, BP:(!) 145/66, Pulse:60, Resp:14, SpO2:100 %  05/03/23 1426, BP:(!) 144/54, Temp:97.6 °F (36.4 °C), Temp src:Temporal, Pulse:61, Resp:22, SpO2:92 %  05/03/23 1156, BP:(!) 143/66, Temp:97.5 °F (36.4 °C), Temp src:Temporal, Pulse:61, Resp:22, SpO2:99 %,

## 2023-05-03 NOTE — H&P
Preoperative H&P Update    Luis Antonio Stark was seen, history and physical examination reviewed, and patient examined by me today. There have been no significant clinical changes since the completion of the previous history and physical.    The risk, benefits, and alternatives of the proposed procedure have been explained to the patient (or appropriate guardian) and understanding verbalized. All questions answered. Patient wishes to proceed.     Electronically signed by: Nicho Keller MD,5/3/2023,12:56 PM

## 2023-05-04 VITALS
TEMPERATURE: 97.9 F | HEIGHT: 66 IN | HEART RATE: 63 BPM | WEIGHT: 169.75 LBS | BODY MASS INDEX: 27.28 KG/M2 | RESPIRATION RATE: 18 BRPM | SYSTOLIC BLOOD PRESSURE: 125 MMHG | OXYGEN SATURATION: 97 % | DIASTOLIC BLOOD PRESSURE: 51 MMHG

## 2023-05-04 LAB
ANION GAP SERPL CALCULATED.3IONS-SCNC: 6 MMOL/L (ref 3–16)
BUN SERPL-MCNC: 40 MG/DL (ref 7–20)
CALCIUM SERPL-MCNC: 8 MG/DL (ref 8.3–10.6)
CHLORIDE SERPL-SCNC: 115 MMOL/L (ref 99–110)
CO2 SERPL-SCNC: 17 MMOL/L (ref 21–32)
CREAT SERPL-MCNC: 3 MG/DL (ref 0.8–1.3)
DEPRECATED RDW RBC AUTO: 15.9 % (ref 12.4–15.4)
GFR SERPLBLD CREATININE-BSD FMLA CKD-EPI: 19 ML/MIN/{1.73_M2}
GLUCOSE SERPL-MCNC: 106 MG/DL (ref 70–99)
HCT VFR BLD AUTO: 24.7 % (ref 40.5–52.5)
HGB BLD-MCNC: 7.6 G/DL (ref 13.5–17.5)
MCH RBC QN AUTO: 28.9 PG (ref 26–34)
MCHC RBC AUTO-ENTMCNC: 30.8 G/DL (ref 31–36)
MCV RBC AUTO: 93.8 FL (ref 80–100)
PLATELET # BLD AUTO: 112 K/UL (ref 135–450)
PMV BLD AUTO: 9.5 FL (ref 5–10.5)
POTASSIUM SERPL-SCNC: 4.8 MMOL/L (ref 3.5–5.1)
RBC # BLD AUTO: 2.63 M/UL (ref 4.2–5.9)
SODIUM SERPL-SCNC: 138 MMOL/L (ref 136–145)
WBC # BLD AUTO: 15.2 K/UL (ref 4–11)

## 2023-05-04 PROCEDURE — 80048 BASIC METABOLIC PNL TOTAL CA: CPT

## 2023-05-04 PROCEDURE — 94760 N-INVAS EAR/PLS OXIMETRY 1: CPT

## 2023-05-04 PROCEDURE — 2580000003 HC RX 258: Performed by: UROLOGY

## 2023-05-04 PROCEDURE — 85027 COMPLETE CBC AUTOMATED: CPT

## 2023-05-04 PROCEDURE — 6370000000 HC RX 637 (ALT 250 FOR IP): Performed by: UROLOGY

## 2023-05-04 PROCEDURE — 36415 COLL VENOUS BLD VENIPUNCTURE: CPT

## 2023-05-04 RX ADMIN — CARVEDILOL 3.12 MG: 3.12 TABLET, FILM COATED ORAL at 07:40

## 2023-05-04 RX ADMIN — SODIUM CHLORIDE: 9 INJECTION, SOLUTION INTRAVENOUS at 04:16

## 2023-05-04 RX ADMIN — DOCUSATE SODIUM 100 MG: 100 CAPSULE, LIQUID FILLED ORAL at 07:40

## 2023-05-04 RX ADMIN — PANTOPRAZOLE SODIUM 40 MG: 40 TABLET, DELAYED RELEASE ORAL at 07:40

## 2023-05-04 RX ADMIN — FERROUS SULFATE TAB EC 324 MG (65 MG FE EQUIVALENT) 324 MG: 324 (65 FE) TABLET DELAYED RESPONSE at 07:40

## 2023-05-04 RX ADMIN — SENNOSIDES AND DOCUSATE SODIUM 1 TABLET: 50; 8.6 TABLET ORAL at 07:40

## 2023-05-04 NOTE — PLAN OF CARE
Problem: Chronic Conditions and Co-morbidities  Goal: Patient's chronic conditions and co-morbidity symptoms are monitored and maintained or improved  5/4/2023 1321 by Jhonatan Laird RN  Outcome: Completed  5/4/2023 1057 by Jhonatan Laird RN  Outcome: Progressing     Problem: Discharge Planning  Goal: Discharge to home or other facility with appropriate resources  5/4/2023 1321 by Jhonatan Laird RN  Outcome: Completed  5/4/2023 1057 by Jhonatan Laird RN  Outcome: Progressing     Problem: Pain  Goal: Verbalizes/displays adequate comfort level or baseline comfort level  5/4/2023 1321 by Jhonatan Laird RN  Outcome: Completed  5/4/2023 1057 by Jhonatan Laird RN  Outcome: Progressing     Problem: Safety - Adult  Goal: Free from fall injury  5/4/2023 1321 by Jhonatan Laird RN  Outcome: Completed  5/4/2023 1057 by Jhonatan Laird RN  Outcome: Progressing     Problem: ABCDS Injury Assessment  Goal: Absence of physical injury  5/4/2023 1321 by Jhonatan Laird RN  Outcome: Completed  5/4/2023 1057 by Jhonatan Laird RN  Outcome: Progressing     Problem: Skin/Tissue Integrity  Goal: Absence of new skin breakdown  Description: 1. Monitor for areas of redness and/or skin breakdown  2. Assess vascular access sites hourly  3. Every 4-6 hours minimum:  Change oxygen saturation probe site  4. Every 4-6 hours:  If on nasal continuous positive airway pressure, respiratory therapy assess nares and determine need for appliance change or resting period.   5/4/2023 1321 by Jhonatan Laird RN  Outcome: Completed  5/4/2023 1057 by Jhonatan Laird RN  Outcome: Progressing

## 2023-05-04 NOTE — DISCHARGE INSTRUCTIONS
Take 2 pills of steroids every morning together, do this over 5 days.  Use your duo nebs as needed.     I will message your lung doctor that we saw you, I recommend you also call just to update him that you had another exacerbation but being treated.    Call us if it doesn't resolve or if it worsens.   You are being discharged after a :TURP    Diet:  Eat what you can tolerate, appetite after surgery can take some time to return    Urination: It is normal to experience some discomfort with urination after this procedure. Some blood in the urine, burning, urgency and frequency is common. Drink extra fluid after your procedure. Medications:  Avoid taking any blood thinning medications for 1 month or cleared by your urologist.   Take stool softener (colace) to avoid constipation  You can take tylenol or ibuprofen for pain control.     Activity:  No strenous activity/ exercise for 1 mo  Avoid straining, lifting > 20 lbs  Ok to drive 2-3 days post op    Call your physician if you develop:  -  Fevers > 100.4 that last for more than 12 hours  - If you have significant bleeding or are unable to urinate contact the office  -  chest pain, difficulty breathing, leg swelling or tenderness

## 2023-05-04 NOTE — OP NOTE
[address] [place] [address]                                OPERATIVE REPORT    PATIENT NAME: Maricarmen Fox                        :        1931  MED REC NO:   [id number]                          ROOM:       4105  ACCOUNT NO:   [de-identified]                           ADMIT DATE: 2023  PROVIDER:     Yvette Reese. Brie Olivarez MD    DATE OF PROCEDURE:  2023    PREOPERATIVE DIAGNOSES:  BPH with lower urinary tract symptoms and  urinary retention. POSTOPERATIVE DIAGNOSES:  BPH with lower urinary tract symptoms and  urinary retention. PROCEDURE:  Cystoscopy with transurethral resection of prostate. ANESTHESIA:  General.    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 DRAINS:  24-Portuguese three-way Chauhan catheter. SPECIMEN:  Prostate chips. EBL:  Minimal.    COMPLICATIONS:  None immediate. DISPOSITION:  Stable to Recovery. Admit to floor for routine  postoperative care. INDICATIONS FOR PROCEDURE:  The patient is a 26-year-old gentleman with  recent urinary retention after a long time dealing with worsening lower  urinary tract symptoms. He has failed a void trial multiple times, but  adamantly would like to try to get the catheter out. He underwent a  cystoscopy which shows trilobar hyperplasia. He was unable to tolerate  the transrectal ultrasound probe, so we do not have an accurate prostate  volume measurement. He presents now for cystoscopy with TURP. OPERATIVE PROCEDURE:  The patient was properly identified in the  preoperative area and taken to the operating room, placed on the table  in supine position. General anesthesia was induced and the patient was  placed in dorsal lithotomy position. He was prepped and draped in a  standard fashion. Antibiotics given and time-out was performed.     The bipolar resectoscope with visual obturator was placed through the  urethra and into the

## 2023-05-04 NOTE — CARE COORDINATION
Case Management Assessment  Initial Evaluation    Date/Time of Evaluation: 5/4/2023 1:38 PM  Assessment Completed by: LACHO Flores    If patient is discharged prior to next notation, then this note serves as note for discharge by case management. Patient Name: Julee Womack                   YOB: 1931  Diagnosis: Retention of urine [R33.9]                   Date / Time: 5/3/2023 11:40 AM    Patient Admission Status: Outpatient in a bed   Readmission Risk (Low < 19, Mod (19-27), High > 27): Readmission Risk Score: 23    Current PCP: David Ortez MD  PCP verified by CM? Yes    Chart Reviewed: Yes      History Provided by: Patient, Child/Family  Patient Orientation: Alert and Oriented    Patient Cognition: Alert    Hospitalization in the last 30 days (Readmission):  No    If yes, Readmission Assessment in  Navigator will be completed. Advance Directives:      Code Status: Full Code   Patient's Primary Decision Maker is: Legal Next of Kin    Primary Decision Maker: Thanh Bradford - Child - [phone]    Primary Decision Maker: Josh Hernández - Child - [phone]    Secondary Decision Maker: Shira White  [phone]    Discharge Planning:    Patient lives with: Children Type of Home: Apartment  Primary Care Giver: Self (son assists with bathing, cooking, transport)  Patient Support Systems include: Children   Current Financial resources: Medicare  Current community resources: None  Current services prior to admission: Home Care            Current DME:              Type of Home Care services:  Nursing Services    ADLS  Prior functional level: Assistance with the following:, Bathing, Cooking, Shopping, Other (see comment) (transport)  Current functional level: Assistance with the following:, Bathing, Cooking, Shopping    PT AM-PAC:   /24  OT AM-PAC:   /24    Family can provide assistance at DC:  Yes  Would you like Case Management to discuss the discharge plan with any other family

## 2023-05-04 NOTE — PROGRESS NOTES
Patient admitted to PACU # 7 from OR at 1425 post cystoscopy transurethral resection of prostate per Dr Vincent Ward. Attached to PACU monitoring system and report received from anesthesia provider. Patient was reported to be hemodynamically stable during procedure. Patient drowsy on admission and denied pain.
Patient dc home, Iv dc, tele dc no questions asked.  Patient voided 300 ml prior to dc
check PVR  Discharge home this afternoon.     Sonido Vazquez, LISSET - BARBARA 5/4/2023

## 2023-05-04 NOTE — PLAN OF CARE
Problem: Chronic Conditions and Co-morbidities  Goal: Patient's chronic conditions and co-morbidity symptoms are monitored and maintained or improved  5/3/2023 2018 by Sy Benavidez RN  Outcome: Progressing  5/3/2023 1742 by Mallory Chowdhury RN  Outcome: Progressing     Problem: Discharge Planning  Goal: Discharge to home or other facility with appropriate resources  5/3/2023 2018 by Sy Benavidez RN  Outcome: Progressing  5/3/2023 1742 by Mallory Chowdhury RN  Outcome: Progressing     Problem: Pain  Goal: Verbalizes/displays adequate comfort level or baseline comfort level  5/3/2023 2018 by Sy Benavidez RN  Outcome: Progressing  5/3/2023 1742 by Mallory Chowdhury RN  Outcome: Progressing     Problem: Safety - Adult  Goal: Free from fall injury  5/3/2023 2018 by yS Benavidez RN  Outcome: Progressing  5/3/2023 1742 by Mallory Chowdhury RN  Outcome: Progressing     Problem: ABCDS Injury Assessment  Goal: Absence of physical injury  5/3/2023 2018 by Sy Benavidez RN  Outcome: Progressing  5/3/2023 1742 by Mallory Chowdhury RN  Outcome: Progressing     Problem: Skin/Tissue Integrity  Goal: Absence of new skin breakdown  Description: 1. Monitor for areas of redness and/or skin breakdown  2. Assess vascular access sites hourly  3. Every 4-6 hours minimum:  Change oxygen saturation probe site  4. Every 4-6 hours:  If on nasal continuous positive airway pressure, respiratory therapy assess nares and determine need for appliance change or resting period.   5/3/2023 2018 by Sy Benavidez RN  Outcome: Progressing  5/3/2023 1742 by Mallory Chowdhury RN  Outcome: Progressing

## 2023-05-04 NOTE — DISCHARGE INSTR - COC
Continuity of Care Form    Patient Name: Samson Spears   :  1931  MRN:  [id number]    [facility] date:  5/3/2023  Discharge date:  ***    Code Status Order: Full Code   Advance Directives:   Advance Care Flowsheet Documentation       Date/Time Healthcare Directive Type of Healthcare Directive Copy in [address] Agent's Name Healthcare Agent's Phone Number    23 1214 No, patient does not have an advance directive for healthcare treatment -- -- -- -- --            Admitting Physician:  Estela Finch MD  PCP: Tatianna Cole MD    Discharging Nurse: [facility] Unit/Room#: U0G-9671/8661-23  Discharging Unit Phone Number: ***    Emergency Contact:   Extended Emergency Contact Information  Primary Emergency Contact: Thanh Bradford  Address: [address] [place] [address] Phone: [phone]  Relation: Child  Secondary Emergency Contact: Phyllis Johnston   [address] Phone: [phone]  Relation: Child    Past Surgical History:  Past Surgical History:   Procedure Laterality Date    CORONARY ANGIOPLASTY WITH STENT PLACEMENT  10/18/15    ENDOSCOPY, COLON, DIAGNOSTIC      TURP N/A 5/3/2023    CYSTOSCOPY TRANSURETHRAL RESECTION OF PROSTATE performed by Estela Finch MD at [address] [place] [address]  10-    UPPER GASTROINTESTINAL ENDOSCOPY N/A 2023    EGD SUBMUCOSAL EPINEPHRINE INJECTION performed by Aydin oRa MD at [address] [place] [address],[facility]  2023    EGD  ABLATION WITH GOLD PROBE performed by Aydin Roa MD at [address] [place] [address],[facility] 2023    EGD SUBMUCOSAL EPINEPHRINE INJECTION performed by Arian Harry MD at [address] [place] [address],[facility]  2023    EGD CONTROL HEMORRHAGE performed by Arian Harry MD at [address] [place] [address] History:   Immunization

## 2023-05-12 DIAGNOSIS — N18.4 STAGE 4 CHRONIC KIDNEY DISEASE (HCC): ICD-10-CM

## 2023-05-12 RX ORDER — ALBUTEROL SULFATE 90 UG/1
4 AEROSOL, METERED RESPIRATORY (INHALATION) PRN
OUTPATIENT
Start: 2023-05-12

## 2023-05-12 RX ORDER — SODIUM CHLORIDE 9 MG/ML
INJECTION, SOLUTION INTRAVENOUS CONTINUOUS
OUTPATIENT
Start: 2023-05-12

## 2023-05-12 RX ORDER — ONDANSETRON 2 MG/ML
8 INJECTION INTRAMUSCULAR; INTRAVENOUS
OUTPATIENT
Start: 2023-05-12

## 2023-05-12 RX ORDER — METHYLPREDNISOLONE SODIUM SUCCINATE 125 MG/2ML
40 INJECTION, POWDER, LYOPHILIZED, FOR SOLUTION INTRAMUSCULAR; INTRAVENOUS PRN
Start: 2023-05-12

## 2023-05-12 RX ORDER — DIPHENHYDRAMINE HYDROCHLORIDE 50 MG/ML
50 INJECTION INTRAMUSCULAR; INTRAVENOUS
OUTPATIENT
Start: 2023-05-12

## 2023-05-12 RX ORDER — EPINEPHRINE 1 MG/ML
0.3 INJECTION, SOLUTION, CONCENTRATE INTRAVENOUS PRN
OUTPATIENT
Start: 2023-05-12

## 2023-05-12 RX ORDER — ACETAMINOPHEN 325 MG/1
650 TABLET ORAL
OUTPATIENT
Start: 2023-05-12

## 2023-05-16 DIAGNOSIS — F32.1 CURRENT MODERATE EPISODE OF MAJOR DEPRESSIVE DISORDER WITHOUT PRIOR EPISODE (HCC): ICD-10-CM

## 2023-05-16 RX ORDER — ESCITALOPRAM OXALATE 10 MG/1
10 TABLET ORAL DAILY
Qty: 90 TABLET | Refills: 3 | Status: SHIPPED | OUTPATIENT
Start: 2023-05-16

## 2023-06-07 ENCOUNTER — HOSPITAL ENCOUNTER (OUTPATIENT)
Dept: INFUSION THERAPY | Age: 88
Setting detail: INFUSION SERIES
Discharge: HOME | End: 2023-06-07
Payer: MEDICARE

## 2023-06-07 ENCOUNTER — OFFICE VISIT (OUTPATIENT)
Dept: INTERNAL MEDICINE CLINIC | Age: 88
End: 2023-06-07

## 2023-06-07 VITALS
SYSTOLIC BLOOD PRESSURE: 110 MMHG | WEIGHT: 135.38 LBS | HEIGHT: 66 IN | HEART RATE: 54 BPM | BODY MASS INDEX: 21.76 KG/M2 | OXYGEN SATURATION: 97 % | DIASTOLIC BLOOD PRESSURE: 60 MMHG

## 2023-06-07 VITALS
RESPIRATION RATE: 16 BRPM | HEART RATE: 51 BPM | TEMPERATURE: 97 F | DIASTOLIC BLOOD PRESSURE: 62 MMHG | SYSTOLIC BLOOD PRESSURE: 117 MMHG | OXYGEN SATURATION: 98 %

## 2023-06-07 DIAGNOSIS — N39.41 URGE INCONTINENCE OF URINE: ICD-10-CM

## 2023-06-07 DIAGNOSIS — E78.2 MIXED HYPERLIPIDEMIA: ICD-10-CM

## 2023-06-07 DIAGNOSIS — N18.4 ANEMIA IN STAGE 4 CHRONIC KIDNEY DISEASE (HCC): ICD-10-CM

## 2023-06-07 DIAGNOSIS — N18.4 CHRONIC KIDNEY DISEASE, STAGE IV (SEVERE) (HCC): Primary | ICD-10-CM

## 2023-06-07 DIAGNOSIS — J61 ASBESTOSIS (HCC): ICD-10-CM

## 2023-06-07 DIAGNOSIS — D63.1 ANEMIA IN STAGE 4 CHRONIC KIDNEY DISEASE (HCC): ICD-10-CM

## 2023-06-07 DIAGNOSIS — N18.4 CHRONIC KIDNEY DISEASE, STAGE IV (SEVERE) (HCC): ICD-10-CM

## 2023-06-07 DIAGNOSIS — R26.9 GAIT DIFFICULTY: ICD-10-CM

## 2023-06-07 DIAGNOSIS — N18.4 STAGE 4 CHRONIC KIDNEY DISEASE (HCC): Primary | ICD-10-CM

## 2023-06-07 DIAGNOSIS — N18.4 STAGE 4 CHRONIC KIDNEY DISEASE (HCC): ICD-10-CM

## 2023-06-07 DIAGNOSIS — F32.1 CURRENT MODERATE EPISODE OF MAJOR DEPRESSIVE DISORDER WITHOUT PRIOR EPISODE (HCC): ICD-10-CM

## 2023-06-07 DIAGNOSIS — Z78.9 NON-ENGLISH SPEAKING PATIENT: ICD-10-CM

## 2023-06-07 DIAGNOSIS — R29.6 FREQUENT FALLS: ICD-10-CM

## 2023-06-07 DIAGNOSIS — I10 ESSENTIAL HYPERTENSION: ICD-10-CM

## 2023-06-07 DIAGNOSIS — I25.10 CORONARY ARTERY DISEASE INVOLVING NATIVE CORONARY ARTERY OF NATIVE HEART WITHOUT ANGINA PECTORIS: ICD-10-CM

## 2023-06-07 DIAGNOSIS — Z51.5 END OF LIFE CARE: ICD-10-CM

## 2023-06-07 DIAGNOSIS — K59.09 CHRONIC CONSTIPATION: ICD-10-CM

## 2023-06-07 DIAGNOSIS — R54 FRAILTY SYNDROME IN GERIATRIC PATIENT: ICD-10-CM

## 2023-06-07 PROBLEM — R33.9 RETENTION OF URINE: Status: RESOLVED | Noted: 2023-05-03 | Resolved: 2023-06-07

## 2023-06-07 PROBLEM — R10.13 EPIGASTRIC PAIN: Status: RESOLVED | Noted: 2017-12-04 | Resolved: 2023-06-07

## 2023-06-07 PROBLEM — D63.8 ANEMIA IN OTHER CHRONIC DISEASES CLASSIFIED ELSEWHERE: Status: RESOLVED | Noted: 2018-09-07 | Resolved: 2023-06-07

## 2023-06-07 PROBLEM — R35.0 URINARY FREQUENCY: Status: RESOLVED | Noted: 2019-10-31 | Resolved: 2023-06-07

## 2023-06-07 LAB
FERRITIN SERPL IA-MCNC: 389.4 NG/ML (ref 30–400)
FOLATE SERPL-MCNC: 8.24 NG/ML (ref 4.78–24.2)
HGB BLD-MCNC: 9.3 G/DL (ref 13.5–17.5)
IRON SATN MFR SERPL: 32 % (ref 20–50)
IRON SERPL-MCNC: 53 UG/DL (ref 59–158)
TIBC SERPL-MCNC: 168 UG/DL (ref 260–445)
VIT B12 SERPL-MCNC: 701 PG/ML (ref 211–911)

## 2023-06-07 PROCEDURE — 83540 ASSAY OF IRON: CPT

## 2023-06-07 PROCEDURE — 82607 VITAMIN B-12: CPT

## 2023-06-07 PROCEDURE — 85018 HEMOGLOBIN: CPT

## 2023-06-07 PROCEDURE — 82728 ASSAY OF FERRITIN: CPT

## 2023-06-07 PROCEDURE — 82746 ASSAY OF FOLIC ACID SERUM: CPT

## 2023-06-07 PROCEDURE — 83550 IRON BINDING TEST: CPT

## 2023-06-07 PROCEDURE — 6360000002 HC RX W HCPCS: Performed by: INTERNAL MEDICINE

## 2023-06-07 PROCEDURE — 96372 THER/PROPH/DIAG INJ SC/IM: CPT

## 2023-06-07 RX ORDER — ACETAMINOPHEN 325 MG/1
650 TABLET ORAL
OUTPATIENT
Start: 2023-06-21

## 2023-06-07 RX ORDER — EPINEPHRINE 1 MG/ML
0.3 INJECTION, SOLUTION, CONCENTRATE INTRAVENOUS PRN
OUTPATIENT
Start: 2023-06-21

## 2023-06-07 RX ORDER — ALBUTEROL SULFATE 90 UG/1
4 AEROSOL, METERED RESPIRATORY (INHALATION) PRN
OUTPATIENT
Start: 2023-06-21

## 2023-06-07 RX ORDER — DIPHENHYDRAMINE HYDROCHLORIDE 50 MG/ML
50 INJECTION INTRAMUSCULAR; INTRAVENOUS
OUTPATIENT
Start: 2023-06-21

## 2023-06-07 RX ORDER — METHYLPREDNISOLONE SODIUM SUCCINATE 125 MG/2ML
40 INJECTION, POWDER, LYOPHILIZED, FOR SOLUTION INTRAMUSCULAR; INTRAVENOUS PRN
Start: 2023-06-21

## 2023-06-07 RX ORDER — SODIUM CHLORIDE 9 MG/ML
INJECTION, SOLUTION INTRAVENOUS CONTINUOUS
OUTPATIENT
Start: 2023-06-21

## 2023-06-07 RX ORDER — ONDANSETRON 2 MG/ML
8 INJECTION INTRAMUSCULAR; INTRAVENOUS
OUTPATIENT
Start: 2023-06-21

## 2023-06-07 RX ADMIN — EPOETIN ALFA-EPBX 10000 UNITS: 10000 INJECTION, SOLUTION INTRAVENOUS; SUBCUTANEOUS at 13:57

## 2023-06-07 NOTE — DISCHARGE INSTRUCTIONS
Outpatient Infusion Discharge Instructions  [place] [address] [facility] [address] [address], [address]  Telephone: [phone]    NAME:  Yaima Phillips  YOB: 1931  MEDICAL RECORD NUMBER:  [id number]  DATE:  6/7/23    Reason for Outpatient Infusion Visit: Epogen, HGB=9.3    If you develop any these symptoms please contact you Doctor    [] Nausea and/or vomiting not relieved with medication   [x] Swelling, redness, and/or bleeding at injection or IV site    [] Fever or chills  [] Rash or itching   [] Shortness of breath  [x] Please review After Visit Summary (AVS) information on  Epogen, anemia  [] Other      Outpatient [address]: Should you experience any significant changes in your health or have questions about your care please contact the [address] at [address] 8:00 am - 4:00 pm.  If you need help outside these hours and cannot wait until we are again available, contact your Primary Care Physician or go to the hospital emergency room.        Electronically signed by Newton Inman RN on 6/7/2023 at 1:26 PM

## 2023-06-07 NOTE — PROGRESS NOTES
dose more frequently than once every 4 weeks. If Hgb 10-10.5 g/dl  Continue same dose  If Hgb 10.6-11 g/dl Decrease dose by 25%. A decrease in dose can be done as frequently as every week. If Hgb is greater than 11 g/dl Hold Epogen. May resume ELIZABETH when Hgb is less than 10 with a 25% reduction in the dose from the last administered dose or decrease with a 25% reduction in the dose from the last administered dose or decrease  frequency. Last visit date: N/A today is the first visit     Last Hgb: 7.6 per MD on 5/4/23 g/dl (patient had a cysto TURP on 5/3/23)  Last dose: N/A    Current Lab Data:    Recent Labs     06/07/23  1305   HGB 9.3*     Dose will be initiated    Epogen dosage: 89080 units was administered slowly subcutaneously into the right upper arm. Dose verified by:  SONALI Greene RN    Response to treatment:  Well tolerated by patient. Education:    Verbalized understanding    Scheduled to return for next dose of Epogen on June 21, 2023 .      Electronically signed by Donaciano Seip, RN on 6/7/2023 at 3:45 PM

## 2023-06-13 ENCOUNTER — APPOINTMENT (OUTPATIENT)
Dept: CT IMAGING | Age: 88
End: 2023-06-13
Payer: MEDICARE

## 2023-06-13 ENCOUNTER — HOSPITAL ENCOUNTER (INPATIENT)
Age: 88
LOS: 9 days | Discharge: NURSING, REHAB OR LONG-TERM CARE | End: 2023-06-22
Attending: EMERGENCY MEDICINE | Admitting: STUDENT IN AN ORGANIZED HEALTH CARE EDUCATION/TRAINING PROGRAM
Payer: MEDICARE

## 2023-06-13 DIAGNOSIS — N30.00 ACUTE CYSTITIS WITHOUT HEMATURIA: ICD-10-CM

## 2023-06-13 DIAGNOSIS — J96.01 ACUTE RESPIRATORY FAILURE WITH HYPOXIA (HCC): ICD-10-CM

## 2023-06-13 DIAGNOSIS — A41.9 SEVERE SEPSIS (HCC): Primary | ICD-10-CM

## 2023-06-13 DIAGNOSIS — R41.0 DELIRIUM: ICD-10-CM

## 2023-06-13 DIAGNOSIS — E87.5 HYPERKALEMIA: ICD-10-CM

## 2023-06-13 DIAGNOSIS — N18.4 STAGE 4 CHRONIC KIDNEY DISEASE (HCC): ICD-10-CM

## 2023-06-13 DIAGNOSIS — J18.9 PNEUMONIA OF RIGHT LOWER LOBE DUE TO INFECTIOUS ORGANISM: ICD-10-CM

## 2023-06-13 DIAGNOSIS — R65.20 SEVERE SEPSIS (HCC): Primary | ICD-10-CM

## 2023-06-13 LAB
ALBUMIN SERPL-MCNC: 3.8 G/DL (ref 3.4–5)
ALBUMIN/GLOB SERPL: 1 {RATIO} (ref 1.1–2.2)
ALP SERPL-CCNC: 114 U/L (ref 40–129)
ALT SERPL-CCNC: 9 U/L (ref 10–40)
ANION GAP SERPL CALCULATED.3IONS-SCNC: 10 MMOL/L (ref 3–16)
ANION GAP SERPL CALCULATED.3IONS-SCNC: 14 MMOL/L (ref 3–16)
AST SERPL-CCNC: 14 U/L (ref 15–37)
BACTERIA URNS QL MICRO: ABNORMAL /HPF
BASE EXCESS BLDV CALC-SCNC: -9.9 MMOL/L
BASOPHILS # BLD: 0 K/UL (ref 0–0.2)
BASOPHILS NFR BLD: 0.3 %
BILIRUB SERPL-MCNC: 0.3 MG/DL (ref 0–1)
BILIRUB UR QL STRIP.AUTO: NEGATIVE
BUN SERPL-MCNC: 57 MG/DL (ref 7–20)
BUN SERPL-MCNC: 61 MG/DL (ref 7–20)
CALCIUM SERPL-MCNC: 8.3 MG/DL (ref 8.3–10.6)
CALCIUM SERPL-MCNC: 8.8 MG/DL (ref 8.3–10.6)
CHLORIDE SERPL-SCNC: 105 MMOL/L (ref 99–110)
CHLORIDE SERPL-SCNC: 112 MMOL/L (ref 99–110)
CLARITY UR: ABNORMAL
CO2 BLDV-SCNC: 18 MMOL/L
CO2 SERPL-SCNC: 15 MMOL/L (ref 21–32)
CO2 SERPL-SCNC: 16 MMOL/L (ref 21–32)
COHGB MFR BLDV: 1.2 %
COLOR UR: YELLOW
CREAT SERPL-MCNC: 3.6 MG/DL (ref 0.8–1.3)
CREAT SERPL-MCNC: 4.1 MG/DL (ref 0.8–1.3)
DEPRECATED RDW RBC AUTO: 15.9 % (ref 12.4–15.4)
EKG ATRIAL RATE: 68 BPM
EKG DIAGNOSIS: NORMAL
EKG P AXIS: 66 DEGREES
EKG P-R INTERVAL: 170 MS
EKG Q-T INTERVAL: 388 MS
EKG QRS DURATION: 100 MS
EKG QTC CALCULATION (BAZETT): 412 MS
EKG R AXIS: -58 DEGREES
EKG T AXIS: 61 DEGREES
EKG VENTRICULAR RATE: 68 BPM
EOSINOPHIL # BLD: 0 K/UL (ref 0–0.6)
EOSINOPHIL NFR BLD: 0.2 %
EPI CELLS #/AREA URNS AUTO: 1 /HPF (ref 0–5)
GFR SERPLBLD CREATININE-BSD FMLA CKD-EPI: 13 ML/MIN/{1.73_M2}
GFR SERPLBLD CREATININE-BSD FMLA CKD-EPI: 15 ML/MIN/{1.73_M2}
GLUCOSE SERPL-MCNC: 121 MG/DL (ref 70–99)
GLUCOSE SERPL-MCNC: 132 MG/DL (ref 70–99)
GLUCOSE UR STRIP.AUTO-MCNC: NEGATIVE MG/DL
HCO3 BLDV-SCNC: 17 MMOL/L (ref 23–29)
HCT VFR BLD AUTO: 31 % (ref 40.5–52.5)
HGB BLD-MCNC: 9.8 G/DL (ref 13.5–17.5)
HGB UR QL STRIP.AUTO: ABNORMAL
HYALINE CASTS #/AREA URNS AUTO: 5 /LPF (ref 0–8)
KETONES UR STRIP.AUTO-MCNC: NEGATIVE MG/DL
LACTATE BLDV-SCNC: 1 MMOL/L (ref 0.4–1.9)
LEGIONELLA AG UR QL: NORMAL
LEUKOCYTE ESTERASE UR QL STRIP.AUTO: ABNORMAL
LIPASE SERPL-CCNC: 26 U/L (ref 13–60)
LYMPHOCYTES # BLD: 1.4 K/UL (ref 1–5.1)
LYMPHOCYTES NFR BLD: 12.5 %
MCH RBC QN AUTO: 28.7 PG (ref 26–34)
MCHC RBC AUTO-ENTMCNC: 31.6 G/DL (ref 31–36)
MCV RBC AUTO: 90.7 FL (ref 80–100)
METHGB MFR BLDV: 0.6 %
MONOCYTES # BLD: 1.1 K/UL (ref 0–1.3)
MONOCYTES NFR BLD: 9.4 %
NEUTROPHILS # BLD: 8.8 K/UL (ref 1.7–7.7)
NEUTROPHILS NFR BLD: 77.6 %
NITRITE UR QL STRIP.AUTO: NEGATIVE
O2 THERAPY: ABNORMAL
PCO2 BLDV: 38.9 MMHG (ref 40–50)
PH BLDV: 7.24 [PH] (ref 7.35–7.45)
PH UR STRIP.AUTO: 5 [PH] (ref 5–8)
PLATELET # BLD AUTO: 241 K/UL (ref 135–450)
PMV BLD AUTO: 8.9 FL (ref 5–10.5)
PO2 BLDV: <30 MMHG
POTASSIUM SERPL-SCNC: 5.1 MMOL/L (ref 3.5–5.1)
POTASSIUM SERPL-SCNC: 5.6 MMOL/L (ref 3.5–5.1)
PROCALCITONIN SERPL IA-MCNC: 0.49 NG/ML (ref 0–0.15)
PROT SERPL-MCNC: 7.7 G/DL (ref 6.4–8.2)
PROT UR STRIP.AUTO-MCNC: 100 MG/DL
RBC # BLD AUTO: 3.41 M/UL (ref 4.2–5.9)
RBC CLUMPS #/AREA URNS AUTO: 7 /HPF (ref 0–4)
REASON FOR REJECTION: NORMAL
REASON FOR REJECTION: NORMAL
REJECTED TEST: NORMAL
REJECTED TEST: NORMAL
S PNEUM AG UR QL: ABNORMAL
SAO2 % BLDV: 23 %
SODIUM SERPL-SCNC: 134 MMOL/L (ref 136–145)
SODIUM SERPL-SCNC: 138 MMOL/L (ref 136–145)
SP GR UR STRIP.AUTO: 1.01 (ref 1–1.03)
TROPONIN, HIGH SENSITIVITY: 41 NG/L (ref 0–22)
TROPONIN, HIGH SENSITIVITY: 45 NG/L (ref 0–22)
UA COMPLETE W REFLEX CULTURE PNL UR: YES
UA DIPSTICK W REFLEX MICRO PNL UR: YES
URN SPEC COLLECT METH UR: ABNORMAL
UROBILINOGEN UR STRIP-ACNC: 0.2 E.U./DL
WBC # BLD AUTO: 11.3 K/UL (ref 4–11)
WBC #/AREA URNS AUTO: 535 /HPF (ref 0–5)

## 2023-06-13 PROCEDURE — 87077 CULTURE AEROBIC IDENTIFY: CPT

## 2023-06-13 PROCEDURE — 87040 BLOOD CULTURE FOR BACTERIA: CPT

## 2023-06-13 PROCEDURE — 83605 ASSAY OF LACTIC ACID: CPT

## 2023-06-13 PROCEDURE — 96365 THER/PROPH/DIAG IV INF INIT: CPT

## 2023-06-13 PROCEDURE — 2580000003 HC RX 258: Performed by: EMERGENCY MEDICINE

## 2023-06-13 PROCEDURE — 70450 CT HEAD/BRAIN W/O DYE: CPT

## 2023-06-13 PROCEDURE — 36415 COLL VENOUS BLD VENIPUNCTURE: CPT

## 2023-06-13 PROCEDURE — 84145 PROCALCITONIN (PCT): CPT

## 2023-06-13 PROCEDURE — 92610 EVALUATE SWALLOWING FUNCTION: CPT

## 2023-06-13 PROCEDURE — 2500000003 HC RX 250 WO HCPCS: Performed by: EMERGENCY MEDICINE

## 2023-06-13 PROCEDURE — 87449 NOS EACH ORGANISM AG IA: CPT

## 2023-06-13 PROCEDURE — 83690 ASSAY OF LIPASE: CPT

## 2023-06-13 PROCEDURE — 87641 MR-STAPH DNA AMP PROBE: CPT

## 2023-06-13 PROCEDURE — 6370000000 HC RX 637 (ALT 250 FOR IP): Performed by: STUDENT IN AN ORGANIZED HEALTH CARE EDUCATION/TRAINING PROGRAM

## 2023-06-13 PROCEDURE — 99285 EMERGENCY DEPT VISIT HI MDM: CPT

## 2023-06-13 PROCEDURE — 85025 COMPLETE CBC W/AUTO DIFF WBC: CPT

## 2023-06-13 PROCEDURE — 93005 ELECTROCARDIOGRAM TRACING: CPT | Performed by: EMERGENCY MEDICINE

## 2023-06-13 PROCEDURE — 87186 SC STD MICRODIL/AGAR DIL: CPT

## 2023-06-13 PROCEDURE — 94761 N-INVAS EAR/PLS OXIMETRY MLT: CPT

## 2023-06-13 PROCEDURE — 2700000000 HC OXYGEN THERAPY PER DAY

## 2023-06-13 PROCEDURE — 6360000002 HC RX W HCPCS: Performed by: STUDENT IN AN ORGANIZED HEALTH CARE EDUCATION/TRAINING PROGRAM

## 2023-06-13 PROCEDURE — 6360000002 HC RX W HCPCS: Performed by: EMERGENCY MEDICINE

## 2023-06-13 PROCEDURE — 1200000000 HC SEMI PRIVATE

## 2023-06-13 PROCEDURE — 2580000003 HC RX 258: Performed by: STUDENT IN AN ORGANIZED HEALTH CARE EDUCATION/TRAINING PROGRAM

## 2023-06-13 PROCEDURE — 51702 INSERT TEMP BLADDER CATH: CPT

## 2023-06-13 PROCEDURE — 94640 AIRWAY INHALATION TREATMENT: CPT

## 2023-06-13 PROCEDURE — 84484 ASSAY OF TROPONIN QUANT: CPT

## 2023-06-13 PROCEDURE — 87184 SC STD DISK METHOD PER PLATE: CPT

## 2023-06-13 PROCEDURE — 71250 CT THORAX DX C-: CPT

## 2023-06-13 PROCEDURE — 81001 URINALYSIS AUTO W/SCOPE: CPT

## 2023-06-13 PROCEDURE — 87086 URINE CULTURE/COLONY COUNT: CPT

## 2023-06-13 PROCEDURE — 80053 COMPREHEN METABOLIC PANEL: CPT

## 2023-06-13 PROCEDURE — 82803 BLOOD GASES ANY COMBINATION: CPT

## 2023-06-13 PROCEDURE — 93010 ELECTROCARDIOGRAM REPORT: CPT | Performed by: INTERNAL MEDICINE

## 2023-06-13 RX ORDER — SODIUM CHLORIDE, SODIUM LACTATE, POTASSIUM CHLORIDE, CALCIUM CHLORIDE 600; 310; 30; 20 MG/100ML; MG/100ML; MG/100ML; MG/100ML
INJECTION, SOLUTION INTRAVENOUS CONTINUOUS
Status: ACTIVE | OUTPATIENT
Start: 2023-06-13 | End: 2023-06-13

## 2023-06-13 RX ORDER — POLYETHYLENE GLYCOL 3350 17 G/17G
17 POWDER, FOR SOLUTION ORAL DAILY PRN
Status: DISCONTINUED | OUTPATIENT
Start: 2023-06-13 | End: 2023-06-22 | Stop reason: HOSPADM

## 2023-06-13 RX ORDER — 0.9 % SODIUM CHLORIDE 0.9 %
1000 INTRAVENOUS SOLUTION INTRAVENOUS ONCE
Status: COMPLETED | OUTPATIENT
Start: 2023-06-13 | End: 2023-06-13

## 2023-06-13 RX ORDER — FERROUS SULFATE TAB EC 324 MG (65 MG FE EQUIVALENT) 324 (65 FE) MG
324 TABLET DELAYED RESPONSE ORAL
Status: DISCONTINUED | OUTPATIENT
Start: 2023-06-13 | End: 2023-06-22 | Stop reason: HOSPADM

## 2023-06-13 RX ORDER — CALCIUM GLUCONATE 94 MG/ML
1000 INJECTION, SOLUTION INTRAVENOUS ONCE
Status: COMPLETED | OUTPATIENT
Start: 2023-06-13 | End: 2023-06-13

## 2023-06-13 RX ORDER — ACETAMINOPHEN 650 MG/1
650 SUPPOSITORY RECTAL EVERY 6 HOURS PRN
Status: DISCONTINUED | OUTPATIENT
Start: 2023-06-13 | End: 2023-06-22 | Stop reason: HOSPADM

## 2023-06-13 RX ORDER — POTASSIUM CHLORIDE 7.45 MG/ML
10 INJECTION INTRAVENOUS PRN
Status: DISCONTINUED | OUTPATIENT
Start: 2023-06-13 | End: 2023-06-13

## 2023-06-13 RX ORDER — CARVEDILOL 3.12 MG/1
3.12 TABLET ORAL 2 TIMES DAILY WITH MEALS
Status: DISCONTINUED | OUTPATIENT
Start: 2023-06-13 | End: 2023-06-22 | Stop reason: HOSPADM

## 2023-06-13 RX ORDER — PANTOPRAZOLE SODIUM 40 MG/1
40 TABLET, DELAYED RELEASE ORAL
Status: DISCONTINUED | OUTPATIENT
Start: 2023-06-13 | End: 2023-06-21

## 2023-06-13 RX ORDER — ONDANSETRON 4 MG/1
4 TABLET, ORALLY DISINTEGRATING ORAL EVERY 8 HOURS PRN
Status: DISCONTINUED | OUTPATIENT
Start: 2023-06-13 | End: 2023-06-22 | Stop reason: HOSPADM

## 2023-06-13 RX ORDER — ESCITALOPRAM OXALATE 10 MG/1
10 TABLET ORAL DAILY
Status: DISCONTINUED | OUTPATIENT
Start: 2023-06-13 | End: 2023-06-22 | Stop reason: HOSPADM

## 2023-06-13 RX ORDER — HEPARIN SODIUM 5000 [USP'U]/ML
5000 INJECTION, SOLUTION INTRAVENOUS; SUBCUTANEOUS EVERY 8 HOURS SCHEDULED
Status: DISCONTINUED | OUTPATIENT
Start: 2023-06-14 | End: 2023-06-22 | Stop reason: HOSPADM

## 2023-06-13 RX ORDER — SODIUM CHLORIDE 0.9 % (FLUSH) 0.9 %
5-40 SYRINGE (ML) INJECTION EVERY 12 HOURS SCHEDULED
Status: DISCONTINUED | OUTPATIENT
Start: 2023-06-13 | End: 2023-06-22 | Stop reason: HOSPADM

## 2023-06-13 RX ORDER — ATORVASTATIN CALCIUM 40 MG/1
40 TABLET, FILM COATED ORAL DAILY
Status: DISCONTINUED | OUTPATIENT
Start: 2023-06-13 | End: 2023-06-22 | Stop reason: HOSPADM

## 2023-06-13 RX ORDER — DOCUSATE SODIUM 100 MG/1
100 CAPSULE, LIQUID FILLED ORAL 2 TIMES DAILY
Status: DISCONTINUED | OUTPATIENT
Start: 2023-06-13 | End: 2023-06-22 | Stop reason: HOSPADM

## 2023-06-13 RX ORDER — METRONIDAZOLE 500 MG/100ML
500 INJECTION, SOLUTION INTRAVENOUS ONCE
Status: COMPLETED | OUTPATIENT
Start: 2023-06-13 | End: 2023-06-13

## 2023-06-13 RX ORDER — DORZOLAMIDE HYDROCHLORIDE AND TIMOLOL MALEATE 20; 5 MG/ML; MG/ML
1 SOLUTION/ DROPS OPHTHALMIC 2 TIMES DAILY
Status: DISCONTINUED | OUTPATIENT
Start: 2023-06-13 | End: 2023-06-22 | Stop reason: HOSPADM

## 2023-06-13 RX ORDER — MAGNESIUM SULFATE IN WATER 40 MG/ML
2000 INJECTION, SOLUTION INTRAVENOUS PRN
Status: DISCONTINUED | OUTPATIENT
Start: 2023-06-13 | End: 2023-06-13

## 2023-06-13 RX ORDER — SODIUM CHLORIDE 9 MG/ML
INJECTION, SOLUTION INTRAVENOUS PRN
Status: DISCONTINUED | OUTPATIENT
Start: 2023-06-13 | End: 2023-06-22 | Stop reason: HOSPADM

## 2023-06-13 RX ORDER — ONDANSETRON 2 MG/ML
4 INJECTION INTRAMUSCULAR; INTRAVENOUS EVERY 6 HOURS PRN
Status: DISCONTINUED | OUTPATIENT
Start: 2023-06-13 | End: 2023-06-22 | Stop reason: HOSPADM

## 2023-06-13 RX ORDER — TAMSULOSIN HYDROCHLORIDE 0.4 MG/1
0.4 CAPSULE ORAL DAILY
Status: DISCONTINUED | OUTPATIENT
Start: 2023-06-13 | End: 2023-06-22 | Stop reason: HOSPADM

## 2023-06-13 RX ORDER — SENNA PLUS 8.6 MG/1
1 TABLET ORAL 2 TIMES DAILY
Status: DISCONTINUED | OUTPATIENT
Start: 2023-06-13 | End: 2023-06-22 | Stop reason: HOSPADM

## 2023-06-13 RX ORDER — SODIUM CHLORIDE 0.9 % (FLUSH) 0.9 %
5-40 SYRINGE (ML) INJECTION PRN
Status: DISCONTINUED | OUTPATIENT
Start: 2023-06-13 | End: 2023-06-22 | Stop reason: HOSPADM

## 2023-06-13 RX ORDER — ACETAMINOPHEN 325 MG/1
650 TABLET ORAL EVERY 6 HOURS PRN
Status: DISCONTINUED | OUTPATIENT
Start: 2023-06-13 | End: 2023-06-22 | Stop reason: HOSPADM

## 2023-06-13 RX ADMIN — DORZOLAMIDE HYDROCHLORIDE AND TIMOLOL MALEATE 1 DROP: 20; 5 SOLUTION/ DROPS OPHTHALMIC at 10:16

## 2023-06-13 RX ADMIN — SODIUM CHLORIDE 1000 ML: 9 INJECTION, SOLUTION INTRAVENOUS at 01:36

## 2023-06-13 RX ADMIN — SODIUM CHLORIDE, PRESERVATIVE FREE 10 ML: 5 INJECTION INTRAVENOUS at 08:04

## 2023-06-13 RX ADMIN — METRONIDAZOLE 500 MG: 500 INJECTION, SOLUTION INTRAVENOUS at 02:36

## 2023-06-13 RX ADMIN — FERROUS SULFATE TAB EC 324 MG (65 MG FE EQUIVALENT) 324 MG: 324 (65 FE) TABLET DELAYED RESPONSE at 10:12

## 2023-06-13 RX ADMIN — DOCUSATE SODIUM 100 MG: 100 CAPSULE, LIQUID FILLED ORAL at 20:46

## 2023-06-13 RX ADMIN — SODIUM CHLORIDE 1000 ML: 9 INJECTION, SOLUTION INTRAVENOUS at 02:19

## 2023-06-13 RX ADMIN — DORZOLAMIDE HYDROCHLORIDE AND TIMOLOL MALEATE 1 DROP: 20; 5 SOLUTION/ DROPS OPHTHALMIC at 20:42

## 2023-06-13 RX ADMIN — PANTOPRAZOLE SODIUM 40 MG: 40 TABLET, DELAYED RELEASE ORAL at 16:17

## 2023-06-13 RX ADMIN — SODIUM BICARBONATE 50 MEQ: 84 INJECTION INTRAVENOUS at 04:36

## 2023-06-13 RX ADMIN — CALCIUM GLUCONATE 1000 MG: 98 INJECTION, SOLUTION INTRAVENOUS at 04:41

## 2023-06-13 RX ADMIN — TAMSULOSIN HYDROCHLORIDE 0.4 MG: 0.4 CAPSULE ORAL at 10:12

## 2023-06-13 RX ADMIN — VANCOMYCIN HYDROCHLORIDE 1000 MG: 1 INJECTION, POWDER, LYOPHILIZED, FOR SOLUTION INTRAVENOUS at 03:39

## 2023-06-13 RX ADMIN — SENNOSIDES 8.6 MG: 8.6 TABLET, FILM COATED ORAL at 10:12

## 2023-06-13 RX ADMIN — SENNOSIDES 8.6 MG: 8.6 TABLET, FILM COATED ORAL at 20:43

## 2023-06-13 RX ADMIN — SODIUM CHLORIDE, PRESERVATIVE FREE 10 ML: 5 INJECTION INTRAVENOUS at 20:42

## 2023-06-13 RX ADMIN — CEFEPIME 2000 MG: 2 INJECTION, POWDER, FOR SOLUTION INTRAVENOUS at 01:37

## 2023-06-13 RX ADMIN — DOCUSATE SODIUM 100 MG: 100 CAPSULE, LIQUID FILLED ORAL at 10:12

## 2023-06-13 RX ADMIN — ATORVASTATIN CALCIUM 40 MG: 40 TABLET, FILM COATED ORAL at 10:12

## 2023-06-13 RX ADMIN — CEFTRIAXONE 1000 MG: 1 INJECTION, POWDER, FOR SOLUTION INTRAMUSCULAR; INTRAVENOUS at 08:07

## 2023-06-13 RX ADMIN — ALBUTEROL SULFATE 10 MG: 2.5 SOLUTION RESPIRATORY (INHALATION) at 03:28

## 2023-06-13 RX ADMIN — SODIUM CHLORIDE, POTASSIUM CHLORIDE, SODIUM LACTATE AND CALCIUM CHLORIDE: 600; 310; 30; 20 INJECTION, SOLUTION INTRAVENOUS at 06:22

## 2023-06-13 RX ADMIN — ESCITALOPRAM OXALATE 10 MG: 10 TABLET ORAL at 10:12

## 2023-06-13 RX ADMIN — AZITHROMYCIN MONOHYDRATE 500 MG: 500 INJECTION, POWDER, LYOPHILIZED, FOR SOLUTION INTRAVENOUS at 10:12

## 2023-06-14 LAB
ANION GAP SERPL CALCULATED.3IONS-SCNC: 11 MMOL/L (ref 3–16)
BASOPHILS # BLD: 0.1 K/UL (ref 0–0.2)
BASOPHILS NFR BLD: 0.5 %
BUN SERPL-MCNC: 57 MG/DL (ref 7–20)
CALCIUM SERPL-MCNC: 8.4 MG/DL (ref 8.3–10.6)
CHLORIDE SERPL-SCNC: 114 MMOL/L (ref 99–110)
CO2 SERPL-SCNC: 16 MMOL/L (ref 21–32)
CREAT SERPL-MCNC: 3.3 MG/DL (ref 0.8–1.3)
DEPRECATED RDW RBC AUTO: 15.7 % (ref 12.4–15.4)
EOSINOPHIL # BLD: 0 K/UL (ref 0–0.6)
EOSINOPHIL NFR BLD: 0.1 %
FOLATE SERPL-MCNC: 7.68 NG/ML (ref 4.78–24.2)
GFR SERPLBLD CREATININE-BSD FMLA CKD-EPI: 17 ML/MIN/{1.73_M2}
GLUCOSE SERPL-MCNC: 116 MG/DL (ref 70–99)
HCT VFR BLD AUTO: 23.8 % (ref 40.5–52.5)
HCT VFR BLD AUTO: 23.9 % (ref 40.5–52.5)
HGB BLD-MCNC: 7.6 G/DL (ref 13.5–17.5)
IMMATURE RETIC FRACT: 0.45 (ref 0.21–0.37)
INR PPP: 1.4 (ref 0.84–1.16)
LYMPHOCYTES # BLD: 1.1 K/UL (ref 1–5.1)
LYMPHOCYTES NFR BLD: 8.6 %
MCH RBC QN AUTO: 28.4 PG (ref 26–34)
MCHC RBC AUTO-ENTMCNC: 31.7 G/DL (ref 31–36)
MCV RBC AUTO: 89.7 FL (ref 80–100)
MONOCYTES # BLD: 1 K/UL (ref 0–1.3)
MONOCYTES NFR BLD: 7.6 %
MRSA DNA SPEC QL NAA+PROBE: NORMAL
NEUTROPHILS # BLD: 10.9 K/UL (ref 1.7–7.7)
NEUTROPHILS NFR BLD: 83.2 %
PLATELET # BLD AUTO: 182 K/UL (ref 135–450)
PMV BLD AUTO: 8.7 FL (ref 5–10.5)
POTASSIUM SERPL-SCNC: 4.9 MMOL/L (ref 3.5–5.1)
PROTHROMBIN TIME: 17.1 SEC (ref 11.5–14.8)
RBC # BLD AUTO: 2.66 M/UL (ref 4.2–5.9)
RETICS # AUTO: 0.04 M/UL
RETICS/RBC NFR AUTO: 1.7 % (ref 0.5–2.18)
SODIUM SERPL-SCNC: 141 MMOL/L (ref 136–145)
TSH SERPL DL<=0.005 MIU/L-ACNC: 1.23 UIU/ML (ref 0.27–4.2)
VIT B12 SERPL-MCNC: 614 PG/ML (ref 211–911)
WBC # BLD AUTO: 13.1 K/UL (ref 4–11)

## 2023-06-14 PROCEDURE — 6360000002 HC RX W HCPCS: Performed by: STUDENT IN AN ORGANIZED HEALTH CARE EDUCATION/TRAINING PROGRAM

## 2023-06-14 PROCEDURE — 6370000000 HC RX 637 (ALT 250 FOR IP): Performed by: STUDENT IN AN ORGANIZED HEALTH CARE EDUCATION/TRAINING PROGRAM

## 2023-06-14 PROCEDURE — 6370000000 HC RX 637 (ALT 250 FOR IP): Performed by: INTERNAL MEDICINE

## 2023-06-14 PROCEDURE — 85610 PROTHROMBIN TIME: CPT

## 2023-06-14 PROCEDURE — 36415 COLL VENOUS BLD VENIPUNCTURE: CPT

## 2023-06-14 PROCEDURE — 84443 ASSAY THYROID STIM HORMONE: CPT

## 2023-06-14 PROCEDURE — 80048 BASIC METABOLIC PNL TOTAL CA: CPT

## 2023-06-14 PROCEDURE — 82607 VITAMIN B-12: CPT

## 2023-06-14 PROCEDURE — 1200000000 HC SEMI PRIVATE

## 2023-06-14 PROCEDURE — 2580000003 HC RX 258: Performed by: STUDENT IN AN ORGANIZED HEALTH CARE EDUCATION/TRAINING PROGRAM

## 2023-06-14 PROCEDURE — 92526 ORAL FUNCTION THERAPY: CPT

## 2023-06-14 PROCEDURE — 85045 AUTOMATED RETICULOCYTE COUNT: CPT

## 2023-06-14 PROCEDURE — 82746 ASSAY OF FOLIC ACID SERUM: CPT

## 2023-06-14 PROCEDURE — 85025 COMPLETE CBC W/AUTO DIFF WBC: CPT

## 2023-06-14 RX ORDER — SOLIFENACIN SUCCINATE 5 MG/1
10 TABLET, FILM COATED ORAL DAILY
COMMUNITY

## 2023-06-14 RX ORDER — SODIUM BICARBONATE 650 MG/1
650 TABLET ORAL 4 TIMES DAILY
Status: DISCONTINUED | OUTPATIENT
Start: 2023-06-14 | End: 2023-06-22 | Stop reason: HOSPADM

## 2023-06-14 RX ADMIN — SODIUM BICARBONATE 650 MG: 650 TABLET ORAL at 15:39

## 2023-06-14 RX ADMIN — CARVEDILOL 3.12 MG: 3.12 TABLET, FILM COATED ORAL at 18:07

## 2023-06-14 RX ADMIN — SENNOSIDES 8.6 MG: 8.6 TABLET, FILM COATED ORAL at 08:33

## 2023-06-14 RX ADMIN — TAMSULOSIN HYDROCHLORIDE 0.4 MG: 0.4 CAPSULE ORAL at 08:32

## 2023-06-14 RX ADMIN — SODIUM CHLORIDE, PRESERVATIVE FREE 10 ML: 5 INJECTION INTRAVENOUS at 20:12

## 2023-06-14 RX ADMIN — PANTOPRAZOLE SODIUM 40 MG: 40 TABLET, DELAYED RELEASE ORAL at 18:07

## 2023-06-14 RX ADMIN — SODIUM BICARBONATE 650 MG: 650 TABLET ORAL at 18:07

## 2023-06-14 RX ADMIN — HEPARIN SODIUM 5000 UNITS: 5000 INJECTION INTRAVENOUS; SUBCUTANEOUS at 15:39

## 2023-06-14 RX ADMIN — CARVEDILOL 3.12 MG: 3.12 TABLET, FILM COATED ORAL at 08:39

## 2023-06-14 RX ADMIN — SODIUM CHLORIDE: 9 INJECTION, SOLUTION INTRAVENOUS at 11:10

## 2023-06-14 RX ADMIN — PANTOPRAZOLE SODIUM 40 MG: 40 TABLET, DELAYED RELEASE ORAL at 05:36

## 2023-06-14 RX ADMIN — DORZOLAMIDE HYDROCHLORIDE AND TIMOLOL MALEATE 1 DROP: 20; 5 SOLUTION/ DROPS OPHTHALMIC at 20:17

## 2023-06-14 RX ADMIN — SODIUM CHLORIDE, PRESERVATIVE FREE 10 ML: 5 INJECTION INTRAVENOUS at 08:33

## 2023-06-14 RX ADMIN — DOCUSATE SODIUM 100 MG: 100 CAPSULE, LIQUID FILLED ORAL at 20:12

## 2023-06-14 RX ADMIN — CEFTRIAXONE 1000 MG: 1 INJECTION, POWDER, FOR SOLUTION INTRAMUSCULAR; INTRAVENOUS at 11:11

## 2023-06-14 RX ADMIN — FERROUS SULFATE TAB EC 324 MG (65 MG FE EQUIVALENT) 324 MG: 324 (65 FE) TABLET DELAYED RESPONSE at 08:33

## 2023-06-14 RX ADMIN — SODIUM BICARBONATE 650 MG: 650 TABLET ORAL at 20:12

## 2023-06-14 RX ADMIN — SENNOSIDES 8.6 MG: 8.6 TABLET, FILM COATED ORAL at 20:12

## 2023-06-14 RX ADMIN — HEPARIN SODIUM 5000 UNITS: 5000 INJECTION INTRAVENOUS; SUBCUTANEOUS at 20:12

## 2023-06-14 RX ADMIN — DORZOLAMIDE HYDROCHLORIDE AND TIMOLOL MALEATE 1 DROP: 20; 5 SOLUTION/ DROPS OPHTHALMIC at 08:53

## 2023-06-14 RX ADMIN — ATORVASTATIN CALCIUM 40 MG: 40 TABLET, FILM COATED ORAL at 08:33

## 2023-06-14 RX ADMIN — DOCUSATE SODIUM 100 MG: 100 CAPSULE, LIQUID FILLED ORAL at 08:33

## 2023-06-14 RX ADMIN — ESCITALOPRAM OXALATE 10 MG: 10 TABLET ORAL at 08:33

## 2023-06-15 LAB
ALBUMIN SERPL-MCNC: 3.1 G/DL (ref 3.4–5)
ANION GAP SERPL CALCULATED.3IONS-SCNC: 11 MMOL/L (ref 3–16)
BUN SERPL-MCNC: 56 MG/DL (ref 7–20)
CALCIUM SERPL-MCNC: 8.4 MG/DL (ref 8.3–10.6)
CHLORIDE SERPL-SCNC: 113 MMOL/L (ref 99–110)
CO2 SERPL-SCNC: 17 MMOL/L (ref 21–32)
CREAT SERPL-MCNC: 3 MG/DL (ref 0.8–1.3)
DEPRECATED RDW RBC AUTO: 15.8 % (ref 12.4–15.4)
FERRITIN SERPL IA-MCNC: 610.4 NG/ML (ref 30–400)
GFR SERPLBLD CREATININE-BSD FMLA CKD-EPI: 19 ML/MIN/{1.73_M2}
GLUCOSE SERPL-MCNC: 117 MG/DL (ref 70–99)
HCT VFR BLD AUTO: 24.5 % (ref 40.5–52.5)
HGB BLD-MCNC: 8.1 G/DL (ref 13.5–17.5)
IRON SATN MFR SERPL: 13 % (ref 20–50)
IRON SERPL-MCNC: 15 UG/DL (ref 59–158)
MCH RBC QN AUTO: 29.7 PG (ref 26–34)
MCHC RBC AUTO-ENTMCNC: 33.2 G/DL (ref 31–36)
MCV RBC AUTO: 89.4 FL (ref 80–100)
PHOSPHATE SERPL-MCNC: 2.1 MG/DL (ref 2.5–4.9)
PLATELET # BLD AUTO: 182 K/UL (ref 135–450)
PMV BLD AUTO: 9 FL (ref 5–10.5)
POTASSIUM SERPL-SCNC: 4.8 MMOL/L (ref 3.5–5.1)
RBC # BLD AUTO: 2.74 M/UL (ref 4.2–5.9)
SODIUM SERPL-SCNC: 141 MMOL/L (ref 136–145)
TIBC SERPL-MCNC: 115 UG/DL (ref 260–445)
WBC # BLD AUTO: 11.5 K/UL (ref 4–11)

## 2023-06-15 PROCEDURE — 83550 IRON BINDING TEST: CPT

## 2023-06-15 PROCEDURE — 36415 COLL VENOUS BLD VENIPUNCTURE: CPT

## 2023-06-15 PROCEDURE — 97530 THERAPEUTIC ACTIVITIES: CPT

## 2023-06-15 PROCEDURE — 83540 ASSAY OF IRON: CPT

## 2023-06-15 PROCEDURE — 94761 N-INVAS EAR/PLS OXIMETRY MLT: CPT

## 2023-06-15 PROCEDURE — 97116 GAIT TRAINING THERAPY: CPT

## 2023-06-15 PROCEDURE — 2700000000 HC OXYGEN THERAPY PER DAY

## 2023-06-15 PROCEDURE — 82728 ASSAY OF FERRITIN: CPT

## 2023-06-15 PROCEDURE — 6360000002 HC RX W HCPCS: Performed by: INTERNAL MEDICINE

## 2023-06-15 PROCEDURE — 6370000000 HC RX 637 (ALT 250 FOR IP): Performed by: INTERNAL MEDICINE

## 2023-06-15 PROCEDURE — 85027 COMPLETE CBC AUTOMATED: CPT

## 2023-06-15 PROCEDURE — 92526 ORAL FUNCTION THERAPY: CPT

## 2023-06-15 PROCEDURE — 1200000000 HC SEMI PRIVATE

## 2023-06-15 PROCEDURE — 6360000002 HC RX W HCPCS: Performed by: STUDENT IN AN ORGANIZED HEALTH CARE EDUCATION/TRAINING PROGRAM

## 2023-06-15 PROCEDURE — 6370000000 HC RX 637 (ALT 250 FOR IP): Performed by: STUDENT IN AN ORGANIZED HEALTH CARE EDUCATION/TRAINING PROGRAM

## 2023-06-15 PROCEDURE — 97162 PT EVAL MOD COMPLEX 30 MIN: CPT

## 2023-06-15 PROCEDURE — 2580000003 HC RX 258: Performed by: STUDENT IN AN ORGANIZED HEALTH CARE EDUCATION/TRAINING PROGRAM

## 2023-06-15 PROCEDURE — 97166 OT EVAL MOD COMPLEX 45 MIN: CPT

## 2023-06-15 PROCEDURE — 80069 RENAL FUNCTION PANEL: CPT

## 2023-06-15 RX ORDER — LEVOFLOXACIN 5 MG/ML
500 INJECTION, SOLUTION INTRAVENOUS
Status: COMPLETED | OUTPATIENT
Start: 2023-06-15 | End: 2023-06-19

## 2023-06-15 RX ORDER — BISACODYL 10 MG
10 SUPPOSITORY, RECTAL RECTAL DAILY PRN
Status: DISCONTINUED | OUTPATIENT
Start: 2023-06-15 | End: 2023-06-22 | Stop reason: HOSPADM

## 2023-06-15 RX ADMIN — FERROUS SULFATE TAB EC 324 MG (65 MG FE EQUIVALENT) 324 MG: 324 (65 FE) TABLET DELAYED RESPONSE at 10:05

## 2023-06-15 RX ADMIN — TAMSULOSIN HYDROCHLORIDE 0.4 MG: 0.4 CAPSULE ORAL at 10:04

## 2023-06-15 RX ADMIN — LEVOFLOXACIN 500 MG: 5 INJECTION, SOLUTION INTRAVENOUS at 18:04

## 2023-06-15 RX ADMIN — HEPARIN SODIUM 5000 UNITS: 5000 INJECTION INTRAVENOUS; SUBCUTANEOUS at 15:00

## 2023-06-15 RX ADMIN — ACETAMINOPHEN 650 MG: 325 TABLET ORAL at 16:05

## 2023-06-15 RX ADMIN — CEFTRIAXONE 1000 MG: 1 INJECTION, POWDER, FOR SOLUTION INTRAMUSCULAR; INTRAVENOUS at 10:12

## 2023-06-15 RX ADMIN — PANTOPRAZOLE SODIUM 40 MG: 40 TABLET, DELAYED RELEASE ORAL at 16:05

## 2023-06-15 RX ADMIN — POLYETHYLENE GLYCOL 3350 17 G: 17 POWDER, FOR SOLUTION ORAL at 10:13

## 2023-06-15 RX ADMIN — SENNOSIDES 8.6 MG: 8.6 TABLET, FILM COATED ORAL at 20:42

## 2023-06-15 RX ADMIN — SODIUM BICARBONATE 650 MG: 650 TABLET ORAL at 10:05

## 2023-06-15 RX ADMIN — Medication 10 MG: at 23:05

## 2023-06-15 RX ADMIN — DOCUSATE SODIUM 100 MG: 100 CAPSULE, LIQUID FILLED ORAL at 20:42

## 2023-06-15 RX ADMIN — CARVEDILOL 3.12 MG: 3.12 TABLET, FILM COATED ORAL at 10:05

## 2023-06-15 RX ADMIN — DORZOLAMIDE HYDROCHLORIDE AND TIMOLOL MALEATE 1 DROP: 20; 5 SOLUTION/ DROPS OPHTHALMIC at 10:14

## 2023-06-15 RX ADMIN — ESCITALOPRAM OXALATE 10 MG: 10 TABLET ORAL at 10:04

## 2023-06-15 RX ADMIN — SENNOSIDES 8.6 MG: 8.6 TABLET, FILM COATED ORAL at 10:04

## 2023-06-15 RX ADMIN — DORZOLAMIDE HYDROCHLORIDE AND TIMOLOL MALEATE 1 DROP: 20; 5 SOLUTION/ DROPS OPHTHALMIC at 20:40

## 2023-06-15 RX ADMIN — ATORVASTATIN CALCIUM 40 MG: 40 TABLET, FILM COATED ORAL at 10:04

## 2023-06-15 RX ADMIN — PANTOPRAZOLE SODIUM 40 MG: 40 TABLET, DELAYED RELEASE ORAL at 05:41

## 2023-06-15 RX ADMIN — SODIUM CHLORIDE, PRESERVATIVE FREE 10 ML: 5 INJECTION INTRAVENOUS at 20:42

## 2023-06-15 RX ADMIN — DOCUSATE SODIUM 100 MG: 100 CAPSULE, LIQUID FILLED ORAL at 10:04

## 2023-06-15 RX ADMIN — HEPARIN SODIUM 5000 UNITS: 5000 INJECTION INTRAVENOUS; SUBCUTANEOUS at 05:42

## 2023-06-15 RX ADMIN — SODIUM BICARBONATE 650 MG: 650 TABLET ORAL at 15:01

## 2023-06-15 RX ADMIN — HEPARIN SODIUM 5000 UNITS: 5000 INJECTION INTRAVENOUS; SUBCUTANEOUS at 20:42

## 2023-06-15 RX ADMIN — SODIUM BICARBONATE 650 MG: 650 TABLET ORAL at 20:41

## 2023-06-15 RX ADMIN — SODIUM BICARBONATE 650 MG: 650 TABLET ORAL at 18:15

## 2023-06-15 RX ADMIN — SODIUM CHLORIDE, PRESERVATIVE FREE 10 ML: 5 INJECTION INTRAVENOUS at 10:17

## 2023-06-16 LAB
ALBUMIN SERPL-MCNC: 3 G/DL (ref 3.4–5)
ANION GAP SERPL CALCULATED.3IONS-SCNC: 11 MMOL/L (ref 3–16)
BACTERIA UR CULT: ABNORMAL
BUN SERPL-MCNC: 51 MG/DL (ref 7–20)
CALCIUM SERPL-MCNC: 8.2 MG/DL (ref 8.3–10.6)
CHLORIDE SERPL-SCNC: 111 MMOL/L (ref 99–110)
CO2 SERPL-SCNC: 18 MMOL/L (ref 21–32)
CREAT SERPL-MCNC: 2.7 MG/DL (ref 0.8–1.3)
DEPRECATED RDW RBC AUTO: 16.6 % (ref 12.4–15.4)
GFR SERPLBLD CREATININE-BSD FMLA CKD-EPI: 21 ML/MIN/{1.73_M2}
GLUCOSE SERPL-MCNC: 137 MG/DL (ref 70–99)
HCT VFR BLD AUTO: 24.1 % (ref 40.5–52.5)
HGB BLD-MCNC: 7.8 G/DL (ref 13.5–17.5)
MCH RBC QN AUTO: 29.1 PG (ref 26–34)
MCHC RBC AUTO-ENTMCNC: 32.5 G/DL (ref 31–36)
MCV RBC AUTO: 89.6 FL (ref 80–100)
ORGANISM: ABNORMAL
PHOSPHATE SERPL-MCNC: 2.1 MG/DL (ref 2.5–4.9)
PLATELET # BLD AUTO: 203 K/UL (ref 135–450)
PMV BLD AUTO: 9.1 FL (ref 5–10.5)
POTASSIUM SERPL-SCNC: 4.8 MMOL/L (ref 3.5–5.1)
RBC # BLD AUTO: 2.69 M/UL (ref 4.2–5.9)
SODIUM SERPL-SCNC: 140 MMOL/L (ref 136–145)
WBC # BLD AUTO: 8.6 K/UL (ref 4–11)

## 2023-06-16 PROCEDURE — 1200000000 HC SEMI PRIVATE

## 2023-06-16 PROCEDURE — 85027 COMPLETE CBC AUTOMATED: CPT

## 2023-06-16 PROCEDURE — 6370000000 HC RX 637 (ALT 250 FOR IP): Performed by: INTERNAL MEDICINE

## 2023-06-16 PROCEDURE — 80069 RENAL FUNCTION PANEL: CPT

## 2023-06-16 PROCEDURE — 2580000003 HC RX 258: Performed by: STUDENT IN AN ORGANIZED HEALTH CARE EDUCATION/TRAINING PROGRAM

## 2023-06-16 PROCEDURE — 36415 COLL VENOUS BLD VENIPUNCTURE: CPT

## 2023-06-16 PROCEDURE — 6370000000 HC RX 637 (ALT 250 FOR IP): Performed by: STUDENT IN AN ORGANIZED HEALTH CARE EDUCATION/TRAINING PROGRAM

## 2023-06-16 PROCEDURE — 6360000002 HC RX W HCPCS: Performed by: STUDENT IN AN ORGANIZED HEALTH CARE EDUCATION/TRAINING PROGRAM

## 2023-06-16 PROCEDURE — 94760 N-INVAS EAR/PLS OXIMETRY 1: CPT

## 2023-06-16 PROCEDURE — 92526 ORAL FUNCTION THERAPY: CPT

## 2023-06-16 RX ADMIN — SODIUM BICARBONATE 650 MG: 650 TABLET ORAL at 17:36

## 2023-06-16 RX ADMIN — SODIUM BICARBONATE 650 MG: 650 TABLET ORAL at 09:28

## 2023-06-16 RX ADMIN — HEPARIN SODIUM 5000 UNITS: 5000 INJECTION INTRAVENOUS; SUBCUTANEOUS at 14:58

## 2023-06-16 RX ADMIN — SENNOSIDES 8.6 MG: 8.6 TABLET, FILM COATED ORAL at 09:26

## 2023-06-16 RX ADMIN — FERROUS SULFATE TAB EC 324 MG (65 MG FE EQUIVALENT) 324 MG: 324 (65 FE) TABLET DELAYED RESPONSE at 09:30

## 2023-06-16 RX ADMIN — ATORVASTATIN CALCIUM 40 MG: 40 TABLET, FILM COATED ORAL at 09:30

## 2023-06-16 RX ADMIN — DORZOLAMIDE HYDROCHLORIDE AND TIMOLOL MALEATE 1 DROP: 20; 5 SOLUTION/ DROPS OPHTHALMIC at 22:30

## 2023-06-16 RX ADMIN — CARVEDILOL 3.12 MG: 3.12 TABLET, FILM COATED ORAL at 09:39

## 2023-06-16 RX ADMIN — DOCUSATE SODIUM 100 MG: 100 CAPSULE, LIQUID FILLED ORAL at 22:18

## 2023-06-16 RX ADMIN — SENNOSIDES 8.6 MG: 8.6 TABLET, FILM COATED ORAL at 22:18

## 2023-06-16 RX ADMIN — DOCUSATE SODIUM 100 MG: 100 CAPSULE, LIQUID FILLED ORAL at 09:33

## 2023-06-16 RX ADMIN — HEPARIN SODIUM 5000 UNITS: 5000 INJECTION INTRAVENOUS; SUBCUTANEOUS at 05:08

## 2023-06-16 RX ADMIN — HEPARIN SODIUM 5000 UNITS: 5000 INJECTION INTRAVENOUS; SUBCUTANEOUS at 22:18

## 2023-06-16 RX ADMIN — ACETAMINOPHEN 650 MG: 325 TABLET ORAL at 05:05

## 2023-06-16 RX ADMIN — SODIUM CHLORIDE, PRESERVATIVE FREE 10 ML: 5 INJECTION INTRAVENOUS at 09:34

## 2023-06-16 RX ADMIN — PANTOPRAZOLE SODIUM 40 MG: 40 TABLET, DELAYED RELEASE ORAL at 17:36

## 2023-06-16 RX ADMIN — SODIUM BICARBONATE 650 MG: 650 TABLET ORAL at 14:56

## 2023-06-16 RX ADMIN — TAMSULOSIN HYDROCHLORIDE 0.4 MG: 0.4 CAPSULE ORAL at 09:26

## 2023-06-16 RX ADMIN — DORZOLAMIDE HYDROCHLORIDE AND TIMOLOL MALEATE 1 DROP: 20; 5 SOLUTION/ DROPS OPHTHALMIC at 09:39

## 2023-06-16 RX ADMIN — PANTOPRAZOLE SODIUM 40 MG: 40 TABLET, DELAYED RELEASE ORAL at 05:05

## 2023-06-16 RX ADMIN — SODIUM BICARBONATE 650 MG: 650 TABLET ORAL at 22:18

## 2023-06-16 RX ADMIN — ESCITALOPRAM OXALATE 10 MG: 10 TABLET ORAL at 09:28

## 2023-06-16 RX ADMIN — SODIUM CHLORIDE, PRESERVATIVE FREE 10 ML: 5 INJECTION INTRAVENOUS at 22:30

## 2023-06-16 RX ADMIN — CARVEDILOL 3.12 MG: 3.12 TABLET, FILM COATED ORAL at 17:36

## 2023-06-16 RX ADMIN — ACETAMINOPHEN 650 MG: 325 TABLET ORAL at 14:56

## 2023-06-16 ASSESSMENT — PAIN DESCRIPTION - DESCRIPTORS
DESCRIPTORS: ACHING;DISCOMFORT
DESCRIPTORS: ACHING

## 2023-06-16 ASSESSMENT — PAIN DESCRIPTION - LOCATION
LOCATION: ABDOMEN
LOCATION: ABDOMEN

## 2023-06-16 ASSESSMENT — PAIN DESCRIPTION - ORIENTATION
ORIENTATION: RIGHT
ORIENTATION: MID

## 2023-06-16 ASSESSMENT — PAIN SCALES - GENERAL
PAINLEVEL_OUTOF10: 3
PAINLEVEL_OUTOF10: 0
PAINLEVEL_OUTOF10: 4

## 2023-06-16 ASSESSMENT — PAIN - FUNCTIONAL ASSESSMENT: PAIN_FUNCTIONAL_ASSESSMENT: PREVENTS OR INTERFERES SOME ACTIVE ACTIVITIES AND ADLS

## 2023-06-17 LAB
ALBUMIN SERPL-MCNC: 2.7 G/DL (ref 3.4–5)
ANION GAP SERPL CALCULATED.3IONS-SCNC: 12 MMOL/L (ref 3–16)
BACTERIA BLD CULT ORG #2: NORMAL
BACTERIA BLD CULT: NORMAL
BUN SERPL-MCNC: 47 MG/DL (ref 7–20)
CALCIUM SERPL-MCNC: 8.4 MG/DL (ref 8.3–10.6)
CHLORIDE SERPL-SCNC: 109 MMOL/L (ref 99–110)
CO2 SERPL-SCNC: 18 MMOL/L (ref 21–32)
CREAT SERPL-MCNC: 2.6 MG/DL (ref 0.8–1.3)
DEPRECATED RDW RBC AUTO: 16.6 % (ref 12.4–15.4)
GFR SERPLBLD CREATININE-BSD FMLA CKD-EPI: 22 ML/MIN/{1.73_M2}
GLUCOSE SERPL-MCNC: 107 MG/DL (ref 70–99)
HCT VFR BLD AUTO: 26.9 % (ref 40.5–52.5)
HGB BLD-MCNC: 8.5 G/DL (ref 13.5–17.5)
MCH RBC QN AUTO: 29.2 PG (ref 26–34)
MCHC RBC AUTO-ENTMCNC: 31.8 G/DL (ref 31–36)
MCV RBC AUTO: 91.8 FL (ref 80–100)
PHOSPHATE SERPL-MCNC: 2.4 MG/DL (ref 2.5–4.9)
PLATELET # BLD AUTO: 214 K/UL (ref 135–450)
PMV BLD AUTO: 8.6 FL (ref 5–10.5)
POTASSIUM SERPL-SCNC: 5 MMOL/L (ref 3.5–5.1)
RBC # BLD AUTO: 2.93 M/UL (ref 4.2–5.9)
SODIUM SERPL-SCNC: 139 MMOL/L (ref 136–145)
WBC # BLD AUTO: 9 K/UL (ref 4–11)

## 2023-06-17 PROCEDURE — 6370000000 HC RX 637 (ALT 250 FOR IP): Performed by: STUDENT IN AN ORGANIZED HEALTH CARE EDUCATION/TRAINING PROGRAM

## 2023-06-17 PROCEDURE — 6360000002 HC RX W HCPCS: Performed by: INTERNAL MEDICINE

## 2023-06-17 PROCEDURE — 1200000000 HC SEMI PRIVATE

## 2023-06-17 PROCEDURE — 85027 COMPLETE CBC AUTOMATED: CPT

## 2023-06-17 PROCEDURE — 6360000002 HC RX W HCPCS: Performed by: STUDENT IN AN ORGANIZED HEALTH CARE EDUCATION/TRAINING PROGRAM

## 2023-06-17 PROCEDURE — 80069 RENAL FUNCTION PANEL: CPT

## 2023-06-17 PROCEDURE — 6370000000 HC RX 637 (ALT 250 FOR IP): Performed by: INTERNAL MEDICINE

## 2023-06-17 PROCEDURE — 94761 N-INVAS EAR/PLS OXIMETRY MLT: CPT

## 2023-06-17 PROCEDURE — 36415 COLL VENOUS BLD VENIPUNCTURE: CPT

## 2023-06-17 PROCEDURE — 2580000003 HC RX 258: Performed by: STUDENT IN AN ORGANIZED HEALTH CARE EDUCATION/TRAINING PROGRAM

## 2023-06-17 RX ORDER — DIPHENHYDRAMINE HYDROCHLORIDE 50 MG/ML
50 INJECTION INTRAMUSCULAR; INTRAVENOUS NIGHTLY PRN
Status: DISCONTINUED | OUTPATIENT
Start: 2023-06-17 | End: 2023-06-22 | Stop reason: HOSPADM

## 2023-06-17 RX ADMIN — SODIUM BICARBONATE 650 MG: 650 TABLET ORAL at 13:18

## 2023-06-17 RX ADMIN — DOCUSATE SODIUM 100 MG: 100 CAPSULE, LIQUID FILLED ORAL at 08:59

## 2023-06-17 RX ADMIN — DIPHENHYDRAMINE HYDROCHLORIDE 50 MG: 50 INJECTION, SOLUTION INTRAMUSCULAR; INTRAVENOUS at 21:20

## 2023-06-17 RX ADMIN — PANTOPRAZOLE SODIUM 40 MG: 40 TABLET, DELAYED RELEASE ORAL at 16:54

## 2023-06-17 RX ADMIN — PANTOPRAZOLE SODIUM 40 MG: 40 TABLET, DELAYED RELEASE ORAL at 08:59

## 2023-06-17 RX ADMIN — SENNOSIDES 8.6 MG: 8.6 TABLET, FILM COATED ORAL at 20:23

## 2023-06-17 RX ADMIN — FERROUS SULFATE TAB EC 324 MG (65 MG FE EQUIVALENT) 324 MG: 324 (65 FE) TABLET DELAYED RESPONSE at 08:58

## 2023-06-17 RX ADMIN — SODIUM BICARBONATE 650 MG: 650 TABLET ORAL at 16:54

## 2023-06-17 RX ADMIN — SODIUM BICARBONATE 650 MG: 650 TABLET ORAL at 20:23

## 2023-06-17 RX ADMIN — CARVEDILOL 3.12 MG: 3.12 TABLET, FILM COATED ORAL at 08:57

## 2023-06-17 RX ADMIN — ACETAMINOPHEN 650 MG: 325 TABLET ORAL at 18:39

## 2023-06-17 RX ADMIN — SENNOSIDES 8.6 MG: 8.6 TABLET, FILM COATED ORAL at 08:57

## 2023-06-17 RX ADMIN — ACETAMINOPHEN 650 MG: 325 TABLET ORAL at 00:03

## 2023-06-17 RX ADMIN — ATORVASTATIN CALCIUM 40 MG: 40 TABLET, FILM COATED ORAL at 08:57

## 2023-06-17 RX ADMIN — HEPARIN SODIUM 5000 UNITS: 5000 INJECTION INTRAVENOUS; SUBCUTANEOUS at 20:23

## 2023-06-17 RX ADMIN — ACETAMINOPHEN 650 MG: 325 TABLET ORAL at 08:59

## 2023-06-17 RX ADMIN — DORZOLAMIDE HYDROCHLORIDE AND TIMOLOL MALEATE 1 DROP: 20; 5 SOLUTION/ DROPS OPHTHALMIC at 08:55

## 2023-06-17 RX ADMIN — ESCITALOPRAM OXALATE 10 MG: 10 TABLET ORAL at 08:59

## 2023-06-17 RX ADMIN — TAMSULOSIN HYDROCHLORIDE 0.4 MG: 0.4 CAPSULE ORAL at 08:58

## 2023-06-17 RX ADMIN — DORZOLAMIDE HYDROCHLORIDE AND TIMOLOL MALEATE 1 DROP: 20; 5 SOLUTION/ DROPS OPHTHALMIC at 20:23

## 2023-06-17 RX ADMIN — DOCUSATE SODIUM 100 MG: 100 CAPSULE, LIQUID FILLED ORAL at 20:23

## 2023-06-17 RX ADMIN — HEPARIN SODIUM 5000 UNITS: 5000 INJECTION INTRAVENOUS; SUBCUTANEOUS at 13:18

## 2023-06-17 RX ADMIN — LEVOFLOXACIN 500 MG: 5 INJECTION, SOLUTION INTRAVENOUS at 16:51

## 2023-06-17 RX ADMIN — SODIUM CHLORIDE, PRESERVATIVE FREE 10 ML: 5 INJECTION INTRAVENOUS at 09:00

## 2023-06-17 RX ADMIN — CARVEDILOL 3.12 MG: 3.12 TABLET, FILM COATED ORAL at 16:54

## 2023-06-17 RX ADMIN — SODIUM BICARBONATE 650 MG: 650 TABLET ORAL at 09:00

## 2023-06-17 RX ADMIN — HEPARIN SODIUM 5000 UNITS: 5000 INJECTION INTRAVENOUS; SUBCUTANEOUS at 06:58

## 2023-06-17 ASSESSMENT — PAIN DESCRIPTION - DESCRIPTORS
DESCRIPTORS: ACHING
DESCRIPTORS: ACHING

## 2023-06-17 ASSESSMENT — PAIN DESCRIPTION - LOCATION: LOCATION: HEAD

## 2023-06-17 ASSESSMENT — PAIN SCALES - GENERAL
PAINLEVEL_OUTOF10: 6
PAINLEVEL_OUTOF10: 5
PAINLEVEL_OUTOF10: 0
PAINLEVEL_OUTOF10: 0

## 2023-06-17 ASSESSMENT — PAIN - FUNCTIONAL ASSESSMENT: PAIN_FUNCTIONAL_ASSESSMENT: PREVENTS OR INTERFERES SOME ACTIVE ACTIVITIES AND ADLS

## 2023-06-18 LAB
ALBUMIN SERPL-MCNC: 2.9 G/DL (ref 3.4–5)
ANION GAP SERPL CALCULATED.3IONS-SCNC: 12 MMOL/L (ref 3–16)
BUN SERPL-MCNC: 49 MG/DL (ref 7–20)
CALCIUM SERPL-MCNC: 8.5 MG/DL (ref 8.3–10.6)
CHLORIDE SERPL-SCNC: 110 MMOL/L (ref 99–110)
CO2 SERPL-SCNC: 20 MMOL/L (ref 21–32)
CREAT SERPL-MCNC: 2.5 MG/DL (ref 0.8–1.3)
GFR SERPLBLD CREATININE-BSD FMLA CKD-EPI: 24 ML/MIN/{1.73_M2}
GLUCOSE SERPL-MCNC: 106 MG/DL (ref 70–99)
PHOSPHATE SERPL-MCNC: 2.4 MG/DL (ref 2.5–4.9)
POTASSIUM SERPL-SCNC: 4.5 MMOL/L (ref 3.5–5.1)
SODIUM SERPL-SCNC: 142 MMOL/L (ref 136–145)

## 2023-06-18 PROCEDURE — 6370000000 HC RX 637 (ALT 250 FOR IP): Performed by: INTERNAL MEDICINE

## 2023-06-18 PROCEDURE — 80069 RENAL FUNCTION PANEL: CPT

## 2023-06-18 PROCEDURE — 94760 N-INVAS EAR/PLS OXIMETRY 1: CPT

## 2023-06-18 PROCEDURE — 6370000000 HC RX 637 (ALT 250 FOR IP): Performed by: STUDENT IN AN ORGANIZED HEALTH CARE EDUCATION/TRAINING PROGRAM

## 2023-06-18 PROCEDURE — 36415 COLL VENOUS BLD VENIPUNCTURE: CPT

## 2023-06-18 PROCEDURE — 6360000002 HC RX W HCPCS: Performed by: STUDENT IN AN ORGANIZED HEALTH CARE EDUCATION/TRAINING PROGRAM

## 2023-06-18 PROCEDURE — 1200000000 HC SEMI PRIVATE

## 2023-06-18 PROCEDURE — 2580000003 HC RX 258: Performed by: STUDENT IN AN ORGANIZED HEALTH CARE EDUCATION/TRAINING PROGRAM

## 2023-06-18 PROCEDURE — 6360000002 HC RX W HCPCS: Performed by: INTERNAL MEDICINE

## 2023-06-18 PROCEDURE — 94761 N-INVAS EAR/PLS OXIMETRY MLT: CPT

## 2023-06-18 RX ORDER — GUAIFENESIN/DEXTROMETHORPHAN 100-10MG/5
5 SYRUP ORAL EVERY 4 HOURS PRN
Status: DISCONTINUED | OUTPATIENT
Start: 2023-06-18 | End: 2023-06-22 | Stop reason: HOSPADM

## 2023-06-18 RX ORDER — GUAIFENESIN 600 MG/1
600 TABLET, EXTENDED RELEASE ORAL 2 TIMES DAILY
Status: DISCONTINUED | OUTPATIENT
Start: 2023-06-18 | End: 2023-06-22 | Stop reason: HOSPADM

## 2023-06-18 RX ADMIN — FERROUS SULFATE TAB EC 324 MG (65 MG FE EQUIVALENT) 324 MG: 324 (65 FE) TABLET DELAYED RESPONSE at 08:47

## 2023-06-18 RX ADMIN — PANTOPRAZOLE SODIUM 40 MG: 40 TABLET, DELAYED RELEASE ORAL at 06:23

## 2023-06-18 RX ADMIN — SODIUM BICARBONATE 650 MG: 650 TABLET ORAL at 08:46

## 2023-06-18 RX ADMIN — HEPARIN SODIUM 5000 UNITS: 5000 INJECTION INTRAVENOUS; SUBCUTANEOUS at 22:43

## 2023-06-18 RX ADMIN — CARVEDILOL 3.12 MG: 3.12 TABLET, FILM COATED ORAL at 16:05

## 2023-06-18 RX ADMIN — TAMSULOSIN HYDROCHLORIDE 0.4 MG: 0.4 CAPSULE ORAL at 08:47

## 2023-06-18 RX ADMIN — CARVEDILOL 3.12 MG: 3.12 TABLET, FILM COATED ORAL at 08:47

## 2023-06-18 RX ADMIN — ESCITALOPRAM OXALATE 10 MG: 10 TABLET ORAL at 08:50

## 2023-06-18 RX ADMIN — SODIUM BICARBONATE 650 MG: 650 TABLET ORAL at 13:51

## 2023-06-18 RX ADMIN — SENNOSIDES 8.6 MG: 8.6 TABLET, FILM COATED ORAL at 08:47

## 2023-06-18 RX ADMIN — SODIUM BICARBONATE 650 MG: 650 TABLET ORAL at 22:43

## 2023-06-18 RX ADMIN — PANTOPRAZOLE SODIUM 40 MG: 40 TABLET, DELAYED RELEASE ORAL at 16:05

## 2023-06-18 RX ADMIN — DORZOLAMIDE HYDROCHLORIDE AND TIMOLOL MALEATE 1 DROP: 20; 5 SOLUTION/ DROPS OPHTHALMIC at 22:48

## 2023-06-18 RX ADMIN — SODIUM BICARBONATE 650 MG: 650 TABLET ORAL at 16:05

## 2023-06-18 RX ADMIN — DIPHENHYDRAMINE HYDROCHLORIDE 50 MG: 50 INJECTION, SOLUTION INTRAMUSCULAR; INTRAVENOUS at 22:43

## 2023-06-18 RX ADMIN — DOCUSATE SODIUM 100 MG: 100 CAPSULE, LIQUID FILLED ORAL at 08:47

## 2023-06-18 RX ADMIN — HEPARIN SODIUM 5000 UNITS: 5000 INJECTION INTRAVENOUS; SUBCUTANEOUS at 13:51

## 2023-06-18 RX ADMIN — ACETAMINOPHEN 650 MG: 325 TABLET ORAL at 22:43

## 2023-06-18 RX ADMIN — HEPARIN SODIUM 5000 UNITS: 5000 INJECTION INTRAVENOUS; SUBCUTANEOUS at 06:23

## 2023-06-18 RX ADMIN — ATORVASTATIN CALCIUM 40 MG: 40 TABLET, FILM COATED ORAL at 08:46

## 2023-06-18 RX ADMIN — SODIUM CHLORIDE, PRESERVATIVE FREE 10 ML: 5 INJECTION INTRAVENOUS at 11:26

## 2023-06-18 RX ADMIN — SODIUM CHLORIDE, PRESERVATIVE FREE 10 ML: 5 INJECTION INTRAVENOUS at 22:48

## 2023-06-18 RX ADMIN — DORZOLAMIDE HYDROCHLORIDE AND TIMOLOL MALEATE 1 DROP: 20; 5 SOLUTION/ DROPS OPHTHALMIC at 08:45

## 2023-06-18 RX ADMIN — GUAIFENESIN SYRUP AND DEXTROMETHORPHAN 5 ML: 100; 10 SYRUP ORAL at 16:04

## 2023-06-18 ASSESSMENT — PAIN SCALES - GENERAL
PAINLEVEL_OUTOF10: 3
PAINLEVEL_OUTOF10: 0
PAINLEVEL_OUTOF10: 0

## 2023-06-18 ASSESSMENT — PAIN DESCRIPTION - LOCATION: LOCATION: ABDOMEN

## 2023-06-18 ASSESSMENT — PAIN DESCRIPTION - DESCRIPTORS: DESCRIPTORS: CRAMPING

## 2023-06-19 LAB
ALBUMIN SERPL-MCNC: 2.6 G/DL (ref 3.4–5)
ANION GAP SERPL CALCULATED.3IONS-SCNC: 10 MMOL/L (ref 3–16)
BUN SERPL-MCNC: 49 MG/DL (ref 7–20)
CALCIUM SERPL-MCNC: 8.7 MG/DL (ref 8.3–10.6)
CHLORIDE SERPL-SCNC: 110 MMOL/L (ref 99–110)
CO2 SERPL-SCNC: 23 MMOL/L (ref 21–32)
CREAT SERPL-MCNC: 2.4 MG/DL (ref 0.8–1.3)
GFR SERPLBLD CREATININE-BSD FMLA CKD-EPI: 25 ML/MIN/{1.73_M2}
GLUCOSE SERPL-MCNC: 110 MG/DL (ref 70–99)
PHOSPHATE SERPL-MCNC: 2.7 MG/DL (ref 2.5–4.9)
POTASSIUM SERPL-SCNC: 5.3 MMOL/L (ref 3.5–5.1)
SODIUM SERPL-SCNC: 143 MMOL/L (ref 136–145)

## 2023-06-19 PROCEDURE — 9990000010 HC NO CHARGE VISIT

## 2023-06-19 PROCEDURE — 6370000000 HC RX 637 (ALT 250 FOR IP): Performed by: STUDENT IN AN ORGANIZED HEALTH CARE EDUCATION/TRAINING PROGRAM

## 2023-06-19 PROCEDURE — 6360000002 HC RX W HCPCS: Performed by: INTERNAL MEDICINE

## 2023-06-19 PROCEDURE — 2580000003 HC RX 258: Performed by: STUDENT IN AN ORGANIZED HEALTH CARE EDUCATION/TRAINING PROGRAM

## 2023-06-19 PROCEDURE — 97530 THERAPEUTIC ACTIVITIES: CPT

## 2023-06-19 PROCEDURE — 6370000000 HC RX 637 (ALT 250 FOR IP): Performed by: INTERNAL MEDICINE

## 2023-06-19 PROCEDURE — 6360000002 HC RX W HCPCS: Performed by: STUDENT IN AN ORGANIZED HEALTH CARE EDUCATION/TRAINING PROGRAM

## 2023-06-19 PROCEDURE — 94760 N-INVAS EAR/PLS OXIMETRY 1: CPT

## 2023-06-19 PROCEDURE — 36415 COLL VENOUS BLD VENIPUNCTURE: CPT

## 2023-06-19 PROCEDURE — 80069 RENAL FUNCTION PANEL: CPT

## 2023-06-19 PROCEDURE — 1200000000 HC SEMI PRIVATE

## 2023-06-19 PROCEDURE — 92526 ORAL FUNCTION THERAPY: CPT

## 2023-06-19 RX ORDER — AMLODIPINE BESYLATE 5 MG/1
5 TABLET ORAL DAILY
Status: DISCONTINUED | OUTPATIENT
Start: 2023-06-19 | End: 2023-06-21

## 2023-06-19 RX ADMIN — ACETAMINOPHEN 650 MG: 325 TABLET ORAL at 21:54

## 2023-06-19 RX ADMIN — HEPARIN SODIUM 5000 UNITS: 5000 INJECTION INTRAVENOUS; SUBCUTANEOUS at 05:44

## 2023-06-19 RX ADMIN — SODIUM BICARBONATE 650 MG: 650 TABLET ORAL at 08:25

## 2023-06-19 RX ADMIN — PANTOPRAZOLE SODIUM 40 MG: 40 TABLET, DELAYED RELEASE ORAL at 16:05

## 2023-06-19 RX ADMIN — AMLODIPINE BESYLATE 5 MG: 5 TABLET ORAL at 10:02

## 2023-06-19 RX ADMIN — ACETAMINOPHEN 650 MG: 325 TABLET ORAL at 16:05

## 2023-06-19 RX ADMIN — SENNOSIDES 8.6 MG: 8.6 TABLET, FILM COATED ORAL at 08:25

## 2023-06-19 RX ADMIN — SODIUM CHLORIDE, PRESERVATIVE FREE 10 ML: 5 INJECTION INTRAVENOUS at 21:57

## 2023-06-19 RX ADMIN — SODIUM BICARBONATE 650 MG: 650 TABLET ORAL at 11:57

## 2023-06-19 RX ADMIN — ATORVASTATIN CALCIUM 40 MG: 40 TABLET, FILM COATED ORAL at 08:26

## 2023-06-19 RX ADMIN — SODIUM ZIRCONIUM CYCLOSILICATE 10 G: 10 POWDER, FOR SUSPENSION ORAL at 11:57

## 2023-06-19 RX ADMIN — LEVOFLOXACIN 500 MG: 5 INJECTION, SOLUTION INTRAVENOUS at 16:15

## 2023-06-19 RX ADMIN — SODIUM BICARBONATE 650 MG: 650 TABLET ORAL at 16:05

## 2023-06-19 RX ADMIN — GUAIFENESIN SYRUP AND DEXTROMETHORPHAN 5 ML: 100; 10 SYRUP ORAL at 21:47

## 2023-06-19 RX ADMIN — HEPARIN SODIUM 5000 UNITS: 5000 INJECTION INTRAVENOUS; SUBCUTANEOUS at 21:47

## 2023-06-19 RX ADMIN — GUAIFENESIN 600 MG: 600 TABLET ORAL at 21:46

## 2023-06-19 RX ADMIN — SODIUM CHLORIDE, PRESERVATIVE FREE 10 ML: 5 INJECTION INTRAVENOUS at 08:27

## 2023-06-19 RX ADMIN — TAMSULOSIN HYDROCHLORIDE 0.4 MG: 0.4 CAPSULE ORAL at 08:25

## 2023-06-19 RX ADMIN — DORZOLAMIDE HYDROCHLORIDE AND TIMOLOL MALEATE 1 DROP: 20; 5 SOLUTION/ DROPS OPHTHALMIC at 08:27

## 2023-06-19 RX ADMIN — SODIUM BICARBONATE 650 MG: 650 TABLET ORAL at 21:46

## 2023-06-19 RX ADMIN — DOCUSATE SODIUM 100 MG: 100 CAPSULE, LIQUID FILLED ORAL at 21:46

## 2023-06-19 RX ADMIN — HEPARIN SODIUM 5000 UNITS: 5000 INJECTION INTRAVENOUS; SUBCUTANEOUS at 14:42

## 2023-06-19 RX ADMIN — ESCITALOPRAM OXALATE 10 MG: 10 TABLET ORAL at 08:25

## 2023-06-19 RX ADMIN — PANTOPRAZOLE SODIUM 40 MG: 40 TABLET, DELAYED RELEASE ORAL at 05:44

## 2023-06-19 RX ADMIN — SENNOSIDES 8.6 MG: 8.6 TABLET, FILM COATED ORAL at 21:46

## 2023-06-19 RX ADMIN — DORZOLAMIDE HYDROCHLORIDE AND TIMOLOL MALEATE 1 DROP: 20; 5 SOLUTION/ DROPS OPHTHALMIC at 21:57

## 2023-06-19 RX ADMIN — FERROUS SULFATE TAB EC 324 MG (65 MG FE EQUIVALENT) 324 MG: 324 (65 FE) TABLET DELAYED RESPONSE at 08:26

## 2023-06-19 RX ADMIN — DOCUSATE SODIUM 100 MG: 100 CAPSULE, LIQUID FILLED ORAL at 08:26

## 2023-06-19 RX ADMIN — GUAIFENESIN 600 MG: 600 TABLET ORAL at 08:26

## 2023-06-20 LAB
ALBUMIN SERPL-MCNC: 2.9 G/DL (ref 3.4–5)
ANION GAP SERPL CALCULATED.3IONS-SCNC: 11 MMOL/L (ref 3–16)
BUN SERPL-MCNC: 43 MG/DL (ref 7–20)
CALCIUM SERPL-MCNC: 8.2 MG/DL (ref 8.3–10.6)
CHLORIDE SERPL-SCNC: 106 MMOL/L (ref 99–110)
CO2 SERPL-SCNC: 22 MMOL/L (ref 21–32)
CREAT SERPL-MCNC: 2.4 MG/DL (ref 0.8–1.3)
GFR SERPLBLD CREATININE-BSD FMLA CKD-EPI: 25 ML/MIN/{1.73_M2}
GLUCOSE SERPL-MCNC: 118 MG/DL (ref 70–99)
PHOSPHATE SERPL-MCNC: 2.6 MG/DL (ref 2.5–4.9)
POTASSIUM SERPL-SCNC: 4.3 MMOL/L (ref 3.5–5.1)
SODIUM SERPL-SCNC: 139 MMOL/L (ref 136–145)

## 2023-06-20 PROCEDURE — 6370000000 HC RX 637 (ALT 250 FOR IP): Performed by: INTERNAL MEDICINE

## 2023-06-20 PROCEDURE — 97535 SELF CARE MNGMENT TRAINING: CPT

## 2023-06-20 PROCEDURE — 6370000000 HC RX 637 (ALT 250 FOR IP): Performed by: STUDENT IN AN ORGANIZED HEALTH CARE EDUCATION/TRAINING PROGRAM

## 2023-06-20 PROCEDURE — 1200000000 HC SEMI PRIVATE

## 2023-06-20 PROCEDURE — 97530 THERAPEUTIC ACTIVITIES: CPT

## 2023-06-20 PROCEDURE — 94760 N-INVAS EAR/PLS OXIMETRY 1: CPT

## 2023-06-20 PROCEDURE — 2580000003 HC RX 258: Performed by: STUDENT IN AN ORGANIZED HEALTH CARE EDUCATION/TRAINING PROGRAM

## 2023-06-20 PROCEDURE — 36415 COLL VENOUS BLD VENIPUNCTURE: CPT

## 2023-06-20 PROCEDURE — 6360000002 HC RX W HCPCS: Performed by: STUDENT IN AN ORGANIZED HEALTH CARE EDUCATION/TRAINING PROGRAM

## 2023-06-20 PROCEDURE — 97116 GAIT TRAINING THERAPY: CPT

## 2023-06-20 PROCEDURE — 97110 THERAPEUTIC EXERCISES: CPT

## 2023-06-20 PROCEDURE — 92526 ORAL FUNCTION THERAPY: CPT

## 2023-06-20 PROCEDURE — 80069 RENAL FUNCTION PANEL: CPT

## 2023-06-20 RX ORDER — SODIUM BICARBONATE 650 MG/1
650 TABLET ORAL 4 TIMES DAILY
Qty: 120 TABLET | Refills: 0 | Status: SHIPPED | OUTPATIENT
Start: 2023-06-20 | End: 2023-07-20

## 2023-06-20 RX ORDER — AMLODIPINE BESYLATE 5 MG/1
5 TABLET ORAL DAILY
Qty: 30 TABLET | Refills: 3 | Status: SHIPPED | OUTPATIENT
Start: 2023-06-21 | End: 2023-06-22 | Stop reason: HOSPADM

## 2023-06-20 RX ADMIN — PANTOPRAZOLE SODIUM 40 MG: 40 TABLET, DELAYED RELEASE ORAL at 06:56

## 2023-06-20 RX ADMIN — ATORVASTATIN CALCIUM 40 MG: 40 TABLET, FILM COATED ORAL at 09:10

## 2023-06-20 RX ADMIN — HEPARIN SODIUM 5000 UNITS: 5000 INJECTION INTRAVENOUS; SUBCUTANEOUS at 21:50

## 2023-06-20 RX ADMIN — SENNOSIDES 8.6 MG: 8.6 TABLET, FILM COATED ORAL at 20:39

## 2023-06-20 RX ADMIN — CARVEDILOL 3.12 MG: 3.12 TABLET, FILM COATED ORAL at 09:11

## 2023-06-20 RX ADMIN — SODIUM BICARBONATE 650 MG: 650 TABLET ORAL at 09:10

## 2023-06-20 RX ADMIN — SODIUM BICARBONATE 650 MG: 650 TABLET ORAL at 17:40

## 2023-06-20 RX ADMIN — CARVEDILOL 3.12 MG: 3.12 TABLET, FILM COATED ORAL at 17:40

## 2023-06-20 RX ADMIN — DORZOLAMIDE HYDROCHLORIDE AND TIMOLOL MALEATE 1 DROP: 20; 5 SOLUTION/ DROPS OPHTHALMIC at 12:39

## 2023-06-20 RX ADMIN — SODIUM CHLORIDE, PRESERVATIVE FREE 10 ML: 5 INJECTION INTRAVENOUS at 20:40

## 2023-06-20 RX ADMIN — FERROUS SULFATE TAB EC 324 MG (65 MG FE EQUIVALENT) 324 MG: 324 (65 FE) TABLET DELAYED RESPONSE at 09:11

## 2023-06-20 RX ADMIN — GUAIFENESIN SYRUP AND DEXTROMETHORPHAN 5 ML: 100; 10 SYRUP ORAL at 20:44

## 2023-06-20 RX ADMIN — ESCITALOPRAM OXALATE 10 MG: 10 TABLET ORAL at 09:10

## 2023-06-20 RX ADMIN — SODIUM BICARBONATE 650 MG: 650 TABLET ORAL at 14:22

## 2023-06-20 RX ADMIN — SODIUM CHLORIDE, PRESERVATIVE FREE 10 ML: 5 INJECTION INTRAVENOUS at 09:12

## 2023-06-20 RX ADMIN — DORZOLAMIDE HYDROCHLORIDE AND TIMOLOL MALEATE 1 DROP: 20; 5 SOLUTION/ DROPS OPHTHALMIC at 21:50

## 2023-06-20 RX ADMIN — GUAIFENESIN 600 MG: 600 TABLET ORAL at 09:10

## 2023-06-20 RX ADMIN — SODIUM BICARBONATE 650 MG: 650 TABLET ORAL at 20:39

## 2023-06-20 RX ADMIN — SENNOSIDES 8.6 MG: 8.6 TABLET, FILM COATED ORAL at 09:11

## 2023-06-20 RX ADMIN — HEPARIN SODIUM 5000 UNITS: 5000 INJECTION INTRAVENOUS; SUBCUTANEOUS at 06:56

## 2023-06-20 RX ADMIN — AMLODIPINE BESYLATE 5 MG: 5 TABLET ORAL at 09:10

## 2023-06-20 RX ADMIN — TAMSULOSIN HYDROCHLORIDE 0.4 MG: 0.4 CAPSULE ORAL at 09:10

## 2023-06-20 RX ADMIN — HEPARIN SODIUM 5000 UNITS: 5000 INJECTION INTRAVENOUS; SUBCUTANEOUS at 14:27

## 2023-06-20 RX ADMIN — PANTOPRAZOLE SODIUM 40 MG: 40 TABLET, DELAYED RELEASE ORAL at 17:41

## 2023-06-20 RX ADMIN — DOCUSATE SODIUM 100 MG: 100 CAPSULE, LIQUID FILLED ORAL at 09:10

## 2023-06-20 ASSESSMENT — PAIN SCALES - GENERAL
PAINLEVEL_OUTOF10: 0

## 2023-06-20 NOTE — CARE COORDINATION
06/20/23 1656   IMM Letter   IMM Letter given to Patient/Family/Significant other/Guardian/POA/by: Provided to patient's son Que Husbands at bedside by YAO Crzu. Education provided to family, family reported no questions and verbalized understanding. Family aware of 4 hours allotted time to determine if they choose to pursue Medicare appeal process.    IMM Letter date given: 06/20/23   IMM Letter time given: 6300

## 2023-06-20 NOTE — CARE COORDINATION
Discharge order noted. Met with patient and son at bedside. Notified that [address] cannot accept. Family requested referral placed to [email or web address], [facility], and [address]. Referrals sent via [employer]. Await return phone call regarding referrals. The Plan for Transition of Care is related to the following treatment goals: SNF     The Patient's son was provided with a choice of provider and agrees   with the discharge plan. [x] Yes [] No    Freedom of choice list was provided with basic dialogue that supports the patient's individualized plan of care/goals, treatment preferences and shares the quality data associated with the providers.  [x] Yes [] No      YAO William, LSW, Social Work/Case Management   [phone]  Electronically signed by YAO William on 6/20/2023 at 4:59 PM

## 2023-06-21 ENCOUNTER — HOSPITAL ENCOUNTER (OUTPATIENT)
Dept: INFUSION THERAPY | Age: 88
Setting detail: INFUSION SERIES
Discharge: HOME | End: 2023-06-21

## 2023-06-21 LAB
ABO + RH BLD: NORMAL
ALBUMIN SERPL-MCNC: 2.6 G/DL (ref 3.4–5)
ANION GAP SERPL CALCULATED.3IONS-SCNC: 10 MMOL/L (ref 3–16)
BLD GP AB SCN SERPL QL: NORMAL
BLOOD BANK DISPENSE STATUS: NORMAL
BLOOD BANK PRODUCT CODE: NORMAL
BPU ID: NORMAL
BUN SERPL-MCNC: 49 MG/DL (ref 7–20)
CALCIUM SERPL-MCNC: 8 MG/DL (ref 8.3–10.6)
CHLORIDE SERPL-SCNC: 110 MMOL/L (ref 99–110)
CO2 SERPL-SCNC: 21 MMOL/L (ref 21–32)
CREAT SERPL-MCNC: 2.6 MG/DL (ref 0.8–1.3)
DEPRECATED RDW RBC AUTO: 15.5 % (ref 12.4–15.4)
DESCRIPTION BLOOD BANK: NORMAL
GFR SERPLBLD CREATININE-BSD FMLA CKD-EPI: 22 ML/MIN/{1.73_M2}
GLUCOSE SERPL-MCNC: 113 MG/DL (ref 70–99)
HCT VFR BLD AUTO: 23.1 % (ref 40.5–52.5)
HCT VFR BLD AUTO: 26.6 % (ref 40.5–52.5)
HGB BLD-MCNC: 7.7 G/DL (ref 13.5–17.5)
HGB BLD-MCNC: 8.7 G/DL (ref 13.5–17.5)
MCH RBC QN AUTO: 29.4 PG (ref 26–34)
MCHC RBC AUTO-ENTMCNC: 33.4 G/DL (ref 31–36)
MCV RBC AUTO: 87.9 FL (ref 80–100)
PHOSPHATE SERPL-MCNC: 2.9 MG/DL (ref 2.5–4.9)
PLATELET # BLD AUTO: 222 K/UL (ref 135–450)
PMV BLD AUTO: 8.4 FL (ref 5–10.5)
POTASSIUM SERPL-SCNC: 4.1 MMOL/L (ref 3.5–5.1)
RBC # BLD AUTO: 2.63 M/UL (ref 4.2–5.9)
SODIUM SERPL-SCNC: 141 MMOL/L (ref 136–145)
WBC # BLD AUTO: 6.7 K/UL (ref 4–11)

## 2023-06-21 PROCEDURE — 85027 COMPLETE CBC AUTOMATED: CPT

## 2023-06-21 PROCEDURE — 6370000000 HC RX 637 (ALT 250 FOR IP): Performed by: INTERNAL MEDICINE

## 2023-06-21 PROCEDURE — 94760 N-INVAS EAR/PLS OXIMETRY 1: CPT

## 2023-06-21 PROCEDURE — P9016 RBC LEUKOCYTES REDUCED: HCPCS

## 2023-06-21 PROCEDURE — 80069 RENAL FUNCTION PANEL: CPT

## 2023-06-21 PROCEDURE — 97535 SELF CARE MNGMENT TRAINING: CPT

## 2023-06-21 PROCEDURE — 6360000002 HC RX W HCPCS: Performed by: STUDENT IN AN ORGANIZED HEALTH CARE EDUCATION/TRAINING PROGRAM

## 2023-06-21 PROCEDURE — 85014 HEMATOCRIT: CPT

## 2023-06-21 PROCEDURE — 6370000000 HC RX 637 (ALT 250 FOR IP): Performed by: STUDENT IN AN ORGANIZED HEALTH CARE EDUCATION/TRAINING PROGRAM

## 2023-06-21 PROCEDURE — 1200000000 HC SEMI PRIVATE

## 2023-06-21 PROCEDURE — 2580000003 HC RX 258: Performed by: STUDENT IN AN ORGANIZED HEALTH CARE EDUCATION/TRAINING PROGRAM

## 2023-06-21 PROCEDURE — 30233N1 TRANSFUSION OF NONAUTOLOGOUS RED BLOOD CELLS INTO PERIPHERAL VEIN, PERCUTANEOUS APPROACH: ICD-10-PCS | Performed by: INTERNAL MEDICINE

## 2023-06-21 PROCEDURE — 86850 RBC ANTIBODY SCREEN: CPT

## 2023-06-21 PROCEDURE — 36430 TRANSFUSION BLD/BLD COMPNT: CPT

## 2023-06-21 PROCEDURE — 86901 BLOOD TYPING SEROLOGIC RH(D): CPT

## 2023-06-21 PROCEDURE — 86923 COMPATIBILITY TEST ELECTRIC: CPT

## 2023-06-21 PROCEDURE — 85018 HEMOGLOBIN: CPT

## 2023-06-21 PROCEDURE — 86900 BLOOD TYPING SEROLOGIC ABO: CPT

## 2023-06-21 PROCEDURE — 36415 COLL VENOUS BLD VENIPUNCTURE: CPT

## 2023-06-21 PROCEDURE — 92526 ORAL FUNCTION THERAPY: CPT

## 2023-06-21 PROCEDURE — 97530 THERAPEUTIC ACTIVITIES: CPT

## 2023-06-21 RX ORDER — AMLODIPINE BESYLATE 5 MG/1
2.5 TABLET ORAL DAILY
Status: DISCONTINUED | OUTPATIENT
Start: 2023-06-22 | End: 2023-06-22 | Stop reason: HOSPADM

## 2023-06-21 RX ORDER — SODIUM CHLORIDE 9 MG/ML
INJECTION, SOLUTION INTRAVENOUS PRN
Status: DISCONTINUED | OUTPATIENT
Start: 2023-06-21 | End: 2023-06-22 | Stop reason: HOSPADM

## 2023-06-21 RX ORDER — LANSOPRAZOLE 30 MG/1
30 TABLET, ORALLY DISINTEGRATING, DELAYED RELEASE ORAL
Status: DISCONTINUED | OUTPATIENT
Start: 2023-06-21 | End: 2023-06-22 | Stop reason: HOSPADM

## 2023-06-21 RX ADMIN — AMLODIPINE BESYLATE 5 MG: 5 TABLET ORAL at 10:20

## 2023-06-21 RX ADMIN — SODIUM BICARBONATE 650 MG: 650 TABLET ORAL at 16:51

## 2023-06-21 RX ADMIN — SODIUM CHLORIDE, PRESERVATIVE FREE 10 ML: 5 INJECTION INTRAVENOUS at 20:49

## 2023-06-21 RX ADMIN — HEPARIN SODIUM 5000 UNITS: 5000 INJECTION INTRAVENOUS; SUBCUTANEOUS at 06:16

## 2023-06-21 RX ADMIN — HEPARIN SODIUM 5000 UNITS: 5000 INJECTION INTRAVENOUS; SUBCUTANEOUS at 13:57

## 2023-06-21 RX ADMIN — SODIUM CHLORIDE, PRESERVATIVE FREE 10 ML: 5 INJECTION INTRAVENOUS at 10:27

## 2023-06-21 RX ADMIN — CARVEDILOL 3.12 MG: 3.12 TABLET, FILM COATED ORAL at 16:51

## 2023-06-21 RX ADMIN — GUAIFENESIN SYRUP AND DEXTROMETHORPHAN 5 ML: 100; 10 SYRUP ORAL at 20:49

## 2023-06-21 RX ADMIN — FERROUS SULFATE TAB EC 324 MG (65 MG FE EQUIVALENT) 324 MG: 324 (65 FE) TABLET DELAYED RESPONSE at 10:20

## 2023-06-21 RX ADMIN — LANSOPRAZOLE 30 MG: 30 TABLET, ORALLY DISINTEGRATING, DELAYED RELEASE ORAL at 06:42

## 2023-06-21 RX ADMIN — DOCUSATE SODIUM 100 MG: 100 CAPSULE, LIQUID FILLED ORAL at 10:20

## 2023-06-21 RX ADMIN — ATORVASTATIN CALCIUM 40 MG: 40 TABLET, FILM COATED ORAL at 10:20

## 2023-06-21 RX ADMIN — SENNOSIDES 8.6 MG: 8.6 TABLET, FILM COATED ORAL at 10:20

## 2023-06-21 RX ADMIN — TAMSULOSIN HYDROCHLORIDE 0.4 MG: 0.4 CAPSULE ORAL at 10:20

## 2023-06-21 RX ADMIN — LANSOPRAZOLE 30 MG: 30 TABLET, ORALLY DISINTEGRATING, DELAYED RELEASE ORAL at 16:56

## 2023-06-21 RX ADMIN — SENNOSIDES 8.6 MG: 8.6 TABLET, FILM COATED ORAL at 20:49

## 2023-06-21 RX ADMIN — SODIUM BICARBONATE 650 MG: 650 TABLET ORAL at 10:20

## 2023-06-21 RX ADMIN — CARVEDILOL 3.12 MG: 3.12 TABLET, FILM COATED ORAL at 10:20

## 2023-06-21 RX ADMIN — DORZOLAMIDE HYDROCHLORIDE AND TIMOLOL MALEATE 1 DROP: 20; 5 SOLUTION/ DROPS OPHTHALMIC at 20:49

## 2023-06-21 RX ADMIN — DORZOLAMIDE HYDROCHLORIDE AND TIMOLOL MALEATE 1 DROP: 20; 5 SOLUTION/ DROPS OPHTHALMIC at 10:19

## 2023-06-21 RX ADMIN — SODIUM BICARBONATE 650 MG: 650 TABLET ORAL at 13:57

## 2023-06-21 RX ADMIN — ESCITALOPRAM OXALATE 10 MG: 10 TABLET ORAL at 10:20

## 2023-06-21 RX ADMIN — SODIUM BICARBONATE 650 MG: 650 TABLET ORAL at 20:49

## 2023-06-21 RX ADMIN — GUAIFENESIN 600 MG: 600 TABLET ORAL at 10:20

## 2023-06-21 RX ADMIN — GUAIFENESIN SYRUP AND DEXTROMETHORPHAN 5 ML: 100; 10 SYRUP ORAL at 01:00

## 2023-06-21 RX ADMIN — ACETAMINOPHEN 650 MG: 325 TABLET ORAL at 00:59

## 2023-06-21 ASSESSMENT — PAIN SCALES - GENERAL
PAINLEVEL_OUTOF10: 0
PAINLEVEL_OUTOF10: 8
PAINLEVEL_OUTOF10: 0

## 2023-06-21 ASSESSMENT — PAIN DESCRIPTION - ORIENTATION: ORIENTATION: MID

## 2023-06-21 ASSESSMENT — PAIN - FUNCTIONAL ASSESSMENT: PAIN_FUNCTIONAL_ASSESSMENT: ACTIVITIES ARE NOT PREVENTED

## 2023-06-21 ASSESSMENT — PAIN DESCRIPTION - DESCRIPTORS: DESCRIPTORS: DISCOMFORT

## 2023-06-21 ASSESSMENT — PAIN DESCRIPTION - LOCATION: LOCATION: ABDOMEN

## 2023-06-21 NOTE — CARE COORDINATION
[address] VIA [facility] ACCESS PORTAL    REQUESTED SETTING:  [facility] [address] TO SNF:  6/21/2023    [facility] AT [place] #:  [id number]      YAO Scott, [facility], Social Work/Case Management   [phone]  Electronically signed by YAO Scott on 6/21/2023 at 11:30 AM

## 2023-06-21 NOTE — DISCHARGE INSTR - COC
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 COVID-19 (Rule Out) 01/05/23 01/05/23 01/05/23 COVID-19, Rapid (Ordered)   01/05/23 Rule-Out Test Resulted            Nurse Assessment:  Last Vital Signs: BP (!) 112/57   Pulse 55   Temp 99 °F (37.2 °C) (Axillary)   Resp 27   Ht 5' 6\" (1.676 m)   Wt 135 lb 2.3 oz (61.3 kg)   SpO2 93%   BMI 21.81 kg/m²     Last documented pain score (0-10 scale): Pain Level: 0  Last Weight:   Wt Readings from Last 1 Encounters:   06/21/23 135 lb 2.3 oz (61.3 kg)     Mental Status:  oriented, alert, coherent, logical, thought processes intact, and able to concentrate and follow conversation    IV Access:  - None    Nursing Mobility/ADLs:  Walking   Assisted  Transfer  Assisted  Bathing  Dependent  Dressing  Dependent  Toileting  Dependent  Feeding  Assisted  Med Admin  Dependent  Med Delivery   crushed and in apple sauce/pudding    Wound Care Documentation and Therapy:        Elimination:  Continence: Bowel: continent at times  Bladder: Yes  Urinary Catheter: no  Colostomy/Ileostomy/Ileal Conduit: No       Date of Last BM: 6/21/23    Intake/Output Summary (Last 24 hours) at 6/21/2023 1429  Last data filed at 6/21/2023 1345  Gross per 24 hour   Intake 520 ml   Output 750 ml   Net -230 ml     I/O last 3 completed shifts: In: 230 [P.O.:220; I.V.:10]  Out: 1300 [Urine:1300]    Safety Concerns:      At Risk for Falls and Aspiration Risk    Impairments/Disabilities:

## 2023-06-21 NOTE — CARE COORDINATION
CASE MANAGEMENT DISCHARGE SUMMARY:    DISCHARGE DATE: 6/21/2023    DISCHARGED TO:  [facility]   Address:  [facility] [address]   Phone:  [phone]  Fax:  [phone]    TRANSPORTATION: [facility]              TIME: 6:45 PM     PREFERRED PHARMACY: At facility     INSURANCE PRECERT OBTAINED: Kevyn RODRIGUEZ/SHANEKA COMPLETED: completed copy placed in chart. Spoke to son, Hernan Do, notified of the above. Notified Kelli BHAKTA and Larry Bernard at [facility]. YAO Vera, LSW, Social Work/Case Management   [phone]  Electronically signed by YAO Vera on 6/21/2023 at 3:00 PM    Call received from RN stating Discharge order canceled due to patient requiring transfusion.

## 2023-06-21 NOTE — DISCHARGE INSTR - DIET

## 2023-06-21 NOTE — CARE COORDINATION
Call received from [facility], [facility] with [facility] for SNF.      [facility] Authorization Received via [employer] Portal:    Plan Auth ID:    Jonathan Loretta ID:  [id number]  Service:  SNF  Approval Dates:  06/21/2023-06/23/2023  Next Review Date:  06/23/2023    YAO Ozuna, [facility], Social Work/Case Management   [phone]  Electronically signed by YAO Ozuna on 6/21/2023 at 2:57 PM

## 2023-06-21 NOTE — CONSENT
Blood Transfusion Consent      I have discussed with the patient the rationale for blood component transfusion; its benefits in treating or preventing fatigue, organ damage, or death; and its risk which includes mild transfusion reactions, rare risk of blood borne infection, or more serious but rare reactions. I have discussed the alternatives to transfusion, including the risk and consequences of not receiving transfusion. The patient had an opportunity to ask questions and had agreed to proceed with transfusion of blood components.      Indication: Symptomatic Anemia  Order; 1 unit of pRBC

## 2023-06-21 NOTE — CARE COORDINATION
Discharge planning:     [facility]NF Follow [facility]. [facility]: Called to Francnie Ayers in admissions #[phone], left voicemail requesting return phone call regarding referral. Await return phone call. [facility]: called to Yumiko in admissions #[phone], left voicemail requesting return phone call regarding referral. Await return phone call. [facility]: called to Nba Desouza in admissions #[phone], left voicemail requesting return phone call regarding referral. Await return phone call. PLAN: SNF    NEED: Accepting facility, [facility], medical transport, completed [facility] and [facility]. YAO Scott, LSW, Social Work/Case Management   [phone]  Electronically signed by YAO Scott on 6/21/2023 at 8:22 AM      [facility]: spoke to Nba Desouza, confirmed they can accept patient. [facility]: message from [facility] in admissions stating they are out of network. [facility]: unable to accept at this time. Called to son, Everardo Damon, discussed discharge destination. Confirmed family is agreeable to [facility] at discharge. Called to Nba Desouza in admissions notified of plan for patient to admit to their facility. [facility] to be submitted. Electronically signed by YAO Scott on 6/21/2023 at 11:13 AM    [facility] TRANSPORT @ 6 pm to [facility] via [facility].    Electronically signed by YAO Scott on 6/21/2023 at 11:32 AM

## 2023-06-22 VITALS
HEART RATE: 56 BPM | TEMPERATURE: 97.8 F | HEIGHT: 66 IN | DIASTOLIC BLOOD PRESSURE: 57 MMHG | OXYGEN SATURATION: 96 % | WEIGHT: 138.23 LBS | BODY MASS INDEX: 22.22 KG/M2 | RESPIRATION RATE: 21 BRPM | SYSTOLIC BLOOD PRESSURE: 118 MMHG

## 2023-06-22 LAB
ALBUMIN SERPL-MCNC: 2.6 G/DL (ref 3.4–5)
ANION GAP SERPL CALCULATED.3IONS-SCNC: 8 MMOL/L (ref 3–16)
BUN SERPL-MCNC: 45 MG/DL (ref 7–20)
CALCIUM SERPL-MCNC: 8.3 MG/DL (ref 8.3–10.6)
CHLORIDE SERPL-SCNC: 106 MMOL/L (ref 99–110)
CO2 SERPL-SCNC: 23 MMOL/L (ref 21–32)
CREAT SERPL-MCNC: 2.6 MG/DL (ref 0.8–1.3)
GFR SERPLBLD CREATININE-BSD FMLA CKD-EPI: 22 ML/MIN/{1.73_M2}
GLUCOSE SERPL-MCNC: 108 MG/DL (ref 70–99)
PHOSPHATE SERPL-MCNC: 2.9 MG/DL (ref 2.5–4.9)
POTASSIUM SERPL-SCNC: 4.3 MMOL/L (ref 3.5–5.1)
SODIUM SERPL-SCNC: 137 MMOL/L (ref 136–145)

## 2023-06-22 PROCEDURE — 6370000000 HC RX 637 (ALT 250 FOR IP): Performed by: INTERNAL MEDICINE

## 2023-06-22 PROCEDURE — 36415 COLL VENOUS BLD VENIPUNCTURE: CPT

## 2023-06-22 PROCEDURE — 6370000000 HC RX 637 (ALT 250 FOR IP): Performed by: STUDENT IN AN ORGANIZED HEALTH CARE EDUCATION/TRAINING PROGRAM

## 2023-06-22 PROCEDURE — 97535 SELF CARE MNGMENT TRAINING: CPT

## 2023-06-22 PROCEDURE — 80069 RENAL FUNCTION PANEL: CPT

## 2023-06-22 PROCEDURE — 94760 N-INVAS EAR/PLS OXIMETRY 1: CPT

## 2023-06-22 PROCEDURE — 97530 THERAPEUTIC ACTIVITIES: CPT

## 2023-06-22 PROCEDURE — 2580000003 HC RX 258: Performed by: STUDENT IN AN ORGANIZED HEALTH CARE EDUCATION/TRAINING PROGRAM

## 2023-06-22 RX ORDER — AMLODIPINE BESYLATE 2.5 MG/1
2.5 TABLET ORAL DAILY
Qty: 30 TABLET | Refills: 3 | Status: SHIPPED | OUTPATIENT
Start: 2023-06-22

## 2023-06-22 RX ADMIN — ESCITALOPRAM OXALATE 10 MG: 10 TABLET ORAL at 08:40

## 2023-06-22 RX ADMIN — AMLODIPINE BESYLATE 2.5 MG: 5 TABLET ORAL at 08:40

## 2023-06-22 RX ADMIN — CARVEDILOL 3.12 MG: 3.12 TABLET, FILM COATED ORAL at 08:40

## 2023-06-22 RX ADMIN — GUAIFENESIN 600 MG: 600 TABLET ORAL at 08:40

## 2023-06-22 RX ADMIN — LANSOPRAZOLE 30 MG: 30 TABLET, ORALLY DISINTEGRATING, DELAYED RELEASE ORAL at 06:10

## 2023-06-22 RX ADMIN — TAMSULOSIN HYDROCHLORIDE 0.4 MG: 0.4 CAPSULE ORAL at 08:40

## 2023-06-22 RX ADMIN — SENNOSIDES 8.6 MG: 8.6 TABLET, FILM COATED ORAL at 08:40

## 2023-06-22 RX ADMIN — GUAIFENESIN SYRUP AND DEXTROMETHORPHAN 5 ML: 100; 10 SYRUP ORAL at 03:58

## 2023-06-22 RX ADMIN — SODIUM BICARBONATE 650 MG: 650 TABLET ORAL at 08:40

## 2023-06-22 RX ADMIN — SODIUM CHLORIDE, PRESERVATIVE FREE 10 ML: 5 INJECTION INTRAVENOUS at 08:40

## 2023-06-22 RX ADMIN — DOCUSATE SODIUM 100 MG: 100 CAPSULE, LIQUID FILLED ORAL at 08:40

## 2023-06-22 RX ADMIN — ATORVASTATIN CALCIUM 40 MG: 40 TABLET, FILM COATED ORAL at 08:40

## 2023-06-22 RX ADMIN — DORZOLAMIDE HYDROCHLORIDE AND TIMOLOL MALEATE 1 DROP: 20; 5 SOLUTION/ DROPS OPHTHALMIC at 08:40

## 2023-06-22 RX ADMIN — FERROUS SULFATE TAB EC 324 MG (65 MG FE EQUIVALENT) 324 MG: 324 (65 FE) TABLET DELAYED RESPONSE at 08:40

## 2023-06-22 NOTE — PROGRESS NOTES
Chauhan removed on Dr order.   Purewick attached to monitor urination
Occupational Therapy  Facility/Department: [address] PROGRESSIVE CARE  Occupational Therapy daily Note  Name: Vamsi Lagos  : 1931  MRN: [id number]  Date of Service: 2023    Discharge Recommendations:  3-5 sessions per week, Patient would benefit from continued therapy after discharge      Vamsi Lagos scored a 14/24 on the AM-PAC ADL Inpatient form. Current research shows that an AM-PAC score of 17 or less is typically not associated with a discharge to the patient's home setting. Based on the patient's AM-PAC score and their current ADL deficits, it is recommended that the patient have 3-5 sessions per week of Occupational Therapy at d/c to increase the patient's independence. Please see assessment section for further patient specific details. If patient discharges prior to next session this note will serve as a discharge summary. Please see below for the latest assessment towards goals. Patient Diagnosis(es): The primary encounter diagnosis was Severe sepsis ([address] Diagnoses of Delirium, Pneumonia of right lower lobe due to infectious organism, Acute cystitis without hematuria, Acute respiratory failure with hypoxia ([address] Hyperkalemia, and Stage 4 chronic kidney disease ([address] were also pertinent to this visit. 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 Past Surgical History:  has a past surgical history that includes Coronary angioplasty with stent (10/18/15); Endoscopy, colon, diagnostic; Upper gastrointestinal endoscopy (10-); Upper gastrointestinal endoscopy (N/A, 2023);  Upper gastrointestinal
Occupational Therapy  Facility/Department: [address] PROGRESSIVE CARE  Occupational Therapy Daily Note  Name: Jinny Mayorga  : 1931  MRN: [id number]  Date of Service: 2023    Discharge Recommendations:  3-5 sessions per week, Patient would benefit from continued therapy after discharge   Jinny Mayorga scored a 14/24 on the AM-PAC ADL Inpatient form. Current research shows that an AM-PAC score of 17 or less is typically not associated with a discharge to the patient's home setting. Based on the patient's AM-PAC score and their current ADL deficits, it is recommended that the patient have 3-5 sessions per week of Occupational Therapy at d/c to increase the patient's independence. Please see assessment section for further patient specific details. If patient discharges prior to next session this note will serve as a discharge summary. Please see below for the latest assessment towards goals. Patient Diagnosis(es): The primary encounter diagnosis was Severe sepsis ([address] Diagnoses of Delirium, Pneumonia of right lower lobe due to infectious organism, Acute cystitis without hematuria, Acute respiratory failure with hypoxia ([address] Hyperkalemia, and Stage 4 chronic kidney disease ([address] were also pertinent to this visit. 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 Past Surgical History:  has a past surgical history that includes Coronary angioplasty with stent (10/18/15); Endoscopy, colon, diagnostic; Upper gastrointestinal endoscopy (10-); Upper gastrointestinal endoscopy (N/A, 2023);  Upper gastrointestinal
[facility]   Speech Therapy  Daily Dysphagia Treatment Note    Vamsi Lagos  AGE: 80 y.o. 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)    Chronic constipation    Former cigarette smoker    Ventral hernia    Current moderate episode of major depressive disorder without prior episode ([facility])    Fatigue    Sleep disorder    Non-English speaking patient    Nocturia    Chronic kidney disease, stage IV (severe) ([facility])    Ataxia    Frequent falls    Frailty syndrome in geriatric patient    Chronic pain syndrome    Bradycardia    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 chronic kidney disease ([facility])    Hyperkalemia    Chronic midline low back pain    SOB (shortness of breath)    Urinary retention    Urinary catheter in place    Angina pectoris, unspecified    Atherosclerotic heart disease of native coronary artery with unspecified angina pectoris    Asbestosis ([address]    Stage 4 chronic kidney disease ([address]    End of life care    Urge incontinence of urine    Gait difficulty    Community acquired pneumonia of right lower lobe of lung     No Known Allergies    Treatment Diagnosis: Dysphagia     Chart review:   2023 admitted with c/o confusion  MD ADMISSION H&P HPI:  Patient is a 80-year-old male with a PMHx of CAD s/p PCI in 10/2015, HTN, CKD4, BPH s/p TURP on 5/3/2023 who presented to the ED with confusion hypoxemia in mid 80's on RA and decreased PO intake for 1-2 days. No reported episodes for aspiration. Not on oxygen at baseline.  Patient unable to provide any details due to confusion, thus
Physical Therapy  Attempt Note    Name:Flash Mirza  BPQ:1/35/3744  [facility]:[id number]  Room: [address]99[address]59    Date of Service: 6/19/2023    Patient chart reviewed. PT attempted to see for PT eval/tx at this time. Patient up in recliner chair sleeping, easy to arouse. Pt reports that he is too tired from getting cleaned up and walking earlier. Refusing to participate with PT at this time due to increased fatigue. Will attempt again as therapy schedule permits. Second attempt 21 : pt refusing participation with PT this date, reports with stomach pain and not interested in OOB at this time. Attempted to redirect continued to refuse participation. Will attempt again tomorrow as schedule permits. Diego Xiong PT, [place] [id number] 06/19/23 2:53 PM)    If patient is discharged prior to the next physical therapy visit; Please see last written PT note for discharge status.             Electronically signed by Sandeep Panda PT on 6/19/2023 at 9:50 AM      Diego Xiong [facility] [id number] 06/19/23 9:50 AM
Physical Therapy  Facility/Department: [facility] PROGRESSIVE CARE  Physical Therapy Treatment Session  Name: Zoran Tavera  : 1931  MRN: [id number]  Date of Service: 2023    Zoran Tavera scored a 16/24 on the AM-PAC short mobility form. Current research shows that an AM-PAC score of 17 or less is typically not associated with a discharge to the patient's home setting. Based on the patient's AM-PAC score and their current functional mobility deficits, it is recommended that the patient have 3-5 sessions per week of Physical Therapy at d/c to increase the patient's independence. Please see assessment section for further patient specific details. If patient discharges prior to next session this note will serve as a discharge summary. Please see below for the latest assessment towards goals. Discharge Recommendations:  Patient would benefit from continued therapy after discharge   PT Equipment Recommendations  Equipment Needed: No      Patient Diagnosis(es): The primary encounter diagnosis was Severe sepsis ([address] Diagnoses of Delirium, Pneumonia of right lower lobe due to infectious organism, Acute cystitis without hematuria, Acute respiratory failure with hypoxia ([address] Hyperkalemia, and Stage 4 chronic kidney disease ([address] were also pertinent to this visit. 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 Past Surgical History:  has a past surgical history that includes Coronary angioplasty with stent (10/18/15); Endoscopy, colon, diagnostic; Upper gastrointestinal endoscopy (10-);  Upper gastrointestinal endoscopy
Pt discharged via squad. Pt has no complaints of pain. IV and telemetry removed. Discharge instrutioncs given. Pt is stable.
Talked to infusion clinic this morning about his infusion treatment.   They said they will contact him about rescheduling the infusion once he is discharged from the hospital.
Update given to daughter Ellis Johnson, and she said she would like case management to reach out to her regarding pt's placement options. Phone number in chart and verified with daughter.      Electronically signed by Raghav Jones RN on 6/20/2023 at 9:19 PM
V2.0  [facility] Hospital[place] Note      Name:  Carlos Francis /Age/Sex: 1931  (80 y.o. male)   MRN & CSN:  [id number] & [id number] Encounter Date/Time: 2023 3:03 PM EDT    Location:  [id number]-8555/8075-25 PCP: Javad Loyola MD       Hospital Day: 8    Assessment and Plan:   Carlos Francis is a 80 y.o. chronic kidney disease stage IV male with pmh of  who presents with confusion. He is also malnourished and was found to have sepsis secondary to UTI/Community acquired pneumonia of right lower lobe of lung    Plan:  Saw patient today, patient clinically continues to improve  He saturating appropriately on room air in no distress  Kidney function continues to be close to baseline  Patient is medically cleared to be released at this time. We are currently awaiting placement at a facility. He can be released at any time from my standpoint once arrangements have been made. Discharge order in place. Patient has completed treatment course of antibiotics at this time. Will monitor off antibiotics  . Appreciate social work team recommendations      Assessment:  Sepsis secondary to UTI , community-acquired pneumonia. Clinically he is doing better. Continue with current treatment. Currently off the oxygen    RONI on chronic kidney disease stage IV. Creatinine is improving and nephrology on board    Coronary artery disease. Medical management    BPH. Continue with Flomax    Chronic congestive heart failure currently compensated    Constipation. Continue with bowel regimen    Depression in remission. Continue home meds    Chronic back pain with history of spinal stenosis. PT/OT    UTI-present on admission. On antibiotic through . Severe protein caloric malnutrition. Encouraged him to eat better. Diet ADULT DIET; Dysphagia - Pureed; 5 carb choices (75 gm/meal);  Low Fat/Low Chol/High Fiber/JUAN CARLOS; Low Sodium (2 gm); Mildly Thick (Nectar)  ADULT ORAL NUTRITION SUPPLEMENT; Breakfast, Lunch,
V2.0  [facility] Hospitalist Progress Note      Name:  Vamsi Lagos /Age/Sex: 1931  (80 y.o. male)   MRN & CSN:  [id number] & [id number] Encounter Date/Time: 2023 3:03 PM EDT    Location:  [id number]-6445/8195-60 PCP: Aylin Dickson MD       Hospital Day: 9    Assessment and Plan:   Vamsi Lagos is a 80 y.o. chronic kidney disease stage IV male with pmh of  who presents with confusion. He is also malnourished and was found to have sepsis secondary to UTI/Community acquired pneumonia of right lower lobe of lung    Plan:  Saw the patient today, no acute events overnight  The patient is looking more weak and tired and deconditioned. I did review his blood work and in the setting of chronic anemia which is multifactorial but with a Background of CAD, his hemoglobin should remain above 8 to optimize his functional status  I discussed transition to optimize symptoms and he agreed to transfuse 1 unit of packed red blood cells at this time  Patient is medically cleared to be released at this time. We are currently awaiting placement at a facility. He can be released at any time from my standpoint once arrangements have been made. Discharge order in place. Patient has completed treatment course of antibiotics at this time. Will monitor off antibiotics  Nephrology following kidney function. He will follow-up with the nephrology service as an outpatient. Lenka Xiong Appreciate social work team recommendations      Assessment:  Sepsis secondary to UTI , community-acquired pneumonia. Clinically he is doing better. Continue with current treatment. Currently off the oxygen    RONI on chronic kidney disease stage IV. Creatinine is improving and nephrology on board    Coronary artery disease. Medical management    BPH. Continue with Flomax    Chronic congestive heart failure currently compensated    Constipation. Continue with bowel regimen    Depression in remission.   Continue home meds    Chronic back pain with history
Visit us at [facility]. com or call us at [address] [place] [address] NOTE    Patient: Olivia Edwards MRN: [id number]     YOB: 1931  Age: 80 y.o. Sex: male    Unit: [facility]N [facility] Room/Bed: Q7Y-1985/5279-01 Location: [address] [place] [address]     Admitting Physician: Dontrell Presley    Primary Care Physician: Arnold Dukes MD          LOS: 6 days       Reason for evaluation: RONI on CKD4      SUBJECTIVE:      The patient was seen and examined. Notes and labs reviewed. There were no complications over night. Patient's review of systems: comfortable, no complaints      OBJECTIVE:     Vitals:    06/19/23 0329 06/19/23 0457 06/19/23 0800 06/19/23 0944   BP: (!) 158/63  (!) 162/64    Pulse: 53  55    Resp: 14  24    Temp: 97.9 °F (36.6 °C)  98.8 °F (37.1 °C)    TempSrc: Oral  Oral    SpO2: 95%  94% 91%   Weight:  130 lb 1.1 oz (59 kg)     Height:           Intake and Output:      Intake/Output Summary (Last 24 hours) at 6/19/2023 1125  Last data filed at 6/19/2023 0457  Gross per 24 hour   Intake 300 ml   Output 400 ml   Net -100 ml         Medications:   amLODIPine  5 mg Oral Daily    guaiFENesin  600 mg Oral BID    levofloxacin  500 mg IntraVENous Q48H    sodium bicarbonate  650 mg Oral 4x Daily    sodium zirconium cyclosilicate  10 g Oral Once    atorvastatin  40 mg Oral Daily    carvedilol  3.125 mg Oral BID WC    docusate sodium  100 mg Oral BID    dorzolamide-timolol  1 drop Both Eyes BID    escitalopram  10 mg Oral Daily    ferrous sulfate  324 mg Oral Daily with breakfast    pantoprazole  40 mg Oral BID AC    senna  1 tablet Oral BID    tamsulosin  0.4 mg Oral Daily    sodium chloride flush  5-40 mL IntraVENous 2 times per day    heparin (porcine)  5,000 Units SubCUTAneous 3 times per day           Exam:   CONSTITUTIONAL/PSYCHIATRY: awake, alert.  Not in acute distress   EYES: Conjunctivae: normal.   RESPIRATORY: Respiratory effort: normal. Auscultation: CTA  CARDIOVASCULAR:
Visit us at [facility]. com or call us at [address] [place] [address] NOTE    Patient: Neida Hough MRN: [id number]     YOB: 1931  Age: 80 y.o. Sex: male    [facility] Room/Bed: R8A-8698/5279-01 Location: [address] [place] [address]     Admitting Physician: Cherie Kwon    Primary Care Physician: Dwain Berg MD          LOS: 9 days       Reason for evaluation: RONI on CKD IV      SUBJECTIVE:      The patient was seen and examined. Notes and labs reviewed. There were no complications over night. Patient's review of systems: comfortable, sleeping      OBJECTIVE:     Vitals:    06/22/23 0356 06/22/23 0559 06/22/23 0830 06/22/23 0851   BP: (!) 123/57  (!) 118/57    Pulse: 57  54 56   Resp: 18 19 21   Temp: 97.7 °F (36.5 °C)  97.8 °F (36.6 °C)    TempSrc: Oral  Oral    SpO2: 94%  95% 96%   Weight:  138 lb 3.7 oz (62.7 kg)     Height:       PF: Intake and Output:      Intake/Output Summary (Last 24 hours) at 6/22/2023 1211  Last data filed at 6/22/2023 0930  Gross per 24 hour   Intake 960 ml   Output 800 ml   Net 160 ml         Medications:   lansoprazole  30 mg Oral BID AC    amLODIPine  2.5 mg Oral Daily    guaiFENesin  600 mg Oral BID    sodium bicarbonate  650 mg Oral 4x Daily    atorvastatin  40 mg Oral Daily    carvedilol  3.125 mg Oral BID WC    docusate sodium  100 mg Oral BID    dorzolamide-timolol  1 drop Both Eyes BID    escitalopram  10 mg Oral Daily    ferrous sulfate  324 mg Oral Daily with breakfast    senna  1 tablet Oral BID    tamsulosin  0.4 mg Oral Daily    sodium chloride flush  5-40 mL IntraVENous 2 times per day    heparin (porcine)  5,000 Units SubCUTAneous 3 times per day           Exam:   CONSTITUTIONAL/PSYCHIATRY: sleeping  RESPIRATORY: Respiratory effort: normal. Auscultation: CTA  CARDIOVASCULAR: Auscultation: RRR. Edema: none  GASTROINTESTINAL: Soft, nontender, nondistended. EXTREMITIES:  No cyanosis or clubbing. SKIN: Warm and dry.
Visit us at [facility]. com or call us at [address] [place] [address] NOTE    Patient: Amadou Ivory MRN: [id number]     YOB: 1931  Age: 80 y.o. Sex: male    [facility] Room/Bed: N5T-4579/5279-01 Location: [address] [place] [address] [facility]     [facility]mitting [facility]ician: [facility]    Primary Care Physician: Gloria Armendariz MD          LOS: 7 days       Reason for evaluation: RONI on CKD4      SUBJECTIVE:      The patient was seen and examined. Notes and labs reviewed. There were no complications over night. Patient's review of systems: comfortable, no complaints      OBJECTIVE:     Vitals:    06/20/23 0915 06/20/23 0937 06/20/23 0945 06/20/23 1000   BP: (!) 140/77      Pulse:  59 58 58   Resp:  24 26 20   Temp: 98.4 °F (36.9 °C)      TempSrc: Oral      SpO2: 95%      Weight:       Height:           Intake and Output:      Intake/Output Summary (Last 24 hours) at 6/20/2023 1138  Last data filed at 6/20/2023 0918  Gross per 24 hour   Intake 190 ml   Output 1100 ml   Net -910 ml         Medications:   amLODIPine  5 mg Oral Daily    guaiFENesin  600 mg Oral BID    sodium bicarbonate  650 mg Oral 4x Daily    atorvastatin  40 mg Oral Daily    carvedilol  3.125 mg Oral BID WC    docusate sodium  100 mg Oral BID    dorzolamide-timolol  1 drop Both Eyes BID    escitalopram  10 mg Oral Daily    ferrous sulfate  324 mg Oral Daily with breakfast    pantoprazole  40 mg Oral BID AC    senna  1 tablet Oral BID    tamsulosin  0.4 mg Oral Daily    sodium chloride flush  5-40 mL IntraVENous 2 times per day    heparin (porcine)  5,000 Units SubCUTAneous 3 times per day           Exam:   CONSTITUTIONAL/PSYCHIATRY: awake, alert. Not in acute distress   EYES: Conjunctivae: normal.   RESPIRATORY: Respiratory effort: normal. Auscultation: CTA  CARDIOVASCULAR: Auscultation: RRR. Edema: none  GASTROINTESTINAL: Soft, nontender, nondistended. EXTREMITIES:  No cyanosis or clubbing. SKIN: Warm and dry.  No
Visit us at [facility]. com or call us at [address] [place] [address] NOTE    Patient: Bob Solano MRN: [id number]     YOB: 1931  Age: 80 y.o. Sex: male    Unit: [facility] Room/Bed: D9H-1339/5279-01 Location: [address] [place] [address]     Admitting Physician: Delaney Green    Primary Care Physician: Davi Pollard MD          LOS: 8 days       Reason for evaluation: RONI on CKD IV      SUBJECTIVE:      The patient was seen and examined. Notes and labs reviewed. There were no complications over night. Patient's review of systems: comfortable, no complaints      OBJECTIVE:     Vitals:    06/21/23 1256 06/21/23 1257 06/21/23 1258 06/21/23 1300   BP:       Pulse: 56 55 56 55   Resp: 28 29 25 27   Temp:       TempSrc:       SpO2:       Weight:       Height:           Intake and Output:      Intake/Output Summary (Last 24 hours) at 6/21/2023 1448  Last data filed at 6/21/2023 1345  Gross per 24 hour   Intake 520 ml   Output 750 ml   Net -230 ml         Medications:   lansoprazole  30 mg Oral BID AC    [START ON 6/22/2023] amLODIPine  2.5 mg Oral Daily    guaiFENesin  600 mg Oral BID    sodium bicarbonate  650 mg Oral 4x Daily    atorvastatin  40 mg Oral Daily    carvedilol  3.125 mg Oral BID WC    docusate sodium  100 mg Oral BID    dorzolamide-timolol  1 drop Both Eyes BID    escitalopram  10 mg Oral Daily    ferrous sulfate  324 mg Oral Daily with breakfast    senna  1 tablet Oral BID    tamsulosin  0.4 mg Oral Daily    sodium chloride flush  5-40 mL IntraVENous 2 times per day    heparin (porcine)  5,000 Units SubCUTAneous 3 times per day           Exam:   CONSTITUTIONAL/PSYCHIATRY: awake, alert. Not in acute distress   EYES: Conjunctivae: normal.   RESPIRATORY: Respiratory effort: normal. Auscultation: CTA  CARDIOVASCULAR: Auscultation: RRR. Edema: none  GASTROINTESTINAL: Soft, nontender, nondistended. EXTREMITIES:  No cyanosis or clubbing. SKIN: Warm and dry.  No significant
Supplement   DVT Prophylaxis [] Lovenox, [x]  Heparin, [] SCDs, [] Ambulation,  [] Eliquis, [] Xarelto  [] Coumadin   Code Status Full Code   Disposition From: Home  Expected Disposition: To be determined  Estimated Date of Discharge: 2 to 3 days  Patient requires continued admission due to  placement   Surrogate Decision Maker/ POA Patient     Personally reviewed Lab Studies and Imaging     Discussed management of the case with patient and staff     Tele Monitor stable rhythm    Subjective:         Laying in bed accompanied by son tell me she feels better on room air in no distress tolerating p.o. diet. Denies fever, night sweats, chills, chest pain,  dyspnea, palpitations, abd pain, nausea, vomiting, diarrhea, dysuria. Review of Systems:      Pertinent positives and negatives discussed in HPI    Objective: Intake/Output Summary (Last 24 hours) at 6/19/2023 1744  Last data filed at 6/19/2023 1615  Gross per 24 hour   Intake 240 ml   Output 950 ml   Net -710 ml          Vitals:   Vitals:    06/19/23 1700   BP:    Pulse: 56   Resp:    Temp:    SpO2:        Physical Exam:     Laying in bed, looks elderly looks underweight,  Poor orientation moist mucous membranes  Neck is supple  Lungs hypoventilatory ultralight and mild respiratory rhonchi but no crackles no rales on room air no respiratory distress  Regular rate and rhythm  Abdomen was soft nontender no signs of peritoneal irritation Chauhan catheter with clear yellow urine  Freely moving upper and lower extremities bilaterally.       Medications:    amLODIPine  5 mg Oral Daily    guaiFENesin  600 mg Oral BID    sodium bicarbonate  650 mg Oral 4x Daily    atorvastatin  40 mg Oral Daily    carvedilol  3.125 mg Oral BID WC    docusate sodium  100 mg Oral BID    dorzolamide-timolol  1 drop Both Eyes BID    escitalopram  10 mg Oral Daily    ferrous sulfate  324 mg Oral Daily with breakfast    pantoprazole  40 mg Oral BID AC    senna  1 tablet Oral BID
hx is per ED provider who discussed with son. IMAGING:  CT CHEST: 6/13/2023  IMPRESSION:  Circumferential wall thickening of the urinary bladder. There is  perivesicular fat stranding. Large diverticulum at the bladder dome. Air in the bladder lumen. Correlate for cystitis. Moderate stool burden in the colon. Airspace opacities in the right lower lobe which could represent an infection in the appropriate clinical setting. Extensive calcified pleural plaques and pleural thickening, compatible with asbestos related pleural disease. This result has not been signed. Information might be incomplete. CT HEAD: 6/13/2023  IMPRESSION:  No acute intracranial abnormality. This result has not been signed. Information might be incomplete. History/Prior Level of Function:   Living Status: admitted from home  Baseline diet: patient endorses continued need for very soft foods; denies continued thickened liquid usage  Prior Dysphagia History: followed by  January 2023 with rec for minced/nectar at that time    Subjective:     Current Diet Level: Dysphagia I Puree ; Mildly (nectar) thick liquids      Comments regarding tolerating Current Diet: No concerns reported by RN    Objective:     Pain: Did not state               Vision: [x]WFL []Impaired:  Hearing: []WFL [x]Impaired: [place]    Cognitive/behavioral/communication:   Oriented to [x] self [x] place [] date [x] situation  [x]alert [x]lethargic  [x]cooperative []self-limiting   []confused   []distractible []agitated []impulsive  [x]verbally responsive []nonverbal []limited verbal responses  [x]follows one step commands []does not follow dx []follows complex commands  []aphasic []dysarthric  [] other:     Respiratory Status: [x]Room Air []O2 via nasal cannula []Other:  Dentition: []Adequate []Dentures [x]Missing Many Teeth []Edentulous []Other:  Patient Positioning: Upright in bed    PO Trials:    Thin Liquids: DNT; pt declined  Nectar thick liquids: limited
RW.Post lean, needing weight shifts to move feet . Safety Devices  Type of Devices: Bed alarm in place;Call light within reach; Heels elevated for pressure relief;Left in bed;Nurse notified (RN, Luis Zapien)           ADL  Additional Comments: Declines any ADL activity. AGreeing only to go back to bed. Anticipate pt needing up to Max A for ADLs based on ROM, strength, and balance        Bed mobility  Sit to Supine: Minimal assistance; Moderate assistance  Scooting: Dependent/Total  Transfers  Sit to stand: Moderate assistance (from recliner)  Stand to sit: Moderate assistance (to bed)  Transfer Comments: Post lean with sit>< stand that continues with transfer from recliner to bed. Narrow KAILYN, needing weight shifts to advance feet along with verbal cues to  not sit prematurely Performed transfer with 2 foot distance, requiring 4 minutes. Cognition  Overall Cognitive Status: Exceptions  Arousal/Alertness: Delayed responses to stimuli  Following Commands: Follows one step commands with repetition; Follows one step commands with increased time  Memory: Decreased short term memory  Safety Judgement: Decreased awareness of need for safety  Initiation: Requires cues for some  Sequencing: Requires cues for some  Orientation  Overall Orientation Status: Impaired  Orientation Level: Oriented to place;Oriented to person;Disoriented to time (Ask OT Joaquin Kelley you my night nurse\" after OT introduction.)                  Education Given To: Patient  Education Provided: Role of Therapy;Transfer Training  Education Method: Demonstration;Verbal  Barriers to Learning: Cognition  Education Outcome: Verbalized understanding;Continued education needed        AM-PAC Score        AM-PAC Inpatient Daily Activity Raw Score: 15 (06/19/23 1201)  AM-PAC Inpatient ADL T-Scale Score : 34.69 (06/19/23 1201)  ADL Inpatient CMS 0-100% Score: 56.46 (06/19/23 1201)  ADL Inpatient CMS G-Code Modifier : CK (06/19/23 1201)    Goals  Short Term Goals  Time
note serves as a D/C Summary in the event that this patient is discharged prior to the next therapy session. Coded treatment time: 0  Total treatment time: 24    Electronically signed by  Thor Monet.  Phani Ponce, [address] [facility], [id number]  Speech-Language Pathologist  Portable phone: [phone]  on 6/20/2023 at 10:11 AM
supervision  Short Term Goal 5: tolerate B UE exercises x10 reps for increased strength with ADLs  Long Term Goals  Time Frame for Long Term Goals : STG=LTG  Patient Goals   Patient goals : return home       Therapy Time   Individual Concurrent Group Co-treatment   Time In 1015         Time Out 1041         Minutes [facility] [address] [place]/L,[facility]

## 2023-06-22 NOTE — PLAN OF CARE
Problem: Confusion  Goal: Confusion, delirium, dementia, or psychosis is improved or at baseline  Description: INTERVENTIONS:  1. Assess for possible contributors to thought disturbance, including medications, impaired vision or hearing, underlying metabolic abnormalities, dehydration, psychiatric diagnoses, and notify attending LIP  2. [place] high risk fall precautions, as indicated  3. Provide frequent short contacts to provide reality reorientation, refocusing and direction  4. Decrease environmental stimuli, including noise as appropriate  5. Monitor and intervene to maintain adequate nutrition, hydration, elimination, sleep and activity  6. If unable to ensure safety without constant attention obtain sitter and review sitter guidelines with assigned personnel  7.  Initiate Psychosocial CNS and Spiritual Care consult, as indicated  Outcome: Progressing  Flowsheets (Taken 6/19/2023 0505)  Effect of thought disturbance (confusion, delirium, dementia, or psychosis) are managed with adequate functional status:   Assess for contributors to thought disturbance, including medications, impaired vision or hearing, underlying metabolic abnormalities, dehydration, psychiatric diagnoses, notify LIP   Provide frequent short contacts to provide reality reorientation, refocusing and direction   Decrease environmental stimuli, including noise as appropriate     Problem: Genitourinary - Adult  Goal: Absence of urinary retention  Outcome: Progressing  Flowsheets (Taken 6/19/2023 0612)  Absence of urinary retention:   Assess patients ability to void and empty bladder   Monitor intake/output and perform bladder scan as needed     Problem: Genitourinary - Adult  Goal: Urinary catheter remains patent  Outcome: Progressing  Flowsheets (Taken 6/19/2023 0612)  Urinary catheter remains patent: Assess patency of urinary catheter     Problem: Infection - Adult  Goal: Absence of infection at discharge  Outcome: Progressing  Flowsheets
Problem: Discharge Planning  Goal: Discharge to home or other facility with appropriate resources  6/19/2023 1310 by Carolyn Kren RN  Outcome: Progressing  6/19/2023 0613 by Destinee Savage RN  Outcome: Progressing     Problem: Confusion  Goal: Confusion, delirium, dementia, or psychosis is improved or at baseline  Description: INTERVENTIONS:  1. Assess for possible contributors to thought disturbance, including medications, impaired vision or hearing, underlying metabolic abnormalities, dehydration, psychiatric diagnoses, and notify attending LIP  2. [place] high risk fall precautions, as indicated  3. Provide frequent short contacts to provide reality reorientation, refocusing and direction  4. Decrease environmental stimuli, including noise as appropriate  5. Monitor and intervene to maintain adequate nutrition, hydration, elimination, sleep and activity  6. If unable to ensure safety without constant attention obtain sitter and review sitter guidelines with assigned personnel  7.  Initiate Psychosocial CNS and Spiritual Care consult, as indicated  6/19/2023 1310 by Carolyn Kern RN  Outcome: Progressing  6/19/2023 0613 by Destinee Savage RN  Outcome: Progressing  Flowsheets (Taken 6/19/2023 0505)  Effect of thought disturbance (confusion, delirium, dementia, or psychosis) are managed with adequate functional status:   Assess for contributors to thought disturbance, including medications, impaired vision or hearing, underlying metabolic abnormalities, dehydration, psychiatric diagnoses, notify LIP   Provide frequent short contacts to provide reality reorientation, refocusing and direction   Decrease environmental stimuli, including noise as appropriate     Problem: Safety - Adult  Goal: Free from fall injury  6/19/2023 1310 by Carolyn Kern RN  Outcome: Progressing  6/19/2023 0613 by Destinee Savage RN  Outcome: Progressing     Problem: Skin/Tissue Integrity  Goal: Absence of new skin
Problem: Discharge Planning  Goal: Discharge to home or other facility with appropriate resources  6/20/2023 1513 by Christiano Grey RN  Outcome: Progressing  Problem: Confusion  Goal: Confusion, delirium, dementia, or psychosis is improved or at baseline  Description: INTERVENTIONS:  1. Assess for possible contributors to thought disturbance, including medications, impaired vision or hearing, underlying metabolic abnormalities, dehydration, psychiatric diagnoses, and notify attending LIP  2. [place] high risk fall precautions, as indicated  3. Provide frequent short contacts to provide reality reorientation, refocusing and direction  4. Decrease environmental stimuli, including noise as appropriate  5. Monitor and intervene to maintain adequate nutrition, hydration, elimination, sleep and activity  6. If unable to ensure safety without constant attention obtain sitter and review sitter guidelines with assigned personnel  7.  Initiate Psychosocial CNS and Spiritual Care consult, as indicated  6/20/2023 1513 by Christiano Grey RN  Outcome: Progressing  Problem: Safety - Adult  Goal: Free from fall injury  Outcome: Progressing     Problem: Nutrition Deficit:  Goal: Optimize nutritional status  6/20/2023 1513 by Christiano Grey RN  Outcome: Progressing
Problem: Discharge Planning  Goal: Discharge to home or other facility with appropriate resources  6/21/2023 1109 by Tita Morrow RN  Outcome: Progressing     Problem: Confusion  Goal: Confusion, delirium, dementia, or psychosis is improved or at baseline  Description: INTERVENTIONS:  1. Assess for possible contributors to thought disturbance, including medications, impaired vision or hearing, underlying metabolic abnormalities, dehydration, psychiatric diagnoses, and notify attending LIP  2. [place] high risk fall precautions, as indicated  3. Provide frequent short contacts to provide reality reorientation, refocusing and direction  4. Decrease environmental stimuli, including noise as appropriate  5. Monitor and intervene to maintain adequate nutrition, hydration, elimination, sleep and activity  6. If unable to ensure safety without constant attention obtain sitter and review sitter guidelines with assigned personnel  7.  Initiate Psychosocial CNS and Spiritual Care consult, as indicated  6/21/2023 1109 by Tita Morrow RN  Outcome: Progressing     Problem: Nutrition Deficit:  Goal: Optimize nutritional status  6/21/2023 1109 by Tita Morrow RN  Outcome: Progressing
Problem: Discharge Planning  Goal: Discharge to home or other facility with appropriate resources  Outcome: Adequate for Discharge
position. Call light within reach. Will continue to monitor. Problem: Skin/Tissue Integrity  Goal: Absence of new skin breakdown  Description: 1. Monitor for areas of redness and/or skin breakdown  2. Assess vascular access sites hourly  3. Every 4-6 hours minimum:  Change oxygen saturation probe site  4. Every 4-6 hours:  If on nasal continuous positive airway pressure, respiratory therapy assess nares and determine need for appliance change or resting period. Outcome: Progressing   Skin assessment completed every shift. Pt assessed for incontinence, appropriate barrier cream applied prn. Pt encouraged to turn/rotate every 2 hours. Assistance provided if pt unable to do so themselves. Problem: ABCDS Injury Assessment  Goal: Absence of physical injury  Outcome: Progressing   Skin assessment completed every shift. Pt assessed for incontinence, appropriate barrier cream applied prn. Pt encouraged to turn/rotate every 2 hours. Assistance provided if pt unable to do so themselves.       Problem: Nutrition Deficit:  Goal: Optimize nutritional status  6/20/2023 2145 by Felipa Newell RN  Outcome: Progressing     Problem: Genitourinary - Adult  Goal: Absence of urinary retention  Recent Flowsheet Documentation  Taken 6/20/2023 1947 by Felipa Newell RN  Absence of urinary retention: Monitor intake/output and perform bladder scan as needed  Taken 6/20/2023 3463 by Parmjit Hoff RN  Absence of urinary retention: Assess patients ability to void and empty bladder

## 2023-06-22 NOTE — DISCHARGE SUMMARY
V2.0  Discharge Summary    Name:  Camilla Mora /Age/Sex: 1931 (80 y.o. male)   Admit Date: 2023  Discharge Date: 23    MRN & CSN:  [id number] & [id number] Encounter Date and Time 23 1:21 PM EDT    Attending:  No att. providers found Discharging Provider: Brittany Clayton MD       Hospital Course:     Brief HPI: Camilla Mora is a 80 y.o. male      Brief Problem Based Course: This pleasant 80-year-old man presented initially with confusion/altered mental status and was ultimately determined to be secondary to episode of sepsis which led to hypoxic respiratory failure that was self-limited determined to be secondary to community-acquired pneumonia of the right lower lung. The patient was provided with supportive respiratory treatments antibiotics with great improvement and complete resolution of sepsis, oxygen requirements and return of mental status back to baseline. Patient also developed an RONI on CKD which improved back to baseline. Patient has chronic anemia and was provided with a unit of packed red blood cells to optimize functional status  Given the patient's hemodynamically stable in no distress has completed treatment course of antibiotics we will proceed on release him back to rehab facility with medications to follow-up with PCP and other specialist as an outpatient      The patient expressed appropriate understanding of, and agreement with the discharge recommendations, medications, and plan. Consults this admission:  IP CONSULT TO HOSPITALIST  IP CONSULT TO NEPHROLOGY    Discharge Diagnosis:      Sepsis secondary to UTI , resolved  community-acquired pneumonia. Clinically he is doing better. Continue with current treatment. Currently off the oxygen     RONI on chronic kidney disease stage IV. Creatinine is improving and nephrology on board     Coronary artery disease. Medical management     BPH.   Continue with Flomax     Chronic congestive heart failure currently

## 2023-06-22 NOTE — CARE COORDINATION
CASE MANAGEMENT DISCHARGE SUMMARY:    DISCHARGE DATE: 6/22/2023    DISCHARGED TO: [facility]     Discharging to Facility/ Agency   Name: [facility]  Address:  [facility] [address] [place]   Phone:  [phone]  Fax:  [phone]    TRANSPORTATION: [facility]              TIME: [address]: at facility     [address] yes via [address]: completed, copy placed on chart. Notified David BHAKTA, voicemail left for [facility] #[phone] notifying of discharge and transport time. Son Thanh notified.      YAO Perez, LSW, Social Work/Case Management   [phone]  Electronically signed by YAO Perez on 6/22/2023 at 9:20 AM

## 2023-07-14 ENCOUNTER — TELEPHONE (OUTPATIENT)
Dept: INTERNAL MEDICINE CLINIC | Age: 88
End: 2023-07-14

## 2023-07-14 NOTE — TELEPHONE ENCOUNTER
Emmanuelle Sherwood speech therapist from [facility] called to let us know that she has switched him to a puree diet due to several factors including difficult swallowing and fatigue.

## 2023-08-04 ENCOUNTER — OFFICE VISIT (OUTPATIENT)
Dept: INTERNAL MEDICINE CLINIC | Age: 88
End: 2023-08-04
Payer: MEDICARE

## 2023-08-04 DIAGNOSIS — Z09 HOSPITAL DISCHARGE FOLLOW-UP: Primary | ICD-10-CM

## 2023-08-04 DIAGNOSIS — M48.061 SPINAL STENOSIS OF LUMBAR REGION WITHOUT NEUROGENIC CLAUDICATION: ICD-10-CM

## 2023-08-04 DIAGNOSIS — G89.4 CHRONIC PAIN SYNDROME: ICD-10-CM

## 2023-08-04 PROCEDURE — 1111F DSCHRG MED/CURRENT MED MERGE: CPT | Performed by: INTERNAL MEDICINE

## 2023-08-04 PROCEDURE — G9692 HOSP RECD BY PT DUR MSMT PER: HCPCS | Performed by: INTERNAL MEDICINE

## 2023-08-04 PROCEDURE — 99215 OFFICE O/P EST HI 40 MIN: CPT | Performed by: INTERNAL MEDICINE

## 2023-08-04 RX ORDER — ERYTHROMYCIN 5 MG/G
OINTMENT OPHTHALMIC
Qty: 3.5 G | Refills: 0 | Status: SHIPPED | OUTPATIENT
Start: 2023-08-04

## 2023-08-04 RX ORDER — OLOPATADINE HYDROCHLORIDE 1 MG/ML
1 SOLUTION/ DROPS OPHTHALMIC 2 TIMES DAILY
Qty: 1 EACH | Refills: 11 | Status: SHIPPED | OUTPATIENT
Start: 2023-08-04 | End: 2023-09-03

## 2023-08-04 RX ORDER — TRAMADOL HYDROCHLORIDE 50 MG/1
50 TABLET ORAL 2 TIMES DAILY PRN
Qty: 60 TABLET | Refills: 0 | Status: SHIPPED | OUTPATIENT
Start: 2023-08-04 | End: 2023-09-03

## 2023-08-04 RX ORDER — POLYETHYLENE GLYCOL 3350 17 G/17G
17 POWDER, FOR SOLUTION ORAL DAILY
Qty: 1530 G | Refills: 1 | Status: SHIPPED | OUTPATIENT
Start: 2023-08-04

## 2023-08-04 NOTE — PROGRESS NOTES
Post-Discharge Transitional Care Follow Up    Kandace Nguyen   YOB: 1931    Date of Office Visit:  8/4/2023  Date of Hospital Admission: 6/13/23  Date of Hospital Discharge: 6/22/23 , then to rehab until discharged home on 7/28/23    Care management risk score Rising risk (score 2-5) and Complex Care (Scores >=6): No Risk Score On File     Non face to face  following discharge, date last encounter closed (first attempt may have been earlier): *No documented post hospital discharge outreach found in the last 14 days     Call initiated 2 business days of discharge: *No response recorded in the last 14 days    ASSESSMENT/PLAN:   Hospital discharge follow-up--encouraged continued work with PT exercises  - [id number] MED LIST  Chronic pain syndrome--plan has been for prn tramadol, however, patient hasn't been requesting refills. REviewed process for this and reordered for him. Med agreement in place and OARRS checked. -     traMADol (ULTRAM) 50 MG tablet; Take 1 tablet by mouth 2 times daily as needed for Pain for up to 30 days. , Disp-60 tablet, R-0Normal  Spinal stenosis of lumbar region without neurogenic claudication  -     traMADol (ULTRAM) 50 MG tablet; Take 1 tablet by mouth 2 times daily as needed for Pain for up to 30 days. , Disp-60 tablet, R-0Normal    Medical Decision Making: high complexity  No follow-ups on file. On this date 8/4/2023 I have spent 45 minutes reviewing previous notes, test results and face to face with the patient discussing the diagnosis and importance of compliance with the treatment plan as well as documenting on the day of the visit. Subjective:   HPI REviewed hospital discharge. Admitted for altered mental status found to be septis with hypoxic resp failure/CAP. Also had RONI that resolved and was transfused one unit for chronic anemia.     States has been gradually improving and pretty much back to baseline pre-admit

## 2023-08-15 ENCOUNTER — PATIENT MESSAGE (OUTPATIENT)
Dept: INTERNAL MEDICINE CLINIC | Age: 88
End: 2023-08-15

## 2023-08-15 DIAGNOSIS — I10 ESSENTIAL HYPERTENSION: ICD-10-CM

## 2023-08-15 DIAGNOSIS — N40.1 BENIGN NODULAR PROSTATIC HYPERPLASIA WITH LOWER URINARY TRACT SYMPTOMS: ICD-10-CM

## 2023-08-15 DIAGNOSIS — I25.10 CORONARY ARTERY DISEASE INVOLVING NATIVE CORONARY ARTERY OF NATIVE HEART WITHOUT ANGINA PECTORIS: ICD-10-CM

## 2023-08-15 RX ORDER — TAMSULOSIN HYDROCHLORIDE 0.4 MG/1
CAPSULE ORAL
Qty: 90 CAPSULE | Refills: 3 | Status: SHIPPED | OUTPATIENT
Start: 2023-08-15

## 2023-08-15 RX ORDER — CARVEDILOL 3.12 MG/1
TABLET ORAL
Qty: 180 TABLET | Refills: 3 | Status: SHIPPED | OUTPATIENT
Start: 2023-08-15

## 2023-08-15 RX ORDER — FERROUS SULFATE TAB EC 324 MG (65 MG FE EQUIVALENT) 324 (65 FE) MG
324 TABLET DELAYED RESPONSE ORAL
Qty: 90 TABLET | Refills: 3 | Status: SHIPPED | OUTPATIENT
Start: 2023-08-15

## 2023-08-15 NOTE — TELEPHONE ENCOUNTER
From: Gonzales Romero  To: Dr. Wanda Brewer: 8/15/2023 12:06 PM EDT  Subject: I need a refill    This message is being sent by Javier Sorensen on behalf of Gonzales Romero.     I need a refill on my prescription of ferrous sulfate 324MG ex tabs red  Thank you  Laura Swann

## 2023-08-17 VITALS
HEART RATE: 59 BPM | SYSTOLIC BLOOD PRESSURE: 104 MMHG | RESPIRATION RATE: 12 BRPM | BODY MASS INDEX: 21.18 KG/M2 | DIASTOLIC BLOOD PRESSURE: 60 MMHG | OXYGEN SATURATION: 98 % | WEIGHT: 131.2 LBS

## 2023-08-21 DIAGNOSIS — I25.10 CORONARY ARTERY DISEASE INVOLVING NATIVE CORONARY ARTERY OF NATIVE HEART WITHOUT ANGINA PECTORIS: ICD-10-CM

## 2023-08-22 RX ORDER — FERROUS SULFATE TAB EC 324 MG (65 MG FE EQUIVALENT) 324 (65 FE) MG
324 TABLET DELAYED RESPONSE ORAL
Qty: 90 TABLET | Refills: 3 | OUTPATIENT
Start: 2023-08-22

## 2023-08-22 RX ORDER — ATORVASTATIN CALCIUM 40 MG/1
40 TABLET, FILM COATED ORAL DAILY
Qty: 90 TABLET | Refills: 0 | Status: SHIPPED | OUTPATIENT
Start: 2023-08-22

## 2023-09-07 ENCOUNTER — OFFICE VISIT (OUTPATIENT)
Dept: INTERNAL MEDICINE CLINIC | Age: 88
End: 2023-09-07
Payer: MEDICARE

## 2023-09-07 VITALS
RESPIRATION RATE: 12 BRPM | WEIGHT: 135.2 LBS | OXYGEN SATURATION: 98 % | HEART RATE: 53 BPM | SYSTOLIC BLOOD PRESSURE: 133 MMHG | DIASTOLIC BLOOD PRESSURE: 60 MMHG | BODY MASS INDEX: 21.82 KG/M2

## 2023-09-07 DIAGNOSIS — N18.6 ESRD (END STAGE RENAL DISEASE) (HCC): Primary | ICD-10-CM

## 2023-09-07 DIAGNOSIS — Z51.5 END OF LIFE CARE: ICD-10-CM

## 2023-09-07 DIAGNOSIS — G89.4 CHRONIC PAIN SYNDROME: ICD-10-CM

## 2023-09-07 DIAGNOSIS — R54 FRAILTY SYNDROME IN GERIATRIC PATIENT: ICD-10-CM

## 2023-09-07 DIAGNOSIS — M48.061 SPINAL STENOSIS OF LUMBAR REGION WITHOUT NEUROGENIC CLAUDICATION: ICD-10-CM

## 2023-09-07 DIAGNOSIS — G89.29 CHRONIC MIDLINE LOW BACK PAIN WITHOUT SCIATICA: ICD-10-CM

## 2023-09-07 DIAGNOSIS — M54.50 CHRONIC MIDLINE LOW BACK PAIN WITHOUT SCIATICA: ICD-10-CM

## 2023-09-07 PROBLEM — R62.7 FAILURE TO THRIVE IN ADULT: Status: ACTIVE | Noted: 2019-12-16

## 2023-09-07 PROCEDURE — 99215 OFFICE O/P EST HI 40 MIN: CPT | Performed by: INTERNAL MEDICINE

## 2023-09-07 PROCEDURE — G9692 HOSP RECD BY PT DUR MSMT PER: HCPCS | Performed by: INTERNAL MEDICINE

## 2023-09-07 RX ORDER — TRAMADOL HYDROCHLORIDE 50 MG/1
50 TABLET ORAL EVERY 8 HOURS PRN
Qty: 90 TABLET | Refills: 0 | Status: SHIPPED | OUTPATIENT
Start: 2023-09-07 | End: 2023-10-07

## 2023-09-07 NOTE — PROGRESS NOTES
Chief Complaint   Patient presents with    Chronic Kidney Disease       HPI: Here for ckd followup and management of multiple chronic conditions as per the active problems list on chart, which I reviewed and updated with the patient today. Nephrologist has given him 2-6 months. Patient is fine with this but son thinks he is upset. I have reviewed the chart notes available from myself and other providers. I have reviewed and addressed all active problems and created or updated the problems list in detail, as needed. No problem-specific Assessment & Plan notes found for this encounter.       I have extensively reviewed and reconciled the medication list, discontinued medications not taking or no longer appropriate, and updated the active meds list      No results found for: \"LABA1C\"    Lab Results   Component Value Date     09/01/2023     06/22/2023     06/21/2023    K 4.5 09/01/2023    K 4.3 06/22/2023    K 4.1 06/21/2023     (H) 09/01/2023     06/22/2023     06/21/2023    CO2 16 (L) 09/01/2023    CO2 23 06/22/2023    CO2 21 06/21/2023    BUN 68 (H) 09/01/2023    BUN 45 (H) 06/22/2023    BUN 49 (H) 06/21/2023    CREATININE 3.2 (H) 09/01/2023    CREATININE 2.6 (H) 06/22/2023    CREATININE 2.6 (H) 06/21/2023    GLUCOSE 75 09/01/2023    GLUCOSE 108 (H) 06/22/2023    GLUCOSE 113 (H) 06/21/2023    CALCIUM 8.9 09/01/2023    CALCIUM 8.3 06/22/2023    CALCIUM 8.0 (L) 06/21/2023       Lab Results   Component Value Date    CHOL 119 04/28/2023    CHOL 117 11/05/2021    CHOL 120 10/05/2020    TRIG 78 04/28/2023    TRIG 75 11/05/2021    TRIG 130 10/05/2020    HDL 75 (H) 04/28/2023    HDL 58 11/05/2021    HDL 55 10/05/2020    LDLCALC 28 04/28/2023    LDLCALC 44 11/05/2021    LDLCALC 39 10/05/2020       Lab Results   Component Value Date    ALT 9 (L) 06/13/2023    ALT 17 04/28/2023    ALT 22 01/31/2023    AST 14 (L) 06/13/2023    AST 18 04/28/2023    AST 23 01/31/2023       Lab

## 2023-09-29 ENCOUNTER — TELEPHONE (OUTPATIENT)
Dept: CARDIOLOGY CLINIC | Age: 88
End: 2023-09-29

## 2023-09-29 NOTE — TELEPHONE ENCOUNTER
Holli/RN at [address] [place] [address] called in this afternoon, she states patients pulse is 47, blood pressure 118/52, there NP stopped patients carvedilol because the pulse is under 60, they would like to know if Dr. Demarcus Velazquez wants to stop patients carvedilol for good. She states patient will be starting hospice care. She can be reached at [phone].

## 2023-09-29 NOTE — TELEPHONE ENCOUNTER
Called and talked to Thanh, he stated that he is the one that puts his medications together I did let him know that Dr. Elisa Tavera was ok with him stopping the carvedilol.  He v/u

## 2023-09-30 ENCOUNTER — PATIENT MESSAGE (OUTPATIENT)
Dept: INTERNAL MEDICINE CLINIC | Age: 88
End: 2023-09-30

## 2023-10-02 RX ORDER — PANTOPRAZOLE SODIUM 40 MG/1
40 TABLET, DELAYED RELEASE ORAL 2 TIMES DAILY
Qty: 60 TABLET | Refills: 3 | Status: SHIPPED | OUTPATIENT
Start: 2023-10-02

## 2023-10-02 NOTE — TELEPHONE ENCOUNTER
From: Iliana Roblero  To: Dr. Keon Mcgill: 9/30/2023 12:23 PM EDT  Subject: Prescription refill    My dad Iliana Roblero) needs a refill of his prescription of   Pantoprazole 40 my tablets as soon as possible. Thank you in advance. Thanh RICHARD, please send it under the birthday of 6/16/1931. Otherwise, [facility] will not give it to him. Because it does not match Medicare records or [facility] records. That's why we have not been able to get his Tramadol.

## 2023-10-10 NOTE — TELEPHONE ENCOUNTER
For some reason we had the wrong birth date. I changed the date because I looked at all his other forms and it has the [birth year] date on it.

## 2023-10-11 RX ORDER — PANTOPRAZOLE SODIUM 40 MG/1
40 TABLET, DELAYED RELEASE ORAL 2 TIMES DAILY
Qty: 60 TABLET | Refills: 3 | OUTPATIENT
Start: 2023-10-11

## 2023-11-01 ENCOUNTER — OFFICE VISIT (OUTPATIENT)
Dept: CARDIOLOGY CLINIC | Age: 88
End: 2023-11-01

## 2023-11-01 VITALS
HEIGHT: 66 IN | WEIGHT: 132 LBS | DIASTOLIC BLOOD PRESSURE: 60 MMHG | SYSTOLIC BLOOD PRESSURE: 122 MMHG | BODY MASS INDEX: 21.21 KG/M2 | OXYGEN SATURATION: 100 % | HEART RATE: 64 BPM

## 2023-11-01 DIAGNOSIS — N18.4 STAGE 4 CHRONIC KIDNEY DISEASE (HCC): ICD-10-CM

## 2023-11-01 DIAGNOSIS — I25.10 CORONARY ARTERY DISEASE INVOLVING NATIVE CORONARY ARTERY OF NATIVE HEART WITHOUT ANGINA PECTORIS: Primary | ICD-10-CM

## 2023-11-01 DIAGNOSIS — I10 ESSENTIAL HYPERTENSION: ICD-10-CM

## 2023-11-01 DIAGNOSIS — R00.1 BRADYCARDIA: ICD-10-CM

## 2023-11-01 DIAGNOSIS — E78.5 HYPERLIPIDEMIA LDL GOAL <70: ICD-10-CM

## 2023-11-01 NOTE — PROGRESS NOTES
[address]  6/16/1931 November 1, 2023    CC: \"Mt heart rate was low\"    HPI: The patient is 80 y.o. male  with a past medical history significant for CAD and prior PCI who came to [facility] with chest pain. Pain started yesterday afternoon and waxed and waned. Came back and was worse this morning so he came to the ER. EKG showed inferior STEMI, and he was taken emergently to the cath lab. He had a a 3.25 X 18mm Xience NANCY placed in a 100% occluded Circumflex artery. He presented  for follow up 6/14/22 accompanied by his son. He continues to follow with Dr. Manny Covarrubias. His breathing has been comfortable without shortness of breath. He reports episodes of dizziness that presents with changes in positions. Today, he presents for bradycardia reported 9/29/2023. We discontinued his carvedilol. He states he has lost weight 10# since the spring time. Upon further review, our last follow up 6/2022 where his weight was 149# 9.6 oz. He notes he is not eating and drinking very much and has not discussed with his PCP. Encouraged follow up. Patient denies exertional chest pain/pressure, dyspnea at rest, COLVIN, PND, orthopnea, palpitations, lightheadedness, weight changes, changes in LE edema, and syncope. The patient admits to medical therapy compliance and feels he is tolerating. Encouraged to use protein shakes. Review of Systems:  Constitutional: No fatigue, weakness, night sweats or fever. HEENT: No new vision difficulties or ringing in the ears. Respiratory: No new SOB, PND, orthopnea or cough. Cardiovascular: See HPI   GI: No n/v, diarrhea, constipation, abdominal pain or changes in bowel habits. No melena, no hematochezia  : No urinary frequency, urgency, incontinence, hematuria or dysuria. Skin: No cyanosis or skin lesions. Musculoskeletal: No new muscle or joint pain. Neurological: No syncope or TIA-like symptoms.   Psychiatric: No anxiety, insomnia or

## 2023-12-07 ENCOUNTER — OFFICE VISIT (OUTPATIENT)
Dept: INTERNAL MEDICINE CLINIC | Age: 88
End: 2023-12-07
Payer: MEDICARE

## 2023-12-07 VITALS
DIASTOLIC BLOOD PRESSURE: 61 MMHG | RESPIRATION RATE: 12 BRPM | WEIGHT: 132.6 LBS | HEART RATE: 52 BPM | OXYGEN SATURATION: 98 % | BODY MASS INDEX: 21.4 KG/M2 | SYSTOLIC BLOOD PRESSURE: 156 MMHG

## 2023-12-07 DIAGNOSIS — D69.6 THROMBOCYTOPENIA, UNSPECIFIED (HCC): ICD-10-CM

## 2023-12-07 DIAGNOSIS — M54.50 CHRONIC MIDLINE LOW BACK PAIN WITHOUT SCIATICA: ICD-10-CM

## 2023-12-07 DIAGNOSIS — G89.29 CHRONIC MIDLINE LOW BACK PAIN WITHOUT SCIATICA: ICD-10-CM

## 2023-12-07 DIAGNOSIS — R54 FRAILTY SYNDROME IN GERIATRIC PATIENT: ICD-10-CM

## 2023-12-07 DIAGNOSIS — I10 ESSENTIAL HYPERTENSION: ICD-10-CM

## 2023-12-07 DIAGNOSIS — G89.4 CHRONIC PAIN SYNDROME: ICD-10-CM

## 2023-12-07 DIAGNOSIS — Z51.5 HOSPICE CARE PATIENT: ICD-10-CM

## 2023-12-07 DIAGNOSIS — I25.10 CORONARY ARTERY DISEASE INVOLVING NATIVE CORONARY ARTERY OF NATIVE HEART WITHOUT ANGINA PECTORIS: ICD-10-CM

## 2023-12-07 DIAGNOSIS — N18.6 ESRD (END STAGE RENAL DISEASE) (HCC): Primary | ICD-10-CM

## 2023-12-07 PROBLEM — I20.9 ANGINA PECTORIS, UNSPECIFIED (HCC): Status: RESOLVED | Noted: 2023-04-25 | Resolved: 2023-12-07

## 2023-12-07 PROBLEM — K21.9 GASTRO-ESOPHAGEAL REFLUX DISEASE WITHOUT ESOPHAGITIS: Status: ACTIVE | Noted: 2023-06-22

## 2023-12-07 PROBLEM — K59.09 CHRONIC CONSTIPATION: Status: RESOLVED | Noted: 2017-02-17 | Resolved: 2023-12-07

## 2023-12-07 PROBLEM — R26.2 DIFFICULTY IN WALKING, NOT ELSEWHERE CLASSIFIED: Status: ACTIVE | Noted: 2023-06-22

## 2023-12-07 PROCEDURE — 99215 OFFICE O/P EST HI 40 MIN: CPT | Performed by: INTERNAL MEDICINE

## 2023-12-07 PROCEDURE — G9692 HOSP RECD BY PT DUR MSMT PER: HCPCS | Performed by: INTERNAL MEDICINE

## 2023-12-07 RX ORDER — AMLODIPINE BESYLATE 2.5 MG/1
TABLET ORAL
COMMUNITY
Start: 2023-10-10

## 2023-12-07 RX ORDER — ESCITALOPRAM OXALATE 10 MG/1
TABLET ORAL
COMMUNITY
Start: 2023-11-12

## 2023-12-07 RX ORDER — LANOLIN ALCOHOL/MO/W.PET/CERES
3 CREAM (GRAM) TOPICAL DAILY
COMMUNITY

## 2023-12-07 NOTE — PROGRESS NOTES
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 States doing well with no new concerns except if noted below. I have reviewed the chart notes available from myself and other providers. I have reviewed and addressed all active problems and created or updated the problems list in detail, as needed. No problem-specific Assessment & Plan notes found for this encounter.       I have extensively reviewed and reconciled the medication list, discontinued medications not taking or no longer appropriate, and updated the active meds list      No results found for: \"LABA1C\"    Lab Results   Component Value Date     09/01/2023     06/22/2023     06/21/2023    K 4.5 09/01/2023    K 4.3 06/22/2023    K 4.1 06/21/2023     (H) 09/01/2023     06/22/2023     06/21/2023    CO2 16 (L) 09/01/2023    CO2 23 06/22/2023    CO2 21 06/21/2023    BUN 68 (H) 09/01/2023    BUN 45 (H) 06/22/2023    BUN 49 (H) 06/21/2023    CREATININE 3.2 (H) 09/01/2023    CREATININE 2.6 (H) 06/22/2023    CREATININE 2.6 (H) 06/21/2023    GLUCOSE 75 09/01/2023    GLUCOSE 108 (H) 06/22/2023    GLUCOSE 113 (H) 06/21/2023    CALCIUM 8.9 09/01/2023    CALCIUM 8.3 06/22/2023    CALCIUM 8.0 (L) 06/21/2023       Lab Results   Component Value Date    CHOL 119 04/28/2023    CHOL 117 11/05/2021    CHOL 120 10/05/2020    TRIG 78 04/28/2023    TRIG 75 11/05/2021    TRIG 130 10/05/2020    HDL 75 (H) 04/28/2023    HDL 58 11/05/2021    HDL 55 10/05/2020    LDLCALC 28 04/28/2023    LDLCALC 44 11/05/2021    LDLCALC 39 10/05/2020       Lab Results   Component Value Date    ALT 9 (L) 06/13/2023    ALT 17 04/28/2023    ALT 22 01/31/2023

## 2023-12-26 PROBLEM — D69.6 THROMBOCYTOPENIA, UNSPECIFIED (HCC): Status: ACTIVE | Noted: 2023-12-26

## 2024-01-20 DIAGNOSIS — K59.01 SLOW TRANSIT CONSTIPATION: ICD-10-CM

## 2024-01-22 RX ORDER — SENNOSIDES A AND B 8.6 MG/1
1 TABLET, FILM COATED ORAL 2 TIMES DAILY
Qty: 60 TABLET | Refills: 11 | Status: SHIPPED | OUTPATIENT
Start: 2024-01-22 | End: 2025-01-21

## 2024-02-07 ENCOUNTER — TELEPHONE (OUTPATIENT)
Dept: INTERNAL MEDICINE CLINIC | Age: 89
End: 2024-02-07

## 2024-02-07 NOTE — TELEPHONE ENCOUNTER
Pt is just getting over being ill and is very weak and just now starting to be able to stand. Pt can not go down steps, nurse comes on a daily visit. So pt can not get out of the house at this time. Looking to see if the pt can get help going up and down stairs. Can do a video call appt on appt day and does pt need blood work for his kidneys  Please advise

## 2024-02-15 ENCOUNTER — TELEPHONE (OUTPATIENT)
Dept: INTERNAL MEDICINE CLINIC | Age: 89
End: 2024-02-15

## 2024-02-15 ENCOUNTER — TELEMEDICINE (OUTPATIENT)
Dept: INTERNAL MEDICINE CLINIC | Age: 89
End: 2024-02-15
Payer: MEDICARE

## 2024-02-15 DIAGNOSIS — J20.8 VIRAL BRONCHITIS: Primary | ICD-10-CM

## 2024-02-15 DIAGNOSIS — F32.1 CURRENT MODERATE EPISODE OF MAJOR DEPRESSIVE DISORDER WITHOUT PRIOR EPISODE (HCC): ICD-10-CM

## 2024-02-15 DIAGNOSIS — D69.6 THROMBOCYTOPENIA, UNSPECIFIED (HCC): ICD-10-CM

## 2024-02-15 DIAGNOSIS — I10 ESSENTIAL HYPERTENSION: ICD-10-CM

## 2024-02-15 DIAGNOSIS — I25.119 ATHEROSCLEROSIS OF NATIVE CORONARY ARTERY OF NATIVE HEART WITH ANGINA PECTORIS (HCC): ICD-10-CM

## 2024-02-15 DIAGNOSIS — N18.6 ESRD (END STAGE RENAL DISEASE) (HCC): ICD-10-CM

## 2024-02-15 DIAGNOSIS — J61 ASBESTOSIS (HCC): ICD-10-CM

## 2024-02-15 PROCEDURE — 99213 OFFICE O/P EST LOW 20 MIN: CPT | Performed by: INTERNAL MEDICINE

## 2024-02-15 PROCEDURE — G9692 HOSP RECD BY PT DUR MSMT PER: HCPCS | Performed by: INTERNAL MEDICINE

## 2024-02-15 NOTE — TELEPHONE ENCOUNTER
Patients son sent my chart message to change visit to a virtual visit.    OK to change to virtual this time can discuss options for future care if continues hospice and unable to make in person visits.    Please call son to schedule.

## 2024-02-15 NOTE — PROGRESS NOTES
No chief complaint on file.      HPI: Virtual visit via [employer] during covid-19 pandemic for  check in after recent acute (likely viral) illness with cough, congestion, decreased intake, worsened weakness. Son reports he is recovering well, improved eating, and although still coughing is getting better.    Also re: htn,  reports has been doing ok.     Medications reviewed and reconciled with what patient reports to be taking.    There were no vitals taken for this visit.    Physical Exam patient appears in usual state of health and no cough observed    ASSESSMENT/PLAN: Pt received counseling and, if relevant, printed instructions for all symptoms listed in CC and HPI, as well as for all diagnoses listed below.    1. Viral bronchitis--improving.discussed management and ER if worsening given overall frailty and lack of reserve.     2. Essential hypertension--improved control    3. Current moderate episode of major depressive disorder without prior episode (HCC)--stable    4. ESRD (end stage renal disease) (HCC)--pt has elected no dialysis, discussed hospice    5. Asbestosis (HCC)    6. Thrombocytopenia, unspecified (HCC)    7. 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

## 2024-04-11 ENCOUNTER — TELEPHONE (OUTPATIENT)
Dept: INTERNAL MEDICINE CLINIC | Age: 89
End: 2024-04-11

## 2024-04-11 NOTE — TELEPHONE ENCOUNTER
Pt daughter called and has questions about the pt. Pt daughter wants to talk privately about the pt. Phone [phone]

## 2024-04-12 NOTE — TELEPHONE ENCOUNTER
I returned a call to patient's daughter who is his health care decision maker and we together decided the best way for her to discuss his care is to schedule an upcoming appointment with Dr. Robertson.

## 2024-05-30 ENCOUNTER — TELEPHONE (OUTPATIENT)
Dept: INTERNAL MEDICINE CLINIC | Age: 89
End: 2024-05-30

## 2024-05-30 NOTE — TELEPHONE ENCOUNTER
Pat son and daughter called hospice in [facility] is wanting a note from the dr to verify complacency of this Pt. We do not do this test. This would be done by neurology. Or they can ask hospice in [place] about doing the test

## (undated) DEVICE — BITE BLOCK ENDOSCP AD 60 FR W/ ADJ STRP PLAS GRN BLOX

## (undated) DEVICE — Device: Brand: OLYMPUS

## (undated) DEVICE — ENDOSCOPY KIT: Brand: MEDLINE INDUSTRIES, INC.

## (undated) DEVICE — CYSTOSCOPY: Brand: MEDLINE INDUSTRIES, INC.

## (undated) DEVICE — DRAINBAG,ANTI-REFLUX TOWER,L/F,2000ML,LL: Brand: MEDLINE

## (undated) DEVICE — SYRINGE MED 30ML STD CLR PLAS LUERLOCK TIP N CTRL DISP

## (undated) DEVICE — NEEDLE 25GAX5.0MM INJ CARR LOCKE

## (undated) DEVICE — DALE FOLEY CATHETER HOLDER, LEGBAND, FITS UP TO 30": Brand: DALE FOLEY CATHETER HOLDER

## (undated) DEVICE — REPLAY HEMOSTASIS CLIP, 16MM SPAN: Brand: REPLAY

## (undated) DEVICE — GOWN SIRUS NONREIN XL W/TWL: Brand: MEDLINE INDUSTRIES, INC.

## (undated) DEVICE — CATHETER URETH 24FR BLLN 30CC RED LTX 3 W F SPEC M RND TIP

## (undated) DEVICE — MAT FLR W32XL58IN

## (undated) DEVICE — BIPOLAR ELECTROHEMOSTASIS CATHETER: Brand: INJECTION GOLD PROBE

## (undated) DEVICE — GLOVE SURG SZ 75 CRM LTX FREE POLYISOPRENE POLYMER BEAD ANTI